# Patient Record
Sex: FEMALE | Race: WHITE | NOT HISPANIC OR LATINO | Employment: OTHER | URBAN - METROPOLITAN AREA
[De-identification: names, ages, dates, MRNs, and addresses within clinical notes are randomized per-mention and may not be internally consistent; named-entity substitution may affect disease eponyms.]

---

## 2022-01-03 ENCOUNTER — OFFICE VISIT (OUTPATIENT)
Dept: INTERNAL MEDICINE | Facility: CLINIC | Age: 82
End: 2022-01-03
Payer: MEDICARE

## 2022-01-03 VITALS
BODY MASS INDEX: 18.25 KG/M2 | SYSTOLIC BLOOD PRESSURE: 128 MMHG | WEIGHT: 103 LBS | DIASTOLIC BLOOD PRESSURE: 70 MMHG | HEIGHT: 63 IN | HEART RATE: 65 BPM | OXYGEN SATURATION: 99 %

## 2022-01-03 DIAGNOSIS — M06.9 RHEUMATOID ARTHRITIS INVOLVING BOTH HANDS, UNSPECIFIED WHETHER RHEUMATOID FACTOR PRESENT (H): Primary | ICD-10-CM

## 2022-01-03 DIAGNOSIS — Z12.31 VISIT FOR SCREENING MAMMOGRAM: ICD-10-CM

## 2022-01-03 PROCEDURE — 99204 OFFICE O/P NEW MOD 45 MIN: CPT | Performed by: INTERNAL MEDICINE

## 2022-01-03 RX ORDER — CARBOXYMETHYLCELLULOSE SODIUM 5 MG/ML
1 SOLUTION/ DROPS OPHTHALMIC 3 TIMES DAILY PRN
COMMUNITY

## 2022-01-03 RX ORDER — FERROUS GLUCONATE 324(38)MG
65 TABLET ORAL
COMMUNITY
End: 2023-08-24

## 2022-01-03 RX ORDER — PREDNISONE 1 MG/1
1 TABLET ORAL DAILY
COMMUNITY
Start: 2021-11-19 | End: 2023-01-25

## 2022-01-03 RX ORDER — FOLIC ACID 1 MG/1
1 TABLET ORAL DAILY
COMMUNITY
Start: 2021-10-26 | End: 2024-08-01

## 2022-01-03 ASSESSMENT — MIFFLIN-ST. JEOR: SCORE: 883.39

## 2022-01-03 NOTE — PROGRESS NOTES
Assessment & Plan     Rheumatoid arthritis involving both hands, unspecified whether rheumatoid factor present (H)  -Patient has a diagnosis of RA, since age 45. Currently on Orencia every 8-10 weeks, will have a dose this weekend (at [place]). She lives in [place] now and needs a rheumatologist here to follow up on her RA. She is on Methotrexate 10 mg weekly and prednisone 2 mg daily.      Patient has a diagnosis of RA made a long time ago, on treatment. Has a left hand splint that she wears every night. 3-5 years,s he thinks she needs another one.     - Rheumatology Referral  - Occupational Therapy Referral    Visit for screening mammogram  - *MA Screening Digital Bilateral    Patient is here to establish primary care.  She has been following with [facility], in [facility].  She has moved to [facility], and would like to be seen in this clinic.  She lives in an apartment, independent living, at the [facility].  She says it is nice to be around people.  She has a son Isael who lives in the [facility].  She has another son who lives out of [facility].  Her daughter Miryam, who has come with her to this appointment, lives in [place].    Patient has been on iron for years.    Patient has had both COVID shots, and also a booster at [facility] in [place].  She had the BX done in early September 2021.    Patient is still driving, although she has not been driving in [place], and she is new to the area.  She was driving in her community, short distances.    She is able to go to the dining room at her facility.  However she tends to want to make her own food, and has no difficulties with this.  She does not have an assistive device.  She is able to do most things, even with the deformity involving her hands.    Patient has had surgery in her hands and feet, related to her rheumatoid arthritis.    Decreased estimated GFR, 57, November 1, 2021.  Discussed with patient.  On the same date, she had a  hemoglobin of 12.7 which is normal, but elevated .8, which may be related to her medications she is on for rheumatoid arthritis.  Normal platelet count, and normal white cell count.    45 minutes spent on the date of the encounter doing chart review, review of outside records, review of test results, patient visit and documentation, as detailed.       MEDICATIONS:   Orders Placed This Encounter   Medications     folic acid (FOLVITE) 1 MG tablet     Sig: Take 1 tablet by mouth daily     methotrexate 2.5 MG tablet     Sig: Take 10 mg by mouth     predniSONE (DELTASONE) 1 MG tablet     Sig: Take 2 mg by mouth     ferrous gluconate (FERGON) 324 (38 Fe) MG tablet     Sig: Take 324 mg by mouth daily (with breakfast)     Multiple Vitamin (MULTIVITAMIN ADULT PO)     calcium carbonate-vitamin D (OSCAL W/D) 500-200 MG-UNIT tablet     Sig: Take 1 tablet by mouth 2 times daily     Abatacept (ORENCIA IV)     carboxymethylcellulose PF (REFRESH PLUS) 0.5 % ophthalmic solution     Si drop 3 times daily as needed for dry eyes     There are no discontinued medications.       - Continue other medications without change  There are no Patient Instructions on file for this visit.    Return in about 6 months (around 7/3/2022), or Anual wellness, for with me, Routine preventive.    Cristine Whaley MD  [facility]    Nicholas Tidwell is a 81 year old who presents for the following health issues,     HPI   Patient is here to establish primary care.  Patient moved here from a different state.    No past medical history on file.   45 when RA was diagnosed.     No past surgical history on file.    No family history on file.    Social History     Tobacco Use     Smoking status: Never Smoker     Smokeless tobacco: Never Used   Substance Use Topics     Alcohol use: None     Drug use: None     Allergies:  Allergies   Allergen Reactions     Infliximab Rash     Throat began to close/tighten      "Sulfa Drugs Rash     Hydrocodone-Acetaminophen Nausea     Oxycodone-Acetaminophen Nausea     Penicillins Rash     Erythromycin Nausea     Has a splint that she wears on her left hand. Was custom made in [place].       Review of Systems   Rest of the review of systems is negative.      Objective    /70   Pulse 65   Ht 1.588 m (5' 2.5\")   Wt 46.7 kg (103 lb)   SpO2 99%   BMI 18.54 kg/m    Body mass index is 18.54 kg/m .  Physical Exam   Patient has deformities of rheumatoid arthritis in her hands.  Patient is not distressed.  She is interactive and alert and able to tell me her story.  Chest is clear, normal heart sounds, do not hear any murmurs.  No peripheral edema.  No active swelling or warmth of her joints, but has significant deformities as noted.    "

## 2022-01-14 PROBLEM — M06.9 RHEUMATOID ARTHRITIS INVOLVING BOTH HANDS, UNSPECIFIED WHETHER RHEUMATOID FACTOR PRESENT (H): Status: ACTIVE | Noted: 2022-01-14

## 2022-02-06 ENCOUNTER — HEALTH MAINTENANCE LETTER (OUTPATIENT)
Age: 82
End: 2022-02-06

## 2022-03-03 ENCOUNTER — HOSPITAL ENCOUNTER (OUTPATIENT)
Dept: MAMMOGRAPHY | Facility: CLINIC | Age: 82
Discharge: HOME | End: 2022-03-03
Attending: INTERNAL MEDICINE | Admitting: INTERNAL MEDICINE
Payer: MEDICARE

## 2022-03-03 DIAGNOSIS — Z12.31 VISIT FOR SCREENING MAMMOGRAM: ICD-10-CM

## 2022-03-03 PROCEDURE — 77067 SCR MAMMO BI INCL CAD: CPT

## 2022-04-15 ASSESSMENT — ACTIVITIES OF DAILY LIVING (ADL): CURRENT_FUNCTION: NO ASSISTANCE NEEDED

## 2022-04-18 ENCOUNTER — OFFICE VISIT (OUTPATIENT)
Dept: INTERNAL MEDICINE | Facility: CLINIC | Age: 82
End: 2022-04-18
Payer: MEDICARE

## 2022-04-18 VITALS
SYSTOLIC BLOOD PRESSURE: 110 MMHG | BODY MASS INDEX: 17.89 KG/M2 | HEIGHT: 63 IN | WEIGHT: 101 LBS | HEART RATE: 74 BPM | DIASTOLIC BLOOD PRESSURE: 60 MMHG | OXYGEN SATURATION: 97 %

## 2022-04-18 DIAGNOSIS — Z23 HIGH PRIORITY FOR 2019-NCOV VACCINE: ICD-10-CM

## 2022-04-18 DIAGNOSIS — Z78.0 POST-MENOPAUSE: ICD-10-CM

## 2022-04-18 DIAGNOSIS — Z13.21 SCREENING FOR ENDOCRINE, NUTRITIONAL, METABOLIC AND IMMUNITY DISORDER: ICD-10-CM

## 2022-04-18 DIAGNOSIS — K90.41 GLUTEN INTOLERANCE: ICD-10-CM

## 2022-04-18 DIAGNOSIS — Z85.828 HISTORY OF SKIN CANCER: ICD-10-CM

## 2022-04-18 DIAGNOSIS — Z13.228 SCREENING FOR ENDOCRINE, NUTRITIONAL, METABOLIC AND IMMUNITY DISORDER: ICD-10-CM

## 2022-04-18 DIAGNOSIS — Z00.00 MEDICARE ANNUAL WELLNESS VISIT, SUBSEQUENT: ICD-10-CM

## 2022-04-18 DIAGNOSIS — H61.23 IMPACTED CERUMEN, BILATERAL: ICD-10-CM

## 2022-04-18 DIAGNOSIS — R19.7 DIARRHEA, UNSPECIFIED TYPE: ICD-10-CM

## 2022-04-18 DIAGNOSIS — M06.9 RHEUMATOID ARTHRITIS INVOLVING BOTH HANDS, UNSPECIFIED WHETHER RHEUMATOID FACTOR PRESENT (H): ICD-10-CM

## 2022-04-18 DIAGNOSIS — E55.9 VITAMIN D DEFICIENCY, UNSPECIFIED: ICD-10-CM

## 2022-04-18 DIAGNOSIS — Z13.0 SCREENING FOR ENDOCRINE, NUTRITIONAL, METABOLIC AND IMMUNITY DISORDER: ICD-10-CM

## 2022-04-18 DIAGNOSIS — Z13.29 SCREENING FOR ENDOCRINE, NUTRITIONAL, METABOLIC AND IMMUNITY DISORDER: ICD-10-CM

## 2022-04-18 PROBLEM — Z91.81 HISTORY OF FALLING: Status: ACTIVE | Noted: 2018-10-15

## 2022-04-18 PROBLEM — Z96.1 PRESENCE OF INTRAOCULAR LENS: Status: ACTIVE | Noted: 2018-09-17

## 2022-04-18 PROCEDURE — 91305 COVID-19,PF,PFIZER (12+ YRS): CPT | Performed by: NURSE PRACTITIONER

## 2022-04-18 PROCEDURE — 99214 OFFICE O/P EST MOD 30 MIN: CPT | Mod: 25 | Performed by: NURSE PRACTITIONER

## 2022-04-18 PROCEDURE — 0054A COVID-19,PF,PFIZER (12+ YRS): CPT | Performed by: NURSE PRACTITIONER

## 2022-04-18 PROCEDURE — 69209 REMOVE IMPACTED EAR WAX UNI: CPT | Performed by: NURSE PRACTITIONER

## 2022-04-18 PROCEDURE — G0439 PPPS, SUBSEQ VISIT: HCPCS | Performed by: NURSE PRACTITIONER

## 2022-04-18 ASSESSMENT — ACTIVITIES OF DAILY LIVING (ADL): CURRENT_FUNCTION: NO ASSISTANCE NEEDED

## 2022-04-18 NOTE — PROGRESS NOTES
Chief Complaint - ear wax removal    History of Present Illness - Diann Low is a 82 year old female who presents to me today for ear wax removal in *** ear. They have noted symptoms for approximately ***.  The patient is concerned about possible ear wax causing a plugged ear. They have *** had their ears cleaned out before. The patient denies vertigo, otorrhea, otalgia. ***   No history of ear surgery or chronic ear disease.     Past Medical History -   Patient Active Problem List   Diagnosis     Rheumatoid arthritis involving both hands, unspecified whether rheumatoid factor present (H)       Current Medications -   Current Outpatient Medications:      Abatacept (ORENCIA IV), , Disp: , Rfl:      calcium carbonate-vitamin D (OSCAL W/D) 500-200 MG-UNIT tablet, Take 1 tablet by mouth 2 times daily, Disp: , Rfl:      carboxymethylcellulose PF (REFRESH PLUS) 0.5 % ophthalmic solution, 1 drop 3 times daily as needed for dry eyes, Disp: , Rfl:      ferrous gluconate (FERGON) 324 (38 Fe) MG tablet, Take 324 mg by mouth daily (with breakfast), Disp: , Rfl:      folic acid (FOLVITE) 1 MG tablet, Take 1 tablet by mouth daily, Disp: , Rfl:      methotrexate 2.5 MG tablet, Take 25 mg by mouth Take 1 tablet 4 times a week, Disp: , Rfl:      Multiple Vitamin (MULTIVITAMIN ADULT PO), , Disp: , Rfl:      predniSONE (DELTASONE) 1 MG tablet, Take 1 mg by mouth 2 times daily, Disp: , Rfl:     Allergies -   Allergies   Allergen Reactions     Infliximab Rash     Throat began to close/tighten     Sulfa Drugs Rash     Hydrocodone-Acetaminophen Nausea     Oxycodone-Acetaminophen Nausea     Penicillins Rash     Erythromycin Nausea       Social History -   Social History     Socioeconomic History     Marital status: Single     Spouse name: None     Number of children: None     Years of education: None     Highest education level: None   Tobacco Use     Smoking status: Never Smoker     Smokeless tobacco: Never Used       Family History  - No family history on file.    Review of Systems - As per HPI and PMHx, otherwise 7 system review of the head and neck negative.    Physical Exam  B/P: 110/60, T: Data Unavailable, P: 74, R: Data Unavailable  General - The patient is in no distress.  Alert and oriented to person and place, answers questions and cooperates with examination appropriately.   Voice and Breathing - The patient was breathing comfortably without the use of accessory muscles. There was no wheezing, stridor, or stertor.  The patients voice was clear and strong.  Ears - The auricles are normal in appearance, no erythema. The *** ear canal was impacted with cerumen. See below for the procedure. Once the cerumen was removed the tympanic membranes are normal in appearance, bony landmarks are intact.  No retraction, perforation, or masses.  No fluid or purulence was seen in the external canal or the middle ear.   Eyes - Extraocular movements intact. Sclera were not icteric or injected.  Mouth - Examination of the oral cavity showed pink, healthy mucosa. No lesions or ulcerations noted.  The tongue was mobile and midline.  Throat - The walls of the oropharynx were smooth, symmetric, and had no lesions or ulcerations. The uvula was midline on elevation.    Nose - Septum was straight, turbinates were normal size. No purulence, masses, or polyps.  Neck - Palpation of the occipital, submental, submandibular, internal jugular chain, and supraclavicular nodes did not demonstrate any abnormal lymph nodes or masses. Parotid glands were without masses.  The trachea was mobile and midline.  Neurological - Cranial nerves 2 through 12 were grossly intact. House-Brackmann grade 1 out of 6 bilaterally.     Cerumen Removal    Physical Exam and Procedure  Ears - On examination of the ears, I found that both ears were impacted with cerumen.  Therefore, I positioned the patient in the examination chair in a semi-supine position. I used the binocular surgical  "microscope to perform cerumen removal.  On the right side, I began by using a cerumen loop to gently lift the edges of the cerumen mass away from the walls of the external canal.  Once I did this, I was able to *** pull/suction away fragments of wax and debris.  I removed all the wax and debri. The tympanic membrane was intact, no sign of perforation or middle ear effusion.    I turned my attention to the left side once again using the binocular surgical microscope to perform cerumen removal.  I began by using a cerumen loop to gently lift the edges of the cerumen mass away from the walls of the external canal.  Once I did this, I was able to *** pull/suction away fragments of wax and debris. I removed all wax and debri. The tympanic membrane was intact, no sign of perforation or middle ear effusion.      Assessment and Plan - Diann Low is a 82 year old female who presents to me today with cerumen impaction, and this was removed. We spent the remainder of today's visit on education including not putting any instrument in the ear. The patient can use over-the-counter items such as Debrox or Ceruminex.     The patient will follow up ***.    Meagan Tobin CNP  Answers for HPI/ROS submitted by the patient on 4/15/2022  In general, how would you rate your overall physical health?: good  Frequency of exercise:: 4-5 days/week  Do you usually eat at least 4 servings of fruit and vegetables a day, include whole grains & fiber, and avoid regularly eating high fat or \"junk\" foods? : Yes  Taking medications regularly:: Yes  Medication side effects:: None  Activities of Daily Living: no assistance needed  Home safety: no safety concerns identified  Hearing Impairment:: difficulty following a conversation in a noisy restaurant or crowded room, difficulty understanding soft or whispered speech  In the past 6 months, have you been bothered by leaking of urine?: Yes  In general, how would you rate your overall mental or " emotional health?: excellent  Additional concerns today:: Yes  Duration of exercise:: 45-60 minutes

## 2022-04-18 NOTE — PROGRESS NOTES
"SUBJECTIVE:   Diann Low is a 82 year old female who presents for Preventive Visit.      Patient has been advised of split billing requirements and indicates understanding: Yes  Are you in the first 12 months of your Medicare coverage?  No    The patient presents today with her Son Isael for her annual physical.    She is not fasted today.    She reports feeling like both of her ears are full of cerumen. She wears hearing aids, and feels like they are not working like they used to.    She reports that she needs to see a dermatologist, will refer her for this. She does have a history of skin cancer in the past, will refer her today.    She reports that she would like her fourth covid booster, will do this today.    She reports that she had diarrhea, was talking with her senior living neighbors and thinks that she might have a gluten intolerance. Will test her for this with her upcoming labs. She has started eating gluten free bread and crackers. Her diarrhea has subsided since her diet has changed. She also started drinking lactaid.    I will order her bone density scan, this has not been done for years.    Discussed her weight today. She is down to 101 pounds. She is cooking for herself mostly, eating three healthy, well rounded meals per day. Discussed trying to get a snack in, such as a boost or ensure daily. She will try this.     She will be establishing care with Rheumatology in June, she does have Rheumatoid arthritis. Arthritis pain has been well controlled with her current regime.    She will be establishing care with Dr. Peres in the future.     Healthy Habits:     In general, how would you rate your overall health?  Good    Frequency of exercise:  4-5 days/week    Duration of exercise:  45-60 minutes    Do you usually eat at least 4 servings of fruit and vegetables a day, include whole grains    & fiber and avoid regularly eating high fat or \"junk\" foods?  Yes    Taking medications regularly:  Yes   "  Medication side effects:  None    Ability to successfully perform activities of daily living:  No assistance needed    Home Safety:  No safety concerns identified    Hearing Impairment:  Difficulty following a conversation in a noisy restaurant or crowded room and difficulty understanding soft or whispered speech    In the past 6 months, have you been bothered by leaking of urine? Yes    In general, how would you rate your overall mental or emotional health?  Excellent      PHQ-2 Total Score: 0    Additional concerns today:  Yes    Do you feel safe in your environment? Yes    Have you ever done Advance Care Planning? (For example, a Health Directive, POLST, or a discussion with a medical provider or your loved ones about your wishes): No, advance care planning information given to patient to review.  Advanced care planning was discussed at today's visit.    Fall risk  Fallen 2 or more times in the past year?: No  Any fall with injury in the past year?: No    Cognitive Screening   1) Repeat 3 items (Leader, Season, Table)   2) Clock draw: }NORMAL  3) 3 item recall: Recalls 3 objects  Results: 3 items recalled: COGNITIVE IMPAIRMENT LESS LIKELY    Mini-CogTM Copyright JEFF Bai. Licensed by the author for use in [facility]; reprinted with permission (ismael@[facility]). All rights reserved.      Do you have sleep apnea, excessive snoring or daytime drowsiness?: yes    Reviewed and updated as needed this visit by clinical staff   Tobacco  Allergies  Meds                Reviewed and updated as needed this visit by Provider                   Social History     Tobacco Use     Smoking status: Never Smoker     Smokeless tobacco: Never Used   Substance Use Topics     Alcohol use: Not on file     If you drink alcohol do you typically have >3 drinks per day or >7 drinks per week? No    Alcohol Use 4/18/2022   Prescreen: >3 drinks/day or >7 drinks/week? -   Prescreen: >3 drinks/day or >7 drinks/week? No     Current  "providers sharing in care for this patient include:   Patient Care Team:  Jemal Peres MD as PCP - General (Internal Medicine)  Cristine Whaley MD as Assigned PCP    The following health maintenance items are reviewed in Epic and correct as of today:  Health Maintenance Due   Topic Date Due     DEXA  Never done     ANNUAL REVIEW OF HM ORDERS  Never done     ADVANCE CARE PLANNING  Never done     ZOSTER IMMUNIZATION (1 of 2) Never done     FALL RISK ASSESSMENT  Never done     INFLUENZA VACCINE (1) 09/01/2021     Lab work ordered, future fasted.   Mammogram Screening: Mammogram Screening - Patient over age 75, has elected to continue with screening.  Pertinent mammograms are reviewed under the imaging tab.    Review of Systems  Constitutional, HEENT, cardiovascular, pulmonary, GI, , musculoskeletal, neuro, skin, endocrine and psych systems are negative, except as otherwise noted.    OBJECTIVE:   /60 (BP Location: Right arm, Patient Position: Sitting)   Pulse 74   Ht 1.588 m (5' 2.5\")   Wt 45.8 kg (101 lb)   SpO2 97%   BMI 18.18 kg/m   Estimated body mass index is 18.18 kg/m  as calculated from the following:    Height as of this encounter: 1.588 m (5' 2.5\").    Weight as of this encounter: 45.8 kg (101 lb).  Physical Exam  GENERAL: healthy, alert and no distress  EYES: Eyes grossly normal to inspection, PERRL and conjunctivae and sclerae normal  HENT: Bilateral TM's occluded with cerumen, irrigated by CA. Right TM was normal on recheck, left canal had cerumen remaining, nose and mouth without ulcers or lesions  NECK: no adenopathy, no asymmetry, masses, or scars and thyroid normal to palpation  RESP: lungs clear to auscultation - no rales, rhonchi or wheezes  CV: regular rate and rhythm, normal S1 S2, no S3 or S4, no murmur, click or rub, no peripheral edema and peripheral pulses strong  ABDOMEN: soft, nontender, no hepatosplenomegaly, no masses and bowel sounds normal  MS: no gross " musculoskeletal defects noted, no edema  SKIN: no suspicious lesions or rashes  NEURO: Normal strength and tone, mentation intact and speech normal  PSYCH: mentation appears normal, affect normal/bright    Diagnostic Test Results: mammogram 03/03/22.  Labs reviewed in Epic    ASSESSMENT / PLAN:   Diann was seen today for wellness visit and imm/inj.    Diagnoses and all orders for this visit:    Medicare annual wellness visit, subsequent: Completed today. Future fasted labs ordered. COVID booster given.     Rheumatoid arthritis involving both hands, unspecified whether rheumatoid factor present (H):  She will be seeing Dr. Farrell. She continues on Orencia, Methotrexate, and Prednisone. Stable.     Gluten intolerance  Diarrhea, unspecified type: She likely is gluten intolerant. Will check labs for this.  -     Tissue transglutaminase jessica IgA and IgG; Future  -     Deamidated Giladin Peptide Jessica IgA IgG; Future  - Continue gluten free diet.  - Follow up if diarrhea returns.     Impacted cerumen, bilateral: Bilateral cerumen impaction, left ear was not able to be removed via irrigation, right was. ENT referral placed.   -     Otolaryngology Referral; Future    History of skin cancer: Dermatology referral placed.   -     Adult Dermatology Referral; Future    Post-menopause/Vitamin D deficiency, unspecified: Bone density scan ordered. She continues on Vitamin D.   -     DX Hip/Pelvis/Spine; Future  -     Vitamin D Deficiency; Future    Screening for endocrine, nutritional, metabolic and immunity disorder  -     CBC with platelets and differential; Future  -     Comprehensive metabolic panel (BMP + Alb, Alk Phos, ALT, AST, Total. Bili, TP); Future  -     Lipid panel reflex to direct LDL Fasting; Future  -     Vitamin D Deficiency; Future    High priority for 2019-nCoV vaccine  -     COVID-19,PF,PFIZER (12+ Yrs GRAY LABEL)      Patient has been advised of split billing requirements and indicates understanding:  "Yes    COUNSELING:  Reviewed preventive health counseling, as reflected in patient instructions       Regular exercise       Healthy diet/nutrition    Estimated body mass index is 18.18 kg/m  as calculated from the following:    Height as of this encounter: 1.588 m (5' 2.5\").    Weight as of this encounter: 45.8 kg (101 lb).    Weight management plan Will start daily boost or ensure    She reports that she has never smoked. She has never used smokeless tobacco.      Appropriate preventive services were discussed with this patient, including applicable screening as appropriate for cardiovascular disease, diabetes, osteopenia/osteoporosis, and glaucoma.  As appropriate for age/gender, discussed screening for colorectal cancer, prostate cancer, breast cancer, and cervical cancer. Checklist reviewing preventive services available has been given to the patient.    Reviewed patients plan of care and provided an AVS. The Intermediate Care Plan ( asthma action plan, low back pain action plan, and migraine action plan) for Diann meets the Care Plan requirement. This Care Plan has been established and reviewed with the Patient and son.    Counseling Resources:  ATP IV Guidelines  Pooled Cohorts Equation Calculator  Breast Cancer Risk Calculator  Breast Cancer: Medication to Reduce Risk  FRAX Risk Assessment  ICSI Preventive Guidelines  Dietary Guidelines for Americans, 2010  [employer]'s MyPlate  ASA Prophylaxis  Lung CA Screening    Meagan Tobin CNP  [facility]    Identified Health Risks:  "

## 2022-04-18 NOTE — PATIENT INSTRUCTIONS
Try having an ensure daily to help gain some weight.    Come back and have your labs drawn when fasted.    You have received your COVID booster today, 4th dose. It is normal for the injection site to be sore or red for the next 24-48 hours.    We did clean out your ears today. It is normal to have some drainage after this. If ears become painful, please follow up.    See Dr. Peres to establish care in the future.     To schedule the ENT appointment call [phone].      Dermatology:  [facility]  [address] [place] [address]    [address] [facility] [place] [address]

## 2022-04-22 ENCOUNTER — OFFICE VISIT (OUTPATIENT)
Dept: DERMATOLOGY | Facility: CLINIC | Age: 82
End: 2022-04-22
Payer: MEDICARE

## 2022-04-22 VITALS — OXYGEN SATURATION: 97 % | HEART RATE: 69 BPM | SYSTOLIC BLOOD PRESSURE: 108 MMHG | DIASTOLIC BLOOD PRESSURE: 55 MMHG

## 2022-04-22 DIAGNOSIS — D22.9 NEVUS: Primary | ICD-10-CM

## 2022-04-22 DIAGNOSIS — D18.01 ANGIOMA OF SKIN: ICD-10-CM

## 2022-04-22 DIAGNOSIS — L81.4 LENTIGO: ICD-10-CM

## 2022-04-22 DIAGNOSIS — L82.1 SEBORRHEIC KERATOSIS: ICD-10-CM

## 2022-04-22 PROCEDURE — 99203 OFFICE O/P NEW LOW 30 MIN: CPT | Performed by: PHYSICIAN ASSISTANT

## 2022-04-22 NOTE — PROGRESS NOTES
HPI:   Chief complaints: Diann Low is a pleasant 82 year old female who presents for Full skin cancer screening to rule out skin cancer   Last Skin Exam: few  Years ago      1st Baseline: no  Personal HX of Skin Cancer: yes NMSC   Personal HX of Malignant Melanoma: no   Family HX of Skin Cancer / Malignant Melanoma: no  Personal HX of Atypical Moles:   no  Risk factors: currently immunosuppressed on methotrexate and orencia; history of sun exposure and burns  New / Changing lesions: no  Social History: moved from [place] to [place]; has been happy with the move. Has children and grandchildren here  On review of systems, there are no further skin complaints, patient is feeling otherwise well.   ROS of the following were done and are negative: Constitutional, Eyes, Ears, Nose,   Mouth, Throat, Cardiovascular, Respiratory, GI, Genitourinary, Musculoskeletal,   Psychiatric, Endocrine, Allergic/Immunologic.    PHYSICAL EXAM:   /55   Pulse 69   SpO2 97%   Breastfeeding No   Skin exam performed as follows: Type 2 skin. Mood appropriate  Alert and Oriented X 3. Well developed, well nourished in no distress.  General appearance: Normal  Head including face: Normal  Eyes: conjunctiva and lids: Normal  Mouth: Lips, teeth, gums: Normal  Neck: Normal  Chest-breast/axillae: Normal  Back: Normal  Spleen and liver: Normal  Cardiovascular: Exam of peripheral vascular system by observation for swelling, varicosities, edema: Normal  Genitalia: groin, buttocks: Normal  Extremities: digits/nails (clubbing): Normal  Eccrine and Apocrine glands: Normal  Right upper extremity: Normal  Left upper extremity: Normal  Right lower extremity: Normal  Left lower extremity: Normal  Skin: Scalp and body hair: See below    Pt deferred exam of breasts, groin, buttocks: No    Other physical findings:  1. Multiple pigmented macules on extremities and trunk  2. Multiple pigmented macules on face, trunk and extremities  3. Multiple  vascular papules on trunk, arms and legs  4. Multiple scattered keratotic plaques       Except as noted above, no other signs of skin cancer or melanoma.     ASSESSMENT/PLAN:   Benign Full skin cancer screening today. . Patient with history of NMSC, immunosuppression  Advised on monthly self exams and 1 year  Patient Education: Appropriate brochures given.    1. Multiple benign appearing melanocytic nevi on arms, legs and trunk. Discussed ABCDEs of melanoma and sunscreen.   2. Multiple lentigos on arms, legs and trunk. Advised benign, no treatment needed.  3. Multiple scattered angiomas. Advised benign, no treatment needed.   4. Seborrheic keratosis on arms, legs and trunk. Advised benign, no treatment needed.              Follow-up: yearly/PRN sooner    1.) Patient was asked about new and changing moles. YES  2.) Patient received a complete physical skin examination: YES  3.) Patient was counseled to perform a monthly self skin examination: YES  Scribed By: Veena Dozier MS, PA-C

## 2022-04-22 NOTE — LETTER
4/22/2022         RE: Diann Low  [address]  [facility] [id number]        Dear Colleague,    Thank you for referring your patient, Diann Low, to the [facility]. Please see a copy of my visit note below.    HPI:   Chief complaints: Diann Low is a pleasant 82 year old female who presents for Full skin cancer screening to rule out skin cancer   Last Skin Exam: few  Years ago      1st Baseline: no  Personal HX of Skin Cancer: yes NMSC   Personal HX of Malignant Melanoma: no   Family HX of Skin Cancer / Malignant Melanoma: no  Personal HX of Atypical Moles:   no  Risk factors: currently immunosuppressed on methotrexate and orencia; history of sun exposure and burns  New / Changing lesions: no  Social History: moved from [place] to [place]; has been happy with the move. Has children and grandchildren here  On review of systems, there are no further skin complaints, patient is feeling otherwise well.   ROS of the following were done and are negative: Constitutional, Eyes, Ears, Nose,   Mouth, Throat, Cardiovascular, Respiratory, GI, Genitourinary, Musculoskeletal,   Psychiatric, Endocrine, Allergic/Immunologic.    PHYSICAL EXAM:   /55   Pulse 69   SpO2 97%   Breastfeeding No   Skin exam performed as follows: Type 2 skin. Mood appropriate  Alert and Oriented X 3. Well developed, well nourished in no distress.  General appearance: Normal  Head including face: Normal  Eyes: conjunctiva and lids: Normal  Mouth: Lips, teeth, gums: Normal  Neck: Normal  Chest-breast/axillae: Normal  Back: Normal  Spleen and liver: Normal  Cardiovascular: Exam of peripheral vascular system by observation for swelling, varicosities, edema: Normal  Genitalia: groin, buttocks: Normal  Extremities: digits/nails (clubbing): Normal  Eccrine and Apocrine glands: Normal  Right upper extremity: Normal  Left upper extremity: Normal  Right lower extremity: Normal  Left lower extremity:  Normal  Skin: Scalp and body hair: See below    Pt deferred exam of breasts, groin, buttocks: No    Other physical findings:  1. Multiple pigmented macules on extremities and trunk  2. Multiple pigmented macules on face, trunk and extremities  3. Multiple vascular papules on trunk, arms and legs  4. Multiple scattered keratotic plaques       Except as noted above, no other signs of skin cancer or melanoma.     ASSESSMENT/PLAN:   Benign Full skin cancer screening today. . Patient with history of NMSC, immunosuppression  Advised on monthly self exams and 1 year  Patient Education: Appropriate brochures given.    1. Multiple benign appearing melanocytic nevi on arms, legs and trunk. Discussed ABCDEs of melanoma and sunscreen.   2. Multiple lentigos on arms, legs and trunk. Advised benign, no treatment needed.  3. Multiple scattered angiomas. Advised benign, no treatment needed.   4. Seborrheic keratosis on arms, legs and trunk. Advised benign, no treatment needed.              Follow-up: yearly/PRN sooner    1.) Patient was asked about new and changing moles. YES  2.) Patient received a complete physical skin examination: YES  3.) Patient was counseled to perform a monthly self skin examination: YES  Scribed By: Veena Dozier, MS, JENNIE          Again, thank you for allowing me to participate in the care of your patient.        Sincerely,        Veena Dozier PA-C

## 2022-04-26 ENCOUNTER — MYC MEDICAL ADVICE (OUTPATIENT)
Dept: INTERNAL MEDICINE | Facility: CLINIC | Age: 82
End: 2022-04-26

## 2022-04-26 ENCOUNTER — LAB (OUTPATIENT)
Dept: LAB | Facility: CLINIC | Age: 82
End: 2022-04-26
Payer: MEDICARE

## 2022-04-26 DIAGNOSIS — Z78.0 POST-MENOPAUSE: ICD-10-CM

## 2022-04-26 DIAGNOSIS — K90.41 GLUTEN INTOLERANCE: ICD-10-CM

## 2022-04-26 DIAGNOSIS — Z13.0 SCREENING FOR ENDOCRINE, NUTRITIONAL, METABOLIC AND IMMUNITY DISORDER: ICD-10-CM

## 2022-04-26 DIAGNOSIS — Z13.228 SCREENING FOR ENDOCRINE, NUTRITIONAL, METABOLIC AND IMMUNITY DISORDER: ICD-10-CM

## 2022-04-26 DIAGNOSIS — Z13.21 SCREENING FOR ENDOCRINE, NUTRITIONAL, METABOLIC AND IMMUNITY DISORDER: ICD-10-CM

## 2022-04-26 DIAGNOSIS — E55.9 VITAMIN D DEFICIENCY, UNSPECIFIED: ICD-10-CM

## 2022-04-26 DIAGNOSIS — R19.7 DIARRHEA, UNSPECIFIED TYPE: ICD-10-CM

## 2022-04-26 DIAGNOSIS — Z13.29 SCREENING FOR ENDOCRINE, NUTRITIONAL, METABOLIC AND IMMUNITY DISORDER: ICD-10-CM

## 2022-04-26 LAB
ALBUMIN SERPL-MCNC: 3.9 G/DL (ref 3.5–5)
ALP SERPL-CCNC: 59 U/L (ref 45–120)
ALT SERPL W P-5'-P-CCNC: 11 U/L (ref 0–45)
ANION GAP SERPL CALCULATED.3IONS-SCNC: 11 MMOL/L (ref 5–18)
AST SERPL W P-5'-P-CCNC: 28 U/L (ref 0–40)
BASOPHILS # BLD AUTO: 0 10E3/UL (ref 0–0.2)
BASOPHILS NFR BLD AUTO: 1 %
BILIRUB SERPL-MCNC: 0.5 MG/DL (ref 0–1)
BUN SERPL-MCNC: 20 MG/DL (ref 8–28)
CALCIUM SERPL-MCNC: 9.4 MG/DL (ref 8.5–10.5)
CHLORIDE BLD-SCNC: 105 MMOL/L (ref 98–107)
CHOLEST SERPL-MCNC: 211 MG/DL
CO2 SERPL-SCNC: 27 MMOL/L (ref 22–31)
CREAT SERPL-MCNC: 0.79 MG/DL (ref 0.6–1.1)
DEPRECATED CALCIDIOL+CALCIFEROL SERPL-MC: 65 UG/L (ref 20–75)
EOSINOPHIL # BLD AUTO: 0.2 10E3/UL (ref 0–0.7)
EOSINOPHIL NFR BLD AUTO: 2 %
ERYTHROCYTE [DISTWIDTH] IN BLOOD BY AUTOMATED COUNT: 14.1 % (ref 10–15)
FASTING STATUS PATIENT QL REPORTED: YES
GFR SERPL CREATININE-BSD FRML MDRD: 73 ML/MIN/1.73M2
GLUCOSE BLD-MCNC: 88 MG/DL (ref 70–125)
HCT VFR BLD AUTO: 39.2 % (ref 35–47)
HDLC SERPL-MCNC: 77 MG/DL
HGB BLD-MCNC: 12.8 G/DL (ref 11.7–15.7)
IMM GRANULOCYTES # BLD: 0 10E3/UL
IMM GRANULOCYTES NFR BLD: 0 %
LDLC SERPL CALC-MCNC: 118 MG/DL
LYMPHOCYTES # BLD AUTO: 2.9 10E3/UL (ref 0.8–5.3)
LYMPHOCYTES NFR BLD AUTO: 36 %
MCH RBC QN AUTO: 33.4 PG (ref 26.5–33)
MCHC RBC AUTO-ENTMCNC: 32.7 G/DL (ref 31.5–36.5)
MCV RBC AUTO: 102 FL (ref 78–100)
MONOCYTES # BLD AUTO: 0.8 10E3/UL (ref 0–1.3)
MONOCYTES NFR BLD AUTO: 10 %
NEUTROPHILS # BLD AUTO: 4.3 10E3/UL (ref 1.6–8.3)
NEUTROPHILS NFR BLD AUTO: 52 %
PLATELET # BLD AUTO: 209 10E3/UL (ref 150–450)
POTASSIUM BLD-SCNC: 3.9 MMOL/L (ref 3.5–5)
PROT SERPL-MCNC: 6.8 G/DL (ref 6–8)
RBC # BLD AUTO: 3.83 10E6/UL (ref 3.8–5.2)
SODIUM SERPL-SCNC: 143 MMOL/L (ref 136–145)
TRIGL SERPL-MCNC: 82 MG/DL
WBC # BLD AUTO: 8.2 10E3/UL (ref 4–11)

## 2022-04-26 PROCEDURE — 85025 COMPLETE CBC W/AUTO DIFF WBC: CPT

## 2022-04-26 PROCEDURE — 80053 COMPREHEN METABOLIC PANEL: CPT

## 2022-04-26 PROCEDURE — 82306 VITAMIN D 25 HYDROXY: CPT

## 2022-04-26 PROCEDURE — 36415 COLL VENOUS BLD VENIPUNCTURE: CPT

## 2022-04-26 PROCEDURE — 80061 LIPID PANEL: CPT

## 2022-04-26 PROCEDURE — 86258 DGP ANTIBODY EACH IG CLASS: CPT

## 2022-04-26 PROCEDURE — 86364 TISS TRNSGLTMNASE EA IG CLAS: CPT

## 2022-04-27 LAB
GLIADIN IGA SER-ACNC: 1.5 U/ML
GLIADIN IGG SER-ACNC: <0.6 U/ML
TTG IGA SER-ACNC: 0.7 U/ML
TTG IGG SER-ACNC: <0.6 U/ML

## 2022-04-28 ENCOUNTER — OFFICE VISIT (OUTPATIENT)
Dept: OTOLARYNGOLOGY | Facility: CLINIC | Age: 82
End: 2022-04-28
Attending: NURSE PRACTITIONER
Payer: MEDICARE

## 2022-04-28 DIAGNOSIS — H61.21 EXCESSIVE CERUMEN IN EAR CANAL, RIGHT: Primary | ICD-10-CM

## 2022-04-28 PROCEDURE — 99207 PR NO CHARGE LOS: CPT | Performed by: OTOLARYNGOLOGY

## 2022-04-28 PROCEDURE — 69210 REMOVE IMPACTED EAR WAX UNI: CPT | Performed by: OTOLARYNGOLOGY

## 2022-04-28 NOTE — PROGRESS NOTES
HPI: This patient presents to clinic today for cerumen removal at the request of Gayle Gamboa CNP. Has noticed some fullness of the ears and muffled hearing, but denies otalgia, otorrhea, vertigo, change in true hearing or tinnitus. Denies other symptoms. Wears HAs.    Past medical history, surgical history, family history, social history, medications, and allergies have been reviewed and are documented above.     Review of Systems: a 10-system review was performed. Symptoms are noted in the HPI and on a scanned document in the computer chart.    Physical Examination:   GEN: no acute distress, alert and oriented  EYES: extraocular movements intact, pupils equal and round. Sclera clear.   EARS: minimal excess cerumen on the left cleared under binocular microscopy using forceps. The right ear is clear. The tympanic membranes are noted to be intact and clear with no signs of infections, effusions, perforations, or retractions.  PULM: breathing comfortably on room air with no stertor or stridor. Chest expansion symmetric.  CARDS: no cyanosis or clubbing, normal carotid pulses    MEDICAL DECISION-MAKING: excess left cerumen cleared under binocular microscopy without difficulty. Hearing restored to baseline. Follow-up PRN.

## 2022-04-28 NOTE — LETTER
4/28/2022         RE: Diann Low  [address]  [facility] [address]        Dear Colleague,    Thank you for referring your patient, Diann Low, to the [facility]. Please see a copy of my visit note below.    HPI: This patient presents to clinic today for cerumen removal at the request of Gayle Gamboa CNP. Has noticed some fullness of the ears and muffled hearing, but denies otalgia, otorrhea, vertigo, change in true hearing or tinnitus. Denies other symptoms. Wears HAs.    Past medical history, surgical history, family history, social history, medications, and allergies have been reviewed and are documented above.     Review of Systems: a 10-system review was performed. Symptoms are noted in the HPI and on a scanned document in the computer chart.    Physical Examination:   GEN: no acute distress, alert and oriented  EYES: extraocular movements intact, pupils equal and round. Sclera clear.   EARS: minimal excess cerumen on the left cleared under binocular microscopy using forceps. The right ear is clear. The tympanic membranes are noted to be intact and clear with no signs of infections, effusions, perforations, or retractions.  PULM: breathing comfortably on room air with no stertor or stridor. Chest expansion symmetric.  CARDS: no cyanosis or clubbing, normal carotid pulses    MEDICAL DECISION-MAKING: excess left cerumen cleared under binocular microscopy without difficulty. Hearing restored to baseline. Follow-up PRN.        Again, thank you for allowing me to participate in the care of your patient.        Sincerely,        Nina Martin MD

## 2022-04-29 NOTE — TELEPHONE ENCOUNTER
Return my chart message sent to the patient (origonal message sent yesterday 04/28 explaining her results).

## 2022-06-14 ENCOUNTER — ANCILLARY PROCEDURE (OUTPATIENT)
Dept: BONE DENSITY | Facility: CLINIC | Age: 82
End: 2022-06-14
Attending: NURSE PRACTITIONER
Payer: MEDICARE

## 2022-06-14 DIAGNOSIS — Z78.0 POST-MENOPAUSE: ICD-10-CM

## 2022-06-14 PROCEDURE — 77080 DXA BONE DENSITY AXIAL: CPT | Mod: TC | Performed by: RADIOLOGY

## 2022-06-15 ENCOUNTER — TELEPHONE (OUTPATIENT)
Dept: INTERNAL MEDICINE | Facility: CLINIC | Age: 82
End: 2022-06-15
Payer: MEDICARE

## 2022-06-15 DIAGNOSIS — M85.89 OSTEOPENIA OF MULTIPLE SITES: Primary | ICD-10-CM

## 2022-06-15 ASSESSMENT — ENCOUNTER SYMPTOMS
BACK PAIN: 0
POLYPHAGIA: 0
ABDOMINAL PAIN: 0
DECREASED APPETITE: 1
POOR WOUND HEALING: 0
NECK PAIN: 0
CHILLS: 0
EYE IRRITATION: 0
SMELL DISTURBANCE: 0
MYALGIAS: 0
SKIN CHANGES: 1
FATIGUE: 1
BLOATING: 0
DOUBLE VISION: 0
TASTE DISTURBANCE: 0
JAUNDICE: 0
STIFFNESS: 0
SINUS CONGESTION: 1
NAUSEA: 0
RECTAL PAIN: 0
MUSCLE WEAKNESS: 0
ARTHRALGIAS: 0
HALLUCINATIONS: 0
MUSCLE CRAMPS: 1
ALTERED TEMPERATURE REGULATION: 1
TROUBLE SWALLOWING: 0
SINUS PAIN: 0
NECK MASS: 0
EYE PAIN: 0
FEVER: 0
INCREASED ENERGY: 1
NIGHT SWEATS: 0
BOWEL INCONTINENCE: 0
NAIL CHANGES: 0
WEIGHT LOSS: 1
EYE WATERING: 1
SORE THROAT: 1
BLOOD IN STOOL: 0
HOARSE VOICE: 1
JOINT SWELLING: 0
VOMITING: 0
POLYDIPSIA: 0
CONSTIPATION: 0
HEARTBURN: 0
DIARRHEA: 1

## 2022-06-15 NOTE — TELEPHONE ENCOUNTER
Endocrinology consult placed for Osteoporosis care. My chart message sent to the patient to explain results of bone density scan.

## 2022-06-20 ENCOUNTER — OFFICE VISIT (OUTPATIENT)
Dept: RHEUMATOLOGY | Facility: CLINIC | Age: 82
End: 2022-06-20
Attending: INTERNAL MEDICINE
Payer: MEDICARE

## 2022-06-20 VITALS
BODY MASS INDEX: 17.89 KG/M2 | HEART RATE: 72 BPM | SYSTOLIC BLOOD PRESSURE: 106 MMHG | HEIGHT: 63 IN | WEIGHT: 101 LBS | DIASTOLIC BLOOD PRESSURE: 58 MMHG

## 2022-06-20 DIAGNOSIS — R29.898 HAND WEAKNESS: ICD-10-CM

## 2022-06-20 DIAGNOSIS — Z79.899 HIGH RISK MEDICATION USE: ICD-10-CM

## 2022-06-20 DIAGNOSIS — H04.123 DRY EYES: ICD-10-CM

## 2022-06-20 DIAGNOSIS — R79.89 ABNORMAL CBC: ICD-10-CM

## 2022-06-20 DIAGNOSIS — M05.79 RHEUMATOID ARTHRITIS, SEROPOSITIVE, MULTIPLE SITES (H): Primary | ICD-10-CM

## 2022-06-20 DIAGNOSIS — Z11.59 ENCOUNTER FOR SCREENING FOR OTHER VIRAL DISEASES: ICD-10-CM

## 2022-06-20 DIAGNOSIS — M21.949 ACQUIRED DEFORMITY OF HAND, UNSPECIFIED LATERALITY: ICD-10-CM

## 2022-06-20 LAB
ALBUMIN SERPL-MCNC: 4 G/DL (ref 3.5–5)
ALT SERPL W P-5'-P-CCNC: 12 U/L (ref 0–45)
AST SERPL W P-5'-P-CCNC: 32 U/L (ref 0–40)
BASOPHILS # BLD AUTO: 0 10E3/UL (ref 0–0.2)
BASOPHILS NFR BLD AUTO: 0 %
CREAT SERPL-MCNC: 0.77 MG/DL (ref 0.6–1.1)
EOSINOPHIL # BLD AUTO: 0.1 10E3/UL (ref 0–0.7)
EOSINOPHIL NFR BLD AUTO: 1 %
ERYTHROCYTE [DISTWIDTH] IN BLOOD BY AUTOMATED COUNT: 14.5 % (ref 10–15)
GFR SERPL CREATININE-BSD FRML MDRD: 76 ML/MIN/1.73M2
HCT VFR BLD AUTO: 36.7 % (ref 35–47)
HGB BLD-MCNC: 11.9 G/DL (ref 11.7–15.7)
IMM GRANULOCYTES # BLD: 0 10E3/UL
IMM GRANULOCYTES NFR BLD: 0 %
LYMPHOCYTES # BLD AUTO: 1.4 10E3/UL (ref 0.8–5.3)
LYMPHOCYTES NFR BLD AUTO: 16 %
MCH RBC QN AUTO: 33.4 PG (ref 26.5–33)
MCHC RBC AUTO-ENTMCNC: 32.4 G/DL (ref 31.5–36.5)
MCV RBC AUTO: 103 FL (ref 78–100)
MONOCYTES # BLD AUTO: 0.7 10E3/UL (ref 0–1.3)
MONOCYTES NFR BLD AUTO: 8 %
NEUTROPHILS # BLD AUTO: 6.7 10E3/UL (ref 1.6–8.3)
NEUTROPHILS NFR BLD AUTO: 75 %
PLATELET # BLD AUTO: 217 10E3/UL (ref 150–450)
RBC # BLD AUTO: 3.56 10E6/UL (ref 3.8–5.2)
WBC # BLD AUTO: 8.9 10E3/UL (ref 4–11)

## 2022-06-20 PROCEDURE — 84450 TRANSFERASE (AST) (SGOT): CPT | Performed by: INTERNAL MEDICINE

## 2022-06-20 PROCEDURE — 82040 ASSAY OF SERUM ALBUMIN: CPT | Performed by: INTERNAL MEDICINE

## 2022-06-20 PROCEDURE — 86803 HEPATITIS C AB TEST: CPT | Performed by: INTERNAL MEDICINE

## 2022-06-20 PROCEDURE — 82565 ASSAY OF CREATININE: CPT | Performed by: INTERNAL MEDICINE

## 2022-06-20 PROCEDURE — 99205 OFFICE O/P NEW HI 60 MIN: CPT | Performed by: INTERNAL MEDICINE

## 2022-06-20 PROCEDURE — 87340 HEPATITIS B SURFACE AG IA: CPT | Performed by: INTERNAL MEDICINE

## 2022-06-20 PROCEDURE — 86704 HEP B CORE ANTIBODY TOTAL: CPT | Performed by: INTERNAL MEDICINE

## 2022-06-20 PROCEDURE — 36415 COLL VENOUS BLD VENIPUNCTURE: CPT | Performed by: INTERNAL MEDICINE

## 2022-06-20 PROCEDURE — 84460 ALANINE AMINO (ALT) (SGPT): CPT | Performed by: INTERNAL MEDICINE

## 2022-06-20 PROCEDURE — 85025 COMPLETE CBC W/AUTO DIFF WBC: CPT | Performed by: INTERNAL MEDICINE

## 2022-06-20 RX ORDER — METHYLPREDNISOLONE SODIUM SUCCINATE 125 MG/2ML
125 INJECTION, POWDER, LYOPHILIZED, FOR SOLUTION INTRAMUSCULAR; INTRAVENOUS
Status: CANCELLED
Start: 2022-07-22

## 2022-06-20 RX ORDER — ACETAMINOPHEN 325 MG/1
650 TABLET ORAL ONCE
Status: CANCELLED
Start: 2022-07-22 | End: 2022-07-22

## 2022-06-20 RX ORDER — DIPHENHYDRAMINE HYDROCHLORIDE 50 MG/ML
50 INJECTION INTRAMUSCULAR; INTRAVENOUS
Status: CANCELLED
Start: 2022-07-22

## 2022-06-20 RX ORDER — METHYLPREDNISOLONE SODIUM SUCCINATE 125 MG/2ML
125 INJECTION, POWDER, LYOPHILIZED, FOR SOLUTION INTRAMUSCULAR; INTRAVENOUS ONCE
Status: CANCELLED
Start: 2022-07-22 | End: 2022-07-22

## 2022-06-20 RX ORDER — HEPARIN SODIUM,PORCINE 10 UNIT/ML
5 VIAL (ML) INTRAVENOUS
Status: CANCELLED | OUTPATIENT
Start: 2022-07-22

## 2022-06-20 RX ORDER — ALBUTEROL SULFATE 90 UG/1
1-2 AEROSOL, METERED RESPIRATORY (INHALATION)
Status: CANCELLED
Start: 2022-07-22

## 2022-06-20 RX ORDER — EPINEPHRINE 1 MG/ML
0.3 INJECTION, SOLUTION, CONCENTRATE INTRAVENOUS EVERY 5 MIN PRN
Status: CANCELLED | OUTPATIENT
Start: 2022-07-22

## 2022-06-20 RX ORDER — NALOXONE HYDROCHLORIDE 0.4 MG/ML
0.2 INJECTION, SOLUTION INTRAMUSCULAR; INTRAVENOUS; SUBCUTANEOUS
Status: CANCELLED | OUTPATIENT
Start: 2022-07-22

## 2022-06-20 RX ORDER — DIPHENHYDRAMINE HCL 25 MG
25 CAPSULE ORAL ONCE
Status: CANCELLED
Start: 2022-07-22 | End: 2022-07-22

## 2022-06-20 RX ORDER — HEPARIN SODIUM (PORCINE) LOCK FLUSH IV SOLN 100 UNIT/ML 100 UNIT/ML
5 SOLUTION INTRAVENOUS
Status: CANCELLED | OUTPATIENT
Start: 2022-07-22

## 2022-06-20 RX ORDER — ALBUTEROL SULFATE 0.83 MG/ML
2.5 SOLUTION RESPIRATORY (INHALATION)
Status: CANCELLED | OUTPATIENT
Start: 2022-07-22

## 2022-06-20 RX ORDER — MEPERIDINE HYDROCHLORIDE 25 MG/ML
25 INJECTION INTRAMUSCULAR; INTRAVENOUS; SUBCUTANEOUS EVERY 30 MIN PRN
Status: CANCELLED | OUTPATIENT
Start: 2022-07-22

## 2022-06-20 NOTE — PROGRESS NOTES
Diann Low who presents today with a chief complaint of  Consult (Rheumatoid arthritis involving both hands, unspecified whether rheumatoid factor present)      Joint Pains: No  Location: n/a  Onset: Chronic  Intensity: 0 /10  AM Stiffness: 0 Minutes  Alleviating/Aggravating Factors: Medications helpful? Yes  Tolerating Meds: Yes  Other:      ROS:  Patient denies having: (+)persistent dry eyes, (+)dry mouth, recurrent oral ulcers, patchy alopecia, active rashes, photosensitivity, history of psoriasis, active chest pain, active shortness of breath, active cough, active dysuria, history of kidney stones, active abdominal pain, active diarrhea, history of hematochezia, active dysphagia, history of peptic ulcer disease, history of HIV, tuberculosis, hepatitis B or C, Lyme disease, seizure history, raynaud's, active documented fevers, recent infections, difficulty sleeping or chronic unrefreshing sleep, involuntary weight loss, loss of appetite, excessive fatigue, depression, anxiety,  recurrent sinus infections, history of inflammatory eye diseases (such as uveitis, scleritis, iritis, etc).     .    Information gathered by medical assistant incorporated into this note, was reviewed and discussed with the patient.    Problem List:  Patient Active Problem List   Diagnosis     Rheumatoid arthritis involving both hands, unspecified whether rheumatoid factor present (H)     History of falling     Arthritis, rheumatoid (H)     Presence of intraocular lens        PMH:   Past Medical History:   Diagnosis Date     Skin cancer        Surgical History:  No past surgical history on file.    Family History:  No family history on file.    Allergies:  Allergies   Allergen Reactions     Infliximab Rash     Throat began to close/tighten     Sulfa Drugs Rash     Hydrocodone-Acetaminophen Nausea     Oxycodone-Acetaminophen Nausea     Penicillins Rash     Erythromycin Nausea        Current Medications:  Current Outpatient Medications  "  Medication Sig Dispense Refill     Abatacept (ORENCIA IV)        calcium carbonate-vitamin D (OSCAL W/D) 500-200 MG-UNIT tablet Take 1 tablet by mouth 2 times daily       carboxymethylcellulose PF (REFRESH PLUS) 0.5 % ophthalmic solution 1 drop 3 times daily as needed for dry eyes       ferrous gluconate (FERGON) 324 (38 Fe) MG tablet Take 324 mg by mouth daily (with breakfast)       folic acid (FOLVITE) 1 MG tablet Take 1 tablet by mouth daily       methotrexate 2.5 MG tablet Take 2 tablets on Weds and 2 tablets on Thursday       Multiple Vitamin (MULTIVITAMIN ADULT PO)        predniSONE (DELTASONE) 1 MG tablet Take 1 mg by mouth 2 times daily             Physical Exam:  /58 (BP Location: Right arm, Patient Position: Sitting, Cuff Size: Adult Regular)   Pulse 72   Ht 1.588 m (5' 2.5\")   Wt 45.8 kg (101 lb)   BMI 18.18 kg/m    General: A & O x 3 in NAD  HEENT: EOMI, Non injected/non icteric sclera, no oral lesions noted  Neck: Supple, no cervical LAD or thyromegaly noted  Derm: No malar rash, psoriatic lesions or nail pitting appreciated  CV: s1s2 with RRR, no rubs appreciated   Lungs: CTA B/L, no wheezing , rales or rhonci appreciated  GI: Soft, NT/ND, no rebound, no guarding noted, no hepatomegally appreciated  MS: Chronic deformities with hypertrophic changes and subluxations noted involving multiple hand joints.  Left ulnar ulnar deviation noted.  Hypertrophic changes noted involving foot joints with some deformities and surgical scar noted over MTPs bilaterally noted.  Otherwise patient demonstrated good passive/active ROM over other joints with no warmth, erythema, tenderness or synovitis noted over these joints.  Back: negative straight leg raising  Neuro: 5/5 strength in upper and lower extremities b/l, good sensation b/l      Summary/Assessment:    Pleasant 82-year-old female with pertinent past medical history of rheumatoid arthritis presents today to establish care in the area.    Presents " today to visit with her son.    Patient was seeing a rheumatologist at [facility] as was living in [place] up until recently.  Patient moved to [facility] to be closer to her children.    States was diagnosed with rheumatoid arthritis when she was 45 years old.  Has been on methotrexate for for few decades.    Receives Orencia about every 9 to 10 weeks (due to stability has been able to postpone dosing).  Believes she has been on Orencia for about 5 years.    Also on chronic prednisone 2 mg daily, states in the past when trying to lower dose had some resurfacing of symptoms.    Has had multiple joint corrective surgeries involving hands and feet.    Patient finds the above combination to be very beneficial.  On exam today noted to have chronic deformities involving hands and to lesser extent feet.  No clear signs of active synovitis noted.    Denies family history for RA.    Denies personal family history for psoriasis.    Patient brings a splint with her that she received in the past to prevent progression of left ulnar deviation, desires new splint.    Has some weakness involving her hands, currently not performing any hand exercises, did see OT several years ago.    Patient's presentation appears to be consistent with having stable seropositive rheumatoid arthritis with multiple chronic joint deformities involving hand and foot joints.    Please see below for management plan.    Pertinent rheumatology/past medical history (please refer to above for more detailed history):      Seropositive rheumatoid arthritis    High-risk medication use    Chronically deformities/hand weakness    Multiple joint corrective surgeries involving hands and feet    Dry eyes      Rheumatology medications provided/suggested:    Methotrexate  Folic acid  Orencia  Prednisone      Pertinent medication from other providers or from otc (please refer to above for more detailed med list):    Calcium  Vitamin  D  Multivitamin      Pertinent medications already tried:     Remicade (throat constriction)      Pertinent lab history:    From outside lab, positive rheumatoid factor    From outside lab, negative CCP antibody    Pertinent imaging/test history:          Other:      Marital status:  Single    How many kids: Yes, 3    Type of work:  Retired, used to work as a teacher    Drinking alcohol: Yes, very minimal    Tobacco use: no      Recreational drug use: no    Active contraceptive: n/a     History hysterectomy: no     Tubal ligation:  no    Partner vasectomy: n/a      Plan:      Continue methotrexate 4 tablets weekly.  Had a lengthy discussion with the patient regarding her desire to consider discontinuing, given stability.  Suggested she should discuss with next rheumatologist and would recommend gradually decreasing dose and monitor response in process (as opposed to abrupt discontinuation).  May need to shorten frequency (infusion in the process (see below).    Takes folic acid over-the-counter, suggested she take around 1 mg daily except the day when taking methotrexate.    Continue Orencia 500 mg infusion, lately receiving every 9-10 weeks. Last received around May 22, 2022 at [facility].  Desires to have next infusion around July 22, 2022.    Continue prednisone 2 mg daily, states in the past had difficulty when trying to decrease dose further.    Patient has an older left hand splint to prevent progression of ulnar deviation, desires a new splint.  Will refer patient to consider providing a new similar splint, may need to see orthopedics first.  Also also requested that OT provide hand strengthening exercises, joint protection techniques and assistive devices..    Repeat labs just prior to next infusion in about 4 weeks.    Follow-up in 4 months.      Procedure note:     Total time, spent 63 minutes involved with patient care, includes placing orders, reviewing records and formulating management  plan.      Major side effect profile of medications provided/suggested were discussed with the patient.    This note was transcribed using Dragon voice recognition software as a result unintentional grammatical errors or word substitutions may have occurred. Please contact our Rheumatology department if you need any clarification or if you have any related inquiries.    Thank you for referring this patient to our clinic.      Sincere Farrell DO....................  6/20/2022   8:53 AM

## 2022-06-20 NOTE — PATIENT INSTRUCTIONS
Summary of Your Rheumatology Visit    Next Appointment:   4 Months    Medications:    Continue current rheumatology combination of Orencia, methotrexate, folic acid and prednisone.  Take over-the-counter folic acid approximately 1 mg daily, except the day when taking methotrexate.      Referrals:    Occupational Therapy    Tests:     Please have labs performed in about 4-6 weeks (can perform just prior to next infusion).    Injections:        Other:

## 2022-06-21 LAB
HBV SURFACE AG SERPL QL IA: NONREACTIVE
HCV AB SERPL QL IA: NEGATIVE

## 2022-06-22 ENCOUNTER — OFFICE VISIT (OUTPATIENT)
Dept: ENDOCRINOLOGY | Facility: CLINIC | Age: 82
End: 2022-06-22
Attending: NURSE PRACTITIONER
Payer: MEDICARE

## 2022-06-22 VITALS
BODY MASS INDEX: 17.84 KG/M2 | HEART RATE: 64 BPM | DIASTOLIC BLOOD PRESSURE: 58 MMHG | WEIGHT: 100.7 LBS | HEIGHT: 63 IN | SYSTOLIC BLOOD PRESSURE: 124 MMHG

## 2022-06-22 DIAGNOSIS — M85.89 OSTEOPENIA OF MULTIPLE SITES: Primary | ICD-10-CM

## 2022-06-22 DIAGNOSIS — R63.4 WEIGHT LOSS: ICD-10-CM

## 2022-06-22 LAB — HBV CORE AB SERPL QL IA: NEGATIVE

## 2022-06-22 PROCEDURE — 99205 OFFICE O/P NEW HI 60 MIN: CPT | Performed by: INTERNAL MEDICINE

## 2022-06-22 NOTE — PROGRESS NOTES
ENDOCRINOLOGY NEW PATIENT VISIT        HISTORY OF PRESENT ILLNESS    Diann Low is seen in consultation at the request of Meagan Gamboa CNP for osteopenia.    The patient is accompanied by her son, Isael.    The patient has longstanding history of low bone density/osteopenia, with most recent DXA scan persisting in the range of osteopenia (although TBS adjusted T score was in the range of osteoporosis) and FRAX calculation indicating high risk of fracture.    DXA scan completed at [facility] on 6/14/2022: Images personally reviewed.  -Lumbar spine L1-L4 T score 1.5 (although discrepant BMD at vertebrae), TBS adjusted T score -3.2.  -Left femoral neck T score -1.3, left total hip T score -1.2.  -Right femoral neck T score -0.4, right total hip T score -1.1.  FRAX calculated 10 year probability of major osteoporotic fracture is 17.4%, hip fracture is 6.0%.  No prior studies were completed in our system for comparison.    Review of care everywhere indicates that last DXA scan in the [facility] system was in 2014 (I see records of DXA since 2007 in the [facility] system).  DXA 6/18/2014 showed osteopenia.  The previous spine scan is considered non-diagnostic as valid results were available for only one vertebra (ISCD Guidelines).     Left Hip [single scan]:   Femur Neck: BMD =  0.878 g/cm (sq)   T-score = -1.1      Z-score =  0.8     Total Hip: BMD =  0.877 g/cm (sq)   T-score = -1.0      Z-score =  0.8     Right Hip [single scan]:   Femur Neck: BMD =  0.947 g/cm (sq)   T-score = -0.7      Z-score =  1.3     Total Hip: BMD =  0.920 g/cm (sq)   T-score = -0.7      Z-score =  1.1     At the left hip, 2% decline in BMD compared to 2007 baseline, 1.7% decline in BMD compared to 2012.  Nonsignificant change.  At the right hip, 3.1% increase in BMD compared to 2007 baseline, 2.1% increase compared to 2012.  Nonsignificant change.    Ms. Low reports that BMD was followed in the [place] system but  she does not recall ever being prescribed medications to reduce risk of fracture.    Has history of toe fracture, otherwise no history of fracture.  3 inches height loss in adulthood, no back pain.  No falls, no balance issues.    Calcium intake:  - Diet - Milk with oatmeal or cereal about 0.5 to 0.75 cups lactose-free milk.  - Supplements - Calcium 1 tablet twice daily with food. MVI daily.  - No PPI therapy.    No history of hypercalcemia.  No history of nephrolithiasis.    No known history of malabsorptive disorder, no GI surgery, has some diarrhea with wheat. Had testing for celiac disease in 4/2022--negative serology.    She reports 8 pound weight loss since her moved to the [facility] in 8/2022.  Sometimes has diminished appetite but still eating 3 meals per day.  No salt craving, no nausea.  No palpitations or tremors.    She has had rheumatoid arthritis since age 45.  Presently on methotrexate, Orencia infusion and prednisone 2 mg daily.  She recalls that she has been on much higher doses of prednisone in the past (although she has not required dose escalation or prednisone burst in the past 1 to 2 years).  Menopause was at age 50.    Never smoker.  Rare alcohol use.    No known family history of osteoporosis.  No parental hip fracture history.    Has regular dentist appointments. No anticipating invasive dental surgery.    Pertinent Social History: Presently living in independent living in [place]; previously lived in [place] and moved to [facility] in 8/2022.  Has 3 children.    PAST MEDICAL HISTORY  Past Medical History:   Diagnosis Date     Skin cancer        MEDICATIONS  Current Outpatient Medications   Medication Sig Dispense Refill     Abatacept (ORENCIA IV)        calcium carbonate-vitamin D (OSCAL W/D) 500-200 MG-UNIT tablet Take 1 tablet by mouth 2 times daily       carboxymethylcellulose PF (REFRESH PLUS) 0.5 % ophthalmic solution 1 drop 3 times daily as needed for dry eyes       ferrous  "gluconate (FERGON) 324 (38 Fe) MG tablet Take 324 mg by mouth daily (with breakfast)       folic acid (FOLVITE) 1 MG tablet Take 1 tablet by mouth daily       methotrexate 2.5 MG tablet Take 2 tablets on Weds and 2 tablets on Thursday       Multiple Vitamin (MULTIVITAMIN ADULT PO)        predniSONE (DELTASONE) 1 MG tablet Take 1 mg by mouth 2 times daily         Allergies, family, and social history were reviewed and documented as needed in EHR.     REVIEW OF SYSTEMS  A complete 10-point ROS was performed and pertinent positives and negatives are noted in the HPI. ROS is also positive for the following: Joint pain related to RA, multiple hand deformities and has had multiple joint surgeries.    PHYSICAL EXAM  /58 (BP Location: Right arm, Patient Position: Sitting, Cuff Size: Adult Regular)   Pulse 64   Ht 1.59 m (5' 2.6\")   Wt 45.7 kg (100 lb 11.2 oz)   BMI 18.07 kg/m    Body mass index is 18.07 kg/m .  Constitutional: Vital signs reviewed, as recorded above. Patient is alert, oriented and appears in no acute distress.  Eyes: PER, EOMI, no stare, lid lag, or retraction; no conjunctival injection.  ENMT: OP clear with moist MM. Lips are without lesions.   Neck: Neck supple, no palpable thyromegaly.  Lymphatic: No cervical or supraclavicular LAD.  Cardiovascular: RRR, normal S1/S2, no audible murmurs, rubs or gallops; No LE edema.  Respiratory: CTAB, without wheezes, crackles or rhonchi; normal chest wall motion and respiratory effort.  GI: Positive bowel sounds, soft, NT/ND.  MSK: Bilateral hand deformities related to RA.  No clubbing or cyanosis.  Skin: Seborrheic keratosis on arms and legs.    Neurological: Alert and oriented times 3.     DATA REVIEW  Each of the following laboratory and/or imaging studies were reviewed.    Office Visit on 06/20/2022   Component Date Value Ref Range Status     ALT 06/20/2022 12  0 - 45 U/L Final     Albumin 06/20/2022 4.0  3.5 - 5.0 g/dL Final     AST 06/20/2022 32  0 - " 40 U/L Final     Creatinine 06/20/2022 0.77  0.60 - 1.10 mg/dL Final     GFR Estimate 06/20/2022 76  >60 mL/min/1.73m2 Final    Effective December 21, 2021 eGFRcr in adults is calculated using the 2021 CKD-EPI creatinine equation which includes age and gender (Zoe et al., NE, DOI: 10.1056/LKBEuy8272014)     Hepatitis B Surface Antigen 06/20/2022 Nonreactive  Nonreactive Final     Hepatitis C Antibody 06/20/2022 Negative  Negative Final     Hepatitis B Core Antibody Total 06/20/2022 Negative  Negative Final     WBC Count 06/20/2022 8.9  4.0 - 11.0 10e3/uL Final     RBC Count 06/20/2022 3.56 (A) 3.80 - 5.20 10e6/uL Final     Hemoglobin 06/20/2022 11.9  11.7 - 15.7 g/dL Final     Hematocrit 06/20/2022 36.7  35.0 - 47.0 % Final     MCV 06/20/2022 103 (A) 78 - 100 fL Final     MCH 06/20/2022 33.4 (A) 26.5 - 33.0 pg Final     MCHC 06/20/2022 32.4  31.5 - 36.5 g/dL Final     RDW 06/20/2022 14.5  10.0 - 15.0 % Final     Platelet Count 06/20/2022 217  150 - 450 10e3/uL Final     % Neutrophils 06/20/2022 75  % Final     % Lymphocytes 06/20/2022 16  % Final     % Monocytes 06/20/2022 8  % Final     % Eosinophils 06/20/2022 1  % Final     % Basophils 06/20/2022 0  % Final     % Immature Granulocytes 06/20/2022 0  % Final     Absolute Neutrophils 06/20/2022 6.7  1.6 - 8.3 10e3/uL Final     Absolute Lymphocytes 06/20/2022 1.4  0.8 - 5.3 10e3/uL Final     Absolute Monocytes 06/20/2022 0.7  0.0 - 1.3 10e3/uL Final     Absolute Eosinophils 06/20/2022 0.1  0.0 - 0.7 10e3/uL Final     Absolute Basophils 06/20/2022 0.0  0.0 - 0.2 10e3/uL Final     Absolute Immature Granulocytes 06/20/2022 0.0  <=0.4 10e3/uL Final     4/26/2022: Celiac serology negative, vitamin D 25-hydroxy 65.    ASSESSMENT  1.  Osteopenia/low bone density.  Although DXA scan shows low bone density and she does not have history of fracture, is at high risk of fracture given TBS adjusted T score and FRAX score.  Likely, her bone health is impacted by postmenopausal  status, history of RA and glucocorticoid therapy.  Would also screen for other secondary causes of bone loss.  Given discrepancy in BMD at the lumbar spine on DXA and her history of height loss, would screen for an occult vertebral fracture with spine x-rays.  She would be a candidate for treatment given calculated fracture risk and TBS adjusted T score, although our choice of medication may change if we discover an occult vertebral fracture.  We had an extensive discussion on pathophysiology of osteoporosis, proposed work-up for secondary causes of bone loss and optimal calcium intake.  We will plan for follow-up after initial testing is complete so that we can move forward with discussion of treatment options.    2.  Weight loss.  Evaluate thyroid function tests.  Would also screen for adrenal insufficiency, although she is on the lower dose of prednisone than what would be required for maintenance (maintenance dose would be closer to 3.5 mg daily) so I do not have a high degree of suspicion that she has HPA suppression related to long-term glucocorticoid therapy.  As noted above, serologic screening for celiac disease in primary care clinic has been negative.    PLAN  -Patient and son will check calcium supplement serving size to make sure she is getting 600 mg once daily; if diet changes, we would need to change calcium supplement  -8 AM labs in the coming 1-2 weeks *lab draw before morning dose of prednisone  -Spine x-rays at [facility]  -2 weeks after making calcium changes, please perform 24 hour urine studies and turn in to lab  -Return for a follow-up visit on July 13th at 4 PM  -We will communicate results via [employer], or if needed by phone      Orders Placed This Encounter   Procedures     XR Thoracic Spine 2 Views     XR Lumbar Spine 2/3 Views     Vitamin D Deficiency     TSH with free T4 reflex     Sodium timed urine     Protein electrophoresis random urine     Phosphorus     Parathyroid Hormone Intact      Comprehensive metabolic panel     Calcium timed urine     CBC with platelets     Cortisol     Adrenal corticotropin     Protein electrophoresis       I spent a total of 64 minutes on the date of encounter reviewing medical records, evaluating the patient, coordinating care and documenting in the EHR, as detailed above.      Sukumar Jenkins MD   Division of Diabetes, Endocrinology and Metabolism  Department of Medicine      cc: Meagan Gamboa, CNP

## 2022-06-22 NOTE — PATIENT INSTRUCTIONS
-Check calcium supplement serving size to make sure you are getting 600 mg once daily; if diet changes, we would need to change calcium supplement  -8 AM labs in the coming 1-2 weeks *lab draw before dose of prednisone  -Spine x-rays at [facility]  -2 weeks after making calcium changes, please perform 24 hour urine studies and turn in to lab  -Return for a follow-up visit on July 13th at 4 PM  -We will communicate results via [employer], or if needed by phone

## 2022-06-22 NOTE — LETTER
6/22/2022         RE: Diann Low  [address]  [place] [address]        Dear Colleague,    Thank you for referring your patient, Diann Low, to the [facility]. Please see a copy of my visit note below.      ENDOCRINOLOGY NEW PATIENT VISIT        HISTORY OF PRESENT ILLNESS    Diann Low is seen in consultation at the request of Meagan Gamboa CNP for osteopenia.    The patient is accompanied by her son, Isael.    The patient has longstanding history of low bone density/osteopenia, with most recent DXA scan persisting in the range of osteopenia (although TBS adjusted T score was in the range of osteoporosis) and FRAX calculation indicating high risk of fracture.    DXA scan completed at [facility] on 6/14/2022: Images personally reviewed.  -Lumbar spine L1-L4 T score 1.5 (although discrepant BMD at vertebrae), TBS adjusted T score -3.2.  -Left femoral neck T score -1.3, left total hip T score -1.2.  -Right femoral neck T score -0.4, right total hip T score -1.1.  FRAX calculated 10 year probability of major osteoporotic fracture is 17.4%, hip fracture is 6.0%.  No prior studies were completed in our system for comparison.    Review of care everywhere indicates that last DXA scan in the [facility] system was in 2014 (I see records of DXA since 2007 in the [facility] system).  DXA 6/18/2014 showed osteopenia.  The previous spine scan is considered non-diagnostic as valid results were available for only one vertebra (ISCD Guidelines).     Left Hip [single scan]:   Femur Neck: BMD =  0.878 g/cm (sq)   T-score = -1.1      Z-score =  0.8     Total Hip: BMD =  0.877 g/cm (sq)   T-score = -1.0      Z-score =  0.8     Right Hip [single scan]:   Femur Neck: BMD =  0.947 g/cm (sq)   T-score = -0.7      Z-score =  1.3     Total Hip: BMD =  0.920 g/cm (sq)   T-score = -0.7      Z-score =  1.1     At the left hip, 2% decline in BMD compared to 2007 baseline,  1.7% decline in BMD compared to 2012.  Nonsignificant change.  At the right hip, 3.1% increase in BMD compared to 2007 baseline, 2.1% increase compared to 2012.  Nonsignificant change.    Ms. Low reports that BMD was followed in the [place] system but she does not recall ever being prescribed medications to reduce risk of fracture.    Has history of toe fracture, otherwise no history of fracture.  3 inches height loss in adulthood, no back pain.  No falls, no balance issues.    Calcium intake:  - Diet - Milk with oatmeal or cereal about 0.5 to 0.75 cups lactose-free milk.  - Supplements - Calcium 1 tablet twice daily with food. MVI daily.  - No PPI therapy.    No history of hypercalcemia.  No history of nephrolithiasis.    No known history of malabsorptive disorder, no GI surgery, has some diarrhea with wheat. Had testing for celiac disease in 4/2022--negative serology.    She reports 8 pound weight loss since her moved to the [facility] in 8/2022.  Sometimes has diminished appetite but still eating 3 meals per day.  No salt craving, no nausea.  No palpitations or tremors.    She has had rheumatoid arthritis since age 45.  Presently on methotrexate, Orencia infusion and prednisone 2 mg daily.  She recalls that she has been on much higher doses of prednisone in the past (although she has not required dose escalation or prednisone burst in the past 1 to 2 years).  Menopause was at age 50.    Never smoker.  Rare alcohol use.    No known family history of osteoporosis.  No parental hip fracture history.    Has regular dentist appointments. No anticipating invasive dental surgery.    Pertinent Social History: Presently living in independent living in [place]; previously lived in [place] and moved to [facility] in 8/2022.  Has 3 children.    PAST MEDICAL HISTORY  Past Medical History:   Diagnosis Date     Skin cancer        MEDICATIONS  Current Outpatient Medications   Medication Sig Dispense Refill     Abatacept  "(ORENCIA IV)        calcium carbonate-vitamin D (OSCAL W/D) 500-200 MG-UNIT tablet Take 1 tablet by mouth 2 times daily       carboxymethylcellulose PF (REFRESH PLUS) 0.5 % ophthalmic solution 1 drop 3 times daily as needed for dry eyes       ferrous gluconate (FERGON) 324 (38 Fe) MG tablet Take 324 mg by mouth daily (with breakfast)       folic acid (FOLVITE) 1 MG tablet Take 1 tablet by mouth daily       methotrexate 2.5 MG tablet Take 2 tablets on Weds and 2 tablets on Thursday       Multiple Vitamin (MULTIVITAMIN ADULT PO)        predniSONE (DELTASONE) 1 MG tablet Take 1 mg by mouth 2 times daily         Allergies, family, and social history were reviewed and documented as needed in EHR.     REVIEW OF SYSTEMS  A complete 10-point ROS was performed and pertinent positives and negatives are noted in the HPI. ROS is also positive for the following: Joint pain related to RA, multiple hand deformities and has had multiple joint surgeries.    PHYSICAL EXAM  /58 (BP Location: Right arm, Patient Position: Sitting, Cuff Size: Adult Regular)   Pulse 64   Ht 1.59 m (5' 2.6\")   Wt 45.7 kg (100 lb 11.2 oz)   BMI 18.07 kg/m    Body mass index is 18.07 kg/m .  Constitutional: Vital signs reviewed, as recorded above. Patient is alert, oriented and appears in no acute distress.  Eyes: PER, EOMI, no stare, lid lag, or retraction; no conjunctival injection.  ENMT: OP clear with moist MM. Lips are without lesions.   Neck: Neck supple, no palpable thyromegaly.  Lymphatic: No cervical or supraclavicular LAD.  Cardiovascular: RRR, normal S1/S2, no audible murmurs, rubs or gallops; No LE edema.  Respiratory: CTAB, without wheezes, crackles or rhonchi; normal chest wall motion and respiratory effort.  GI: Positive bowel sounds, soft, NT/ND.  MSK: Bilateral hand deformities related to RA.  No clubbing or cyanosis.  Skin: Seborrheic keratosis on arms and legs.    Neurological: Alert and oriented times 3.     DATA REVIEW  Each of " the following laboratory and/or imaging studies were reviewed.    Office Visit on 06/20/2022   Component Date Value Ref Range Status     ALT 06/20/2022 12  0 - 45 U/L Final     Albumin 06/20/2022 4.0  3.5 - 5.0 g/dL Final     AST 06/20/2022 32  0 - 40 U/L Final     Creatinine 06/20/2022 0.77  0.60 - 1.10 mg/dL Final     GFR Estimate 06/20/2022 76  >60 mL/min/1.73m2 Final    Effective December 21, 2021 eGFRcr in adults is calculated using the 2021 CKD-EPI creatinine equation which includes age and gender (Zoe et al., NE, DOI: 10.1056/HRJWwp9988485)     Hepatitis B Surface Antigen 06/20/2022 Nonreactive  Nonreactive Final     Hepatitis C Antibody 06/20/2022 Negative  Negative Final     Hepatitis B Core Antibody Total 06/20/2022 Negative  Negative Final     WBC Count 06/20/2022 8.9  4.0 - 11.0 10e3/uL Final     RBC Count 06/20/2022 3.56 (A) 3.80 - 5.20 10e6/uL Final     Hemoglobin 06/20/2022 11.9  11.7 - 15.7 g/dL Final     Hematocrit 06/20/2022 36.7  35.0 - 47.0 % Final     MCV 06/20/2022 103 (A) 78 - 100 fL Final     MCH 06/20/2022 33.4 (A) 26.5 - 33.0 pg Final     MCHC 06/20/2022 32.4  31.5 - 36.5 g/dL Final     RDW 06/20/2022 14.5  10.0 - 15.0 % Final     Platelet Count 06/20/2022 217  150 - 450 10e3/uL Final     % Neutrophils 06/20/2022 75  % Final     % Lymphocytes 06/20/2022 16  % Final     % Monocytes 06/20/2022 8  % Final     % Eosinophils 06/20/2022 1  % Final     % Basophils 06/20/2022 0  % Final     % Immature Granulocytes 06/20/2022 0  % Final     Absolute Neutrophils 06/20/2022 6.7  1.6 - 8.3 10e3/uL Final     Absolute Lymphocytes 06/20/2022 1.4  0.8 - 5.3 10e3/uL Final     Absolute Monocytes 06/20/2022 0.7  0.0 - 1.3 10e3/uL Final     Absolute Eosinophils 06/20/2022 0.1  0.0 - 0.7 10e3/uL Final     Absolute Basophils 06/20/2022 0.0  0.0 - 0.2 10e3/uL Final     Absolute Immature Granulocytes 06/20/2022 0.0  <=0.4 10e3/uL Final     4/26/2022: Celiac serology negative, vitamin D 25-hydroxy  65.    ASSESSMENT  1.  Osteopenia/low bone density.  Although DXA scan shows low bone density and she does not have history of fracture, is at high risk of fracture given TBS adjusted T score and FRAX score.  Likely, her bone health is impacted by postmenopausal status, history of RA and glucocorticoid therapy.  Would also screen for other secondary causes of bone loss.  Given discrepancy in BMD at the lumbar spine on DXA and her history of height loss, would screen for an occult vertebral fracture with spine x-rays.  She would be a candidate for treatment given calculated fracture risk and TBS adjusted T score, although our choice of medication may change if we discover an occult vertebral fracture.  We had an extensive discussion on pathophysiology of osteoporosis, proposed work-up for secondary causes of bone loss and optimal calcium intake.  We will plan for follow-up after initial testing is complete so that we can move forward with discussion of treatment options.    2.  Weight loss.  Evaluate thyroid function tests.  Would also screen for adrenal insufficiency, although she is on the lower dose of prednisone than what would be required for maintenance (maintenance dose would be closer to 3.5 mg daily) so I do not have a high degree of suspicion that she has HPA suppression related to long-term glucocorticoid therapy.  As noted above, serologic screening for celiac disease in primary care clinic has been negative.    PLAN  -Patient and son will check calcium supplement serving size to make sure she is getting 600 mg once daily; if diet changes, we would need to change calcium supplement  -8 AM labs in the coming 1-2 weeks *lab draw before morning dose of prednisone  -Spine x-rays at [facility]  -2 weeks after making calcium changes, please perform 24 hour urine studies and turn in to lab  -Return for a follow-up visit on July 13th at 4 PM  -We will communicate results via [employer], or if needed by  phone      Orders Placed This Encounter   Procedures     XR Thoracic Spine 2 Views     XR Lumbar Spine 2/3 Views     Vitamin D Deficiency     TSH with free T4 reflex     Sodium timed urine     Protein electrophoresis random urine     Phosphorus     Parathyroid Hormone Intact     Comprehensive metabolic panel     Calcium timed urine     CBC with platelets     Cortisol     Adrenal corticotropin     Protein electrophoresis       I spent a total of 64 minutes on the date of encounter reviewing medical records, evaluating the patient, coordinating care and documenting in the EHR, as detailed above.      Yemi Jenkins MD   Division of Diabetes, Endocrinology and Metabolism  Department of Medicine      cc: Meagan Gamboa CNP             Again, thank you for allowing me to participate in the care of your patient.        Sincerely,        YEMI Jenkins MD

## 2022-06-23 ENCOUNTER — TELEPHONE (OUTPATIENT)
Dept: RHEUMATOLOGY | Facility: CLINIC | Age: 82
End: 2022-06-23

## 2022-06-23 NOTE — TELEPHONE ENCOUNTER
Left message for pt that labs could be done at her infusion appt on 7/22. Pt to call back with any other questions.  
Pt needs to get lab work done and was wondering if okay to do so the same day as her infusion on 07/22. Please call pt back.   
Benefits, risks, and possible complications of procedure explained to patient/caregiver who verbalized understanding and gave verbal consent.

## 2022-06-29 ENCOUNTER — LAB (OUTPATIENT)
Dept: LAB | Facility: CLINIC | Age: 82
End: 2022-06-29
Payer: MEDICARE

## 2022-06-29 DIAGNOSIS — R79.89 ABNORMAL CBC: ICD-10-CM

## 2022-06-29 DIAGNOSIS — R63.4 WEIGHT LOSS: ICD-10-CM

## 2022-06-29 DIAGNOSIS — H04.123 DRY EYES: ICD-10-CM

## 2022-06-29 DIAGNOSIS — M85.89 OSTEOPENIA OF MULTIPLE SITES: ICD-10-CM

## 2022-06-29 LAB
ACTH PLAS-MCNC: 42 PG/ML
ALBUMIN SERPL BCG-MCNC: 4.3 G/DL (ref 3.5–5.2)
ALP SERPL-CCNC: 59 U/L (ref 35–104)
ALT SERPL W P-5'-P-CCNC: 15 U/L (ref 10–35)
ANION GAP SERPL CALCULATED.3IONS-SCNC: 12 MMOL/L (ref 7–15)
AST SERPL W P-5'-P-CCNC: 34 U/L (ref 10–35)
BILIRUB SERPL-MCNC: 0.4 MG/DL
BUN SERPL-MCNC: 19.9 MG/DL (ref 8–23)
CALCIUM SERPL-MCNC: 8.9 MG/DL (ref 8.8–10.2)
CHLORIDE SERPL-SCNC: 103 MMOL/L (ref 98–107)
CORTIS SERPL-MCNC: 13.4 UG/DL
CREAT SERPL-MCNC: 0.75 MG/DL (ref 0.51–0.95)
DEPRECATED CALCIDIOL+CALCIFEROL SERPL-MC: 79 UG/L (ref 20–75)
DEPRECATED HCO3 PLAS-SCNC: 26 MMOL/L (ref 22–29)
ERYTHROCYTE [DISTWIDTH] IN BLOOD BY AUTOMATED COUNT: 14.9 % (ref 10–15)
FERRITIN SERPL-MCNC: 103 NG/ML (ref 11–328)
GFR SERPL CREATININE-BSD FRML MDRD: 78 ML/MIN/1.73M2
GLUCOSE SERPL-MCNC: 79 MG/DL (ref 70–99)
HCT VFR BLD AUTO: 35.6 % (ref 35–47)
HGB BLD-MCNC: 11.7 G/DL (ref 11.7–15.7)
MCH RBC QN AUTO: 33.4 PG (ref 26.5–33)
MCHC RBC AUTO-ENTMCNC: 32.9 G/DL (ref 31.5–36.5)
MCV RBC AUTO: 102 FL (ref 78–100)
PHOSPHATE SERPL-MCNC: 3.1 MG/DL (ref 2.5–4.5)
PLATELET # BLD AUTO: 193 10E3/UL (ref 150–450)
POTASSIUM SERPL-SCNC: 4 MMOL/L (ref 3.4–5.3)
PROT SERPL-MCNC: 6.3 G/DL (ref 6.4–8.3)
PTH-INTACT SERPL-MCNC: 42 PG/ML (ref 15–65)
RBC # BLD AUTO: 3.5 10E6/UL (ref 3.8–5.2)
SODIUM SERPL-SCNC: 141 MMOL/L (ref 136–145)
T4 FREE SERPL-MCNC: 0.93 NG/DL (ref 0.9–1.7)
TOTAL PROTEIN SERUM FOR ELP: 6.3 G/DL (ref 6.4–8.3)
TSH SERPL DL<=0.005 MIU/L-ACNC: 11.7 UIU/ML (ref 0.3–4.2)
VIT B12 SERPL-MCNC: 491 PG/ML (ref 232–1245)
WBC # BLD AUTO: 7.2 10E3/UL (ref 4–11)

## 2022-06-29 PROCEDURE — 83970 ASSAY OF PARATHORMONE: CPT

## 2022-06-29 PROCEDURE — 85027 COMPLETE CBC AUTOMATED: CPT

## 2022-06-29 PROCEDURE — 82533 TOTAL CORTISOL: CPT

## 2022-06-29 PROCEDURE — 82024 ASSAY OF ACTH: CPT

## 2022-06-29 PROCEDURE — 84439 ASSAY OF FREE THYROXINE: CPT

## 2022-06-29 PROCEDURE — 82607 VITAMIN B-12: CPT

## 2022-06-29 PROCEDURE — 80053 COMPREHEN METABOLIC PANEL: CPT | Mod: 59

## 2022-06-29 PROCEDURE — 82306 VITAMIN D 25 HYDROXY: CPT

## 2022-06-29 PROCEDURE — 84100 ASSAY OF PHOSPHORUS: CPT

## 2022-06-29 PROCEDURE — 82728 ASSAY OF FERRITIN: CPT

## 2022-06-29 PROCEDURE — 84166 PROTEIN E-PHORESIS/URINE/CSF: CPT

## 2022-06-29 PROCEDURE — 84155 ASSAY OF PROTEIN SERUM: CPT

## 2022-06-29 PROCEDURE — 36415 COLL VENOUS BLD VENIPUNCTURE: CPT

## 2022-06-29 PROCEDURE — 84443 ASSAY THYROID STIM HORMONE: CPT

## 2022-06-29 PROCEDURE — 84165 PROTEIN E-PHORESIS SERUM: CPT

## 2022-07-01 LAB
ALBUMIN SERPL ELPH-MCNC: 4 G/DL (ref 3.7–5.1)
ALPHA1 GLOB SERPL ELPH-MCNC: 0.3 G/DL (ref 0.2–0.4)
ALPHA2 GLOB SERPL ELPH-MCNC: 0.7 G/DL (ref 0.5–0.9)
B-GLOBULIN SERPL ELPH-MCNC: 0.6 G/DL (ref 0.6–1)
GAMMA GLOB SERPL ELPH-MCNC: 0.7 G/DL (ref 0.7–1.6)
MONOCLONAL PEAK: 0 G/DL
PROT PATTERN SERPL ELPH-IMP: NORMAL

## 2022-07-06 LAB — PROT PATTERN UR ELPH-IMP: NORMAL

## 2022-07-07 ASSESSMENT — ENCOUNTER SYMPTOMS
WEIGHT GAIN: 0
SEIZURES: 0
INCREASED ENERGY: 0
ALTERED TEMPERATURE REGULATION: 1
WEAKNESS: 0
FATIGUE: 0
POOR WOUND HEALING: 0
POLYPHAGIA: 0
DYSURIA: 0
DIFFICULTY URINATING: 0
STIFFNESS: 1
NAUSEA: 0
MYALGIAS: 1
BACK PAIN: 0
DIZZINESS: 0
NUMBNESS: 1
HEADACHES: 0
LIGHT-HEADEDNESS: 1
DOUBLE VISION: 0
FEVER: 0
BOWEL INCONTINENCE: 0
DIARRHEA: 1
SLEEP DISTURBANCES DUE TO BREATHING: 0
WEIGHT LOSS: 1
JAUNDICE: 0
BRUISES/BLEEDS EASILY: 1
NECK PAIN: 0
POLYDIPSIA: 0
EYE PAIN: 0
CHILLS: 0
MUSCLE WEAKNESS: 0
SPEECH CHANGE: 0
SKIN CHANGES: 0
EYE REDNESS: 0
DECREASED APPETITE: 1
SYNCOPE: 0
TREMORS: 0
ABDOMINAL PAIN: 0
NIGHT SWEATS: 1
BLOATING: 0
JOINT SWELLING: 0
RECTAL PAIN: 0
VOMITING: 0
NAIL CHANGES: 0
LOSS OF CONSCIOUSNESS: 0
SWOLLEN GLANDS: 0
CONSTIPATION: 0
HEARTBURN: 0
HEMATURIA: 0
TINGLING: 0
EYE WATERING: 1
LEG PAIN: 0
HYPOTENSION: 1
EYE IRRITATION: 1
MUSCLE CRAMPS: 1
HALLUCINATIONS: 0
PALPITATIONS: 0
FLANK PAIN: 0
PARALYSIS: 0
DISTURBANCES IN COORDINATION: 0
HYPERTENSION: 0
ARTHRALGIAS: 0
ORTHOPNEA: 0
MEMORY LOSS: 0
EXERCISE INTOLERANCE: 0
BLOOD IN STOOL: 0

## 2022-07-08 LAB
CALCIUM 24H UR-MRATE: 0.16 G/SPEC (ref 0.1–0.3)
CALCIUM UR-MCNC: 19.4 MG/DL
COLLECT DURATION TIME UR: 24 H
COLLECT DURATION TIME UR: 24 H
SODIUM 24H UR-SRATE: 90 MMOL/SPEC (ref 40–220)
SODIUM UR-SCNC: 113 MMOL/L
SPECIMEN VOL UR: 800 ML
SPECIMEN VOL UR: 800 ML

## 2022-07-08 PROCEDURE — 84300 ASSAY OF URINE SODIUM: CPT | Performed by: INTERNAL MEDICINE

## 2022-07-08 PROCEDURE — 82340 ASSAY OF CALCIUM IN URINE: CPT | Performed by: INTERNAL MEDICINE

## 2022-07-08 PROCEDURE — 81050 URINALYSIS VOLUME MEASURE: CPT | Performed by: INTERNAL MEDICINE

## 2022-07-13 ENCOUNTER — LAB (OUTPATIENT)
Dept: LAB | Facility: CLINIC | Age: 82
End: 2022-07-13
Payer: MEDICARE

## 2022-07-13 ENCOUNTER — HOSPITAL ENCOUNTER (OUTPATIENT)
Dept: GENERAL RADIOLOGY | Facility: HOSPITAL | Age: 82
Discharge: HOME | End: 2022-07-13
Attending: INTERNAL MEDICINE
Payer: MEDICARE

## 2022-07-13 ENCOUNTER — OFFICE VISIT (OUTPATIENT)
Dept: ENDOCRINOLOGY | Facility: CLINIC | Age: 82
End: 2022-07-13

## 2022-07-13 VITALS
SYSTOLIC BLOOD PRESSURE: 118 MMHG | BODY MASS INDEX: 18.12 KG/M2 | HEIGHT: 63 IN | DIASTOLIC BLOOD PRESSURE: 64 MMHG | HEART RATE: 68 BPM | WEIGHT: 102.3 LBS

## 2022-07-13 DIAGNOSIS — R63.4 WEIGHT LOSS: ICD-10-CM

## 2022-07-13 DIAGNOSIS — M85.89 OSTEOPENIA OF MULTIPLE SITES: Primary | ICD-10-CM

## 2022-07-13 DIAGNOSIS — M85.89 OSTEOPENIA OF MULTIPLE SITES: ICD-10-CM

## 2022-07-13 DIAGNOSIS — E03.8 SUBCLINICAL HYPOTHYROIDISM: ICD-10-CM

## 2022-07-13 PROCEDURE — 72100 X-RAY EXAM L-S SPINE 2/3 VWS: CPT | Mod: FY

## 2022-07-13 PROCEDURE — 99215 OFFICE O/P EST HI 40 MIN: CPT | Performed by: INTERNAL MEDICINE

## 2022-07-13 PROCEDURE — 86335 IMMUNFIX E-PHORSIS/URINE/CSF: CPT

## 2022-07-13 PROCEDURE — 72070 X-RAY EXAM THORAC SPINE 2VWS: CPT | Mod: FY

## 2022-07-13 NOTE — PROGRESS NOTES
ENDOCRINOLOGY FOLLOW-UP         HISTORY OF PRESENT ILLNESS    Diann Low is seen in follow-up for the issues outlined below.   She is accompanied by her son Isael to today's visit.    No falls or fractures since last visit.  She brings prescription medications and supplement bottles to visit today.  Taking [employer] brand calcium supplement, 600 mg 1 tablet twice daily: 1 tablet provides 600 mg of calcium and 800 IU vitamin D3.  Also taking multivitamin which contains 1000 IU vitamin D3 (although she takes half of serving size each day).    No history of dysphagia.  As noted before, has regular dentist appointments and is not anticipating invasive oral surgery.    Has gained 2 pounds since last visit on 6/22/2022.    Pertinent endocrine and related history:  1.  Low bone density/osteopenia.  Longstanding history, previously followed with serial DXA scans in the [facility] system.  -Most recent DXA scan completed at [facility] on 6/14/2022 showed BMD persistent in the range of osteopenia (although TBS adjusted T score was in the range of osteoporosis) and FRAX calculation indicating high risk of fracture.  Lumbar spine L1-L4 T score 1.5 (although discrepant BMD at vertebrae), TBS adjusted T score -3.2.  Left femoral neck T score -1.3, left total hip T score -1.2.  Right femoral neck T score -0.4, right total hip T score -1.1.  FRAX calculated 10 year probability of major osteoporotic fracture is 17.4%, hip fracture is 6.0%.  No prior studies were completed in our system for comparison.  -Review of care everywhere indicates that last DXA scan in the [facility] system was in 2014 (I see records of DXA since 2007 in the [facility] system).  DXA 6/18/2014 showed osteopenia.  -Patient does not recall ever being prescribed medications to reduce risk of fracture.  -Has history of toe fracture, otherwise no history of fracture.  2.  Rheumatoid arthritis. Since age 45.  Presently on methotrexate, Orencia  "infusion and prednisone 2 mg daily.  She recalls that she has been on much higher doses of prednisone in the past.  3.  Unintentional weight loss.  Noted since her moved to the [facility] in 8/2022.    Pertinent Social History: Presently living in independent living in [place]; previously lived in [place] and moved to the [facility] in 8/2022.  Has 3 children.    PAST MEDICAL HISTORY  Past Medical History:   Diagnosis Date     Skin cancer        MEDICATIONS  Current Outpatient Medications   Medication Sig Dispense Refill     Abatacept (ORENCIA IV)        calcium carbonate-vitamin D (OSCAL W/D) 500-200 MG-UNIT tablet Take 1 tablet by mouth 2 times daily       carboxymethylcellulose PF (REFRESH PLUS) 0.5 % ophthalmic solution 1 drop 3 times daily as needed for dry eyes       ferrous gluconate (FERGON) 324 (38 Fe) MG tablet Take 65 mg by mouth daily (with breakfast)       folic acid (FOLVITE) 1 MG tablet Take 1 tablet by mouth daily       methotrexate 2.5 MG tablet Take 2 tablets on Weds and 2 tablets on Thursday       Multiple Vitamin (MULTIVITAMIN ADULT PO)        predniSONE (DELTASONE) 1 MG tablet Take 1 mg by mouth daily Two tablets once daily         Allergies, family, and social history were reviewed and documented as needed in EHR.     REVIEW OF SYSTEMS  A focused ROS was performed, with pertinent positives and negatives as noted in the HPI.    PHYSICAL EXAM  /64 (BP Location: Right arm, Patient Position: Sitting, Cuff Size: Adult Regular)   Pulse 68   Ht 1.59 m (5' 2.6\")   Wt 46.4 kg (102 lb 4.8 oz)   BMI 18.35 kg/m    Body mass index is 18.35 kg/m .  Constitutional: Vital signs reviewed, as recorded above. Patient is alert, oriented and appears in no acute distress.  Eyes: PER, EOMI, no stare, lid lag, or retraction; no conjunctival injection.  MSK: No clubbing or cyanosis; thin, otherwise normal muscle bulk and tone.  Neurological: Alert and oriented times 3.     DATA REVIEW  Each of the " following laboratory and/or imaging studies were reviewed.    Component      Latest Ref Rng & Units 7/8/2022 7/8/2022           6:00 AM  6:00 AM   Sodium Urine mmol/L      mmol/L 113    Sodium Urine mmol/24 h      40 - 220 mmol/spec 90    Duration in hours      h 24.0 24.0   Volume in mL      mL 800 800   Calcium Urine mg/dL      mg/dL  19.4   Calcium Urine g/24 h      0.10 - 0.30 g/spec  0.16     6/29/2022: Vitamin D 79, TSH 11.7, free T4 0.93, serum protein electrophoresis without obvious monoclonal proteins, CMP and phosphorus normal, PTH normal at 42, CBC overall normal aside from mild macrocytosis, ACTH 42, cortisol 13.4, urine protein electrophoresis showed trace albumin and globulins.      ASSESSMENT  1.  Osteopenia/low bone density.  At high risk of fracture given TBS adjusted T score and FRAX score.  Likely, her bone health is impacted by postmenopausal status, history of RA and glucocorticoid therapy.  Our work-up for other secondary causes of bone loss has otherwise been unremarkable: Specifically, normal PTH, CMP, no anemia, and normal 24-hour urine calcium excretion; serum protein electrophoresis was normal while urine protein electrophoresis showed trace proteins (we will check urine immunofixation to further evaluate).  Screening for celiac disease in primary care clinic in 4/2022 showed normal serology.  Appears to be getting sufficient calcium.  Vitamin D level was just above upper limit of normal so I would reduce vitamin D she is getting from multivitamin.  With regards to treatment, if spine x-ray does not reveal an occult vertebral fracture (would proceed with this to help with our treatment decision making), would favor an antiresorptive; in the absence of dysphagia or other contraindications, would consider an oral bisphosphonate.  Conversely, if we discover an occult vertebral fracture, could consider anabolic therapy.  We reviewed classes of drugs available to treat osteoporosis (namely  antiresorptive's and anabolic's) benefits and potential side effects of antiresorptive's as well as specific benefits and potential side effects of oral bisphosphonates (including potential gastrointestinal side effects, achiness, and rare effects such as ONJ and atypical femur fracture).  Given patient's fracture risk, benefits of treatment with bisphosphonate would outweigh potential risks of side effect.  Ms. Low is agreeable with moving forward with treatment, once we have completed the above studies.    2.  Weight loss.  Thyroid function tests (primarily checked to screen for hyperthyroidism) actually show subclinical hypothyroidism as discussed below.  No evidence of adrenal insufficiency based on early morning cortisol and ACTH levels.  As noted before, celiac serology checked in primary care clinic was normal in 4/2022.  Has gained 2 pounds since last visit.  Continue monitoring and follow-up in primary care.    3.  Subclinical hypothyroidism.  Noted on initial labs in 6/2022.  Aside from longstanding cold intolerance, no clear symptoms suggestive of hypothyroidism.  Would recheck thyroid function tests in 6 weeks, along with TPO antibody, to reevaluate thyroid status.  We discussed pathophysiology of hypothyroidism and thresholds at which we would consider treatment.    PLAN  -Labs today  -Spine x-rays today  -Change to a multivitamin with less (or no) vitamin D--no more than 400 IU per serving  -Continue calcium 1 tablet twice daily  -Based on results (if no spine fracture) we will either start alendronate (Fosamax) or discuss alternative options if we discover fracture  -Lab draw in about 6 weeks to check thyroid function tests   -Return for a follow-up visit in 4 months  -We will communicate results via [employer], or if needed by phone      Orders Placed This Encounter   Procedures     Protein immunofixation urine     TSH with free T4 reflex     Thyroid peroxidase antibody     Vitamin D Deficiency          I spent a total of 63 minutes on the date of encounter reviewing medical records, evaluating the patient, coordinating care and documenting in the EHR, as detailed above.      Sukumar Jenkins MD   Division of Diabetes, Endocrinology and Metabolism  Department of Medicine

## 2022-07-13 NOTE — LETTER
7/13/2022         RE: Diann Low  [address]  [place] [address]        Dear Colleague,    Thank you for referring your patient, Diann Low, to the [facility]. Please see a copy of my visit note below.      ENDOCRINOLOGY FOLLOW-UP         HISTORY OF PRESENT ILLNESS    Diann Low is seen in follow-up for the issues outlined below.   She is accompanied by her son Isael to today's visit.    No falls or fractures since last visit.  She brings prescription medications and supplement bottles to visit today.  Taking [employer] brand calcium supplement, 600 mg 1 tablet twice daily: 1 tablet provides 600 mg of calcium and 800 IU vitamin D3.  Also taking multivitamin which contains 1000 IU vitamin D3 (although she takes half of serving size each day).    No history of dysphagia.  As noted before, has regular dentist appointments and is not anticipating invasive oral surgery.    Has gained 2 pounds since last visit on 6/22/2022.    Pertinent endocrine and related history:  1.  Low bone density/osteopenia.  Longstanding history, previously followed with serial DXA scans in the [facility] system.  -Most recent DXA scan completed at [facility] on 6/14/2022 showed BMD persistent in the range of osteopenia (although TBS adjusted T score was in the range of osteoporosis) and FRAX calculation indicating high risk of fracture.  Lumbar spine L1-L4 T score 1.5 (although discrepant BMD at vertebrae), TBS adjusted T score -3.2.  Left femoral neck T score -1.3, left total hip T score -1.2.  Right femoral neck T score -0.4, right total hip T score -1.1.  FRAX calculated 10 year probability of major osteoporotic fracture is 17.4%, hip fracture is 6.0%.  No prior studies were completed in our system for comparison.  -Review of care everywhere indicates that last DXA scan in the [facility] system was in 2014 (I see records of DXA since 2007 in the [facility] system).  DXA 6/18/2014  "showed osteopenia.  -Patient does not recall ever being prescribed medications to reduce risk of fracture.  -Has history of toe fracture, otherwise no history of fracture.  2.  Rheumatoid arthritis. Since age 45.  Presently on methotrexate, Orencia infusion and prednisone 2 mg daily.  She recalls that she has been on much higher doses of prednisone in the past.  3.  Unintentional weight loss.  Noted since her moved to the [facility] in 8/2022.    Pertinent Social History: Presently living in independent living in [place]; previously lived in [place] and moved to the [facility] in 8/2022.  Has 3 children.    PAST MEDICAL HISTORY  Past Medical History:   Diagnosis Date     Skin cancer        MEDICATIONS  Current Outpatient Medications   Medication Sig Dispense Refill     Abatacept (ORENCIA IV)        calcium carbonate-vitamin D (OSCAL W/D) 500-200 MG-UNIT tablet Take 1 tablet by mouth 2 times daily       carboxymethylcellulose PF (REFRESH PLUS) 0.5 % ophthalmic solution 1 drop 3 times daily as needed for dry eyes       ferrous gluconate (FERGON) 324 (38 Fe) MG tablet Take 65 mg by mouth daily (with breakfast)       folic acid (FOLVITE) 1 MG tablet Take 1 tablet by mouth daily       methotrexate 2.5 MG tablet Take 2 tablets on Weds and 2 tablets on Thursday       Multiple Vitamin (MULTIVITAMIN ADULT PO)        predniSONE (DELTASONE) 1 MG tablet Take 1 mg by mouth daily Two tablets once daily         Allergies, family, and social history were reviewed and documented as needed in EHR.     REVIEW OF SYSTEMS  A focused ROS was performed, with pertinent positives and negatives as noted in the HPI.    PHYSICAL EXAM  /64 (BP Location: Right arm, Patient Position: Sitting, Cuff Size: Adult Regular)   Pulse 68   Ht 1.59 m (5' 2.6\")   Wt 46.4 kg (102 lb 4.8 oz)   BMI 18.35 kg/m    Body mass index is 18.35 kg/m .  Constitutional: Vital signs reviewed, as recorded above. Patient is alert, oriented and appears in no " acute distress.  Eyes: PER, EOMI, no stare, lid lag, or retraction; no conjunctival injection.  MSK: No clubbing or cyanosis; thin, otherwise normal muscle bulk and tone.  Neurological: Alert and oriented times 3.     DATA REVIEW  Each of the following laboratory and/or imaging studies were reviewed.    Component      Latest Ref Rng & Units 7/8/2022 7/8/2022           6:00 AM  6:00 AM   Sodium Urine mmol/L      mmol/L 113    Sodium Urine mmol/24 h      40 - 220 mmol/spec 90    Duration in hours      h 24.0 24.0   Volume in mL      mL 800 800   Calcium Urine mg/dL      mg/dL  19.4   Calcium Urine g/24 h      0.10 - 0.30 g/spec  0.16     6/29/2022: Vitamin D 79, TSH 11.7, free T4 0.93, serum protein electrophoresis without obvious monoclonal proteins, CMP and phosphorus normal, PTH normal at 42, CBC overall normal aside from mild macrocytosis, ACTH 42, cortisol 13.4, urine protein electrophoresis showed trace albumin and globulins.      ASSESSMENT  1.  Osteopenia/low bone density.  At high risk of fracture given TBS adjusted T score and FRAX score.  Likely, her bone health is impacted by postmenopausal status, history of RA and glucocorticoid therapy.  Our work-up for other secondary causes of bone loss has otherwise been unremarkable: Specifically, normal PTH, CMP, no anemia, and normal 24-hour urine calcium excretion; serum protein electrophoresis was normal while urine protein electrophoresis showed trace proteins (we will check urine immunofixation to further evaluate).  Screening for celiac disease in primary care clinic in 4/2022 showed normal serology.  Appears to be getting sufficient calcium.  Vitamin D level was just above upper limit of normal so I would reduce vitamin D she is getting from multivitamin.  With regards to treatment, if spine x-ray does not reveal an occult vertebral fracture (would proceed with this to help with our treatment decision making), would favor an antiresorptive; in the absence of  dysphagia or other contraindications, would consider an oral bisphosphonate.  Conversely, if we discover an occult vertebral fracture, could consider anabolic therapy.  We reviewed classes of drugs available to treat osteoporosis (namely antiresorptive's and anabolic's) benefits and potential side effects of antiresorptive's as well as specific benefits and potential side effects of oral bisphosphonates (including potential gastrointestinal side effects, achiness, and rare effects such as ONJ and atypical femur fracture).  Given patient's fracture risk, benefits of treatment with bisphosphonate would outweigh potential risks of side effect.  Ms. Low is agreeable with moving forward with treatment, once we have completed the above studies.    2.  Weight loss.  Thyroid function tests (primarily checked to screen for hyperthyroidism) actually show subclinical hypothyroidism as discussed below.  No evidence of adrenal insufficiency based on early morning cortisol and ACTH levels.  As noted before, celiac serology checked in primary care clinic was normal in 4/2022.  Has gained 2 pounds since last visit.  Continue monitoring and follow-up in primary care.    3.  Subclinical hypothyroidism.  Noted on initial labs in 6/2022.  Aside from longstanding cold intolerance, no clear symptoms suggestive of hypothyroidism.  Would recheck thyroid function tests in 6 weeks, along with TPO antibody, to reevaluate thyroid status.  We discussed pathophysiology of hypothyroidism and thresholds at which we would consider treatment.    PLAN  -Labs today  -Spine x-rays today  -Change to a multivitamin with less (or no) vitamin D--no more than 400 IU per serving  -Continue calcium 1 tablet twice daily  -Based on results (if no spine fracture) we will either start alendronate (Fosamax) or discuss alternative options if we discover fracture  -Lab draw in about 6 weeks to check thyroid function tests   -Return for a follow-up visit in 4  months  -We will communicate results via [facility], or if needed by phone      Orders Placed This Encounter   Procedures     Protein immunofixation urine     TSH with free T4 reflex     Thyroid peroxidase antibody     Vitamin D Deficiency         I spent a total of 63 minutes on the date of encounter reviewing medical records, evaluating the patient, coordinating care and documenting in the EHR, as detailed above.      Yemi Jenkins MD   Division of Diabetes, Endocrinology and Metabolism  Department of Medicine              Again, thank you for allowing me to participate in the care of your patient.        Sincerely,        YEMI Jenkins MD

## 2022-07-13 NOTE — PATIENT INSTRUCTIONS
-Labs today  -Spine x-rays today  -Change to a multivitamin with less (or no) vitamin D--no more than 400 IU per serving  -Continue calcium 1 tablet twice daily  -Based on results (if no spine fracture) we will either start alendronate (Fosamax) or discuss alternative options if we discover fracture  -Lab draw in about 6 weeks to check thyroid function tests   -Return for a follow-up visit in 4 months  -We will communicate results via [employer], or if needed by phone

## 2022-07-15 LAB — PROT ELPH PNL UR ELPH: NORMAL

## 2022-07-21 ENCOUNTER — INFUSION THERAPY VISIT (OUTPATIENT)
Dept: INFUSION THERAPY | Facility: CLINIC | Age: 82
End: 2022-07-21
Attending: INTERNAL MEDICINE
Payer: MEDICARE

## 2022-07-21 ENCOUNTER — TELEPHONE (OUTPATIENT)
Dept: ENDOCRINOLOGY | Facility: CLINIC | Age: 82
End: 2022-07-21

## 2022-07-21 VITALS — WEIGHT: 104.2 LBS | BODY MASS INDEX: 18.69 KG/M2

## 2022-07-21 DIAGNOSIS — R82.90 ABNORMAL URINALYSIS: ICD-10-CM

## 2022-07-21 DIAGNOSIS — M05.79 RHEUMATOID ARTHRITIS, SEROPOSITIVE, MULTIPLE SITES (H): Primary | ICD-10-CM

## 2022-07-21 LAB
ALBUMIN UR-MCNC: NEGATIVE MG/DL
APPEARANCE UR: CLEAR
BILIRUB UR QL STRIP: NEGATIVE
COLOR UR AUTO: ABNORMAL
FOLATE SERPL-MCNC: 29.3 NG/ML (ref 4.6–34.8)
GLUCOSE UR STRIP-MCNC: NEGATIVE MG/DL
HGB UR QL STRIP: NEGATIVE
KETONES UR STRIP-MCNC: NEGATIVE MG/DL
LEUKOCYTE ESTERASE UR QL STRIP: NEGATIVE
MUCOUS THREADS #/AREA URNS LPF: PRESENT /LPF
NITRATE UR QL: NEGATIVE
PH UR STRIP: 7.5 [PH] (ref 5–7)
RBC URINE: <1 /HPF
SP GR UR STRIP: 1.01 (ref 1–1.03)
SQUAMOUS EPITHELIAL: <1 /HPF
UROBILINOGEN UR STRIP-MCNC: <2 MG/DL
WBC URINE: <1 /HPF

## 2022-07-21 PROCEDURE — 258N000003 HC RX IP 258 OP 636: Performed by: INTERNAL MEDICINE

## 2022-07-21 PROCEDURE — 36415 COLL VENOUS BLD VENIPUNCTURE: CPT

## 2022-07-21 PROCEDURE — 250N000028 HC RX JA MOD (INTRAVENOUS), IP 250 OP 636: Mod: JA | Performed by: INTERNAL MEDICINE

## 2022-07-21 PROCEDURE — 81001 URINALYSIS AUTO W/SCOPE: CPT | Performed by: INTERNAL MEDICINE

## 2022-07-21 PROCEDURE — 86235 NUCLEAR ANTIGEN ANTIBODY: CPT

## 2022-07-21 PROCEDURE — 96365 THER/PROPH/DIAG IV INF INIT: CPT

## 2022-07-21 PROCEDURE — 82746 ASSAY OF FOLIC ACID SERUM: CPT

## 2022-07-21 RX ORDER — DIPHENHYDRAMINE HYDROCHLORIDE 50 MG/ML
50 INJECTION INTRAMUSCULAR; INTRAVENOUS
Status: CANCELLED
Start: 2022-08-04

## 2022-07-21 RX ORDER — ALBUTEROL SULFATE 0.83 MG/ML
2.5 SOLUTION RESPIRATORY (INHALATION)
Status: DISCONTINUED | OUTPATIENT
Start: 2022-07-21 | End: 2022-07-21 | Stop reason: HOSPADM

## 2022-07-21 RX ORDER — EPINEPHRINE 1 MG/ML
0.3 INJECTION, SOLUTION, CONCENTRATE INTRAVENOUS EVERY 5 MIN PRN
Status: DISCONTINUED | OUTPATIENT
Start: 2022-07-21 | End: 2022-07-21 | Stop reason: HOSPADM

## 2022-07-21 RX ORDER — EPINEPHRINE 1 MG/ML
0.3 INJECTION, SOLUTION, CONCENTRATE INTRAVENOUS EVERY 5 MIN PRN
Status: CANCELLED | OUTPATIENT
Start: 2022-08-04

## 2022-07-21 RX ORDER — METHYLPREDNISOLONE SODIUM SUCCINATE 125 MG/2ML
125 INJECTION, POWDER, LYOPHILIZED, FOR SOLUTION INTRAMUSCULAR; INTRAVENOUS
Status: DISCONTINUED | OUTPATIENT
Start: 2022-07-21 | End: 2022-07-21 | Stop reason: HOSPADM

## 2022-07-21 RX ORDER — HEPARIN SODIUM,PORCINE 10 UNIT/ML
5 VIAL (ML) INTRAVENOUS
Status: CANCELLED | OUTPATIENT
Start: 2022-08-04

## 2022-07-21 RX ORDER — METHYLPREDNISOLONE SODIUM SUCCINATE 125 MG/2ML
125 INJECTION, POWDER, LYOPHILIZED, FOR SOLUTION INTRAMUSCULAR; INTRAVENOUS
Status: CANCELLED
Start: 2022-08-04

## 2022-07-21 RX ORDER — METHYLPREDNISOLONE SODIUM SUCCINATE 125 MG/2ML
125 INJECTION, POWDER, LYOPHILIZED, FOR SOLUTION INTRAMUSCULAR; INTRAVENOUS ONCE
Status: CANCELLED
Start: 2022-08-04 | End: 2022-08-04

## 2022-07-21 RX ORDER — MEPERIDINE HYDROCHLORIDE 50 MG/ML
25 INJECTION INTRAMUSCULAR; INTRAVENOUS; SUBCUTANEOUS EVERY 30 MIN PRN
Status: DISCONTINUED | OUTPATIENT
Start: 2022-07-21 | End: 2022-07-21 | Stop reason: HOSPADM

## 2022-07-21 RX ORDER — NALOXONE HYDROCHLORIDE 0.4 MG/ML
0.2 INJECTION, SOLUTION INTRAMUSCULAR; INTRAVENOUS; SUBCUTANEOUS
Status: DISCONTINUED | OUTPATIENT
Start: 2022-07-21 | End: 2022-07-21 | Stop reason: HOSPADM

## 2022-07-21 RX ORDER — ACETAMINOPHEN 325 MG/1
650 TABLET ORAL ONCE
Status: CANCELLED
Start: 2022-08-04 | End: 2022-08-04

## 2022-07-21 RX ORDER — ALBUTEROL SULFATE 90 UG/1
1-2 AEROSOL, METERED RESPIRATORY (INHALATION)
Status: CANCELLED
Start: 2022-08-04

## 2022-07-21 RX ORDER — DIPHENHYDRAMINE HCL 25 MG
25 CAPSULE ORAL ONCE
Status: CANCELLED
Start: 2022-08-04 | End: 2022-08-04

## 2022-07-21 RX ORDER — DIPHENHYDRAMINE HYDROCHLORIDE 50 MG/ML
50 INJECTION INTRAMUSCULAR; INTRAVENOUS
Status: DISCONTINUED | OUTPATIENT
Start: 2022-07-21 | End: 2022-07-21 | Stop reason: HOSPADM

## 2022-07-21 RX ORDER — HEPARIN SODIUM (PORCINE) LOCK FLUSH IV SOLN 100 UNIT/ML 100 UNIT/ML
5 SOLUTION INTRAVENOUS
Status: CANCELLED | OUTPATIENT
Start: 2022-08-04

## 2022-07-21 RX ORDER — ALBUTEROL SULFATE 90 UG/1
1-2 AEROSOL, METERED RESPIRATORY (INHALATION)
Status: DISCONTINUED | OUTPATIENT
Start: 2022-07-21 | End: 2022-07-21 | Stop reason: HOSPADM

## 2022-07-21 RX ORDER — NALOXONE HYDROCHLORIDE 0.4 MG/ML
0.2 INJECTION, SOLUTION INTRAMUSCULAR; INTRAVENOUS; SUBCUTANEOUS
Status: CANCELLED | OUTPATIENT
Start: 2022-08-04

## 2022-07-21 RX ORDER — MEPERIDINE HYDROCHLORIDE 50 MG/ML
25 INJECTION INTRAMUSCULAR; INTRAVENOUS; SUBCUTANEOUS EVERY 30 MIN PRN
Status: CANCELLED | OUTPATIENT
Start: 2022-08-04

## 2022-07-21 RX ORDER — ALBUTEROL SULFATE 0.83 MG/ML
2.5 SOLUTION RESPIRATORY (INHALATION)
Status: CANCELLED | OUTPATIENT
Start: 2022-08-04

## 2022-07-21 RX ADMIN — SODIUM CHLORIDE 500 MG: 9 INJECTION, SOLUTION INTRAVENOUS at 11:43

## 2022-07-21 NOTE — TELEPHONE ENCOUNTER
Called patient to review results of spine x-rays and urine immunofixation.  We discussed the following results, assessment and recommendations:  -X-rays of the lumbar and thoracic spine show age-indeterminate T9 vertebral body compression fracture and T12 vertebral body compression fracture.  Also noted is degenerative change and atherosclerosis of the aortic arch.  -Ms. Low notes that she does not have history of trauma such as MVA, nor does she have significant back pain.  -Urine immunofixation normal.    We discussed the following plan:  -Would recommend MRI to further characterize fractures; the patient has claustrophobia and we discussed the option of either foregoing MRI, using low-dose benzodiazepine, or searching for open MRI.  She would like to give these options more thought.  -Since she will be traveling to visit family out of state in the coming week, we will plan to reconvene in mid August 2022 (can be a video visit) so that we can discuss next steps, including options for MRI and consider alternate osteoporosis therapy such as anabolics given newly discovered fractures.    Ms. Low is in agreement with the above plan.    Will route to clinic team to schedule visit.    Sukumar Jenkins MD 7/21/2022 3:28 PM

## 2022-07-21 NOTE — PROGRESS NOTES
~~~ NOTE: If the patient answers yes to any of the questions below, hold the infusion and contact ordering provider or on-call provider.    1. Have you recently had an elevated temperature, fever, chills, productive cough, coughing for 3 weeks or longer or hemoptysis,  abnormal vital signs, night sweats,  chest pain or have you noticed a decrease in your appetite, unexplained weight loss or fatigue? No  2. Do you have any open wounds or new incisions? No  3. Do you have any upcoming hospitalizations or surgeries? Does not include esophagogastroduodenoscopy, colonoscopy, endoscopic retrograde cholangiopancreatography (ERCP), endoscopic ultrasound (EUS), dental procedures or joint aspiration/steroid injections No  4. Do you currently have any signs of illness or infection or are you on any antibiotics? No  5. Have you had any new, sudden or worsening abdominal pain? No  6. Have you or anyone in your household received a live vaccination in the past 4 weeks? Please note: No live vaccines while on biologic/chemotherapy until 6 months after the last treatment. Patient can receive the flu vaccine (shot only), pneumovax and the Covid vaccine. It is optimal for the patient to get these vaccines mid cycle, but they can be given at any time as long as it is not on the day of the infusion. No  7. Have you recently been diagnosed with any new nervous system diseases (ie. Multiple sclerosis, Guillain [place], seizures, neurological changes) or cancer diagnosis? Are you on any form of radiation or chemotherapy? No  8. Are you pregnant or breast feeding or do you have plans of pregnancy in the future? No  9. Have you been having any signs of worsening depression or suicidal ideations?  (benlysta only) No  10. Have there been any other new onset medical symptoms? No   11. Have you had any new blood clots? (IVIG only) No      Infusion Nursing Note:  Diann Low presents today for Orencia infusion.    Patient seen by provider today:  No   present during visit today: Not Applicable.    Note: Pt states she has been receiving infusions for many years through the [facility].    Intravenous Access:  Peripheral IV placed.    Treatment Conditions:  Not Applicable.    Post Infusion Assessment:  Patient tolerated infusion without incident.  Site patent and intact, free from redness, edema or discomfort.     Discharge Plan:   Patient and/or family verbalized understanding of discharge instructions and all questions answered.  Patient discharged in stable condition accompanied by: self.  Departure Mode: Ambulatory.      Sandra JORDAN RN

## 2022-07-22 LAB
ENA SS-A AB SER IA-ACNC: <0.5 U/ML
ENA SS-A AB SER IA-ACNC: NEGATIVE
ENA SS-B IGG SER IA-ACNC: <0.6 U/ML
ENA SS-B IGG SER IA-ACNC: NEGATIVE

## 2022-07-22 NOTE — TELEPHONE ENCOUNTER
Spoke to patient, she is going to be out of town wk of 8/15-8/19, she cannot do 8/24. No other openings at this time. Will contact patient when something opens up.

## 2022-07-22 NOTE — TELEPHONE ENCOUNTER
07.22 Left message for patient to call back, offer 8/31 to follow-up with Dr. Jenkins in person or video.

## 2022-07-26 ENCOUNTER — HOSPITAL ENCOUNTER (OUTPATIENT)
Dept: OCCUPATIONAL THERAPY | Facility: REHABILITATION | Age: 82
Discharge: HOME | End: 2022-07-26
Attending: INTERNAL MEDICINE

## 2022-07-26 DIAGNOSIS — M05.79 RHEUMATOID ARTHRITIS, SEROPOSITIVE, MULTIPLE SITES (H): ICD-10-CM

## 2022-07-26 DIAGNOSIS — M21.949 ACQUIRED DEFORMITY OF HAND, UNSPECIFIED LATERALITY: ICD-10-CM

## 2022-07-26 DIAGNOSIS — R29.898 HAND WEAKNESS: ICD-10-CM

## 2022-08-23 ENCOUNTER — OFFICE VISIT (OUTPATIENT)
Dept: INTERNAL MEDICINE | Facility: CLINIC | Age: 82
End: 2022-08-23
Payer: MEDICARE

## 2022-08-23 VITALS
HEART RATE: 72 BPM | OXYGEN SATURATION: 98 % | SYSTOLIC BLOOD PRESSURE: 122 MMHG | WEIGHT: 102 LBS | BODY MASS INDEX: 18.3 KG/M2 | DIASTOLIC BLOOD PRESSURE: 67 MMHG

## 2022-08-23 DIAGNOSIS — H61.23 BILATERAL IMPACTED CERUMEN: Primary | ICD-10-CM

## 2022-08-23 DIAGNOSIS — M85.89 OSTEOPENIA OF MULTIPLE SITES: ICD-10-CM

## 2022-08-23 DIAGNOSIS — E03.8 SUBCLINICAL HYPOTHYROIDISM: ICD-10-CM

## 2022-08-23 DIAGNOSIS — R82.90 ABNORMAL URINALYSIS: ICD-10-CM

## 2022-08-23 DIAGNOSIS — L98.9 SKIN LESION: ICD-10-CM

## 2022-08-23 LAB
ALBUMIN UR-MCNC: NEGATIVE MG/DL
APPEARANCE UR: CLEAR
BACTERIA #/AREA URNS HPF: ABNORMAL /HPF
BILIRUB UR QL STRIP: NEGATIVE
COLOR UR AUTO: YELLOW
GLUCOSE UR STRIP-MCNC: NEGATIVE MG/DL
HGB UR QL STRIP: ABNORMAL
HYALINE CASTS #/AREA URNS LPF: ABNORMAL /LPF
KETONES UR STRIP-MCNC: ABNORMAL MG/DL
LEUKOCYTE ESTERASE UR QL STRIP: NEGATIVE
NITRATE UR QL: NEGATIVE
PH UR STRIP: 5 [PH] (ref 5–8)
RBC #/AREA URNS AUTO: ABNORMAL /HPF
SP GR UR STRIP: >=1.03 (ref 1–1.03)
SQUAMOUS #/AREA URNS AUTO: ABNORMAL /LPF
T4 FREE SERPL-MCNC: 0.89 NG/DL (ref 0.9–1.7)
TSH SERPL DL<=0.005 MIU/L-ACNC: 4.97 UIU/ML (ref 0.3–4.2)
UROBILINOGEN UR STRIP-ACNC: 0.2 E.U./DL
WBC #/AREA URNS AUTO: ABNORMAL /HPF

## 2022-08-23 PROCEDURE — 84439 ASSAY OF FREE THYROXINE: CPT | Performed by: NURSE PRACTITIONER

## 2022-08-23 PROCEDURE — 84443 ASSAY THYROID STIM HORMONE: CPT | Performed by: NURSE PRACTITIONER

## 2022-08-23 PROCEDURE — 36415 COLL VENOUS BLD VENIPUNCTURE: CPT | Performed by: NURSE PRACTITIONER

## 2022-08-23 PROCEDURE — 82306 VITAMIN D 25 HYDROXY: CPT | Performed by: NURSE PRACTITIONER

## 2022-08-23 PROCEDURE — 86376 MICROSOMAL ANTIBODY EACH: CPT | Performed by: NURSE PRACTITIONER

## 2022-08-23 PROCEDURE — 81001 URINALYSIS AUTO W/SCOPE: CPT | Performed by: NURSE PRACTITIONER

## 2022-08-23 PROCEDURE — 99213 OFFICE O/P EST LOW 20 MIN: CPT | Performed by: NURSE PRACTITIONER

## 2022-08-23 NOTE — PROGRESS NOTES
Assessment & Plan     Bilateral impacted cerumen  She was worried because her hearing aids did not appear to be working very well.  She thought she might have excess earwax.  Otoscopic exam did not reveal significant cerumen.    Skin lesion  Some flaky erythematous lesions on her bilateral legs that are of unclear etiology.  Advised dermatology consult.    Torrey Levy, [facility] SENDY Tidwell is a 82 year old, presenting for the following health issues:  Cerumen Impaction (wax in both ears)      History of Present Illness       Reason for visit:  Ear wax    She eats 2-3 servings of fruits and vegetables daily.She consumes 0 sweetened beverage(s) daily.She exercises with enough effort to increase her heart rate 30 to 60 minutes per day.  She exercises with enough effort to increase her heart rate 4 days per week.   She is taking medications regularly.         Review of Systems   Constitutional, HEENT, cardiovascular, pulmonary, gi and gu systems are negative, except as otherwise noted.      Objective    /67   Pulse 72   Wt 46.3 kg (102 lb)   SpO2 98%   BMI 18.30 kg/m    Body mass index is 18.3 kg/m .  Physical Exam     Bilateral ear exam is normal.  She has some slightly erythematous flaky lesions on her bilateral shins, approximately 0.5 cm in diameter        .  ..

## 2022-08-24 LAB
DEPRECATED CALCIDIOL+CALCIFEROL SERPL-MC: 73 UG/L (ref 20–75)
THYROPEROXIDASE AB SERPL-ACNC: <10 IU/ML

## 2022-08-25 ASSESSMENT — ENCOUNTER SYMPTOMS
POLYPHAGIA: 0
MUSCLE WEAKNESS: 0
FLANK PAIN: 0
POLYDIPSIA: 0
SKIN CHANGES: 0
SWOLLEN GLANDS: 0
CHILLS: 0
POOR WOUND HEALING: 0
NIGHT SWEATS: 1
WEIGHT LOSS: 1
NAIL CHANGES: 0
FATIGUE: 1
WEIGHT GAIN: 0
BRUISES/BLEEDS EASILY: 1
BACK PAIN: 0
HEMATURIA: 0
INCREASED ENERGY: 0
JOINT SWELLING: 1
MUSCLE CRAMPS: 1
DECREASED APPETITE: 1
HALLUCINATIONS: 0
DIFFICULTY URINATING: 0
FEVER: 0
MYALGIAS: 0
NECK PAIN: 0
STIFFNESS: 1
ARTHRALGIAS: 0
ALTERED TEMPERATURE REGULATION: 1
DYSURIA: 0

## 2022-08-31 ENCOUNTER — TELEPHONE (OUTPATIENT)
Dept: FAMILY MEDICINE | Facility: CLINIC | Age: 82
End: 2022-08-31

## 2022-08-31 ENCOUNTER — OFFICE VISIT (OUTPATIENT)
Dept: ENDOCRINOLOGY | Facility: CLINIC | Age: 82
End: 2022-08-31
Payer: MEDICARE

## 2022-08-31 VITALS
DIASTOLIC BLOOD PRESSURE: 60 MMHG | BODY MASS INDEX: 18.48 KG/M2 | WEIGHT: 103 LBS | HEART RATE: 64 BPM | SYSTOLIC BLOOD PRESSURE: 122 MMHG

## 2022-08-31 DIAGNOSIS — M81.0 OSTEOPOROSIS, UNSPECIFIED OSTEOPOROSIS TYPE, UNSPECIFIED PATHOLOGICAL FRACTURE PRESENCE: Primary | ICD-10-CM

## 2022-08-31 DIAGNOSIS — E03.9 HYPOTHYROIDISM, UNSPECIFIED TYPE: ICD-10-CM

## 2022-08-31 PROCEDURE — 99215 OFFICE O/P EST HI 40 MIN: CPT | Performed by: INTERNAL MEDICINE

## 2022-08-31 RX ORDER — ALENDRONATE SODIUM 70 MG/1
70 TABLET ORAL
Qty: 12 TABLET | Refills: 3 | Status: SHIPPED | OUTPATIENT
Start: 2022-08-31 | End: 2023-07-21

## 2022-08-31 RX ORDER — LEVOTHYROXINE SODIUM 25 UG/1
25 TABLET ORAL DAILY
Qty: 90 TABLET | Refills: 0 | Status: SHIPPED | OUTPATIENT
Start: 2022-08-31 | End: 2022-12-06

## 2022-08-31 NOTE — PATIENT INSTRUCTIONS
-Start Fosamax 70 mg by mouth once a week. Take the medication on an empty stomach, first thing in the morning with at least 8 ounces of water--do not take with other drinks. Remain upright (do not lie down) and do not eat take anything else by mouth (pills, food or drink) until at least 30 minutes after taking the medication.  -When you have finished current supply of multivitamin, change to a multivitamin with less (or no) vitamin D--no more than 400 IU per serving  -Continue calcium without changes, 1 tablet twice daily  -Start levothyroxine 25 mcg daily at bedtime  -Lab draw in 2 months to check thyroid function tests (we will also plan for another set of labs in about 6 months to check vitamin D)  -Return for a follow-up visit in one year--contact me sooner if concerns  -We will communicate results via [facility], or if needed by phone

## 2022-08-31 NOTE — PROGRESS NOTES
ENDOCRINOLOGY FOLLOW-UP         HISTORY OF PRESENT ILLNESS    Diann Low is seen in follow-up for the issues outlined below. Patient is accompanied by her son Isael.    No new events referable to bone since last visit.  Recently had abnormal UA and is anticipating follow-up in primary care clinic.  Reports increased frequency of urination but no dysuria or fevers.    I am seeing Ms. Low earlier than planned after spine x-rays revealed vertebral fractures.  Our visit is intended to review treatment options in light of newly discovered fractures.    She has no history of trauma such as fall from height greater than standing height or MVA.    Continues on [employer] brand calcium supplement, 600 mg 1 tablet twice daily: 1 tablet provides 600 mg of calcium and 800 IU vitamin D3.    Continues on multivitamin which contains 1000 IU vitamin D3 (she takes half of serving size each day).  She plans to transition to a new multivitamin with less vitamin D once she finishes this bottle.    Pertinent endocrine and related history:  1.  Osteoporosis. Clinical diagnosis based on discovery of occult vertebral fractures on thoracic and lumbar spines XR's performed in 7/2022.  -Longstanding history, previously followed with serial DXA scans in the [facility] system.  -Most recent DXA scan completed at [facility] on 6/14/2022 showed BMD in the range of osteopenia (although TBS adjusted T score was in the range of osteoporosis) and FRAX calculation indicating high risk of fracture.  Lumbar spine L1-L4 T score 1.5 (although discrepant BMD at vertebrae), TBS adjusted T score -3.2.  Left femoral neck T score -1.3, left total hip T score -1.2.  Right femoral neck T score -0.4, right total hip T score -1.1.  FRAX calculated 10 year probability of major osteoporotic fracture is 17.4%, hip fracture is 6.0%.  No prior studies were completed in our system for comparison.  -Review of care everywhere indicates that last DXA scan in  the [facility] system was in 2014 (I see records of DXA since 2007 in the [facility] system).  DXA 6/18/2014 showed osteopenia.  -Patient does not recall ever being prescribed medications to reduce risk of fracture.  -Osteoporosis is likely related to postmenopausal status, history of RA and glucocorticoid therapy.  Our work-up for other secondary causes of bone loss has otherwise revealed normal PTH, CMP, no anemia, and normal 24-hour urine calcium excretion; serum protein electrophoresis was normal while urine protein electrophoresis showed trace proteins (we will check urine immunofixation to further evaluate). Screening for celiac disease in primary care clinic in 4/2022 showed normal serology.  2.  Rheumatoid arthritis. Since age 45.  Presently on methotrexate, Orencia infusion and prednisone 2 mg daily.  She recalls that she has been on much higher doses of prednisone in the past.  3.  Unintentional weight loss.  Noted since her moved to the [facility] in 8/2022.    Pertinent Social History: Presently living in [facility] in [place]; previously lived in [place] and moved to the [facility] in 8/2022.  Has 3 children.    PAST MEDICAL HISTORY  Past Medical History:   Diagnosis Date     Skin cancer        MEDICATIONS  Current Outpatient Medications   Medication Sig Dispense Refill     Abatacept (ORENCIA IV)        calcium carbonate-vitamin D (OSCAL W/D) 500-200 MG-UNIT tablet Take 1 tablet by mouth 2 times daily       carboxymethylcellulose PF (REFRESH PLUS) 0.5 % ophthalmic solution 1 drop 3 times daily as needed for dry eyes       ferrous gluconate (FERGON) 324 (38 Fe) MG tablet Take 65 mg by mouth daily (with breakfast)       folic acid (FOLVITE) 1 MG tablet Take 1 tablet by mouth daily       methotrexate 2.5 MG tablet Take 2 tablets on Weds and 2 tablets on Thursday       Multiple Vitamin (MULTIVITAMIN ADULT PO)        predniSONE (DELTASONE) 1 MG tablet Take 1 mg by mouth daily Two tablets once  daily         Allergies, family, and social history were reviewed and documented as needed in EHR.     REVIEW OF SYSTEMS  A focused ROS was performed, with pertinent positives and negatives as noted in the HPI.    PHYSICAL EXAM  /60 (BP Location: Right arm, Patient Position: Sitting, Cuff Size: Adult Small)   Pulse 64   Wt 46.7 kg (103 lb)   BMI 18.48 kg/m    Body mass index is 18.48 kg/m .  Constitutional: Vital signs reviewed, as recorded above. Patient is alert, oriented and appears in no acute distress.  MSK: No clubbing or cyanosis; normal muscle bulk and tone.  Neurological: Alert and oriented times 3. No tremor.      DATA REVIEW  Each of the following laboratory and/or imaging studies were reviewed.    Office Visit on 08/23/2022   Component Date Value Ref Range Status     Thyroid Peroxidase Antibody 08/23/2022 <10  <35 IU/mL Final     TSH 08/23/2022 4.97 (A) 0.30 - 4.20 uIU/mL Final     Vitamin D, Total (25-Hydroxy) 08/23/2022 73  20 - 75 ug/L Final     Color Urine 08/23/2022 Yellow  Colorless, Straw, Light Yellow, Yellow Final     Appearance Urine 08/23/2022 Clear  Clear Final     Glucose Urine 08/23/2022 Negative  Negative mg/dL Final     Bilirubin Urine 08/23/2022 Negative  Negative Final     Ketones Urine 08/23/2022 Trace (A) Negative mg/dL Final     Specific Gravity Urine 08/23/2022 >=1.030  1.005 - 1.030 Final     Blood Urine 08/23/2022 Trace (A) Negative Final     pH Urine 08/23/2022 5.0  5.0 - 8.0 Final     Protein Albumin Urine 08/23/2022 Negative  Negative mg/dL Final     Urobilinogen Urine 08/23/2022 0.2  0.2, 1.0 E.U./dL Final     Nitrite Urine 08/23/2022 Negative  Negative Final     Leukocyte Esterase Urine 08/23/2022 Negative  Negative Final     Bacteria Urine 08/23/2022 Few (A) None Seen /HPF Final     RBC Urine 08/23/2022 0-2  0-2 /HPF /HPF Final     WBC Urine 08/23/2022 0-5  0-5 /HPF /HPF Final     Squamous Epithelials Urine 08/23/2022 Few (A) None Seen /LPF Final     Hyaline Casts  Urine 08/23/2022 0-2 (A) None Seen /LPF Final     Free T4 08/23/2022 0.89 (A) 0.90 - 1.70 ng/dL Final       EXAM: XR LUMBAR SPINE 2-3 VIEWS, XR THORACIC SPINE 2 VIEWS  LOCATION: [facility]  DATE/TIME: 7/13/2022 5:21 PM     INDICATION: Height loss with osteoporosis, please evaluate for compression fracture  COMPARISON: None.  TECHNIQUE:   CR Thoracic Spine.  CR Lumbar Spine.                                                                      IMPRESSION:      THORACIC SPINE:     There is exaggeration of the normal thoracic kyphosis. There is an age-indeterminate T9 vertebral body compression fracture resulting in approximately 60% loss of vertebral body height centrally and anteriorly. There is an age-indeterminate T12 vertebral   body compression fracture resulting in approximately 35% loss of vertebral body height anteriorly. Further characterization of both fractures with thoracic spine MRI is recommended. The remainder of the thoracic vertebral body heights are maintained.   There is moderate multilevel degenerative change in the thoracic spine. There is atherosclerotic calcification of the aortic arch. The visualized lungs are grossly clear bilaterally.     LUMBAR SPINE:       There is mild to moderate broad-based dextroconvex scoliosis of the lumbar spine. There is 6 mm of grade 1 anterolisthesis of L4 and L4 and 6 mm of grade 1 anterolisthesis of L4 and L5. There is an age-indeterminate L4 vertebral body compression fracture   resulting in approximately 20% loss of vertebral body height anteriorly. There is also an age-indeterminate L3 vertebral body compression fracture with approximately 10% loss of vertebral body height anteriorly. Further characterization of both   fractures with lumbar spine MRI is recommended. There is severe multilevel intervertebral disc space narrowing and endplate degenerative change, most pronounced at L5-S1. The abdominal soft tissues are  unremarkable.     RECOMMENDATION: Thoracic and lumbar spine MRI to further evaluate multiple age-indeterminate thoracic and lumbar vertebral body compression fractures.    ASSESSMENT    1.  Osteoporosis.  Although  DXA has shown osteopenia/low bone density and patient has been assessed to be at high risk of fracture given TBS adjusted T score and FRAX score, new discovery of vertebral compression fractures is consistent with clinical diagnosis of osteoporosis.  We had initially discussed treatment with oral bisphosphonate but given discovery of occult fractures, would be a candidate for anabolic therapy.  Would consider teriparatide; we reviewed options for anabolic therapy, benefits and potential side effects.  Ms. Low has reservations about anabolic therapy, especially since injections are involved.  She would prefer to trial oral bisphosphonate and escalate dose in the future if absolutely necessary.  We reviewed once more benefits and side effects of oral bisphosphonates as well as precautions to take while taking this class of drugs.    2.  Vertebral compression fractures.  Discovered on spine x-rays.  Likely due to osteoporosis.  Our evaluation for other causes such as multiple myeloma has not revealed suggestive findings.  Since patient is not experiencing back pain, limited utility to obtaining MRI (especially since patient has claustrophobia).  Address osteoporosis as above.    3.  Hypothyroidism.  Subclinical hypothyroidism noted on initial labs in 6/2022.  Follow-up thyroid function test shows a downward trend in TSH, but overly low free T4 level also.  Given development of overt hypothyroidism, would recommend initiation of levothyroxine therapy.  Although TPO is negative, I would still have higher degree of suspicion for autoimmune thyroid disease given her coexisting autoimmune condition (rheumatoid arthritis).  We discussed rationale for initiation of thyroid hormone supplementation, benefits and  potential side effects of therapy as well as how best to take levothyroxine to ensure maximal absorption.  Patient is agreeable to moving forward with treatment.    PLAN  -Start Fosamax 70 mg by mouth once a week. Take the medication on an empty stomach, first thing in the morning with at least 8 ounces of water--do not take with other drinks. Remain upright (do not lie down) and do not eat take anything else by mouth (pills, food or drink) until at least 30 minutes after taking the medication.  -When finished with current supply of multivitamin, change to a multivitamin with less (or no) vitamin D--no more than 400 IU per serving  -Continue calcium without changes, 1 tablet twice daily  -Start levothyroxine 25 mcg daily at bedtime  -Lab draw in 2 months to check thyroid function tests (we will also plan for another set of labs in about 6 months to check vitamin D)  -Return for a follow-up visit in one year--contact me sooner if concerns  -We will communicate results via [employer], or if needed by phone      Orders Placed This Encounter   Procedures     TSH with free T4 reflex       I spent a total of 45 minutes on the date of encounter reviewing medical records, evaluating the patient, coordinating care and documenting in the EHR, as detailed above.      Sukumar Jenkins MD   Division of Diabetes, Endocrinology and Metabolism  Department of Medicine

## 2022-08-31 NOTE — TELEPHONE ENCOUNTER
Pt's 09/02/2022 appt with Bee was cancelled. Pt needing new appt. Writer scheduled pt with Desire on 09/02/2022.    Ann Bone RN

## 2022-08-31 NOTE — LETTER
8/31/2022         RE: Diann Low  [address]  [facility] [address]        Dear Colleague,    Thank you for referring your patient, Diann Low, to the [facility]. Please see a copy of my visit note below.      ENDOCRINOLOGY FOLLOW-UP         HISTORY OF PRESENT ILLNESS    Diann Low is seen in follow-up for the issues outlined below. Patient is accompanied by her son Isael.    No new events referable to bone since last visit.  Recently had abnormal UA and is anticipating follow-up in primary care clinic.  Reports increased frequency of urination but no dysuria or fevers.    I am seeing Ms. oLw earlier than planned after spine x-rays revealed vertebral fractures.  Our visit is intended to review treatment options in light of newly discovered fractures.    She has no history of trauma such as fall from height greater than standing height or MVA.    Continues on [employer] brand calcium supplement, 600 mg 1 tablet twice daily: 1 tablet provides 600 mg of calcium and 800 IU vitamin D3.    Continues on multivitamin which contains 1000 IU vitamin D3 (she takes half of serving size each day).  She plans to transition to a new multivitamin with less vitamin D once she finishes this bottle.    Pertinent endocrine and related history:  1.  Osteoporosis. Clinical diagnosis based on discovery of occult vertebral fractures on thoracic and lumbar spines XR's performed in 7/2022.  -Longstanding history, previously followed with serial DXA scans in the [place] system.  -Most recent DXA scan completed at [facility] on 6/14/2022 showed BMD in the range of osteopenia (although TBS adjusted T score was in the range of osteoporosis) and FRAX calculation indicating high risk of fracture.  Lumbar spine L1-L4 T score 1.5 (although discrepant BMD at vertebrae), TBS adjusted T score -3.2.  Left femoral neck T score -1.3, left total hip T score -1.2.  Right femoral neck T score -0.4,  right total hip T score -1.1.  FRAX calculated 10 year probability of major osteoporotic fracture is 17.4%, hip fracture is 6.0%.  No prior studies were completed in our system for comparison.  -Review of care everywhere indicates that last DXA scan in the [facility] system was in 2014 (I see records of DXA since 2007 in the [facility] system).  DXA 6/18/2014 showed osteopenia.  -Patient does not recall ever being prescribed medications to reduce risk of fracture.  -Osteoporosis is likely related to postmenopausal status, history of RA and glucocorticoid therapy.  Our work-up for other secondary causes of bone loss has otherwise revealed normal PTH, CMP, no anemia, and normal 24-hour urine calcium excretion; serum protein electrophoresis was normal while urine protein electrophoresis showed trace proteins (we will check urine immunofixation to further evaluate). Screening for celiac disease in primary care clinic in 4/2022 showed normal serology.  2.  Rheumatoid arthritis. Since age 45.  Presently on methotrexate, Orencia infusion and prednisone 2 mg daily.  She recalls that she has been on much higher doses of prednisone in the past.  3.  Unintentional weight loss.  Noted since her moved to the [facility] in 8/2022.    Pertinent Social History: Presently living in independent living in [place]; previously lived in [place] and moved to the [facility] in 8/2022.  Has 3 children.    PAST MEDICAL HISTORY  Past Medical History:   Diagnosis Date     Skin cancer        MEDICATIONS  Current Outpatient Medications   Medication Sig Dispense Refill     Abatacept (ORENCIA IV)        calcium carbonate-vitamin D (OSCAL W/D) 500-200 MG-UNIT tablet Take 1 tablet by mouth 2 times daily       carboxymethylcellulose PF (REFRESH PLUS) 0.5 % ophthalmic solution 1 drop 3 times daily as needed for dry eyes       ferrous gluconate (FERGON) 324 (38 Fe) MG tablet Take 65 mg by mouth daily (with breakfast)       folic acid (FOLVITE) 1  MG tablet Take 1 tablet by mouth daily       methotrexate 2.5 MG tablet Take 2 tablets on Weds and 2 tablets on Thursday       Multiple Vitamin (MULTIVITAMIN ADULT PO)        predniSONE (DELTASONE) 1 MG tablet Take 1 mg by mouth daily Two tablets once daily         Allergies, family, and social history were reviewed and documented as needed in EHR.     REVIEW OF SYSTEMS  A focused ROS was performed, with pertinent positives and negatives as noted in the HPI.    PHYSICAL EXAM  /60 (BP Location: Right arm, Patient Position: Sitting, Cuff Size: Adult Small)   Pulse 64   Wt 46.7 kg (103 lb)   BMI 18.48 kg/m    Body mass index is 18.48 kg/m .  Constitutional: Vital signs reviewed, as recorded above. Patient is alert, oriented and appears in no acute distress.  MSK: No clubbing or cyanosis; normal muscle bulk and tone.  Neurological: Alert and oriented times 3. No tremor.      DATA REVIEW  Each of the following laboratory and/or imaging studies were reviewed.    Office Visit on 08/23/2022   Component Date Value Ref Range Status     Thyroid Peroxidase Antibody 08/23/2022 <10  <35 IU/mL Final     TSH 08/23/2022 4.97 (A) 0.30 - 4.20 uIU/mL Final     Vitamin D, Total (25-Hydroxy) 08/23/2022 73  20 - 75 ug/L Final     Color Urine 08/23/2022 Yellow  Colorless, Straw, Light Yellow, Yellow Final     Appearance Urine 08/23/2022 Clear  Clear Final     Glucose Urine 08/23/2022 Negative  Negative mg/dL Final     Bilirubin Urine 08/23/2022 Negative  Negative Final     Ketones Urine 08/23/2022 Trace (A) Negative mg/dL Final     Specific Gravity Urine 08/23/2022 >=1.030  1.005 - 1.030 Final     Blood Urine 08/23/2022 Trace (A) Negative Final     pH Urine 08/23/2022 5.0  5.0 - 8.0 Final     Protein Albumin Urine 08/23/2022 Negative  Negative mg/dL Final     Urobilinogen Urine 08/23/2022 0.2  0.2, 1.0 E.U./dL Final     Nitrite Urine 08/23/2022 Negative  Negative Final     Leukocyte Esterase Urine 08/23/2022 Negative  Negative  Final     Bacteria Urine 08/23/2022 Few (A) None Seen /HPF Final     RBC Urine 08/23/2022 0-2  0-2 /HPF /HPF Final     WBC Urine 08/23/2022 0-5  0-5 /HPF /HPF Final     Squamous Epithelials Urine 08/23/2022 Few (A) None Seen /LPF Final     Hyaline Casts Urine 08/23/2022 0-2 (A) None Seen /LPF Final     Free T4 08/23/2022 0.89 (A) 0.90 - 1.70 ng/dL Final       EXAM: XR LUMBAR SPINE 2-3 VIEWS, XR THORACIC SPINE 2 VIEWS  LOCATION: [facility]  DATE/TIME: 7/13/2022 5:21 PM     INDICATION: Height loss with osteoporosis, please evaluate for compression fracture  COMPARISON: None.  TECHNIQUE:   CR Thoracic Spine.  CR Lumbar Spine.                                                                      IMPRESSION:      THORACIC SPINE:     There is exaggeration of the normal thoracic kyphosis. There is an age-indeterminate T9 vertebral body compression fracture resulting in approximately 60% loss of vertebral body height centrally and anteriorly. There is an age-indeterminate T12 vertebral   body compression fracture resulting in approximately 35% loss of vertebral body height anteriorly. Further characterization of both fractures with thoracic spine MRI is recommended. The remainder of the thoracic vertebral body heights are maintained.   There is moderate multilevel degenerative change in the thoracic spine. There is atherosclerotic calcification of the aortic arch. The visualized lungs are grossly clear bilaterally.     LUMBAR SPINE:       There is mild to moderate broad-based dextroconvex scoliosis of the lumbar spine. There is 6 mm of grade 1 anterolisthesis of L4 and L4 and 6 mm of grade 1 anterolisthesis of L4 and L5. There is an age-indeterminate L4 vertebral body compression fracture   resulting in approximately 20% loss of vertebral body height anteriorly. There is also an age-indeterminate L3 vertebral body compression fracture with approximately 10% loss of vertebral body height anteriorly.  Further characterization of both   fractures with lumbar spine MRI is recommended. There is severe multilevel intervertebral disc space narrowing and endplate degenerative change, most pronounced at L5-S1. The abdominal soft tissues are unremarkable.     RECOMMENDATION: Thoracic and lumbar spine MRI to further evaluate multiple age-indeterminate thoracic and lumbar vertebral body compression fractures.    ASSESSMENT    1.  Osteoporosis.  Although  DXA has shown osteopenia/low bone density and patient has been assessed to be at high risk of fracture given TBS adjusted T score and FRAX score, new discovery of vertebral compression fractures is consistent with clinical diagnosis of osteoporosis.  We had initially discussed treatment with oral bisphosphonate but given discovery of occult fractures, would be a candidate for anabolic therapy.  Would consider teriparatide; we reviewed options for anabolic therapy, benefits and potential side effects.  Ms. Low has reservations about anabolic therapy, especially since injections are involved.  She would prefer to trial oral bisphosphonate and escalate dose in the future if absolutely necessary.  We reviewed once more benefits and side effects of oral bisphosphonates as well as precautions to take while taking this class of drugs.    2.  Vertebral compression fractures.  Discovered on spine x-rays.  Likely due to osteoporosis.  Our evaluation for other causes such as multiple myeloma has not revealed suggestive findings.  Since patient is not experiencing back pain, limited utility to obtaining MRI (especially since patient has claustrophobia).  Address osteoporosis as above.    3.  Hypothyroidism.  Subclinical hypothyroidism noted on initial labs in 6/2022.  Follow-up thyroid function test shows a downward trend in TSH, but overly low free T4 level also.  Given development of overt hypothyroidism, would recommend initiation of levothyroxine therapy.  Although TPO is  negative, I would still have higher degree of suspicion for autoimmune thyroid disease given her coexisting autoimmune condition (rheumatoid arthritis).  We discussed rationale for initiation of thyroid hormone supplementation, benefits and potential side effects of therapy as well as how best to take levothyroxine to ensure maximal absorption.  Patient is agreeable to moving forward with treatment.    PLAN  -Start Fosamax 70 mg by mouth once a week. Take the medication on an empty stomach, first thing in the morning with at least 8 ounces of water--do not take with other drinks. Remain upright (do not lie down) and do not eat take anything else by mouth (pills, food or drink) until at least 30 minutes after taking the medication.  -When finished with current supply of multivitamin, change to a multivitamin with less (or no) vitamin D--no more than 400 IU per serving  -Continue calcium without changes, 1 tablet twice daily  -Start levothyroxine 25 mcg daily at bedtime  -Lab draw in 2 months to check thyroid function tests (we will also plan for another set of labs in about 6 months to check vitamin D)  -Return for a follow-up visit in one year--contact me sooner if concerns  -We will communicate results via [place], or if needed by phone      Orders Placed This Encounter   Procedures     TSH with free T4 reflex       I spent a total of 45 minutes on the date of encounter reviewing medical records, evaluating the patient, coordinating care and documenting in the EHR, as detailed above.      Yemi Jenkins MD   Division of Diabetes, Endocrinology and Metabolism  Department of Medicine              Again, thank you for allowing me to participate in the care of your patient.        Sincerely,        YEMI Jenkins MD

## 2022-09-02 ENCOUNTER — OFFICE VISIT (OUTPATIENT)
Dept: FAMILY MEDICINE | Facility: CLINIC | Age: 82
End: 2022-09-02
Payer: MEDICARE

## 2022-09-02 VITALS — SYSTOLIC BLOOD PRESSURE: 118 MMHG | OXYGEN SATURATION: 96 % | DIASTOLIC BLOOD PRESSURE: 64 MMHG | HEART RATE: 69 BPM

## 2022-09-02 DIAGNOSIS — R31.29 MICROSCOPIC HEMATURIA: Primary | ICD-10-CM

## 2022-09-02 LAB
ALBUMIN UR-MCNC: NEGATIVE MG/DL
APPEARANCE UR: CLEAR
BILIRUB UR QL STRIP: NEGATIVE
COLOR UR AUTO: YELLOW
GLUCOSE UR STRIP-MCNC: NEGATIVE MG/DL
HGB UR QL STRIP: ABNORMAL
KETONES UR STRIP-MCNC: NEGATIVE MG/DL
LEUKOCYTE ESTERASE UR QL STRIP: NEGATIVE
NITRATE UR QL: NEGATIVE
PH UR STRIP: 5.5 [PH] (ref 5–8)
RBC #/AREA URNS AUTO: ABNORMAL /HPF
SP GR UR STRIP: 1.02 (ref 1–1.03)
UROBILINOGEN UR STRIP-ACNC: 0.2 E.U./DL
WBC #/AREA URNS AUTO: ABNORMAL /HPF

## 2022-09-02 PROCEDURE — 99213 OFFICE O/P EST LOW 20 MIN: CPT | Mod: 25 | Performed by: NURSE PRACTITIONER

## 2022-09-02 PROCEDURE — G0008 ADMIN INFLUENZA VIRUS VAC: HCPCS | Performed by: NURSE PRACTITIONER

## 2022-09-02 PROCEDURE — 81001 URINALYSIS AUTO W/SCOPE: CPT | Performed by: NURSE PRACTITIONER

## 2022-09-02 PROCEDURE — 90662 IIV NO PRSV INCREASED AG IM: CPT | Performed by: NURSE PRACTITIONER

## 2022-09-02 NOTE — PROGRESS NOTES
"  Assessment & Plan     Microscopic hematuria  Previous UA does not show any RBCs, but today's sample does.  Will refer to urology for subsequent workup.   - Urine Microscopic  - Adult Urology  Referral        No follow-ups on file.    Elisabeth Phipps NP  [facility] 82 [facility] who presents for follow up.  Patient had a UA completed as ordered by her rheumatologist on 8/23/  It showed \"trace\" blood on the UA, but no RBCs on the microscopic.  Her rheumatologist recommend follow up with PCP, who is not available today.  Patient reports some urinary frequency, but has been drinking a lot more water.  No dysuria, gross hematuria, abdominal pain, N/V/ or urgency.  She is not sure why the rheumatologist was checking her urine.     Review of Systems   Pertinent items in HPI      Objective    /64   Pulse 69   SpO2 96%   There is no height or weight on file to calculate BMI.  Physical Exam   GENERAL: healthy, alert and no distress      "

## 2022-09-09 ENCOUNTER — OFFICE VISIT (OUTPATIENT)
Dept: FAMILY MEDICINE | Facility: CLINIC | Age: 82
End: 2022-09-09
Payer: MEDICARE

## 2022-09-09 VITALS
WEIGHT: 99.6 LBS | RESPIRATION RATE: 16 BRPM | OXYGEN SATURATION: 98 % | DIASTOLIC BLOOD PRESSURE: 67 MMHG | BODY MASS INDEX: 17.87 KG/M2 | SYSTOLIC BLOOD PRESSURE: 125 MMHG | TEMPERATURE: 98 F | HEART RATE: 71 BPM

## 2022-09-09 DIAGNOSIS — R19.7 DIARRHEA, UNSPECIFIED TYPE: Primary | ICD-10-CM

## 2022-09-09 PROCEDURE — 99213 OFFICE O/P EST LOW 20 MIN: CPT | Performed by: FAMILY MEDICINE

## 2022-09-09 NOTE — PROGRESS NOTES
Clinical Decision Making:    At the end of the encounter, I discussed results, diagnosis, medications. Discussed red flags for immediate return to clinic/ER, as well as indications for follow up if no improvement. Patient understood and agreed to plan. Patient was stable for discharge.      ICD-10-CM    1. Diarrhea, unspecified type  R19.7    Goal of at least 60-80 ounces of liquid daily.  Half of it water and half of it gatorade or pedialyte.    Liquid/soft food diet.  Increase as able.  BRAT diet - bananas, rice, applesauce, toast    Avoid dairy    Immodium - take a tablet when you have diarrhea.  Up to 2 tablets per day    Follow up if you develop fever, constant abdominal pain or if the diarrhea continues beyond a week.        There are no Patient Instructions on file for this visit.   No follow-ups on file.      chief complaint    HPI:  Diann Low is a 82 year old female who presents today complaining of diarrhea since Monday.  She will get severe cramping abdominal pain and then have explosive diarrhea.  It has been loose and watery.  No formed stools this week.  No blood in the stool.  She has no constant abdominal pain.  No nausea or vomiting.  No dysuria, hematuria, urinary urgency or frequency.  No recent travel.  No recent hospitalizations.  No recent antibiotics.  She did feel some chills yesterday.  She has taken Imodium once or twice.  She has not had any lightheadedness or dizziness.  No fevers.  No sick contacts.  Patient lives alone.  She has not had diarrhea since 3 AM this morning    History obtained from her son and the patient.    Problem List:  2022-08: Osteoporosis, unspecified osteoporosis type, unspecified   pathological fracture presence  2022-06: Rheumatoid arthritis, seropositive, multiple sites (H)  2022-01: Rheumatoid arthritis involving both hands, unspecified   whether rheumatoid factor present (H)  2018-10: History of falling  2018-09: Presence of intraocular lens  2007-09:  Arthritis, rheumatoid (H)      Past Medical History:   Diagnosis Date     Skin cancer        Social History     Tobacco Use     Smoking status: Never Smoker     Smokeless tobacco: Never Used   Substance Use Topics     Alcohol use: Not on file       Review of systems  negative except listed in HPI    Vitals:    09/09/22 1014   BP: 125/67   BP Location: Right arm   Patient Position: Sitting   Cuff Size: Adult Small   Pulse: 71   Resp: 16   Temp: 98  F (36.7  C)   TempSrc: Oral   SpO2: 98%   Weight: 45.2 kg (99 lb 9.6 oz)       Physical Exam  Vitals noted and within normal limits  Her weight is down 4 pounds in the last 10 days  In general she is alert, oriented, and in no acute distress  Neck supple with no cervical lymphadenopathy no thyromegaly  Heart has a regular rate and rhythm with no murmurs  Lungs are clear to auscultation bilaterally  Abdomen has normal bowel sounds, soft, nondistended and nontender

## 2022-09-09 NOTE — PATIENT INSTRUCTIONS
Goal of at least 60-80 ounces of liquid daily.  Half of it water and half of it gatorade or pedialyte.    Liquid/soft food diet.  Increase as able.  BRAT diet - bananas, rice, applesauce, toast    Avoid dairy    Immodium - take a tablet when you have diarrhea.  Up to 2 tablets per day    Follow up if you develop fever, constant abdominal pain or if the diarrhea continues beyond a week.

## 2022-09-26 ENCOUNTER — INFUSION THERAPY VISIT (OUTPATIENT)
Dept: INFUSION THERAPY | Facility: CLINIC | Age: 82
End: 2022-09-26
Attending: INTERNAL MEDICINE
Payer: MEDICARE

## 2022-09-26 VITALS
SYSTOLIC BLOOD PRESSURE: 132 MMHG | DIASTOLIC BLOOD PRESSURE: 61 MMHG | OXYGEN SATURATION: 100 % | HEART RATE: 68 BPM | TEMPERATURE: 98.6 F | RESPIRATION RATE: 16 BRPM

## 2022-09-26 DIAGNOSIS — M05.79 RHEUMATOID ARTHRITIS, SEROPOSITIVE, MULTIPLE SITES (H): Primary | ICD-10-CM

## 2022-09-26 LAB
ALBUMIN UR-MCNC: NEGATIVE MG/DL
APPEARANCE UR: CLEAR
BILIRUB UR QL STRIP: NEGATIVE
COLOR UR AUTO: ABNORMAL
GLUCOSE UR STRIP-MCNC: NEGATIVE MG/DL
HGB UR QL STRIP: NEGATIVE
HYALINE CASTS: 9 /LPF
KETONES UR STRIP-MCNC: NEGATIVE MG/DL
LEUKOCYTE ESTERASE UR QL STRIP: NEGATIVE
MUCOUS THREADS #/AREA URNS LPF: PRESENT /LPF
NITRATE UR QL: NEGATIVE
PH UR STRIP: 6.5 [PH] (ref 5–7)
RBC URINE: 1 /HPF
SP GR UR STRIP: 1.02 (ref 1–1.03)
SQUAMOUS EPITHELIAL: <1 /HPF
UROBILINOGEN UR STRIP-MCNC: <2 MG/DL
WBC URINE: 1 /HPF

## 2022-09-26 PROCEDURE — 250N000028 HC RX JA MOD (INTRAVENOUS), IP 250 OP 636: Mod: JA | Performed by: INTERNAL MEDICINE

## 2022-09-26 PROCEDURE — 81001 URINALYSIS AUTO W/SCOPE: CPT | Performed by: INTERNAL MEDICINE

## 2022-09-26 PROCEDURE — 258N000003 HC RX IP 258 OP 636: Performed by: INTERNAL MEDICINE

## 2022-09-26 PROCEDURE — 96365 THER/PROPH/DIAG IV INF INIT: CPT

## 2022-09-26 RX ORDER — METHYLPREDNISOLONE SODIUM SUCCINATE 125 MG/2ML
125 INJECTION, POWDER, LYOPHILIZED, FOR SOLUTION INTRAMUSCULAR; INTRAVENOUS
Status: CANCELLED
Start: 2022-09-29

## 2022-09-26 RX ORDER — ACETAMINOPHEN 325 MG/1
650 TABLET ORAL ONCE
Status: CANCELLED
Start: 2022-09-29 | End: 2022-09-29

## 2022-09-26 RX ORDER — MEPERIDINE HYDROCHLORIDE 50 MG/ML
25 INJECTION INTRAMUSCULAR; INTRAVENOUS; SUBCUTANEOUS EVERY 30 MIN PRN
Status: CANCELLED | OUTPATIENT
Start: 2022-09-29

## 2022-09-26 RX ORDER — ALBUTEROL SULFATE 90 UG/1
1-2 AEROSOL, METERED RESPIRATORY (INHALATION)
Status: CANCELLED
Start: 2022-09-29

## 2022-09-26 RX ORDER — ALBUTEROL SULFATE 0.83 MG/ML
2.5 SOLUTION RESPIRATORY (INHALATION)
Status: CANCELLED | OUTPATIENT
Start: 2022-09-29

## 2022-09-26 RX ORDER — HEPARIN SODIUM,PORCINE 10 UNIT/ML
5 VIAL (ML) INTRAVENOUS
Status: CANCELLED | OUTPATIENT
Start: 2022-09-29

## 2022-09-26 RX ORDER — DIPHENHYDRAMINE HYDROCHLORIDE 50 MG/ML
50 INJECTION INTRAMUSCULAR; INTRAVENOUS
Status: CANCELLED
Start: 2022-09-29

## 2022-09-26 RX ORDER — DIPHENHYDRAMINE HCL 25 MG
25 CAPSULE ORAL ONCE
Status: CANCELLED
Start: 2022-09-29 | End: 2022-09-29

## 2022-09-26 RX ORDER — METHYLPREDNISOLONE SODIUM SUCCINATE 125 MG/2ML
125 INJECTION, POWDER, LYOPHILIZED, FOR SOLUTION INTRAMUSCULAR; INTRAVENOUS ONCE
Status: CANCELLED
Start: 2022-09-29 | End: 2022-09-29

## 2022-09-26 RX ORDER — EPINEPHRINE 1 MG/ML
0.3 INJECTION, SOLUTION, CONCENTRATE INTRAVENOUS EVERY 5 MIN PRN
Status: CANCELLED | OUTPATIENT
Start: 2022-09-29

## 2022-09-26 RX ORDER — HEPARIN SODIUM (PORCINE) LOCK FLUSH IV SOLN 100 UNIT/ML 100 UNIT/ML
5 SOLUTION INTRAVENOUS
Status: CANCELLED | OUTPATIENT
Start: 2022-09-29

## 2022-09-26 RX ORDER — NALOXONE HYDROCHLORIDE 0.4 MG/ML
0.2 INJECTION, SOLUTION INTRAMUSCULAR; INTRAVENOUS; SUBCUTANEOUS
Status: CANCELLED | OUTPATIENT
Start: 2022-09-29

## 2022-09-26 RX ADMIN — SODIUM CHLORIDE 500 MG: 9 INJECTION, SOLUTION INTRAVENOUS at 09:23

## 2022-09-26 NOTE — PROGRESS NOTES
Infusion Nursing Note:  Diann Low presents today for Orencia.    Patient seen by provider today: No   present during visit today: Not Applicable.    Note: N/A.    Intravenous Access:  Peripheral IV placed.    Treatment Conditions:  Not Applicable. UA sent as ordered.    Post Infusion Assessment:  Patient tolerated infusion without incident.  Site patent and intact, free from redness, edema or discomfort.  Access discontinued per protocol.     Discharge Plan:   Patient discharged in stable condition accompanied by: self.  Departure Mode: Ambulatory.      Nubia Reyez RN

## 2022-09-26 NOTE — PROGRESS NOTES
~~~ NOTE: If the patient answers yes to any of the questions below, hold the infusion and contact ordering provider or on-call provider.    1. Have you recently had an elevated temperature, fever, chills, productive cough, coughing for 3 weeks or longer or hemoptysis,  abnormal vital signs, night sweats,  chest pain or have you noticed a decrease in your appetite, unexplained weight loss or fatigue? No  2. Do you have any open wounds or new incisions? No  3. Do you have any upcoming hospitalizations or surgeries? Does not include esophagogastroduodenoscopy, colonoscopy, endoscopic retrograde cholangiopancreatography (ERCP), endoscopic ultrasound (EUS), dental procedures or joint aspiration/steroid injections No  4. Do you currently have any signs of illness or infection or are you on any antibiotics? No  5. Have you had any new, sudden or worsening abdominal pain? No  6. Have you or anyone in your household received a live vaccination in the past 4 weeks? Please note: No live vaccines while on biologic/chemotherapy until 6 months after the last treatment. Patient can receive the flu vaccine (shot only), pneumovax and the Covid vaccine. It is optimal for the patient to get these vaccines mid cycle, but they can be given at any time as long as it is not on the day of the infusion. No  7. Have you recently been diagnosed with any new nervous system diseases (ie. Multiple sclerosis, Guillain [place], seizures, neurological changes) or cancer diagnosis? Are you on any form of radiation or chemotherapy? No  8. Are you pregnant or breast feeding or do you have plans of pregnancy in the future? No  9. Have you been having any signs of worsening depression or suicidal ideations?  (benlysta only) No  10. Have there been any other new onset medical symptoms? No  11. Have you had any new blood clots? (IVIG only) No

## 2022-10-25 ENCOUNTER — LAB (OUTPATIENT)
Dept: LAB | Facility: CLINIC | Age: 82
End: 2022-10-25
Payer: MEDICARE

## 2022-10-25 ENCOUNTER — HOSPITAL ENCOUNTER (OUTPATIENT)
Dept: GENERAL RADIOLOGY | Facility: HOSPITAL | Age: 82
Discharge: HOME | End: 2022-10-25
Attending: INTERNAL MEDICINE | Admitting: INTERNAL MEDICINE
Payer: MEDICARE

## 2022-10-25 ENCOUNTER — OFFICE VISIT (OUTPATIENT)
Dept: RHEUMATOLOGY | Facility: CLINIC | Age: 82
End: 2022-10-25
Payer: MEDICARE

## 2022-10-25 VITALS
DIASTOLIC BLOOD PRESSURE: 60 MMHG | BODY MASS INDEX: 17.92 KG/M2 | WEIGHT: 99.9 LBS | SYSTOLIC BLOOD PRESSURE: 120 MMHG | HEART RATE: 68 BPM

## 2022-10-25 DIAGNOSIS — E03.9 HYPOTHYROIDISM, UNSPECIFIED TYPE: ICD-10-CM

## 2022-10-25 DIAGNOSIS — M05.79 RHEUMATOID ARTHRITIS, SEROPOSITIVE, MULTIPLE SITES (H): Primary | ICD-10-CM

## 2022-10-25 DIAGNOSIS — M05.79 RHEUMATOID ARTHRITIS, SEROPOSITIVE, MULTIPLE SITES (H): ICD-10-CM

## 2022-10-25 DIAGNOSIS — Z79.899 HIGH RISK MEDICATION USE: ICD-10-CM

## 2022-10-25 DIAGNOSIS — M15.0 PRIMARY OSTEOARTHRITIS INVOLVING MULTIPLE JOINTS: ICD-10-CM

## 2022-10-25 LAB
ALBUMIN SERPL BCG-MCNC: 4.2 G/DL (ref 3.5–5.2)
ALT SERPL W P-5'-P-CCNC: 12 U/L (ref 10–35)
CREAT SERPL-MCNC: 0.92 MG/DL (ref 0.51–0.95)
ERYTHROCYTE [DISTWIDTH] IN BLOOD BY AUTOMATED COUNT: 14.9 % (ref 10–15)
GFR SERPL CREATININE-BSD FRML MDRD: 61 ML/MIN/1.73M2
HCT VFR BLD AUTO: 36.1 % (ref 35–47)
HGB BLD-MCNC: 11.9 G/DL (ref 11.7–15.7)
MCH RBC QN AUTO: 32.8 PG (ref 26.5–33)
MCHC RBC AUTO-ENTMCNC: 33 G/DL (ref 31.5–36.5)
MCV RBC AUTO: 99 FL (ref 78–100)
PLATELET # BLD AUTO: 182 10E3/UL (ref 150–450)
RBC # BLD AUTO: 3.63 10E6/UL (ref 3.8–5.2)
RHEUMATOID FACT SER NEPH-ACNC: 22 IU/ML
TSH SERPL DL<=0.005 MIU/L-ACNC: 3.63 UIU/ML (ref 0.3–4.2)
WBC # BLD AUTO: 6 10E3/UL (ref 4–11)

## 2022-10-25 PROCEDURE — 84460 ALANINE AMINO (ALT) (SGPT): CPT

## 2022-10-25 PROCEDURE — 82565 ASSAY OF CREATININE: CPT

## 2022-10-25 PROCEDURE — 86200 CCP ANTIBODY: CPT

## 2022-10-25 PROCEDURE — 84443 ASSAY THYROID STIM HORMONE: CPT

## 2022-10-25 PROCEDURE — 36415 COLL VENOUS BLD VENIPUNCTURE: CPT

## 2022-10-25 PROCEDURE — 82040 ASSAY OF SERUM ALBUMIN: CPT

## 2022-10-25 PROCEDURE — 99214 OFFICE O/P EST MOD 30 MIN: CPT | Performed by: INTERNAL MEDICINE

## 2022-10-25 PROCEDURE — 85027 COMPLETE CBC AUTOMATED: CPT

## 2022-10-25 PROCEDURE — 73130 X-RAY EXAM OF HAND: CPT | Mod: 50,FY

## 2022-10-25 PROCEDURE — 86038 ANTINUCLEAR ANTIBODIES: CPT

## 2022-10-25 PROCEDURE — 86431 RHEUMATOID FACTOR QUANT: CPT

## 2022-10-25 NOTE — PROGRESS NOTES
This document was created using a software with less than 100% fidelity, at times resulting in unintended, even erroneous syntax and grammar.  The reader is advised to keep this under consideration while reviewing, interpreting this note.      Rheumatology Consult Note      Diann Low     YOB: 1940 Age: 82 year old    Date of visit: 10/25/22    PCP: Meagan Gamboa    Chief Complaint   Patient presents with:  RECHECK: TRACY      Assessment and Plan     Diann was seen today for recheck.    Diagnoses and all orders for this visit:    Rheumatoid arthritis, seropositive, multiple sites (H)  -     XR Hand Bilateral G/E 3 Views; Future  -     Anti Nuclear Jannette IgG by IFA with Reflex; Future  -     Cyclic Citrullinated Peptide Antibody IgG; Future  -     Rheumatoid factor; Future    High risk medication use  -     ALT; Standing  -     Albumin level; Standing  -     CBC with platelets; Standing  -     Creatinine; Standing    Primary osteoarthritis involving multiple joints           This patient is here for establishment of care for seropositive rheumatoid arthritis, osteoarthritis, on methotrexate and Orencia, on chronic prednisone.  Today we had a detailed discussion.  I have outlined to her that how 1 would think of the reasons of polyarthralgia in somebody like her.  We discussed rheumatoid arthritis, osteoarthritis related symptoms.  The use of prednisone in this teenage is something we can consider going forward hopefully we can come to a point where she does not need it anymore.  We discussed gait stability use of cane.  We will x-ray the hands today, labs as noted.  I have asked her to come back in 3 months.  Today there will be no change in her regimen.    Return to clinic: 3 months      HPI   Diann Low is a 82 year old female  is seen today for evaluation of and establishment of care for polyarthralgia in the background of seropositive rheumatoid arthritis, osteoarthritis accompanied by  "her son, she lives independently at a senior living complex.  She recalls that her joint symptoms associated with rheumatoid arthritis began when she was in her 40s.  She considers her current control of pain and her functional ability as adequate.  She gets some discomfort in her hands such as with the weather change.  Overall she is content with how things are in terms of functional ability.  She does not take Tylenol and ibuprofen on a regular basis.  She is on methotrexate and folic acid.  She is due for her labs last done in June.  We will recheck those today.  Review of the chart from [facility] which showed that her anti-CCP antibody was negative and rheumatoid factor was positive in 2015 and these will be rechecked today.  Her SSA SSB profile is negative.  She has had \"couple of knuckles changed\".  She has not had any major joint arthroplasty.  Apart from thyroid disease she describes herself otherwise in good health.  She is known to have osteoporosis.  She is on prednisone 2 mg daily for the past several years..           Active Problem List     Patient Active Problem List   Diagnosis     Rheumatoid arthritis involving both hands, unspecified whether rheumatoid factor present (H)     History of falling     Arthritis, rheumatoid (H)     Presence of intraocular lens     Rheumatoid arthritis, seropositive, multiple sites (H)     Osteoporosis, unspecified osteoporosis type, unspecified pathological fracture presence     Past Medical History     Past Medical History:   Diagnosis Date     Skin cancer      Past Surgical History   No past surgical history on file.  Allergy     Allergies   Allergen Reactions     Infliximab Rash     Throat began to close/tighten     Sulfa Drugs Rash     Hydrocodone-Acetaminophen Nausea     Oxycodone-Acetaminophen Nausea     Penicillins Rash     Erythromycin Nausea     Current Medication List     Current Outpatient Medications   Medication Sig     Abatacept (ORENCIA IV)      " alendronate (FOSAMAX) 70 MG tablet Take 1 tablet (70 mg) by mouth every 7 days     calcium carbonate-vitamin D (OSCAL W/D) 500-200 MG-UNIT tablet Take 1 tablet by mouth 2 times daily     carboxymethylcellulose PF (REFRESH PLUS) 0.5 % ophthalmic solution 1 drop 3 times daily as needed for dry eyes     ferrous gluconate (FERGON) 324 (38 Fe) MG tablet Take 65 mg by mouth daily (with breakfast)     folic acid (FOLVITE) 1 MG tablet Take 1 tablet by mouth daily     levothyroxine (SYNTHROID/LEVOTHROID) 25 MCG tablet Take 1 tablet (25 mcg) by mouth daily     methotrexate 2.5 MG tablet Take 2 tablets on Weds and 2 tablets on Thursday     Multiple Vitamin (MULTIVITAMIN ADULT PO)      predniSONE (DELTASONE) 1 MG tablet Take 1 mg by mouth daily Two tablets once daily     No current facility-administered medications for this visit.            Family History   No family history on file.      Physical Exam     COMPREHENSIVE EXAMINATION:  Vitals:    10/25/22 0837   Weight: 45.3 kg (99 lb 14.4 oz)     A well appearing alert oriented female. Vital data as noted above. Her eyes examined for inflammation/scleromalacia. ENT examined for oral mucositis, moisture, thrush, nasal deformity, external ear redness, deformity. Her neck is examined for suppleness and lymphadenopathy.  Cardiopulmonary examination without dyspnea at rest, use of accessory muscles of breathing, wheezing, edema, peripheral or central cyanosis.   Skin examined for heliotrope, malar area eruption, lupus pernio, periungual erythema, sclerodactyly, papules, erythema nodosum, purpura, nail pitting, onycholysis, and obvious psoriasis lesion. Neurological examination done for alertness, speech, facial symmetry,  tone and power in upper and lower extremities, and gait. The joint examination is performed for swelling, tenderness, warmth, erythema, and range of motion in the following joints: DIPs, PIPs, MCPs, wrists, first CMC's, elbows, shoulders, hips, knees, ankles, feet;  spine for range of motion and paraspinal muscles for tenderness. The salient normal / abnormal findings are appended.  Warm she has multiple deformities in her hands, besides the DIP associated OA related changes she has scars from the prior surgery such as in the right index middle and the left middle fingers.  She has synovial thickening more prominent on the left MCPs, there is no dactylitis of digits.  She has other associated deformity such as flexion deformity at the fifth digit PIP more prominent on the right side, squaring of the first CMC's, she has excellent gait.  Full painless abduction at the shoulders.    Labs / Imaging (select studies)     No results found for: PPDINDURATIO, PPDREDNESS, TBGOLT, RHF, CCPABG, URIC, [id number], [id number], ANTIDNA, ANTIDNAINT, CARIA, [id number], [id number], ENASM, ENASCL, JO1, ENASSA, ENASSB, CENTA, [id number], V0EOUYS, [id number]   Hemoglobin   Date Value Ref Range Status   06/29/2022 11.7 11.7 - 15.7 g/dL Final   06/20/2022 11.9 11.7 - 15.7 g/dL Final   04/26/2022 12.8 11.7 - 15.7 g/dL Final     Urea Nitrogen   Date Value Ref Range Status   06/29/2022 19.9 8.0 - 23.0 mg/dL Final   04/26/2022 20 8 - 28 mg/dL Final     AST   Date Value Ref Range Status   06/29/2022 34 10 - 35 U/L Final   06/20/2022 32 0 - 40 U/L Final   04/26/2022 28 0 - 40 U/L Final     Albumin   Date Value Ref Range Status   06/29/2022 4.3 3.5 - 5.2 g/dL Final   06/20/2022 4.0 3.5 - 5.0 g/dL Final   04/26/2022 3.9 3.5 - 5.0 g/dL Final     Alkaline Phosphatase   Date Value Ref Range Status   06/29/2022 59 35 - 104 U/L Final   04/26/2022 59 45 - 120 U/L Final     ALT   Date Value Ref Range Status   06/29/2022 15 10 - 35 U/L Final   06/20/2022 12 0 - 45 U/L Final   04/26/2022 11 0 - 45 U/L Final          Immunization History     Immunization History   Administered Date(s) Administered     COVID-19,PF,Pfizer (12+ Yrs) 01/22/2021, 02/13/2021, 09/10/2021     COVID-19,PF,Pfizer 12+ Yrs (2022 and After) 04/18/2022     FLU 6-35  months 11/15/2007, 10/30/2008, 09/22/2009, 10/22/2010     FLUAD(HD)65+ QUAD 10/07/2020     [id number] Novel Flu P-free 12/22/2009     Influenza (H1N1) 12/22/2009     Influenza (High Dose) 3 valent vaccine 10/18/2011, 11/01/2012, 10/01/2013, 09/21/2014, 09/24/2015, 11/05/2016, 09/20/2017, 10/19/2018, 10/15/2019     Influenza (IIV3) PF 10/01/2001, 11/11/2002, 11/10/2003, 11/01/2004, 10/28/2005, 10/25/2006, 09/25/2012     Influenza Vaccine IM Ages 6-35 Months 4 Valent (PF) 11/01/2004, 10/01/2013     Influenza, Quad, High Dose, Pf, 65yr+ (Fluzone HD) 09/02/2022     Pneumo Conj 13-V (2010&after) 11/28/2014     Pneumococcal 23 valent 06/24/2002, 08/07/2007     Td (Adult), Adsorbed 06/30/1995, 02/10/2005     Tdap (Adacel,Boostrix) 11/30/2011, 01/10/2018

## 2022-10-26 LAB
ANA SER QL IF: NEGATIVE
CCP AB SER IA-ACNC: 3.6 U/ML

## 2022-10-28 DIAGNOSIS — M81.0 OSTEOPOROSIS, UNSPECIFIED OSTEOPOROSIS TYPE, UNSPECIFIED PATHOLOGICAL FRACTURE PRESENCE: Primary | ICD-10-CM

## 2022-10-28 DIAGNOSIS — E03.9 HYPOTHYROIDISM, UNSPECIFIED TYPE: ICD-10-CM

## 2022-11-11 ENCOUNTER — OFFICE VISIT (OUTPATIENT)
Dept: UROLOGY | Facility: CLINIC | Age: 82
End: 2022-11-11
Payer: MEDICARE

## 2022-11-11 VITALS
BODY MASS INDEX: 16.07 KG/M2 | WEIGHT: 100 LBS | HEIGHT: 66 IN | SYSTOLIC BLOOD PRESSURE: 110 MMHG | DIASTOLIC BLOOD PRESSURE: 72 MMHG

## 2022-11-11 DIAGNOSIS — R31.29 MICROSCOPIC HEMATURIA: ICD-10-CM

## 2022-11-11 LAB
ALBUMIN UR-MCNC: NEGATIVE MG/DL
APPEARANCE UR: CLEAR
BILIRUB UR QL STRIP: NEGATIVE
COLOR UR AUTO: YELLOW
GLUCOSE UR STRIP-MCNC: NEGATIVE MG/DL
HGB UR QL STRIP: NEGATIVE
KETONES UR STRIP-MCNC: ABNORMAL MG/DL
LEUKOCYTE ESTERASE UR QL STRIP: NEGATIVE
NITRATE UR QL: NEGATIVE
PH UR STRIP: 7 [PH] (ref 5–7)
SP GR UR STRIP: 1.02 (ref 1–1.03)
UROBILINOGEN UR STRIP-ACNC: 0.2 E.U./DL

## 2022-11-11 PROCEDURE — 99214 OFFICE O/P EST MOD 30 MIN: CPT | Performed by: PHYSICIAN ASSISTANT

## 2022-11-11 PROCEDURE — 81003 URINALYSIS AUTO W/O SCOPE: CPT | Mod: QW | Performed by: PHYSICIAN ASSISTANT

## 2022-11-11 PROCEDURE — 88112 CYTOPATH CELL ENHANCE TECH: CPT | Performed by: PATHOLOGY

## 2022-11-11 ASSESSMENT — PAIN SCALES - GENERAL: PAINLEVEL: NO PAIN (0)

## 2022-11-11 NOTE — LETTER
11/11/2022       RE: Diann Low  [address]  [place] [address]     Dear Colleague,    Thank you for referring your patient, Diann Low, to the [facility] at [facility]. Please see a copy of my visit note below.    CC: Hematuria.    HPI: It is a pleasure to see Ms. Diann Low, a very pleasant 82 year old female, asked to be seen in consultation by Elisabeth Phipps NP for evaluation of micro hematuria.     The patient denies any gross hematuria.  Ms. Low voids without difficulty.  She currently denies any dysuria, pyuria, hesitancy, intermittency, feelings of incomplete emptying, or any recent history of urinary tract infections or stones.    Hematuria Risk Factors:  Age >40: Yes     Smoking history: no  Occupational exposure to chemicals or dyes (ie, benzenes, aromatic amines): no  History of urologic disorder or disease: no  History of irritative voiding symptoms: no  History of urinary tract infection: no  Analgesic abuse: no  History of pelvic irradiation: no    Past Medical History:   Diagnosis Date     Skin cancer      Spider veins      History reviewed. No pertinent surgical history.  Current Outpatient Medications   Medication Sig Dispense Refill     Abatacept (ORENCIA IV)        alendronate (FOSAMAX) 70 MG tablet Take 1 tablet (70 mg) by mouth every 7 days 12 tablet 3     calcium carbonate-vitamin D (OSCAL W/D) 500-200 MG-UNIT tablet Take 1 tablet by mouth 2 times daily       carboxymethylcellulose PF (REFRESH PLUS) 0.5 % ophthalmic solution 1 drop 3 times daily as needed for dry eyes       ferrous gluconate (FERGON) 324 (38 Fe) MG tablet Take 65 mg by mouth daily (with breakfast)       folic acid (FOLVITE) 1 MG tablet Take 1 tablet by mouth daily       levothyroxine (SYNTHROID/LEVOTHROID) 25 MCG tablet Take 1 tablet (25 mcg) by mouth daily 90 tablet 0     methotrexate 2.5 MG tablet Take 2 tablets on Weds and 2  "tablets on Thursday       Multiple Vitamin (MULTIVITAMIN ADULT PO)        predniSONE (DELTASONE) 1 MG tablet Take 1 mg by mouth daily Two tablets once daily       Allergies   Allergen Reactions     Infliximab Rash     Throat began to close/tighten     Sulfa Drugs Rash     Hydrocodone-Acetaminophen Nausea     Oxycodone-Acetaminophen Nausea     Penicillins Rash     Erythromycin Nausea     FAMILY HISTORY: There is no reported history of genitourinary carcinoma.  There is no history of urolithiasis.      Social History     Socioeconomic History     Marital status: Single     Spouse name: Not on file     Number of children: Not on file     Years of education: Not on file     Highest education level: Not on file   Occupational History     Not on file   Tobacco Use     Smoking status: Never     Smokeless tobacco: Never   Substance and Sexual Activity     Alcohol use: Not on file     Drug use: Not on file     Sexual activity: Not on file   Other Topics Concern     Not on file   Social History Narrative     Not on file     Social Determinants of Health     Financial Resource Strain: Not on file   Food Insecurity: Not on file   Transportation Needs: Not on file   Physical Activity: Not on file   Stress: Not on file   Social Connections: Not on file   Intimate Partner Violence: Not on file   Housing Stability: Not on file       PHYSICAL EXAM:   Vitals:    11/11/22 1232   BP: 110/72   Weight: 45.4 kg (100 lb)   Height: 1.664 m (5' 5.5\")     PSYCH: NAD  EYES: EOMI  MOUTH: MMM  NEURO: AAO x3    Lab on 10/25/2022   Component Date Value Ref Range Status     TSH 10/25/2022 3.63  0.30 - 4.20 uIU/mL Final     REBECCA interpretation 10/25/2022 Negative  Negative Final      Negative:              <1:40  Borderline Positive:   1:40 - 1:80  Positive:              >1:80     Cyclic Citrullinated Peptide Antib* 10/25/2022 3.6  <7.0 U/mL Final    Negative     Rheumatoid Factor 10/25/2022 22 (H)  <12 IU/mL Final     ALT 10/25/2022 12  10 - 35 U/L " Final     Albumin 10/25/2022 4.2  3.5 - 5.2 g/dL Final     WBC Count 10/25/2022 6.0  4.0 - 11.0 10e3/uL Final     RBC Count 10/25/2022 3.63 (L)  3.80 - 5.20 10e6/uL Final     Hemoglobin 10/25/2022 11.9  11.7 - 15.7 g/dL Final     Hematocrit 10/25/2022 36.1  35.0 - 47.0 % Final     MCV 10/25/2022 99  78 - 100 fL Final     MCH 10/25/2022 32.8  26.5 - 33.0 pg Final     MCHC 10/25/2022 33.0  31.5 - 36.5 g/dL Final     RDW 10/25/2022 14.9  10.0 - 15.0 % Final     Platelet Count 10/25/2022 182  150 - 450 10e3/uL Final     Creatinine 10/25/2022 0.92  0.51 - 0.95 mg/dL Final     GFR Estimate 10/25/2022 61  >60 mL/min/1.73m2 Final    Effective December 21, 2021 eGFRcr in adults is calculated using the 2021 CKD-EPI creatinine equation which includes age and gender (Zoe et al., NEJM, DOI: 10.1056/KIAClb2450449)       IMAGING: none    ASSESSMENT and PLAN:    82 year old female with high risk micro hematuria.  The differential diagnosis at this point includes stone disease, infection, vaginal contaminant, urothelial malignancy, renal disorder versus another yet unknown diagnosis.    At this time, recommend proceeding with comprehensive hematuria evaluation to include:  - Urine cytology to look for cells concerning for malignancy.  - CT urogram for upper tract imaging.  - Cystoscopy with the first available urologist to evaluate the interior of the bladder. Follow up for hematuria as recommended by urologist performing cystoscopic evaluation.    Thank you for allowing me to participate in Ms. Low's care. I will keep you updated of her progress, but please do not hesitate to contact me with any questions.    Jocelyn Ndiaye PA-C  [facility]  26 minutes spent on the date of the encounter doing chart review, review of outside records, review of test results, interpretation of tests, patient visit and documentation.

## 2022-11-11 NOTE — PROGRESS NOTES
CC: Hematuria.    HPI: It is a pleasure to see Ms. Diann Low, a very pleasant 82 year old female, asked to be seen in consultation by Elisabeth Phipps NP for evaluation of micro hematuria.     The patient denies any gross hematuria.  Ms. Low voids without difficulty.  She currently denies any dysuria, pyuria, hesitancy, intermittency, feelings of incomplete emptying, or any recent history of urinary tract infections or stones.    Hematuria Risk Factors:  Age >40: Yes     Smoking history: no  Occupational exposure to chemicals or dyes (ie, benzenes, aromatic amines): no  History of urologic disorder or disease: no  History of irritative voiding symptoms: no  History of urinary tract infection: no  Analgesic abuse: no  History of pelvic irradiation: no    Past Medical History:   Diagnosis Date     Skin cancer      Spider veins      History reviewed. No pertinent surgical history.  Current Outpatient Medications   Medication Sig Dispense Refill     Abatacept (ORENCIA IV)        alendronate (FOSAMAX) 70 MG tablet Take 1 tablet (70 mg) by mouth every 7 days 12 tablet 3     calcium carbonate-vitamin D (OSCAL W/D) 500-200 MG-UNIT tablet Take 1 tablet by mouth 2 times daily       carboxymethylcellulose PF (REFRESH PLUS) 0.5 % ophthalmic solution 1 drop 3 times daily as needed for dry eyes       ferrous gluconate (FERGON) 324 (38 Fe) MG tablet Take 65 mg by mouth daily (with breakfast)       folic acid (FOLVITE) 1 MG tablet Take 1 tablet by mouth daily       levothyroxine (SYNTHROID/LEVOTHROID) 25 MCG tablet Take 1 tablet (25 mcg) by mouth daily 90 tablet 0     methotrexate 2.5 MG tablet Take 2 tablets on Weds and 2 tablets on Thursday       Multiple Vitamin (MULTIVITAMIN ADULT PO)        predniSONE (DELTASONE) 1 MG tablet Take 1 mg by mouth daily Two tablets once daily       Allergies   Allergen Reactions     Infliximab Rash     Throat began to close/tighten     Sulfa Drugs Rash     Hydrocodone-Acetaminophen Nausea  "    Oxycodone-Acetaminophen Nausea     Penicillins Rash     Erythromycin Nausea     FAMILY HISTORY: There is no reported history of genitourinary carcinoma.  There is no history of urolithiasis.      Social History     Socioeconomic History     Marital status: Single     Spouse name: Not on file     Number of children: Not on file     Years of education: Not on file     Highest education level: Not on file   Occupational History     Not on file   Tobacco Use     Smoking status: Never     Smokeless tobacco: Never   Substance and Sexual Activity     Alcohol use: Not on file     Drug use: Not on file     Sexual activity: Not on file   Other Topics Concern     Not on file   Social History Narrative     Not on file     Social Determinants of Health     Financial Resource Strain: Not on file   Food Insecurity: Not on file   Transportation Needs: Not on file   Physical Activity: Not on file   Stress: Not on file   Social Connections: Not on file   Intimate Partner Violence: Not on file   Housing Stability: Not on file       PHYSICAL EXAM:   Vitals:    11/11/22 1232   BP: 110/72   Weight: 45.4 kg (100 lb)   Height: 1.664 m (5' 5.5\")     PSYCH: NAD  EYES: EOMI  MOUTH: MMM  NEURO: AAO x3    Lab on 10/25/2022   Component Date Value Ref Range Status     TSH 10/25/2022 3.63  0.30 - 4.20 uIU/mL Final     REBECCA interpretation 10/25/2022 Negative  Negative Final      Negative:              <1:40  Borderline Positive:   1:40 - 1:80  Positive:              >1:80     Cyclic Citrullinated Peptide Antib* 10/25/2022 3.6  <7.0 U/mL Final    Negative     Rheumatoid Factor 10/25/2022 22 (H)  <12 IU/mL Final     ALT 10/25/2022 12  10 - 35 U/L Final     Albumin 10/25/2022 4.2  3.5 - 5.2 g/dL Final     WBC Count 10/25/2022 6.0  4.0 - 11.0 10e3/uL Final     RBC Count 10/25/2022 3.63 (L)  3.80 - 5.20 10e6/uL Final     Hemoglobin 10/25/2022 11.9  11.7 - 15.7 g/dL Final     Hematocrit 10/25/2022 36.1  35.0 - 47.0 % Final     MCV 10/25/2022 99  78 - " 100 fL Final     MCH 10/25/2022 32.8  26.5 - 33.0 pg Final     MCHC 10/25/2022 33.0  31.5 - 36.5 g/dL Final     RDW 10/25/2022 14.9  10.0 - 15.0 % Final     Platelet Count 10/25/2022 182  150 - 450 10e3/uL Final     Creatinine 10/25/2022 0.92  0.51 - 0.95 mg/dL Final     GFR Estimate 10/25/2022 61  >60 mL/min/1.73m2 Final    Effective December 21, 2021 eGFRcr in adults is calculated using the 2021 CKD-EPI creatinine equation which includes age and gender (Zoe et al., NEJM, DOI: 10.1056/CIDWgb7025947)       IMAGING: none    ASSESSMENT and PLAN:    82 year old female with high risk micro hematuria.  The differential diagnosis at this point includes stone disease, infection, vaginal contaminant, urothelial malignancy, renal disorder versus another yet unknown diagnosis.    At this time, recommend proceeding with comprehensive hematuria evaluation to include:  - Urine cytology to look for cells concerning for malignancy.  - CT urogram for upper tract imaging.  - Cystoscopy with the first available urologist to evaluate the interior of the bladder. Follow up for hematuria as recommended by urologist performing cystoscopic evaluation.    Thank you for allowing me to participate in Ms. Low's care. I will keep you updated of her progress, but please do not hesitate to contact me with any questions.    Jocelyn Ndiaye PA-C  [facility]  26 minutes spent on the date of the encounter doing chart review, review of outside records, review of test results, interpretation of tests, patient visit and documentation.

## 2022-11-11 NOTE — PATIENT INSTRUCTIONS
- Urine cytology to look for abnormal cells. Please make an appointment at your local [facility] lab to leave a urine sample.  - CT scan of the kidneys.   - Cystoscopy with the  urologist to evaluate the interior of the bladder. Follow up as recommended by the urologist.    CYSTOSCOPY    What is a Cystoscopy?  This is a procedure done to check for problems inside the bladder.  Problems may include polyps (growths), tumors, inflammation (swelling and redness) and other concerns.    The Urologist inserts a thin tube (called a cystoscope) into the bladder.  The tube is about the size of a pencil.  We will give you numbing medicine to reduce the pain or discomfort you may feel.    The Urologist will be able to see inside the bladder by filling the bladder with water.  The water makes it easier to see any problems that may be present. You will have a sense to need to urinate and this is normal.       How should I get ready for the exam?  Nothing to do to prepare. You may eat normally the day of the exam. There is no sedation, so you may drive yourself to and from if you can drive.       Please tell your doctor if:  You have a history of urinary tract infections.  You know that you have a tumor in your bladder.  You have bleeding problems.  You have any allergies.  You are or may be pregnant.      What happens after the exam?  You may go back to your normal diet and activity as you feel ready.    For the next two days after the exam, you may notice:  Some blood in your urine.  Some burning when you urinate (use the toilet).  An urge to urinate more often.  Bladder spasms.    These are normal after the procedure. They should go away on their own after a day or two.      You can help to relieve the above listed symptoms by:  Drinking 6 to 8 large glasses of water each day (includes drinks at meals).  This will help clear the urine.  Take warm baths to relieve pain and bladder spasms.  Do not add anything to the bath  water.  You may take Tylenol (acetaminophen) per label instructions for discomfort.

## 2022-11-15 LAB
PATH REPORT.COMMENTS IMP SPEC: NORMAL
PATH REPORT.FINAL DX SPEC: NORMAL
PATH REPORT.GROSS SPEC: NORMAL
PATH REPORT.RELEVANT HX SPEC: NORMAL

## 2022-11-18 ENCOUNTER — TELEPHONE (OUTPATIENT)
Dept: ENDOCRINOLOGY | Facility: CLINIC | Age: 82
End: 2022-11-18

## 2022-11-18 ENCOUNTER — TELEPHONE (OUTPATIENT)
Dept: RHEUMATOLOGY | Facility: CLINIC | Age: 82
End: 2022-11-18

## 2022-11-18 NOTE — TELEPHONE ENCOUNTER
[facility] Call Center    Phone Message    May a detailed message be left on voicemail: yes     Reason for Call: Medication Question or concern regarding medication   Prescription Clarification    Name of Medication: levothyroxine (SYNTHROID/LEVOTHROID) 25 MCG tablet     Prescribing Provider: Gregory     What on the order needs clarification? Pt stated she wakes up some mornings and does not feel well. Per pt heard above script is supposed to be taken around the same time every night and is wondering if that is accurate and the reasoning behind not feeling well. Please advise. Thank you      Action Taken: Message routed to:  Other: endo    Travel Screening: Not Applicable

## 2022-11-18 NOTE — TELEPHONE ENCOUNTER
[facility] Call Center    Phone Message    May a detailed message be left on voicemail: yes     Reason for Call: Medication Refill Request    Has the patient contacted the pharmacy for the refill? Yes   Name of medication being requested: alendronate (FOSAMAX) 70 MG tablet     Provider who prescribed the medication: Gregory     Pharmacy:   [facility] - [facility], [place] - [address] [place] [address] DRUG STORE #[id number] [address]  AT [facility]    Date medication is needed: as soon as possible    Pt wondering if temporary script can be sent to [facility] as [facility] takes 7-10 days and pt only has 2 lefts.         Action Taken: Message routed to:  Other: endo    Travel Screening: Not Applicable

## 2022-11-18 NOTE — TELEPHONE ENCOUNTER
Patient called back to speak with ARABELLA Green. She is requesting a call back. Please follow up. Thank you.

## 2022-11-18 NOTE — TELEPHONE ENCOUNTER
Spoke to patient, states she takes it around 9pm. She eats about 5-530.     Patient states it was going well, until the last couple of nights when she took the medication earlier. Patient will try to go back to the 9pm.     Patient informed if she goes back to the normal routine, if still not feeling well should follow up with pcp, as may be something else causing it.

## 2022-11-23 ENCOUNTER — INFUSION THERAPY VISIT (OUTPATIENT)
Dept: INFUSION THERAPY | Facility: CLINIC | Age: 82
End: 2022-11-23
Attending: INTERNAL MEDICINE
Payer: MEDICARE

## 2022-11-23 VITALS
RESPIRATION RATE: 18 BRPM | TEMPERATURE: 98 F | DIASTOLIC BLOOD PRESSURE: 61 MMHG | HEART RATE: 69 BPM | OXYGEN SATURATION: 98 % | SYSTOLIC BLOOD PRESSURE: 135 MMHG

## 2022-11-23 DIAGNOSIS — M05.79 RHEUMATOID ARTHRITIS, SEROPOSITIVE, MULTIPLE SITES (H): Primary | ICD-10-CM

## 2022-11-23 PROCEDURE — 258N000003 HC RX IP 258 OP 636: Performed by: INTERNAL MEDICINE

## 2022-11-23 PROCEDURE — 96365 THER/PROPH/DIAG IV INF INIT: CPT

## 2022-11-23 PROCEDURE — 250N000028 HC RX JA MOD (INTRAVENOUS), IP 250 OP 636: Mod: JA | Performed by: INTERNAL MEDICINE

## 2022-11-23 RX ORDER — METHYLPREDNISOLONE SODIUM SUCCINATE 125 MG/2ML
125 INJECTION, POWDER, LYOPHILIZED, FOR SOLUTION INTRAMUSCULAR; INTRAVENOUS ONCE
Status: CANCELLED
Start: 2022-11-25 | End: 2022-11-25

## 2022-11-23 RX ORDER — HEPARIN SODIUM,PORCINE 10 UNIT/ML
5 VIAL (ML) INTRAVENOUS
Status: CANCELLED | OUTPATIENT
Start: 2022-11-25

## 2022-11-23 RX ORDER — ALBUTEROL SULFATE 0.83 MG/ML
2.5 SOLUTION RESPIRATORY (INHALATION)
Status: CANCELLED | OUTPATIENT
Start: 2022-11-25

## 2022-11-23 RX ORDER — DIPHENHYDRAMINE HCL 25 MG
25 CAPSULE ORAL ONCE
Status: CANCELLED
Start: 2022-11-25 | End: 2022-11-25

## 2022-11-23 RX ORDER — ALBUTEROL SULFATE 90 UG/1
1-2 AEROSOL, METERED RESPIRATORY (INHALATION)
Status: CANCELLED
Start: 2022-11-25

## 2022-11-23 RX ORDER — EPINEPHRINE 1 MG/ML
0.3 INJECTION, SOLUTION INTRAMUSCULAR; SUBCUTANEOUS EVERY 5 MIN PRN
Status: CANCELLED | OUTPATIENT
Start: 2022-11-25

## 2022-11-23 RX ORDER — HEPARIN SODIUM (PORCINE) LOCK FLUSH IV SOLN 100 UNIT/ML 100 UNIT/ML
5 SOLUTION INTRAVENOUS
Status: CANCELLED | OUTPATIENT
Start: 2022-11-25

## 2022-11-23 RX ORDER — MEPERIDINE HYDROCHLORIDE 50 MG/ML
25 INJECTION INTRAMUSCULAR; INTRAVENOUS; SUBCUTANEOUS EVERY 30 MIN PRN
Status: CANCELLED | OUTPATIENT
Start: 2022-11-25

## 2022-11-23 RX ORDER — NALOXONE HYDROCHLORIDE 0.4 MG/ML
0.2 INJECTION, SOLUTION INTRAMUSCULAR; INTRAVENOUS; SUBCUTANEOUS
Status: CANCELLED | OUTPATIENT
Start: 2022-11-25

## 2022-11-23 RX ORDER — ACETAMINOPHEN 325 MG/1
650 TABLET ORAL ONCE
Status: CANCELLED
Start: 2022-11-25 | End: 2022-11-25

## 2022-11-23 RX ORDER — DIPHENHYDRAMINE HYDROCHLORIDE 50 MG/ML
50 INJECTION INTRAMUSCULAR; INTRAVENOUS
Status: CANCELLED
Start: 2022-11-25

## 2022-11-23 RX ORDER — METHYLPREDNISOLONE SODIUM SUCCINATE 125 MG/2ML
125 INJECTION, POWDER, LYOPHILIZED, FOR SOLUTION INTRAMUSCULAR; INTRAVENOUS
Status: CANCELLED
Start: 2022-11-25

## 2022-11-23 RX ADMIN — SODIUM CHLORIDE 500 MG: 9 INJECTION, SOLUTION INTRAVENOUS at 10:17

## 2022-11-23 NOTE — PROGRESS NOTES
Infusion Nursing Note:  Diann Low presents today for Orencia.    Patient seen by provider today: No   present during visit today: Not Applicable.    Note: Pt arrives to infusion today feeling well. Offers no new concerns or complaints.   ~~~ NOTE: If the patient answers yes to any of the questions below, hold the infusion and contact ordering provider or on-call provider.    1. Have you recently had an elevated temperature, fever, chills, productive cough, coughing for 3 weeks or longer or hemoptysis,  abnormal vital signs, night sweats,  chest pain or have you noticed a decrease in your appetite, unexplained weight loss or fatigue? No  2. Do you have any open wounds or new incisions? No  3. Do you have any upcoming hospitalizations or surgeries? Does not include esophagogastroduodenoscopy, colonoscopy, endoscopic retrograde cholangiopancreatography (ERCP), endoscopic ultrasound (EUS), dental procedures or joint aspiration/steroid injections No  4. Do you currently have any signs of illness or infection or are you on any antibiotics? No  5. Have you had any new, sudden or worsening abdominal pain? No  6. Have you or anyone in your household received a live vaccination in the past 4 weeks? Please note: No live vaccines while on biologic/chemotherapy until 6 months after the last treatment. Patient can receive the flu vaccine (shot only), pneumovax and the Covid vaccine. It is optimal for the patient to get these vaccines mid cycle, but they can be given at any time as long as it is not on the day of the infusion. No  7. Have you recently been diagnosed with any new nervous system diseases (ie. Multiple sclerosis, Guillain [place], seizures, neurological changes) or cancer diagnosis? Are you on any form of radiation or chemotherapy? No  8. Are you pregnant or breast feeding or do you have plans of pregnancy in the future? No  9. Have you been having any signs of worsening depression or suicidal  ideations?  (benlysta only) N/A  10. Have there been any other new onset medical symptoms? No  11. Have you had any new blood clots? (IVIG only) N/A    Intravenous Access:  Peripheral IV placed.    Treatment Conditions:  Not Applicable.    Post Infusion Assessment:  Patient tolerated infusion without incident.  Blood return noted pre and post infusion.  Site patent and intact, free from redness, edema or discomfort.  No evidence of extravasations.  Access discontinued per protocol.     Discharge Plan:   Patient discharged in stable condition accompanied by: self.  Departure Mode: Ambulatory.      Ann Robles RN

## 2022-11-25 ENCOUNTER — HOSPITAL ENCOUNTER (OUTPATIENT)
Dept: CT IMAGING | Facility: CLINIC | Age: 82
Discharge: HOME | End: 2022-11-25
Attending: PHYSICIAN ASSISTANT | Admitting: PHYSICIAN ASSISTANT
Payer: MEDICARE

## 2022-11-25 DIAGNOSIS — R31.29 MICROSCOPIC HEMATURIA: ICD-10-CM

## 2022-11-25 LAB
CREAT BLD-MCNC: 0.9 MG/DL (ref 0.6–1.1)
GFR SERPL CREATININE-BSD FRML MDRD: >60 ML/MIN/1.73M2

## 2022-11-25 PROCEDURE — 250N000011 HC RX IP 250 OP 636: Performed by: PHYSICIAN ASSISTANT

## 2022-11-25 PROCEDURE — G1010 CDSM STANSON: HCPCS

## 2022-11-25 PROCEDURE — 82565 ASSAY OF CREATININE: CPT

## 2022-11-25 RX ORDER — IOPAMIDOL 755 MG/ML
90 INJECTION, SOLUTION INTRAVASCULAR ONCE
Status: COMPLETED | OUTPATIENT
Start: 2022-11-25 | End: 2022-11-25

## 2022-11-25 RX ADMIN — IOPAMIDOL 90 ML: 755 INJECTION, SOLUTION INTRAVENOUS at 14:42

## 2022-12-06 ENCOUNTER — TELEPHONE (OUTPATIENT)
Dept: ENDOCRINOLOGY | Facility: CLINIC | Age: 82
End: 2022-12-06

## 2022-12-06 DIAGNOSIS — M81.0 OSTEOPOROSIS, UNSPECIFIED OSTEOPOROSIS TYPE, UNSPECIFIED PATHOLOGICAL FRACTURE PRESENCE: ICD-10-CM

## 2022-12-06 RX ORDER — LEVOTHYROXINE SODIUM 25 UG/1
25 TABLET ORAL DAILY
Qty: 90 TABLET | Refills: 0 | Status: SHIPPED | OUTPATIENT
Start: 2022-12-06 | End: 2023-04-07

## 2022-12-06 RX ORDER — LEVOTHYROXINE SODIUM 25 UG/1
25 TABLET ORAL DAILY
Qty: 30 TABLET | Refills: 0 | Status: SHIPPED | OUTPATIENT
Start: 2022-12-06 | End: 2022-12-06

## 2022-12-06 NOTE — TELEPHONE ENCOUNTER
[facility] Call Center    Phone Message    May a detailed message be left on voicemail: yes     Reason for Call: Medication Refill Request    Has the patient contacted the pharmacy for the refill? Yes   Name of medication being requested: levothyroxine (SYNTHROID/LEVOTHROID) 25 MCG tablet    Patient wants to know if she can get an emergency refill sent to -[facility] #[id number] [place], [place] - [address] [place] [address]  AT [facility]    Per patient down to one pill -     Then wants the \"regular \" full refill to be sent to     [facility] - [place], [place] - [address] [place] [address]        Provider who prescribed the medication: Dr. Jenkins       Date medication is needed: asap           Action Taken: Other: ENDO    Travel Screening: Not Applicable                                                                      "

## 2022-12-30 ENCOUNTER — TELEPHONE (OUTPATIENT)
Dept: ONCOLOGY | Facility: CLINIC | Age: 82
End: 2022-12-30

## 2022-12-30 DIAGNOSIS — R31.9 HEMATURIA: Primary | ICD-10-CM

## 2022-12-30 NOTE — TELEPHONE ENCOUNTER
Called Diann to follow up prior to cysto that is scheduled on 1/3/23. Informed her to arrive with a full bladder as UA is needed prior to cysto. She will check in around 130 for 145 lab prior to her cysto. She stated understanding . Message sent to schedulers will add her to the lab schedule. Order placed/Tamika Castillo RN

## 2023-01-03 ENCOUNTER — OFFICE VISIT (OUTPATIENT)
Dept: ONCOLOGY | Facility: CLINIC | Age: 83
End: 2023-01-03
Attending: STUDENT IN AN ORGANIZED HEALTH CARE EDUCATION/TRAINING PROGRAM
Payer: MEDICARE

## 2023-01-03 ENCOUNTER — LAB (OUTPATIENT)
Dept: INFUSION THERAPY | Facility: CLINIC | Age: 83
End: 2023-01-03
Attending: STUDENT IN AN ORGANIZED HEALTH CARE EDUCATION/TRAINING PROGRAM
Payer: MEDICARE

## 2023-01-03 VITALS
TEMPERATURE: 98.6 F | HEART RATE: 75 BPM | WEIGHT: 101.6 LBS | OXYGEN SATURATION: 96 % | SYSTOLIC BLOOD PRESSURE: 115 MMHG | RESPIRATION RATE: 16 BRPM | DIASTOLIC BLOOD PRESSURE: 56 MMHG | BODY MASS INDEX: 16.65 KG/M2

## 2023-01-03 DIAGNOSIS — R31.9 HEMATURIA: ICD-10-CM

## 2023-01-03 DIAGNOSIS — R31.29 MICROSCOPIC HEMATURIA: Primary | ICD-10-CM

## 2023-01-03 LAB
ALBUMIN UR-MCNC: 10 MG/DL
APPEARANCE UR: CLEAR
BILIRUB UR QL STRIP: NEGATIVE
COLOR UR AUTO: YELLOW
GLUCOSE UR STRIP-MCNC: NEGATIVE MG/DL
HGB UR QL STRIP: NEGATIVE
KETONES UR STRIP-MCNC: NEGATIVE MG/DL
LEUKOCYTE ESTERASE UR QL STRIP: NEGATIVE
NITRATE UR QL: NEGATIVE
PH UR STRIP: 5.5 [PH] (ref 5–7)
SP GR UR STRIP: 1.02 (ref 1–1.03)
UROBILINOGEN UR STRIP-MCNC: <2 MG/DL

## 2023-01-03 PROCEDURE — 99212 OFFICE O/P EST SF 10 MIN: CPT | Mod: 25 | Performed by: STUDENT IN AN ORGANIZED HEALTH CARE EDUCATION/TRAINING PROGRAM

## 2023-01-03 PROCEDURE — 52000 CYSTOURETHROSCOPY: CPT | Performed by: STUDENT IN AN ORGANIZED HEALTH CARE EDUCATION/TRAINING PROGRAM

## 2023-01-03 PROCEDURE — G0463 HOSPITAL OUTPT CLINIC VISIT: HCPCS | Mod: 25 | Performed by: STUDENT IN AN ORGANIZED HEALTH CARE EDUCATION/TRAINING PROGRAM

## 2023-01-03 PROCEDURE — 81003 URINALYSIS AUTO W/O SCOPE: CPT

## 2023-01-03 ASSESSMENT — PAIN SCALES - GENERAL: PAINLEVEL: NO PAIN (0)

## 2023-01-03 NOTE — PROGRESS NOTES
CHIEF COMPLAINT   It was my pleasure to see Diann Low who is a 82 year old female for follow-up of microscopic hematuria.      HPI:  Diann Low is a 82 year old female being seen for microscopic hematuria follow-up and evaluation with cystoscopy.  Duration of problem: 3 months  Previous treatments: None      Reviewed previous notes from Jocelyn Ndiaye PA-C,  Diann is here for cystoscopy  She has had a CT done and a urine cytology as well  She is never had any gross hematuria and only 1 evaluation showing microscopic hematuria    Exam:  /56   Pulse 75   Temp 98.6  F (37  C) (Oral)   Resp 16   Wt 46.1 kg (101 lb 9.6 oz)   SpO2 96%   BMI 16.65 kg/m    General: age-appropriate appearing female in NAD sitting in an exam chair  Resp: no respiratory distress  CV: heart rate regular  Abdomen: Degree of obesity is none. Abdomen is soft and nontender. No organomegaly.   : Deferred to cystoscopy  Neuro: grossly non focal. Normal reflexes  Motor: excellent strength throughout      CystoscopyPre-procedure diagnosis: Microscopic hematuria  Post procedure diagnosis: normal cystoscopy  Procedure performed: cystoscopy  Surgeon: Jon Hancock MD  Anesthesia: local    Indications for procedure: Patient is a 82 year old year old female with a history of microscopic hematuria.  Here today for cysto.    Description of procedure: After fully informed voluntary consent was obtained patient was brought into the procedure room, identified and placed in a supine position on the cysto table.  The vagina/intraoitus were prepped and draped in a sterile fashion with betadine.  A 15F flexible cystoscope was inserted into the urethra and the bladder and urethra examined in a systematic manner.  There were no tumor stones or diverticula.  Ureteric orifices were normal in position and number and effluxing clear urine.   Distal urethra was normal.  The patient tolerated the procedure well and there were no complications.       Assessment/Plan: Patient with a history of hematuria with negative cystoscopy.  Follow up as needed.  Review of Imaging:  The following imaging exams were independently viewed and interpreted by me and discussed with patient:  CT Scan Abd/Pelvis: CT urogram: Normal    Review of Labs:  The following labs were reviewed by me and discussed with the patient:  UA: Normal  Urine cytology: Normal    Assessment & Plan     Microscopic hematuria  Discussed with Diann about findings of the cystoscopy as well as imaging and other evaluations including urine cytology  Based on the evaluation she does not have any discernible cause for the microscopic hematuria  Additionally I discussed with her that subsequent urinalysis has not shown any evidence of persistence of hematuria so I would not be too concerned about it at this point in time  Discussed with her that if she has any gross hematuria then she should seek consult with us for further evaluation at that point in time          Jon Hancock MD  [facility]      ==========================    Additional Billing and Coding Information:  Review of external notes as documented above   Review of the result(s) of each unique test - UA, urine cytology, CT urogram    Independent interpretation of a test performed by another physician/other qualified health care professional (not separately reported) -       Discussion of management or test interpretation with external physician/other qualified healthcare professional/appropriate source -           8 minutes spent on the date of the encounter doing chart review, review of test results, interpretation of tests, patient visit and documentation apart from time spent at cystoscopy    ==========================

## 2023-01-03 NOTE — LETTER
1/3/2023         RE: Diann Low  [address]  [facility] [address]        Dear Colleague,    Thank you for referring your patient, Diann Low, to the [facility] ED. Please see a copy of my visit note below.    Chief Complaint   Patient presents with     Urology/Cystoscopy       not currently breastfeeding. There is no height or weight on file to calculate BMI.    Patient Active Problem List   Diagnosis     Rheumatoid arthritis involving both hands, unspecified whether rheumatoid factor present (H)     History of falling     Arthritis, rheumatoid (H)     Presence of intraocular lens     Rheumatoid arthritis, seropositive, multiple sites (H)     Osteoporosis, unspecified osteoporosis type, unspecified pathological fracture presence       Allergies   Allergen Reactions     Infliximab Rash     Throat began to close/tighten     Sulfa Drugs Rash     Hydrocodone-Acetaminophen Nausea     Oxycodone-Acetaminophen Nausea     Penicillins Rash     Erythromycin Nausea       Current Outpatient Medications   Medication Sig Dispense Refill     Abatacept (ORENCIA IV)        alendronate (FOSAMAX) 70 MG tablet Take 1 tablet (70 mg) by mouth every 7 days 12 tablet 3     calcium carbonate-vitamin D (OSCAL W/D) 500-200 MG-UNIT tablet Take 1 tablet by mouth 2 times daily       carboxymethylcellulose PF (REFRESH PLUS) 0.5 % ophthalmic solution 1 drop 3 times daily as needed for dry eyes       ferrous gluconate (FERGON) 324 (38 Fe) MG tablet Take 65 mg by mouth daily (with breakfast)       folic acid (FOLVITE) 1 MG tablet Take 1 tablet by mouth daily       levothyroxine (SYNTHROID/LEVOTHROID) 25 MCG tablet Take 1 tablet (25 mcg) by mouth daily 90 tablet 0     methotrexate 2.5 MG tablet Take 2 tablets on Weds and 2 tablets on Thursday       Multiple Vitamin (MULTIVITAMIN ADULT PO)        predniSONE (DELTASONE) 1 MG tablet Take 1 mg by mouth daily Two tablets once daily         Social History      Tobacco Use     Smoking status: Never     Smokeless tobacco: Never       Invasive Procedure Safety Checklist:    Procedure: Cystoscopy    Action: Complete sections and checkboxes as appropriate.    Pre-procedure:  1. Patient ID Verified with 2 identifiers (Karoline and  or MRN) : YES    2. Procedure and site verified with patient/designee (when able) : YES    3. Accurate consent documentation in medical record : YES    4. H&P (or appropriate assessment) documented in medical record : N/A  H&P must be up to 30 days prior to procedure an updated within 24 hours of                 Procedure as applicable.     5. Relevant diagnostic and radiology test results appropriately labeled and displayed as applicable : YES    6. Blood products, implants, devices, and/or special equipment available for the procedure as applicable : YES    7. Procedure site(s) marked with provider initials [Exclusions: none] : NO    8. Marking not required. Reason : Yes  Procedure does not require site marking    Time Out:     Time-Out performed immediately prior to starting procedure, including verbal and active participation of all team members addressing: YES    1. Correct patient identity.  2. Confirmed that the correct side and site are marked.  3. An accurate procedure to be done.  4. Agreement on the procedure to be done.  5. Correct patient position.  6. Relevant images and results are properly labeled and appropriately displayed.  7. The need to administer antibiotics or fluids for irrigation purposes during the procedure as applicable.  8. Safety precautions based on patient history or medication use.    During Procedure: Verification of correct person, site, and procedure occurs any time the responsibility for care of the patient is transferred to another member of the care team.    МАРИЯ VAZQUEZ RN  1/3/2023  1:19 PM    CHIEF COMPLAINT   It was my pleasure to see Diann Low who is a 82 year old female for follow-up of microscopic  hematuria.      HPI:  Diann Low is a 82 year old female being seen for microscopic hematuria follow-up and evaluation with cystoscopy.  Duration of problem: 3 months  Previous treatments: None      Reviewed previous notes from Jcoelyn Ndiaye PA-C,  Diann is here for cystoscopy  She has had a CT done and a urine cytology as well  She is never had any gross hematuria and only 1 evaluation showing microscopic hematuria    Exam:  /56   Pulse 75   Temp 98.6  F (37  C) (Oral)   Resp 16   Wt 46.1 kg (101 lb 9.6 oz)   SpO2 96%   BMI 16.65 kg/m    General: age-appropriate appearing female in NAD sitting in an exam chair  Resp: no respiratory distress  CV: heart rate regular  Abdomen: Degree of obesity is none. Abdomen is soft and nontender. No organomegaly.   : Deferred to cystoscopy  Neuro: grossly non focal. Normal reflexes  Motor: excellent strength throughout      CystoscopyPre-procedure diagnosis: Microscopic hematuria  Post procedure diagnosis: normal cystoscopy  Procedure performed: cystoscopy  Surgeon: Jon Hancock MD  Anesthesia: local    Indications for procedure: Patient is a 82 year old year old female with a history of microscopic hematuria.  Here today for cysto.    Description of procedure: After fully informed voluntary consent was obtained patient was brought into the procedure room, identified and placed in a supine position on the cysto table.  The vagina/intraoitus were prepped and draped in a sterile fashion with betadine.  A 15F flexible cystoscope was inserted into the urethra and the bladder and urethra examined in a systematic manner.  There were no tumor stones or diverticula.  Ureteric orifices were normal in position and number and effluxing clear urine.   Distal urethra was normal.  The patient tolerated the procedure well and there were no complications.      Assessment/Plan: Patient with a history of hematuria with negative cystoscopy.  Follow up as needed.  Review of  Imaging:  The following imaging exams were independently viewed and interpreted by me and discussed with patient:  CT Scan Abd/Pelvis: CT urogram: Normal    Review of Labs:  The following labs were reviewed by me and discussed with the patient:  UA: Normal  Urine cytology: Normal    Assessment & Plan     Microscopic hematuria  Discussed with Diann about findings of the cystoscopy as well as imaging and other evaluations including urine cytology  Based on the evaluation she does not have any discernible cause for the microscopic hematuria  Additionally I discussed with her that subsequent urinalysis has not shown any evidence of persistence of hematuria so I would not be too concerned about it at this point in time  Discussed with her that if she has any gross hematuria then she should seek consult with us for further evaluation at that point in time          Jon Hancock MD  [facility], located within [facility]      ==========================    Additional Billing and Coding Information:  Review of external notes as documented above   Review of the result(s) of each unique test - UA, urine cytology, CT urogram    Independent interpretation of a test performed by another physician/other qualified health care professional (not separately reported) -       Discussion of management or test interpretation with external physician/other qualified healthcare professional/appropriate source -           8 minutes spent on the date of the encounter doing chart review, review of test results, interpretation of tests, patient visit and documentation apart from time spent at cystoscopy    ==========================      Again, thank you for allowing me to participate in the care of your patient.        Sincerely,        Jon Hancock MD

## 2023-01-03 NOTE — PATIENT INSTRUCTIONS

## 2023-01-03 NOTE — PROGRESS NOTES
Chief Complaint   Patient presents with     Urology/Cystoscopy       not currently breastfeeding. There is no height or weight on file to calculate BMI.    Patient Active Problem List   Diagnosis     Rheumatoid arthritis involving both hands, unspecified whether rheumatoid factor present (H)     History of falling     Arthritis, rheumatoid (H)     Presence of intraocular lens     Rheumatoid arthritis, seropositive, multiple sites (H)     Osteoporosis, unspecified osteoporosis type, unspecified pathological fracture presence       Allergies   Allergen Reactions     Infliximab Rash     Throat began to close/tighten     Sulfa Drugs Rash     Hydrocodone-Acetaminophen Nausea     Oxycodone-Acetaminophen Nausea     Penicillins Rash     Erythromycin Nausea       Current Outpatient Medications   Medication Sig Dispense Refill     Abatacept (ORENCIA IV)        alendronate (FOSAMAX) 70 MG tablet Take 1 tablet (70 mg) by mouth every 7 days 12 tablet 3     calcium carbonate-vitamin D (OSCAL W/D) 500-200 MG-UNIT tablet Take 1 tablet by mouth 2 times daily       carboxymethylcellulose PF (REFRESH PLUS) 0.5 % ophthalmic solution 1 drop 3 times daily as needed for dry eyes       ferrous gluconate (FERGON) 324 (38 Fe) MG tablet Take 65 mg by mouth daily (with breakfast)       folic acid (FOLVITE) 1 MG tablet Take 1 tablet by mouth daily       levothyroxine (SYNTHROID/LEVOTHROID) 25 MCG tablet Take 1 tablet (25 mcg) by mouth daily 90 tablet 0     methotrexate 2.5 MG tablet Take 2 tablets on Weds and 2 tablets on Thursday       Multiple Vitamin (MULTIVITAMIN ADULT PO)        predniSONE (DELTASONE) 1 MG tablet Take 1 mg by mouth daily Two tablets once daily         Social History     Tobacco Use     Smoking status: Never     Smokeless tobacco: Never       Invasive Procedure Safety Checklist:    Procedure: Cystoscopy    Action: Complete sections and checkboxes as appropriate.    Pre-procedure:  1. Patient ID Verified with 2 identifiers  (Karoline and REDD or MRN) : YES    2. Procedure and site verified with patient/designee (when able) : YES    3. Accurate consent documentation in medical record : YES    4. H&P (or appropriate assessment) documented in medical record : N/A  H&P must be up to 30 days prior to procedure an updated within 24 hours of                 Procedure as applicable.     5. Relevant diagnostic and radiology test results appropriately labeled and displayed as applicable : YES    6. Blood products, implants, devices, and/or special equipment available for the procedure as applicable : YES    7. Procedure site(s) marked with provider initials [Exclusions: none] : NO    8. Marking not required. Reason : Yes  Procedure does not require site marking    Time Out:     Time-Out performed immediately prior to starting procedure, including verbal and active participation of all team members addressing: YES    1. Correct patient identity.  2. Confirmed that the correct side and site are marked.  3. An accurate procedure to be done.  4. Agreement on the procedure to be done.  5. Correct patient position.  6. Relevant images and results are properly labeled and appropriately displayed.  7. The need to administer antibiotics or fluids for irrigation purposes during the procedure as applicable.  8. Safety precautions based on patient history or medication use.    During Procedure: Verification of correct person, site, and procedure occurs any time the responsibility for care of the patient is transferred to another member of the care team.    МАРИЯ VAZQUEZ RN  1/3/2023  1:19 PM

## 2023-01-23 ENCOUNTER — INFUSION THERAPY VISIT (OUTPATIENT)
Dept: INFUSION THERAPY | Facility: CLINIC | Age: 83
End: 2023-01-23
Attending: INTERNAL MEDICINE
Payer: MEDICARE

## 2023-01-23 VITALS
OXYGEN SATURATION: 96 % | DIASTOLIC BLOOD PRESSURE: 62 MMHG | TEMPERATURE: 97.6 F | BODY MASS INDEX: 16.47 KG/M2 | WEIGHT: 100.5 LBS | HEART RATE: 70 BPM | SYSTOLIC BLOOD PRESSURE: 107 MMHG | RESPIRATION RATE: 16 BRPM

## 2023-01-23 DIAGNOSIS — Z79.899 HIGH RISK MEDICATION USE: Primary | ICD-10-CM

## 2023-01-23 DIAGNOSIS — M05.79 RHEUMATOID ARTHRITIS, SEROPOSITIVE, MULTIPLE SITES (H): ICD-10-CM

## 2023-01-23 LAB
ALBUMIN SERPL-MCNC: 4.1 G/DL (ref 3.5–5)
ALBUMIN UR-MCNC: NEGATIVE MG/DL
ALT SERPL W P-5'-P-CCNC: 13 U/L (ref 0–45)
APPEARANCE UR: CLEAR
BILIRUB UR QL STRIP: NEGATIVE
COLOR UR AUTO: ABNORMAL
CREAT SERPL-MCNC: 0.87 MG/DL (ref 0.6–1.1)
ERYTHROCYTE [DISTWIDTH] IN BLOOD BY AUTOMATED COUNT: 14.6 % (ref 10–15)
GFR SERPL CREATININE-BSD FRML MDRD: 65 ML/MIN/1.73M2
GLUCOSE UR STRIP-MCNC: NEGATIVE MG/DL
HCT VFR BLD AUTO: 36.5 % (ref 35–47)
HGB BLD-MCNC: 12.1 G/DL (ref 11.7–15.7)
HGB UR QL STRIP: NEGATIVE
HYALINE CASTS: 2 /LPF
KETONES UR STRIP-MCNC: NEGATIVE MG/DL
LEUKOCYTE ESTERASE UR QL STRIP: NEGATIVE
MCH RBC QN AUTO: 33.7 PG (ref 26.5–33)
MCHC RBC AUTO-ENTMCNC: 33.2 G/DL (ref 31.5–36.5)
MCV RBC AUTO: 102 FL (ref 78–100)
MUCOUS THREADS #/AREA URNS LPF: PRESENT /LPF
NITRATE UR QL: NEGATIVE
PH UR STRIP: 5 [PH] (ref 5–7)
PLATELET # BLD AUTO: 195 10E3/UL (ref 150–450)
RBC # BLD AUTO: 3.59 10E6/UL (ref 3.8–5.2)
RBC URINE: <1 /HPF
SP GR UR STRIP: 1.01 (ref 1–1.03)
SQUAMOUS EPITHELIAL: <1 /HPF
UROBILINOGEN UR STRIP-MCNC: <2 MG/DL
WBC # BLD AUTO: 7.1 10E3/UL (ref 4–11)
WBC URINE: 0 /HPF

## 2023-01-23 PROCEDURE — 85014 HEMATOCRIT: CPT

## 2023-01-23 PROCEDURE — 250N000028 HC RX JA MOD (INTRAVENOUS), IP 250 OP 636: Mod: JA | Performed by: INTERNAL MEDICINE

## 2023-01-23 PROCEDURE — 258N000003 HC RX IP 258 OP 636: Performed by: INTERNAL MEDICINE

## 2023-01-23 PROCEDURE — 96365 THER/PROPH/DIAG IV INF INIT: CPT

## 2023-01-23 PROCEDURE — 82565 ASSAY OF CREATININE: CPT

## 2023-01-23 PROCEDURE — 36415 COLL VENOUS BLD VENIPUNCTURE: CPT

## 2023-01-23 PROCEDURE — 84460 ALANINE AMINO (ALT) (SGPT): CPT

## 2023-01-23 PROCEDURE — 82040 ASSAY OF SERUM ALBUMIN: CPT

## 2023-01-23 PROCEDURE — 81001 URINALYSIS AUTO W/SCOPE: CPT | Performed by: INTERNAL MEDICINE

## 2023-01-23 RX ORDER — HEPARIN SODIUM (PORCINE) LOCK FLUSH IV SOLN 100 UNIT/ML 100 UNIT/ML
5 SOLUTION INTRAVENOUS
Status: DISCONTINUED | OUTPATIENT
Start: 2023-01-23 | End: 2023-01-23 | Stop reason: HOSPADM

## 2023-01-23 RX ORDER — MEPERIDINE HYDROCHLORIDE 50 MG/ML
25 INJECTION INTRAMUSCULAR; INTRAVENOUS; SUBCUTANEOUS EVERY 30 MIN PRN
Status: CANCELLED | OUTPATIENT
Start: 2023-01-24

## 2023-01-23 RX ORDER — ALBUTEROL SULFATE 0.83 MG/ML
2.5 SOLUTION RESPIRATORY (INHALATION)
Status: DISCONTINUED | OUTPATIENT
Start: 2023-01-23 | End: 2023-01-23 | Stop reason: HOSPADM

## 2023-01-23 RX ORDER — ALBUTEROL SULFATE 90 UG/1
1-2 AEROSOL, METERED RESPIRATORY (INHALATION)
Status: CANCELLED
Start: 2023-01-24

## 2023-01-23 RX ORDER — NALOXONE HYDROCHLORIDE 0.4 MG/ML
0.2 INJECTION, SOLUTION INTRAMUSCULAR; INTRAVENOUS; SUBCUTANEOUS
Status: DISCONTINUED | OUTPATIENT
Start: 2023-01-23 | End: 2023-01-23 | Stop reason: HOSPADM

## 2023-01-23 RX ORDER — DIPHENHYDRAMINE HYDROCHLORIDE 50 MG/ML
50 INJECTION INTRAMUSCULAR; INTRAVENOUS
Status: DISCONTINUED | OUTPATIENT
Start: 2023-01-23 | End: 2023-01-23 | Stop reason: HOSPADM

## 2023-01-23 RX ORDER — METHYLPREDNISOLONE SODIUM SUCCINATE 125 MG/2ML
125 INJECTION, POWDER, LYOPHILIZED, FOR SOLUTION INTRAMUSCULAR; INTRAVENOUS ONCE
Status: CANCELLED
Start: 2023-01-24 | End: 2023-01-24

## 2023-01-23 RX ORDER — HEPARIN SODIUM,PORCINE 10 UNIT/ML
5 VIAL (ML) INTRAVENOUS
Status: DISCONTINUED | OUTPATIENT
Start: 2023-01-23 | End: 2023-01-23 | Stop reason: HOSPADM

## 2023-01-23 RX ORDER — MEPERIDINE HYDROCHLORIDE 50 MG/ML
25 INJECTION INTRAMUSCULAR; INTRAVENOUS; SUBCUTANEOUS EVERY 30 MIN PRN
Status: DISCONTINUED | OUTPATIENT
Start: 2023-01-23 | End: 2023-01-23 | Stop reason: HOSPADM

## 2023-01-23 RX ORDER — HEPARIN SODIUM (PORCINE) LOCK FLUSH IV SOLN 100 UNIT/ML 100 UNIT/ML
5 SOLUTION INTRAVENOUS
Status: CANCELLED | OUTPATIENT
Start: 2023-01-24

## 2023-01-23 RX ORDER — METHYLPREDNISOLONE SODIUM SUCCINATE 125 MG/2ML
125 INJECTION, POWDER, LYOPHILIZED, FOR SOLUTION INTRAMUSCULAR; INTRAVENOUS
Status: DISCONTINUED | OUTPATIENT
Start: 2023-01-23 | End: 2023-01-23 | Stop reason: HOSPADM

## 2023-01-23 RX ORDER — METHYLPREDNISOLONE SODIUM SUCCINATE 125 MG/2ML
125 INJECTION, POWDER, LYOPHILIZED, FOR SOLUTION INTRAMUSCULAR; INTRAVENOUS
Status: CANCELLED
Start: 2023-01-24

## 2023-01-23 RX ORDER — ALBUTEROL SULFATE 90 UG/1
1-2 AEROSOL, METERED RESPIRATORY (INHALATION)
Status: DISCONTINUED | OUTPATIENT
Start: 2023-01-23 | End: 2023-01-23 | Stop reason: HOSPADM

## 2023-01-23 RX ORDER — EPINEPHRINE 1 MG/ML
0.3 INJECTION, SOLUTION INTRAMUSCULAR; SUBCUTANEOUS EVERY 5 MIN PRN
Status: DISCONTINUED | OUTPATIENT
Start: 2023-01-23 | End: 2023-01-23 | Stop reason: HOSPADM

## 2023-01-23 RX ORDER — DIPHENHYDRAMINE HCL 25 MG
25 CAPSULE ORAL ONCE
Status: CANCELLED
Start: 2023-01-24 | End: 2023-01-24

## 2023-01-23 RX ORDER — EPINEPHRINE 1 MG/ML
0.3 INJECTION, SOLUTION INTRAMUSCULAR; SUBCUTANEOUS EVERY 5 MIN PRN
Status: CANCELLED | OUTPATIENT
Start: 2023-01-24

## 2023-01-23 RX ORDER — ALBUTEROL SULFATE 0.83 MG/ML
2.5 SOLUTION RESPIRATORY (INHALATION)
Status: CANCELLED | OUTPATIENT
Start: 2023-01-24

## 2023-01-23 RX ORDER — DIPHENHYDRAMINE HYDROCHLORIDE 50 MG/ML
50 INJECTION INTRAMUSCULAR; INTRAVENOUS
Status: CANCELLED
Start: 2023-01-24

## 2023-01-23 RX ORDER — ACETAMINOPHEN 325 MG/1
650 TABLET ORAL ONCE
Status: CANCELLED
Start: 2023-01-24 | End: 2023-01-24

## 2023-01-23 RX ORDER — HEPARIN SODIUM,PORCINE 10 UNIT/ML
5 VIAL (ML) INTRAVENOUS
Status: CANCELLED | OUTPATIENT
Start: 2023-01-24

## 2023-01-23 RX ORDER — NALOXONE HYDROCHLORIDE 0.4 MG/ML
0.2 INJECTION, SOLUTION INTRAMUSCULAR; INTRAVENOUS; SUBCUTANEOUS
Status: CANCELLED | OUTPATIENT
Start: 2023-01-24

## 2023-01-23 RX ADMIN — SODIUM CHLORIDE 500 MG: 9 INJECTION, SOLUTION INTRAVENOUS at 12:35

## 2023-01-23 RX ADMIN — SODIUM CHLORIDE 250 ML: 9 INJECTION, SOLUTION INTRAVENOUS at 12:26

## 2023-01-23 NOTE — PROGRESS NOTES
Infusion Nursing Note:  Diann Low presents today for Infusion of Orencia.    Patient seen by provider today: No   present during visit today: Not Applicable.    Note:Patient ambulated into Infusion Care by herself. Patientis alert and oriented and VSS. A peripheral IV was inserted into her right AC with excellent blood return. Patient tolerated infusion of Orencia without any problems. Peripheral IV flushed with Normal Saline and discontinued. Dry 2 x 2 gauze wrapped with Coban around IV site.    Intravenous Access:  Peripheral IV placed.    Treatment Conditions:  Not Applicable.    Post Infusion Assessment:  Patient tolerated infusion without incident.  Site patent and intact, free from redness, edema or discomfort.  No evidence of extravasations.  Access discontinued per protocol.     Discharge Plan:   Patient and/or family verbalized understanding of discharge instructions and all questions answered.  Patient discharged in stable condition accompanied by: self.  Departure Mode: Ambulatory.      Maria Eugenia Sánchez RN,OCN

## 2023-01-25 ENCOUNTER — OFFICE VISIT (OUTPATIENT)
Dept: RHEUMATOLOGY | Facility: CLINIC | Age: 83
End: 2023-01-25
Payer: MEDICARE

## 2023-01-25 VITALS
BODY MASS INDEX: 16.43 KG/M2 | DIASTOLIC BLOOD PRESSURE: 52 MMHG | WEIGHT: 102.2 LBS | SYSTOLIC BLOOD PRESSURE: 110 MMHG | HEART RATE: 80 BPM | HEIGHT: 66 IN

## 2023-01-25 DIAGNOSIS — Z79.899 HIGH RISK MEDICATION USE: ICD-10-CM

## 2023-01-25 DIAGNOSIS — M21.949 ACQUIRED DEFORMITY OF HAND, UNSPECIFIED LATERALITY: ICD-10-CM

## 2023-01-25 DIAGNOSIS — M15.0 PRIMARY OSTEOARTHRITIS INVOLVING MULTIPLE JOINTS: ICD-10-CM

## 2023-01-25 DIAGNOSIS — M05.79 RHEUMATOID ARTHRITIS, SEROPOSITIVE, MULTIPLE SITES (H): Primary | ICD-10-CM

## 2023-01-25 PROCEDURE — 99214 OFFICE O/P EST MOD 30 MIN: CPT | Performed by: INTERNAL MEDICINE

## 2023-01-25 RX ORDER — PREDNISONE 1 MG/1
1 TABLET ORAL DAILY
Qty: 90 TABLET | Refills: 1 | Status: SHIPPED | OUTPATIENT
Start: 2023-01-25 | End: 2023-06-28

## 2023-01-25 RX ORDER — PREDNISONE 1 MG/1
1 TABLET ORAL DAILY
Qty: 15 TABLET | Refills: 0 | Status: SHIPPED | OUTPATIENT
Start: 2023-01-25 | End: 2023-02-01 | Stop reason: DRUGHIGH

## 2023-01-25 NOTE — PROGRESS NOTES
Rheumatology follow-up visit note     Diann is a 82 year old female presents today for follow-up.    Diann was seen today for recheck.    Diagnoses and all orders for this visit:    Rheumatoid arthritis, seropositive, multiple sites (H)  -     predniSONE (DELTASONE) 1 MG tablet; Take 1 tablet (1 mg) by mouth daily for 90 days  -     predniSONE (DELTASONE) 1 MG tablet; Take 1 tablet (1 mg) by mouth daily for 15 days  -     methotrexate 2.5 MG tablet; Take 4 tablets (10 mg) by mouth every 7 days for 90 days Take 2 tablets on Weds and 2 tablets on Thursday    High risk medication use    Primary osteoarthritis involving multiple joints    Acquired deformity of hand, unspecified laterality        Her seropositive rheumatoid arthritis is under good control with combination of infusion off Orencia, methotrexate 10 mg/week and that she is going to take on the same day, folic acid, she is going to drop prednisone from 2 mg daily to 1 mg daily.  We are hoping to be able to get her off prednisone completely in time.  We will meet her in 6 months and labs every 3 months.  Management principles of OA reviewed.  I have asked her to consider using the cane that she keeps at home.    Follow up in 6 months.    HPI    Diann Low is a 82 year old female is here for follow-up.  She has seropositive rheumatoid arthritis, osteoarthritis.  She has remained asymptomatic.  Able to do most of her day-to-day activities.  Accompanied by her son here.  She had labs drawn recently which are reviewed within acceptable range.  She wondered about MCV and explained to her what that means in her context of methotrexate intake.  She lives independently at a senior living complex.  She recalls that her joint symptoms associated with rheumatoid arthritis began when she was in her 40s.  She considers her current control of pain and her functional ability as adequate.  She gets some discomfort in her hands such as with the weather change.   "Overall she is content with how things are in terms of functional ability.  She does not take Tylenol and ibuprofen on a regular basis.  She is on methotrexate and folic acid.  She is due for her labs last done in June.  We will recheck those today.  Review of the chart from [facility] which showed that her anti-CCP antibody was negative and rheumatoid factor was positive in 2015 and these will be rechecked today.  Her SSA SSB profile is negative.  She has had \"couple of knuckles changed\".  She has not had any major joint arthroplasty.  Apart from thyroid disease she describes herself otherwise in good health.  She is known to have osteoporosis.  She is on prednisone 2 mg daily for the past several years..        DETAILED EXAMINATION  01/25/23  :    Vitals:    01/25/23 0841   BP: 110/52   BP Location: Right arm   Patient Position: Sitting   Cuff Size: Adult Regular   Pulse: 80   Weight: 46.4 kg (102 lb 3.2 oz)   Height: 1.664 m (5' 5.5\")     Alert oriented. Head including the face is examined for malar rash, heliotropes, scarring, lupus pernio. Eyes examined for redness such as in episcleritis/scleritis, periorbital lesions.   Neck/ Face examined for parotid gland swelling, range of motion of neck.  Left upper and lower and right upper and lower extremities examined for tenderness, swelling, warmth of the appendicular joints, range of motion, edema, rash.  Some of the important findings included: she does not have evidence of synovitis in the palpable joints of the upper extremities.  No significant deformities of the digits.  ++ Heberden nodes.  Range of motion of the shoulders  show full abduction.  No JLT effusion or warmth of the knees.  she does not have dactylitis of the digits.     Patient Active Problem List    Diagnosis Date Noted     Osteoporosis, unspecified osteoporosis type, unspecified pathological fracture presence 08/31/2022     Priority: Medium     Rheumatoid arthritis, seropositive, multiple sites " (H) 06/20/2022     Priority: Medium     Rheumatoid arthritis involving both hands, unspecified whether rheumatoid factor present (H) 01/14/2022     Priority: Medium     History of falling 10/15/2018     Priority: Medium     Presence of intraocular lens 09/17/2018     Priority: Medium     Arthritis, rheumatoid (H) 09/12/2007     Priority: Medium     No past surgical history on file.   Past Medical History:   Diagnosis Date     Skin cancer      Spider veins      Allergies   Allergen Reactions     Infliximab Rash     Throat began to close/tighten     Sulfa Drugs Rash     Hydrocodone-Acetaminophen Nausea     Oxycodone-Acetaminophen Nausea     Penicillins Rash     Erythromycin Nausea     Current Outpatient Medications   Medication Sig Dispense Refill     Abatacept (ORENCIA IV)        alendronate (FOSAMAX) 70 MG tablet Take 1 tablet (70 mg) by mouth every 7 days 12 tablet 3     calcium carbonate-vitamin D (OSCAL W/D) 500-200 MG-UNIT tablet Take 1 tablet by mouth 2 times daily       carboxymethylcellulose PF (REFRESH PLUS) 0.5 % ophthalmic solution 1 drop 3 times daily as needed for dry eyes       ferrous gluconate (FERGON) 324 (38 Fe) MG tablet Take 65 mg by mouth daily (with breakfast)       folic acid (FOLVITE) 1 MG tablet Take 1 tablet by mouth daily       levothyroxine (SYNTHROID/LEVOTHROID) 25 MCG tablet Take 1 tablet (25 mcg) by mouth daily 90 tablet 0     methotrexate 2.5 MG tablet Take 2 tablets on Weds and 2 tablets on Thursday       Multiple Vitamin (MULTIVITAMIN ADULT PO)        predniSONE (DELTASONE) 1 MG tablet Take 2 mg by mouth daily Two tablets once daily       family history is not on file.  Social Connections: Not on file          WBC Count   Date Value Ref Range Status   01/23/2023 7.1 4.0 - 11.0 10e3/uL Final     RBC Count   Date Value Ref Range Status   01/23/2023 3.59 (L) 3.80 - 5.20 10e6/uL Final     Hemoglobin   Date Value Ref Range Status   01/23/2023 12.1 11.7 - 15.7 g/dL Final     Hematocrit    Date Value Ref Range Status   01/23/2023 36.5 35.0 - 47.0 % Final     MCV   Date Value Ref Range Status   01/23/2023 102 (H) 78 - 100 fL Final     MCH   Date Value Ref Range Status   01/23/2023 33.7 (H) 26.5 - 33.0 pg Final     Platelet Count   Date Value Ref Range Status   01/23/2023 195 150 - 450 10e3/uL Final     % Lymphocytes   Date Value Ref Range Status   06/20/2022 16 % Final     AST   Date Value Ref Range Status   06/29/2022 34 10 - 35 U/L Final     ALT   Date Value Ref Range Status   01/23/2023 13 0 - 45 U/L Final     Albumin   Date Value Ref Range Status   01/23/2023 4.1 3.5 - 5.0 g/dL Final     Alkaline Phosphatase   Date Value Ref Range Status   06/29/2022 59 35 - 104 U/L Final     Creatinine   Date Value Ref Range Status   01/23/2023 0.87 0.60 - 1.10 mg/dL Final     GFR Estimate   Date Value Ref Range Status   01/23/2023 65 >60 mL/min/1.73m2 Final     Comment:     eGFR calculated using 2021 CKD-EPI equation.     GFR, ESTIMATED POCT   Date Value Ref Range Status   11/25/2022 >60 >60 mL/min/1.73m2 Final

## 2023-02-01 ENCOUNTER — OFFICE VISIT (OUTPATIENT)
Dept: INTERNAL MEDICINE | Facility: CLINIC | Age: 83
End: 2023-02-01
Payer: MEDICARE

## 2023-02-01 VITALS
TEMPERATURE: 97.5 F | HEIGHT: 66 IN | BODY MASS INDEX: 16.07 KG/M2 | OXYGEN SATURATION: 96 % | DIASTOLIC BLOOD PRESSURE: 68 MMHG | RESPIRATION RATE: 16 BRPM | SYSTOLIC BLOOD PRESSURE: 136 MMHG | HEART RATE: 75 BPM | WEIGHT: 100 LBS

## 2023-02-01 DIAGNOSIS — E03.8 SUBCLINICAL HYPOTHYROIDISM: ICD-10-CM

## 2023-02-01 DIAGNOSIS — Z79.52 LONG-TERM CORTICOSTEROID USE: Primary | ICD-10-CM

## 2023-02-01 DIAGNOSIS — M05.79 RHEUMATOID ARTHRITIS, SEROPOSITIVE, MULTIPLE SITES (H): ICD-10-CM

## 2023-02-01 LAB
ANION GAP SERPL CALCULATED.3IONS-SCNC: 10 MMOL/L (ref 7–15)
BUN SERPL-MCNC: 19.2 MG/DL (ref 8–23)
CALCIUM SERPL-MCNC: 9.2 MG/DL (ref 8.8–10.2)
CHLORIDE SERPL-SCNC: 104 MMOL/L (ref 98–107)
CORTIS SERPL-MCNC: 6 UG/DL
CREAT SERPL-MCNC: 0.81 MG/DL (ref 0.51–0.95)
DEPRECATED HCO3 PLAS-SCNC: 27 MMOL/L (ref 22–29)
GFR SERPL CREATININE-BSD FRML MDRD: 71 ML/MIN/1.73M2
GLUCOSE SERPL-MCNC: 87 MG/DL (ref 70–99)
POTASSIUM SERPL-SCNC: 4.5 MMOL/L (ref 3.4–5.3)
SODIUM SERPL-SCNC: 141 MMOL/L (ref 136–145)
TSH SERPL DL<=0.005 MIU/L-ACNC: 4.32 UIU/ML (ref 0.3–4.2)

## 2023-02-01 PROCEDURE — 84443 ASSAY THYROID STIM HORMONE: CPT | Performed by: INTERNAL MEDICINE

## 2023-02-01 PROCEDURE — 82024 ASSAY OF ACTH: CPT | Performed by: INTERNAL MEDICINE

## 2023-02-01 PROCEDURE — 99214 OFFICE O/P EST MOD 30 MIN: CPT | Performed by: INTERNAL MEDICINE

## 2023-02-01 PROCEDURE — 82533 TOTAL CORTISOL: CPT | Performed by: INTERNAL MEDICINE

## 2023-02-01 PROCEDURE — 36415 COLL VENOUS BLD VENIPUNCTURE: CPT | Performed by: INTERNAL MEDICINE

## 2023-02-01 PROCEDURE — 80048 BASIC METABOLIC PNL TOTAL CA: CPT | Performed by: INTERNAL MEDICINE

## 2023-02-01 NOTE — PROGRESS NOTES
Office Visit - Follow Up   Diann Low   82 year old female    Date of Visit: 2/1/2023    Chief Complaint   Patient presents with     Weight Loss     Diarrhea               -------------------------------------------------------------------------------------------------------------------------  Assessment and Plan    Symptom directed visit    Diann comes to clinic today because she has been worried about loose stools, maybe a pound or 2 weight loss, fatigue and malaise which she is pretty sure relates to reducing her prednisone dose from 2 mg down to 1 mg on January 25, 2023, in the context of being on a very slow taper of prednisone, which she had been taking for greater than a decade for seropositive rheumatoid arthritis.    I advised Diann  to continue with the 1 mg prednisone dose, with the though that her body is still getting used to the lower doses, but I do want to send her to the laboratory today to check her basic metabolic panel, morning cortisol level (it is currently 9:45 AM), and also measure her ACTH to look for signs of adrenal insufficiency.  We could consider doing a cosyntropin stimulation test at some point.    We will also check her TSH, she had since she was diagnosed with hypothyroidism in 2022 and started on levothyroxine replacement.    I told her that if she still feeling unwell after another week is gone by (and we do not have any strong clues from today's laboratory test), to send a message to her [facility] rheumatologist Dr. Olivier, and consider going back to her previous prednisone dose of 2 mg a day.    CBC was satisfactory January 23, 2023 (monitoring because of methotrexate therapy)    Loose stools, consider the possibility of lactose intolerance.  She told me that in the past she actually tried taking lactase enzyme tablet, so historically there was some suspicion of lactose intolerance.  She, that she does enjoy eating dairy foods including cheese pizza.  And ice cream.    I  asked her to try using the Lactaid enzyme tablets such as Lactaid brand or generic substitutes, or switch to lactose reduced to dairy foods    Seropositive rheumatoid arthritis, osteoarthritis  As of 1- prednisone in 1 mg a day (from 2 mg down to 1 mg)  Dr. Olivier was a need to get Ms. Horton off of prednisone completely, probably for the sake of her bones since she has a diagnosis of osteoporosis.  She had been on prednisone for  greater than 10 years    Still on weekly methotrexate  -     methotrexate 2.5 MG tablet; Take 4 tablets (10 mg) by mouth every 7 days for 90 days Take 2 tablets on Weds and 2 tablets on Thursday  And Orencia Abatacept (ORENCIA IV)    Primary osteoarthritis involving multiple joints  10-  TSH 0.30 - 4.20 uIU/mL 3.63      Osteoporosis, for which she has been on weekly alendronate tablets which she seems to be tolerating well    Underweight with body mass index 16, but her weight has stayed right around 100-103 pounds over the last year and a half    Wt Readings from Last 3 Encounters:   02/01/23 45.4 kg (100 lb)   01/25/23 46.4 kg (102 lb 3.2 oz)   01/23/23 45.6 kg (100 lb 8 oz)     8-  Wt 46.3 kg (102 lb)    4-  45.8 kg (101 lb).    1-3-2022  Wt 46.7 kg (103 lb)    Uses bilateral hearing aids, has a small amount of wax in both tympanic canals.  I suggested that she use over-the-counter wax dissolving eardrops, example brand Debrox or Murine, consider doing that every week to keep the ears clear of wax    Vaccinations  She already received the bivalent COVID-19 vaccine and seasonal flu shot  She asked about shingles vaccine, and I might suggest she NOT get that, because it is quite immunogenic, and I would hesitate to give her that in the context of her rheumatoid arthritis.    COVID-19 Vaccine Bivalent Booster 12+ (Pfizer) 10/10/2022        --------------------------------------------------------------------------------------------------------------------------  History of Present Illness  This 82 year old old     Symptom directed visit    Diann comes to clinic today because she has been worried about loose stools, maybe a pound or 2 weight loss, fatigue and malaise which she is pretty sure relates to reducing her prednisone dose from 2 mg down to 1 mg on January 25, 2023, in the context of being on a very slow taper of prednisone, which she had been taking for greater than a decade for seropositive rheumatoid arthritis.    I advised Diann  to continue with the 1 mg prednisone dose, with the though that her body is still getting used to the lower doses, but I do want to send her to the laboratory today to check her basic metabolic panel, morning cortisol level (it is currently 9:45 AM), and also measure her ACTH to look for signs of adrenal insufficiency.  We could consider doing a cosyntropin stimulation test at some point.    We will also check her TSH, she had since she was diagnosed with hypothyroidism in 2022 and started on levothyroxine replacement.    I told her that if she still feeling unwell after another week is gone by (and we do not have any strong clues from today's laboratory test), to send a message to her [facility] rheumatologist Dr. Olivier, and consider going back to her previous prednisone dose of 2 mg a day.    CBC was satisfactory January 23, 2023 (monitoring because of methotrexate therapy)    Loose stools, consider the possibility of lactose intolerance.  She told me that in the past she actually tried taking lactase enzyme tablet, so historically there was some suspicion of lactose intolerance.  She, that she does enjoy eating dairy foods including cheese pizza.  And ice cream.    I asked her to try using the Lactaid enzyme tablets such as Lactaid brand or generic substitutes, or switch to lactose reduced to dairy  foods      BP Readings from Last 3 Encounters:   02/01/23 136/68   01/25/23 110/52   01/23/23 107/62     ------    Medications, Allergies, Social, and Problem List   Current Outpatient Medications   Medication Sig Dispense Refill     Abatacept (ORENCIA IV)        alendronate (FOSAMAX) 70 MG tablet Take 1 tablet (70 mg) by mouth every 7 days 12 tablet 3     calcium carbonate-vitamin D (OSCAL W/D) 500-200 MG-UNIT tablet Take 1 tablet by mouth 2 times daily       carboxymethylcellulose PF (REFRESH PLUS) 0.5 % ophthalmic solution 1 drop 3 times daily as needed for dry eyes       ferrous gluconate (FERGON) 324 (38 Fe) MG tablet Take 65 mg by mouth daily (with breakfast)       folic acid (FOLVITE) 1 MG tablet Take 1 tablet by mouth daily       levothyroxine (SYNTHROID/LEVOTHROID) 25 MCG tablet Take 1 tablet (25 mcg) by mouth daily 90 tablet 0     methotrexate 2.5 MG tablet Take 4 tablets (10 mg) by mouth every 7 days for 90 days Take 2 tablets on Weds and 2 tablets on Thursday 48 tablet 0     Multiple Vitamin (MULTIVITAMIN ADULT PO)        predniSONE (DELTASONE) 1 MG tablet Take 1 tablet (1 mg) by mouth daily for 90 days 90 tablet 1     predniSONE (DELTASONE) 1 MG tablet Take 1 tablet (1 mg) by mouth daily for 15 days (Patient not taking: Reported on 2/1/2023) 15 tablet 0     Allergies   Allergen Reactions     Infliximab Rash     Throat began to close/tighten     Sulfa Drugs Rash     Hydrocodone-Acetaminophen Nausea     Oxycodone-Acetaminophen Nausea     Penicillins Rash     Erythromycin Nausea     Social History     Tobacco Use     Smoking status: Never     Smokeless tobacco: Never     Patient Active Problem List   Diagnosis     Rheumatoid arthritis involving both hands, unspecified whether rheumatoid factor present (H)     History of falling     Arthritis, rheumatoid (H)     Presence of intraocular lens     Rheumatoid arthritis, seropositive, multiple sites (H)     Osteoporosis, unspecified osteoporosis type,  "unspecified pathological fracture presence        Reviewed, reconciled and updated       Physical Exam   General Appearance:       /68 (BP Location: Right arm, Patient Position: Sitting, Cuff Size: Adult Small)   Pulse 75   Temp 97.5  F (36.4  C)   Resp 16   Ht 1.664 m (5' 5.5\")   Wt 45.4 kg (100 lb)   SpO2 96%   BMI 16.39 kg/m      Needed extremely well, normal mental status, blood pressure is okay, he is underweight  Lungs clear  Heart regular rate rhythm  Abdomen nontender  Extremities no edema     Additional Information   I spent 30 minutes on this encounter, including reviewing interval history since last visit, examining the patient, explaining and counseling the issues enumerated in the Assessment and Plan (patient given a copy), ordering indicated tests     SRIDEVI MCBRIDE MD, MD        "

## 2023-02-01 NOTE — PATIENT INSTRUCTIONS
Symptom directed visit    Diann comes to clinic today because she has been worried about loose stools, maybe a pound or 2 weight loss, fatigue and malaise which she is pretty sure relates to reducing her prednisone dose from 2 mg down to 1 mg on January 25, 2023, in the context of being on a very slow taper of prednisone, which she had been taking for greater than a decade for seropositive rheumatoid arthritis.    I advised Diann  to continue with the 1 mg prednisone dose, with the though that her body is still getting used to the lower doses, but I do want to send her to the laboratory today to check her basic metabolic panel, morning cortisol level (it is currently 9:45 AM), and also measure her ACTH to look for signs of adrenal insufficiency.  We could consider doing a cosyntropin stimulation test at some point.    We will also check her TSH, she had since she was diagnosed with hypothyroidism in 2022 and started on levothyroxine replacement.    I told her that if she still feeling unwell after another week is gone by (and we do not have any strong clues from today's laboratory test), to send a message to her [facility] rheumatologist Dr. Olivier, and consider going back to her previous prednisone dose of 2 mg a day.    CBC was satisfactory January 23, 2023 (monitoring because of methotrexate therapy)    Loose stools, consider the possibility of lactose intolerance.  She told me that in the past she actually tried taking lactase enzyme tablet, so historically there was some suspicion of lactose intolerance.  She, that she does enjoy eating dairy foods including cheese pizza.  And ice cream.    I asked her to try using the Lactaid enzyme tablets such as Lactaid brand or generic substitutes, or switch to lactose reduced to dairy foods    Seropositive rheumatoid arthritis, osteoarthritis  As of 1- prednisone in 1 mg a day (from 2 mg down to 1 mg)  Dr. Olivier was a need to get Ms. Horton off of prednisone  completely, probably for the sake of her bones since she has a diagnosis of osteoporosis.  She had been on prednisone for  greater than 10 years    Still on weekly methotrexate  -     methotrexate 2.5 MG tablet; Take 4 tablets (10 mg) by mouth every 7 days for 90 days Take 2 tablets on Weds and 2 tablets on Thursday  And Orencia Abatacept (ORENCIA IV)    Primary osteoarthritis involving multiple joints  10-  TSH 0.30 - 4.20 uIU/mL 3.63      Osteoporosis, for which she has been on weekly alendronate tablets which she seems to be tolerating well    Underweight with body mass index 16, but her weight has stayed right around 100-103 pounds over the last year and a half    Wt Readings from Last 3 Encounters:   02/01/23 45.4 kg (100 lb)   01/25/23 46.4 kg (102 lb 3.2 oz)   01/23/23 45.6 kg (100 lb 8 oz)     8-  Wt 46.3 kg (102 lb)    4-  45.8 kg (101 lb).    1-3-2022  Wt 46.7 kg (103 lb)    Uses bilateral hearing aids, has a small amount of wax in both tympanic canals.  I suggested that she use over-the-counter wax dissolving eardrops, example brand Debrox or Murine, consider doing that every week to keep the ears clear of wax    Vaccinations  She already received the bivalent COVID-19 vaccine and seasonal flu shot  She asked about shingles vaccine, and I might suggest she NOT get that, because it is quite immunogenic, and I would hesitate to give her that in the context of her rheumatoid arthritis.    COVID-19 Vaccine Bivalent Booster 12+ (Pfizer) 10/10/2022

## 2023-02-02 ENCOUNTER — TELEPHONE (OUTPATIENT)
Dept: RHEUMATOLOGY | Facility: CLINIC | Age: 83
End: 2023-02-02
Payer: MEDICARE

## 2023-02-02 DIAGNOSIS — M05.79 RHEUMATOID ARTHRITIS, SEROPOSITIVE, MULTIPLE SITES (H): ICD-10-CM

## 2023-02-02 LAB — ACTH PLAS-MCNC: 11 PG/ML

## 2023-02-02 NOTE — TELEPHONE ENCOUNTER
[facility] Call Center    Phone Message    May a detailed message be left on voicemail: yes     Reason for Call: Medication Refill Request    Has the patient contacted the pharmacy for the refill? Yes   Name of medication being requested: methotrexate  Provider who prescribed the medication: Dr. Olivier   Pharmacy: [facility]   Date medication is needed: ASAP        Action Taken: Other: [facility]    Travel Screening: Not Applicable

## 2023-02-09 ENCOUNTER — TELEPHONE (OUTPATIENT)
Dept: INTERNAL MEDICINE | Facility: CLINIC | Age: 83
End: 2023-02-09
Payer: MEDICARE

## 2023-02-09 ENCOUNTER — TELEPHONE (OUTPATIENT)
Dept: RHEUMATOLOGY | Facility: CLINIC | Age: 83
End: 2023-02-09
Payer: MEDICARE

## 2023-02-09 NOTE — TELEPHONE ENCOUNTER
"Pt calling re: increased fatigue and malaise since her last visit with Dr. Holley.     Chart shows she did reach out to her rheumatology team today. They discussed this was likely due to her prednisone taper and to give it another week or so before increasing her dose back to 2mg.   Pt currently on 1mg.     Pt reports she feels worse in the mornings.   No other symptoms other than fatigue and generally not feeling well.   She denied further triage of her symptoms.    Pt wondering what  would recommend.   Per lab note on 2/1/23 \"As we discussed, I would like you to continue with the prednisone 1 mg dose, unless things get worse in the next couple weeks.  If that happens, contact Dr. Olivier and consider increasing the prednisone back to 2 mg.\"    Told Pt of this.   She'd still like to know what  would recommend. Pt states she does not want to suffer for another week.     Debi Lema RN, BSN  [facility]    "

## 2023-02-09 NOTE — TELEPHONE ENCOUNTER
Patient recently reduced her prednisone by 1 mg. She has been not feeling well since doing so and wondering if this is normal.

## 2023-02-09 NOTE — TELEPHONE ENCOUNTER
Spoke to pt- pt reduced around 1/26/23 pt will give another week and will call back if no improvement.     Pt feels lethargic no energy- pt states she takes it in the morning.

## 2023-02-09 NOTE — TELEPHONE ENCOUNTER
Outgoing Call:  No answer    Sruthi Bragg RN contacted [place] on 02/09/23 and left a message. If patient calls back please route to any available RN     Sruthi JACOBSEN RN  [facility]

## 2023-02-09 NOTE — TELEPHONE ENCOUNTER
Patient called back and I relayed message. She stated she is going to resume 2mg based on how she is feeling. She had no other questions at this time. She stated she will call back with questions or concerns.     Daniela Cody RN

## 2023-03-28 ENCOUNTER — INFUSION THERAPY VISIT (OUTPATIENT)
Dept: INFUSION THERAPY | Facility: CLINIC | Age: 83
End: 2023-03-28
Attending: NURSE PRACTITIONER
Payer: MEDICARE

## 2023-03-28 VITALS
DIASTOLIC BLOOD PRESSURE: 61 MMHG | OXYGEN SATURATION: 98 % | SYSTOLIC BLOOD PRESSURE: 129 MMHG | RESPIRATION RATE: 16 BRPM | TEMPERATURE: 97.4 F | HEART RATE: 67 BPM

## 2023-03-28 DIAGNOSIS — M05.79 RHEUMATOID ARTHRITIS, SEROPOSITIVE, MULTIPLE SITES (H): Primary | ICD-10-CM

## 2023-03-28 DIAGNOSIS — Z79.899 HIGH RISK MEDICATION USE: ICD-10-CM

## 2023-03-28 LAB
ALBUMIN SERPL-MCNC: 4.1 G/DL (ref 3.5–5)
ALBUMIN UR-MCNC: NEGATIVE MG/DL
ALT SERPL W P-5'-P-CCNC: 12 U/L (ref 0–45)
APPEARANCE UR: CLEAR
BILIRUB UR QL STRIP: NEGATIVE
COLOR UR AUTO: ABNORMAL
CREAT SERPL-MCNC: 0.79 MG/DL (ref 0.6–1.1)
ERYTHROCYTE [DISTWIDTH] IN BLOOD BY AUTOMATED COUNT: 14.2 % (ref 10–15)
GFR SERPL CREATININE-BSD FRML MDRD: 73 ML/MIN/1.73M2
GLUCOSE UR STRIP-MCNC: NEGATIVE MG/DL
HCT VFR BLD AUTO: 38.1 % (ref 35–47)
HGB BLD-MCNC: 12.2 G/DL (ref 11.7–15.7)
HGB UR QL STRIP: NEGATIVE
KETONES UR STRIP-MCNC: NEGATIVE MG/DL
LEUKOCYTE ESTERASE UR QL STRIP: NEGATIVE
MCH RBC QN AUTO: 32.4 PG (ref 26.5–33)
MCHC RBC AUTO-ENTMCNC: 32 G/DL (ref 31.5–36.5)
MCV RBC AUTO: 101 FL (ref 78–100)
MUCOUS THREADS #/AREA URNS LPF: PRESENT /LPF
NITRATE UR QL: NEGATIVE
PH UR STRIP: 5.5 [PH] (ref 5–7)
PLATELET # BLD AUTO: 197 10E3/UL (ref 150–450)
RBC # BLD AUTO: 3.77 10E6/UL (ref 3.8–5.2)
RBC URINE: 0 /HPF
SP GR UR STRIP: 1.01 (ref 1–1.03)
UROBILINOGEN UR STRIP-MCNC: <2 MG/DL
WBC # BLD AUTO: 6.2 10E3/UL (ref 4–11)
WBC URINE: <1 /HPF

## 2023-03-28 PROCEDURE — 250N000028 HC RX JA MOD (INTRAVENOUS), IP 250 OP 636: Mod: JA | Performed by: INTERNAL MEDICINE

## 2023-03-28 PROCEDURE — 96365 THER/PROPH/DIAG IV INF INIT: CPT

## 2023-03-28 PROCEDURE — 82565 ASSAY OF CREATININE: CPT

## 2023-03-28 PROCEDURE — 82040 ASSAY OF SERUM ALBUMIN: CPT

## 2023-03-28 PROCEDURE — 85027 COMPLETE CBC AUTOMATED: CPT

## 2023-03-28 PROCEDURE — 81001 URINALYSIS AUTO W/SCOPE: CPT | Performed by: INTERNAL MEDICINE

## 2023-03-28 PROCEDURE — 84460 ALANINE AMINO (ALT) (SGPT): CPT

## 2023-03-28 PROCEDURE — 36415 COLL VENOUS BLD VENIPUNCTURE: CPT

## 2023-03-28 PROCEDURE — 258N000003 HC RX IP 258 OP 636: Performed by: INTERNAL MEDICINE

## 2023-03-28 RX ORDER — METHYLPREDNISOLONE SODIUM SUCCINATE 125 MG/2ML
125 INJECTION, POWDER, LYOPHILIZED, FOR SOLUTION INTRAMUSCULAR; INTRAVENOUS ONCE
Status: CANCELLED
Start: 2023-04-12 | End: 2023-04-12

## 2023-03-28 RX ORDER — DIPHENHYDRAMINE HYDROCHLORIDE 50 MG/ML
50 INJECTION INTRAMUSCULAR; INTRAVENOUS
Status: CANCELLED
Start: 2023-04-12

## 2023-03-28 RX ORDER — MEPERIDINE HYDROCHLORIDE 50 MG/ML
25 INJECTION INTRAMUSCULAR; INTRAVENOUS; SUBCUTANEOUS EVERY 30 MIN PRN
Status: CANCELLED | OUTPATIENT
Start: 2023-04-12

## 2023-03-28 RX ORDER — ALBUTEROL SULFATE 90 UG/1
1-2 AEROSOL, METERED RESPIRATORY (INHALATION)
Status: CANCELLED
Start: 2023-04-12

## 2023-03-28 RX ORDER — EPINEPHRINE 1 MG/ML
0.3 INJECTION, SOLUTION INTRAMUSCULAR; SUBCUTANEOUS EVERY 5 MIN PRN
Status: CANCELLED | OUTPATIENT
Start: 2023-04-12

## 2023-03-28 RX ORDER — HEPARIN SODIUM,PORCINE 10 UNIT/ML
5 VIAL (ML) INTRAVENOUS
Status: CANCELLED | OUTPATIENT
Start: 2023-04-12

## 2023-03-28 RX ORDER — DIPHENHYDRAMINE HCL 25 MG
25 CAPSULE ORAL ONCE
Status: CANCELLED
Start: 2023-04-12 | End: 2023-04-12

## 2023-03-28 RX ORDER — METHYLPREDNISOLONE SODIUM SUCCINATE 125 MG/2ML
125 INJECTION, POWDER, LYOPHILIZED, FOR SOLUTION INTRAMUSCULAR; INTRAVENOUS
Status: CANCELLED
Start: 2023-04-12

## 2023-03-28 RX ORDER — ACETAMINOPHEN 325 MG/1
650 TABLET ORAL ONCE
Status: CANCELLED
Start: 2023-04-12 | End: 2023-04-12

## 2023-03-28 RX ORDER — NALOXONE HYDROCHLORIDE 0.4 MG/ML
0.2 INJECTION, SOLUTION INTRAMUSCULAR; INTRAVENOUS; SUBCUTANEOUS
Status: CANCELLED | OUTPATIENT
Start: 2023-04-12

## 2023-03-28 RX ORDER — ALBUTEROL SULFATE 0.83 MG/ML
2.5 SOLUTION RESPIRATORY (INHALATION)
Status: CANCELLED | OUTPATIENT
Start: 2023-04-12

## 2023-03-28 RX ORDER — HEPARIN SODIUM (PORCINE) LOCK FLUSH IV SOLN 100 UNIT/ML 100 UNIT/ML
5 SOLUTION INTRAVENOUS
Status: CANCELLED | OUTPATIENT
Start: 2023-04-12

## 2023-03-28 RX ADMIN — SODIUM CHLORIDE 500 MG: 9 INJECTION, SOLUTION INTRAVENOUS at 13:54

## 2023-03-28 NOTE — PROGRESS NOTES
~~~ NOTE: If the patient answers yes to any of the questions below, hold the infusion and contact ordering provider or on-call provider.    1. Have you recently had an elevated temperature, fever, chills, productive cough, coughing for 3 weeks or longer or hemoptysis,  abnormal vital signs, night sweats,  chest pain or have you noticed a decrease in your appetite, unexplained weight loss or fatigue? No  2. Do you have any open wounds or new incisions? No  3. Do you have any upcoming hospitalizations or surgeries? Does not include esophagogastroduodenoscopy, colonoscopy, endoscopic retrograde cholangiopancreatography (ERCP), endoscopic ultrasound (EUS), dental procedures or joint aspiration/steroid injections No  4. Do you currently have any signs of illness or infection or are you on any antibiotics? No  5. Have you had any new, sudden or worsening abdominal pain? No  6. Have you or anyone in your household received a live vaccination in the past 4 weeks? Please note: No live vaccines while on biologic/chemotherapy until 6 months after the last treatment. Patient can receive the flu vaccine (shot only), pneumovax and the Covid vaccine. It is optimal for the patient to get these vaccines mid cycle, but they can be given at any time as long as it is not on the day of the infusion. No  7. Have you recently been diagnosed with any new nervous system diseases (ie. Multiple sclerosis, Guillain [place], seizures, neurological changes) or cancer diagnosis? Are you on any form of radiation or chemotherapy? No  8. Are you pregnant or breast feeding or do you have plans of pregnancy in the future? No  9. Have you been having any signs of worsening depression or suicidal ideations?  (benlysta only) No  10. Have there been any other new onset medical symptoms? No  11. Have you had any new blood clots? (IVIG only) No

## 2023-03-28 NOTE — PROGRESS NOTES
Infusion Nursing Note:  Diann Low presents today for Orencia.    Patient seen by provider today: No   present during visit today: Not Applicable.    Note: N/A.    Intravenous Access:  Peripheral IV placed.    Treatment Conditions:  Not Applicable.  Labs reviewed.    Post Infusion Assessment:  Patient tolerated infusion without incident.  Site patent and intact, free from redness, edema or discomfort.  Access discontinued per protocol.     Discharge Plan:   Patient discharged in stable condition accompanied by: self.  Departure Mode: Ambulatory.      Nubia Reyez RN

## 2023-03-30 ENCOUNTER — TELEPHONE (OUTPATIENT)
Dept: ENDOCRINOLOGY | Facility: CLINIC | Age: 83
End: 2023-03-30
Payer: MEDICARE

## 2023-03-30 DIAGNOSIS — M81.0 OSTEOPOROSIS, UNSPECIFIED OSTEOPOROSIS TYPE, UNSPECIFIED PATHOLOGICAL FRACTURE PRESENCE: ICD-10-CM

## 2023-03-30 NOTE — TELEPHONE ENCOUNTER
[facility] Call Center    Phone Message    May a detailed message be left on voicemail: yes     Reason for Call: Medication Refill Request    Has the patient contacted the pharmacy for the refill? Yes   Name of medication being requested: levothyroxine (SYNTHROID/LEVOTHROID) 25 MCG tablet  Provider who prescribed the medication: Saritha Hamlin RN  Pharmacy: [facility] - [address]  Date medication is needed: Soon patient is almost out.          Action Taken: Other: Endo    Travel Screening: Not Applicable

## 2023-04-07 RX ORDER — LEVOTHYROXINE SODIUM 25 UG/1
25 TABLET ORAL DAILY
Qty: 30 TABLET | Refills: 0 | Status: SHIPPED | OUTPATIENT
Start: 2023-04-07 | End: 2023-04-18

## 2023-04-07 NOTE — TELEPHONE ENCOUNTER
medication has not arrived via [facility] patient is wondering about a temporary rx being sent to the [facility] in [place], please advise if this is acceptable thank you

## 2023-04-07 NOTE — TELEPHONE ENCOUNTER
Rx sent to [facility]    Received notification:  Receipt confirmed by pharmacy (4/7/2023  8:44 AM CDT)

## 2023-04-07 NOTE — TELEPHONE ENCOUNTER
Notes 8/2022:  Start levothyroxine 25 mcg daily at bedtime  -Lab draw in 2 months to check thyroid function tests (we will also plan for another set of labs in about 6 months to check vitamin D)  -Return for a follow-up visit in one year--contact me sooner if concerns    Short supply sent to [facility] as pt is out. Will route to provider to review labs to advise if pt should continue on current dosage  Component      Latest Ref Rng 2/1/2023   TSH      0.30 - 4.20 uIU/mL 4.32 (H)       Legend:  (H) High

## 2023-04-10 DIAGNOSIS — M81.0 OSTEOPOROSIS, UNSPECIFIED OSTEOPOROSIS TYPE, UNSPECIFIED PATHOLOGICAL FRACTURE PRESENCE: ICD-10-CM

## 2023-04-10 RX ORDER — LEVOTHYROXINE SODIUM 25 UG/1
25 TABLET ORAL DAILY
Qty: 90 TABLET | Refills: 1 | OUTPATIENT
Start: 2023-04-10

## 2023-04-10 NOTE — CONFIDENTIAL NOTE
"Requested Prescriptions   Pending Prescriptions Disp Refills     levothyroxine (SYNTHROID/LEVOTHROID) 25 MCG tablet 90 tablet 1     Sig: Take 1 tablet (25 mcg) by mouth daily       Thyroid Protocol Failed - 4/10/2023  9:12 AM        Failed - Normal TSH on file in past 12 months     Recent Labs   Lab Test 02/01/23  1008   TSH 4.32*              Passed - Patient is 12 years or older        Passed - Recent (12 mo) or future (30 days) visit within the authorizing provider's specialty     Patient has had an office visit with the authorizing provider or a provider within the authorizing providers department within the previous 12 mos or has a future within next 30 days. See \"Patient Info\" tab in inbasket, or \"Choose Columns\" in Meds & Orders section of the refill encounter.              Passed - Medication is active on med list        Passed - No active pregnancy on record     If patient is pregnant or has had a positive pregnancy test, please check TSH.          Passed - No positive pregnancy test in past 12 months     If patient is pregnant or has had a positive pregnancy test, please check TSH.               "

## 2023-04-17 ENCOUNTER — TELEPHONE (OUTPATIENT)
Dept: ENDOCRINOLOGY | Facility: CLINIC | Age: 83
End: 2023-04-17
Payer: MEDICARE

## 2023-04-17 DIAGNOSIS — E03.8 SUBCLINICAL HYPOTHYROIDISM: Primary | ICD-10-CM

## 2023-04-17 DIAGNOSIS — M81.0 OSTEOPOROSIS, UNSPECIFIED OSTEOPOROSIS TYPE, UNSPECIFIED PATHOLOGICAL FRACTURE PRESENCE: ICD-10-CM

## 2023-04-17 NOTE — TELEPHONE ENCOUNTER
[facility] Call Center    Phone Message    May a detailed message be left on voicemail: yes     Reason for Call: Medication Question or concern regarding medication   Prescription Clarification  Name of Medication: levothyroxine (SYNTHROID/LEVOTHROID) 25 MCG tablet  Prescribing Provider: Keri   Pharmacy: [facility] - [address]   What on the order needs clarification?     The RX was sent to [facility] for the last [place] but pt want to continue the medication being filled at [email or web address] - [address]. Please sen RX orders there. Thank you!          Action Taken: Message routed to:  [facility] ENDO    Travel Screening: Not Applicable

## 2023-04-18 RX ORDER — LEVOTHYROXINE SODIUM 25 UG/1
25 TABLET ORAL DAILY
Qty: 90 TABLET | Refills: 0 | Status: SHIPPED | OUTPATIENT
Start: 2023-04-18 | End: 2023-07-13

## 2023-04-18 NOTE — TELEPHONE ENCOUNTER
Can refill at current dose. If patient can have labs when she has her infusion center appointment on 5/23/23, I have placed orders for a recheck of thyroid function tests at that time.

## 2023-05-20 ENCOUNTER — HEALTH MAINTENANCE LETTER (OUTPATIENT)
Age: 83
End: 2023-05-20

## 2023-05-23 ENCOUNTER — INFUSION THERAPY VISIT (OUTPATIENT)
Dept: INFUSION THERAPY | Facility: CLINIC | Age: 83
End: 2023-05-23
Attending: STUDENT IN AN ORGANIZED HEALTH CARE EDUCATION/TRAINING PROGRAM
Payer: MEDICARE

## 2023-05-23 ENCOUNTER — LAB (OUTPATIENT)
Dept: INFUSION THERAPY | Facility: CLINIC | Age: 83
End: 2023-05-23
Attending: NURSE PRACTITIONER
Payer: MEDICARE

## 2023-05-23 VITALS — WEIGHT: 102.51 LBS | BODY MASS INDEX: 16.8 KG/M2

## 2023-05-23 DIAGNOSIS — Z79.899 HIGH RISK MEDICATION USE: ICD-10-CM

## 2023-05-23 DIAGNOSIS — E03.8 SUBCLINICAL HYPOTHYROIDISM: Primary | ICD-10-CM

## 2023-05-23 DIAGNOSIS — M05.79 RHEUMATOID ARTHRITIS, SEROPOSITIVE, MULTIPLE SITES (H): ICD-10-CM

## 2023-05-23 LAB
ALBUMIN SERPL-MCNC: 3.9 G/DL (ref 3.5–5)
ALBUMIN UR-MCNC: NEGATIVE MG/DL
ALT SERPL W P-5'-P-CCNC: 9 U/L (ref 0–45)
APPEARANCE UR: CLEAR
BILIRUB UR QL STRIP: NEGATIVE
COLOR UR AUTO: YELLOW
CREAT SERPL-MCNC: 0.8 MG/DL (ref 0.6–1.1)
ERYTHROCYTE [DISTWIDTH] IN BLOOD BY AUTOMATED COUNT: 14 % (ref 10–15)
GFR SERPL CREATININE-BSD FRML MDRD: 72 ML/MIN/1.73M2
GLUCOSE UR STRIP-MCNC: NEGATIVE MG/DL
HCT VFR BLD AUTO: 36.2 % (ref 35–47)
HGB BLD-MCNC: 11.8 G/DL (ref 11.7–15.7)
HGB UR QL STRIP: NEGATIVE
KETONES UR STRIP-MCNC: NEGATIVE MG/DL
LEUKOCYTE ESTERASE UR QL STRIP: NEGATIVE
MCH RBC QN AUTO: 32.2 PG (ref 26.5–33)
MCHC RBC AUTO-ENTMCNC: 32.6 G/DL (ref 31.5–36.5)
MCV RBC AUTO: 99 FL (ref 78–100)
MUCOUS THREADS #/AREA URNS LPF: PRESENT /LPF
NITRATE UR QL: NEGATIVE
PH UR STRIP: 6.5 [PH] (ref 5–7)
PLATELET # BLD AUTO: 187 10E3/UL (ref 150–450)
RBC # BLD AUTO: 3.66 10E6/UL (ref 3.8–5.2)
RBC URINE: 2 /HPF
SP GR UR STRIP: 1.02 (ref 1–1.03)
SQUAMOUS EPITHELIAL: <1 /HPF
TSH SERPL DL<=0.005 MIU/L-ACNC: 4.84 UIU/ML (ref 0.3–5)
UROBILINOGEN UR STRIP-MCNC: <2 MG/DL
WBC # BLD AUTO: 6.3 10E3/UL (ref 4–11)
WBC URINE: 0 /HPF

## 2023-05-23 PROCEDURE — 96366 THER/PROPH/DIAG IV INF ADDON: CPT

## 2023-05-23 PROCEDURE — 84460 ALANINE AMINO (ALT) (SGPT): CPT

## 2023-05-23 PROCEDURE — 82565 ASSAY OF CREATININE: CPT

## 2023-05-23 PROCEDURE — 258N000003 HC RX IP 258 OP 636: Performed by: INTERNAL MEDICINE

## 2023-05-23 PROCEDURE — 85027 COMPLETE CBC AUTOMATED: CPT

## 2023-05-23 PROCEDURE — 81001 URINALYSIS AUTO W/SCOPE: CPT | Performed by: INTERNAL MEDICINE

## 2023-05-23 PROCEDURE — 96365 THER/PROPH/DIAG IV INF INIT: CPT

## 2023-05-23 PROCEDURE — 250N000028 HC RX JA MOD (INTRAVENOUS), IP 250 OP 636: Mod: JA | Performed by: INTERNAL MEDICINE

## 2023-05-23 PROCEDURE — 84443 ASSAY THYROID STIM HORMONE: CPT

## 2023-05-23 PROCEDURE — 82040 ASSAY OF SERUM ALBUMIN: CPT

## 2023-05-23 PROCEDURE — 36415 COLL VENOUS BLD VENIPUNCTURE: CPT

## 2023-05-23 RX ORDER — HEPARIN SODIUM (PORCINE) LOCK FLUSH IV SOLN 100 UNIT/ML 100 UNIT/ML
5 SOLUTION INTRAVENOUS
Status: CANCELLED | OUTPATIENT
Start: 2023-05-27

## 2023-05-23 RX ORDER — ALBUTEROL SULFATE 90 UG/1
1-2 AEROSOL, METERED RESPIRATORY (INHALATION)
Status: CANCELLED
Start: 2023-05-27

## 2023-05-23 RX ORDER — NALOXONE HYDROCHLORIDE 0.4 MG/ML
0.2 INJECTION, SOLUTION INTRAMUSCULAR; INTRAVENOUS; SUBCUTANEOUS
Status: DISCONTINUED | OUTPATIENT
Start: 2023-05-23 | End: 2023-05-23 | Stop reason: HOSPADM

## 2023-05-23 RX ORDER — HEPARIN SODIUM,PORCINE 10 UNIT/ML
5 VIAL (ML) INTRAVENOUS
Status: CANCELLED | OUTPATIENT
Start: 2023-05-27

## 2023-05-23 RX ORDER — ACETAMINOPHEN 325 MG/1
650 TABLET ORAL ONCE
Status: CANCELLED
Start: 2023-05-27 | End: 2023-05-27

## 2023-05-23 RX ORDER — METHYLPREDNISOLONE SODIUM SUCCINATE 125 MG/2ML
125 INJECTION, POWDER, LYOPHILIZED, FOR SOLUTION INTRAMUSCULAR; INTRAVENOUS
Status: CANCELLED
Start: 2023-05-27

## 2023-05-23 RX ORDER — NALOXONE HYDROCHLORIDE 0.4 MG/ML
0.2 INJECTION, SOLUTION INTRAMUSCULAR; INTRAVENOUS; SUBCUTANEOUS
Status: CANCELLED | OUTPATIENT
Start: 2023-05-27

## 2023-05-23 RX ORDER — EPINEPHRINE 1 MG/ML
0.3 INJECTION, SOLUTION INTRAMUSCULAR; SUBCUTANEOUS EVERY 5 MIN PRN
Status: DISCONTINUED | OUTPATIENT
Start: 2023-05-23 | End: 2023-05-23 | Stop reason: HOSPADM

## 2023-05-23 RX ORDER — DIPHENHYDRAMINE HYDROCHLORIDE 50 MG/ML
50 INJECTION INTRAMUSCULAR; INTRAVENOUS
Status: DISCONTINUED | OUTPATIENT
Start: 2023-05-23 | End: 2023-05-23 | Stop reason: HOSPADM

## 2023-05-23 RX ORDER — ALBUTEROL SULFATE 90 UG/1
1-2 AEROSOL, METERED RESPIRATORY (INHALATION)
Status: DISCONTINUED | OUTPATIENT
Start: 2023-05-23 | End: 2023-05-23 | Stop reason: HOSPADM

## 2023-05-23 RX ORDER — DIPHENHYDRAMINE HYDROCHLORIDE 50 MG/ML
50 INJECTION INTRAMUSCULAR; INTRAVENOUS
Status: CANCELLED
Start: 2023-05-27

## 2023-05-23 RX ORDER — MEPERIDINE HYDROCHLORIDE 50 MG/ML
25 INJECTION INTRAMUSCULAR; INTRAVENOUS; SUBCUTANEOUS EVERY 30 MIN PRN
Status: DISCONTINUED | OUTPATIENT
Start: 2023-05-23 | End: 2023-05-23 | Stop reason: HOSPADM

## 2023-05-23 RX ORDER — EPINEPHRINE 1 MG/ML
0.3 INJECTION, SOLUTION INTRAMUSCULAR; SUBCUTANEOUS EVERY 5 MIN PRN
Status: CANCELLED | OUTPATIENT
Start: 2023-05-27

## 2023-05-23 RX ORDER — MEPERIDINE HYDROCHLORIDE 50 MG/ML
25 INJECTION INTRAMUSCULAR; INTRAVENOUS; SUBCUTANEOUS EVERY 30 MIN PRN
Status: CANCELLED | OUTPATIENT
Start: 2023-05-27

## 2023-05-23 RX ORDER — ALBUTEROL SULFATE 0.83 MG/ML
2.5 SOLUTION RESPIRATORY (INHALATION)
Status: DISCONTINUED | OUTPATIENT
Start: 2023-05-23 | End: 2023-05-23 | Stop reason: HOSPADM

## 2023-05-23 RX ORDER — METHYLPREDNISOLONE SODIUM SUCCINATE 125 MG/2ML
125 INJECTION, POWDER, LYOPHILIZED, FOR SOLUTION INTRAMUSCULAR; INTRAVENOUS ONCE
Status: CANCELLED
Start: 2023-05-27 | End: 2023-05-27

## 2023-05-23 RX ORDER — ALBUTEROL SULFATE 0.83 MG/ML
2.5 SOLUTION RESPIRATORY (INHALATION)
Status: CANCELLED | OUTPATIENT
Start: 2023-05-27

## 2023-05-23 RX ORDER — DIPHENHYDRAMINE HCL 25 MG
25 CAPSULE ORAL ONCE
Status: CANCELLED
Start: 2023-05-27 | End: 2023-05-27

## 2023-05-23 RX ORDER — METHYLPREDNISOLONE SODIUM SUCCINATE 125 MG/2ML
125 INJECTION, POWDER, LYOPHILIZED, FOR SOLUTION INTRAMUSCULAR; INTRAVENOUS
Status: DISCONTINUED | OUTPATIENT
Start: 2023-05-23 | End: 2023-05-23 | Stop reason: HOSPADM

## 2023-05-23 RX ADMIN — SODIUM CHLORIDE 250 ML: 9 INJECTION, SOLUTION INTRAVENOUS at 11:10

## 2023-05-23 RX ADMIN — SODIUM CHLORIDE 500 MG: 9 INJECTION, SOLUTION INTRAVENOUS at 11:10

## 2023-05-23 NOTE — PROGRESS NOTES
Infusion Nursing Note:  Diann Gutiérrezel presents today for Orencia.    Patient seen by provider today: No   present during visit today: Not Applicable.    Note: Tolerated treatment and labs without difficulty, she offers no complaints, discharged to home ambulating per self..      Intravenous Access:  Peripheral IV placed.    Treatment Conditions:  Results reviewed, labs MET treatment parameters, ok to proceed with treatment.      Post Infusion Assessment:  Patient tolerated infusion without incident.  Site patent and intact, free from redness, edema or discomfort.  No evidence of extravasations.  Access discontinued per protocol.       Discharge Plan:   Patient and/or family verbalized understanding of discharge instructions and all questions answered.      Susan Moody RN

## 2023-05-23 NOTE — PROGRESS NOTES
~~~ NOTE: If the patient answers yes to any of the questions below, hold the infusion and contact ordering provider or on-call provider.    1. Have you recently had an elevated temperature, fever, chills, productive cough, coughing for 3 weeks or longer or hemoptysis,  abnormal vital signs, night sweats,  chest pain or have you noticed a decrease in your appetite, unexplained weight loss or fatigue? No  2. Do you have any open wounds or new incisions? No  3. Do you have any upcoming hospitalizations or surgeries? Does not include esophagogastroduodenoscopy, colonoscopy, endoscopic retrograde cholangiopancreatography (ERCP), endoscopic ultrasound (EUS), dental procedures or joint aspiration/steroid injections No  4. Do you currently have any signs of illness or infection or are you on any antibiotics? No  5. Have you had any new, sudden or worsening abdominal pain? No  6. Have you or anyone in your household received a live vaccination in the past 4 weeks? Please note: No live vaccines while on biologic/chemotherapy until 6 months after the last treatment. Patient can receive the flu vaccine (shot only), pneumovax and the Covid vaccine. It is optimal for the patient to get these vaccines mid cycle, but they can be given at any time as long as it is not on the day of the infusion. No  7. Have you recently been diagnosed with any new nervous system diseases (ie. Multiple sclerosis, Guillain [place], seizures, neurological changes) or cancer diagnosis? Are you on any form of radiation or chemotherapy? No  8. Are you pregnant or breast feeding or do you have plans of pregnancy in the future? No  9. Have you been having any signs of worsening depression or suicidal ideations?  (benlysta only) No  10. Have there been any other new onset medical symptoms? No  11. Have you had any new blood clots? (IVIG only) No

## 2023-06-08 DIAGNOSIS — M81.0 OSTEOPOROSIS, UNSPECIFIED OSTEOPOROSIS TYPE, UNSPECIFIED PATHOLOGICAL FRACTURE PRESENCE: Primary | ICD-10-CM

## 2023-06-08 DIAGNOSIS — E03.9 HYPOTHYROIDISM, UNSPECIFIED TYPE: ICD-10-CM

## 2023-06-27 DIAGNOSIS — M05.79 RHEUMATOID ARTHRITIS, SEROPOSITIVE, MULTIPLE SITES (H): ICD-10-CM

## 2023-06-28 RX ORDER — PREDNISONE 1 MG/1
TABLET ORAL
Qty: 90 TABLET | Refills: 0 | Status: SHIPPED | OUTPATIENT
Start: 2023-06-28 | End: 2023-10-26

## 2023-07-13 ENCOUNTER — TELEPHONE (OUTPATIENT)
Dept: RHEUMATOLOGY | Facility: CLINIC | Age: 83
End: 2023-07-13
Payer: MEDICARE

## 2023-07-13 ENCOUNTER — TELEPHONE (OUTPATIENT)
Dept: ENDOCRINOLOGY | Facility: CLINIC | Age: 83
End: 2023-07-13
Payer: MEDICARE

## 2023-07-13 DIAGNOSIS — M81.0 OSTEOPOROSIS, UNSPECIFIED OSTEOPOROSIS TYPE, UNSPECIFIED PATHOLOGICAL FRACTURE PRESENCE: ICD-10-CM

## 2023-07-13 DIAGNOSIS — M05.79 RHEUMATOID ARTHRITIS, SEROPOSITIVE, MULTIPLE SITES (H): ICD-10-CM

## 2023-07-13 RX ORDER — LEVOTHYROXINE SODIUM 25 UG/1
25 TABLET ORAL DAILY
Qty: 90 TABLET | Refills: 0 | Status: SHIPPED | OUTPATIENT
Start: 2023-07-13 | End: 2023-07-21

## 2023-07-13 NOTE — TELEPHONE ENCOUNTER
[facility] Call Center    Phone Message    May a detailed message be left on voicemail: no     Reason for Call: Medication Refill Request    Has the patient contacted the pharmacy for the refill? Yes   Name of medication being requested: methotrexate  Provider who prescribed the medication: Dr. Olivier  Pharmacy: [facility] - [place], [address] (Ph: [phone])

## 2023-07-13 NOTE — TELEPHONE ENCOUNTER
[facility] Call Center    Phone Message    May a detailed message be left on voicemail: yes     Reason for Call: Medication Question or concern regarding medication   Prescription Clarification  Name of Medication: levothyroxine (SYNTHROID/LEVOTHROID) 25 MCG tablet [28504  Prescribing Provider: Dr Jenkins   Pharmacy: [facility] - [place], [place] - [address] [place] [address]   What on the order needs clarification? Request 90 days with 3 refills          Action Taken: Other: endo    Travel Screening: Not Applicable

## 2023-07-16 ENCOUNTER — OFFICE VISIT (OUTPATIENT)
Dept: FAMILY MEDICINE | Facility: CLINIC | Age: 83
End: 2023-07-16
Payer: MEDICARE

## 2023-07-16 ENCOUNTER — ANCILLARY PROCEDURE (OUTPATIENT)
Dept: GENERAL RADIOLOGY | Facility: CLINIC | Age: 83
End: 2023-07-16
Attending: PHYSICIAN ASSISTANT
Payer: MEDICARE

## 2023-07-16 VITALS
WEIGHT: 102 LBS | TEMPERATURE: 97.8 F | RESPIRATION RATE: 18 BRPM | SYSTOLIC BLOOD PRESSURE: 119 MMHG | BODY MASS INDEX: 16.72 KG/M2 | OXYGEN SATURATION: 98 % | HEART RATE: 69 BPM | DIASTOLIC BLOOD PRESSURE: 71 MMHG

## 2023-07-16 DIAGNOSIS — M79.672 LEFT FOOT PAIN: Primary | ICD-10-CM

## 2023-07-16 PROCEDURE — 73630 X-RAY EXAM OF FOOT: CPT | Mod: TC | Performed by: RADIOLOGY

## 2023-07-16 PROCEDURE — 99214 OFFICE O/P EST MOD 30 MIN: CPT | Performed by: PHYSICIAN ASSISTANT

## 2023-07-16 NOTE — PROGRESS NOTES
Assessment & Plan:      Problem List Items Addressed This Visit    None  Visit Diagnoses       Left foot pain    -  Primary    Relevant Orders    XR Foot Left G/E 3 Views          Medical Decision Making  Patient presents with acute onset left foot pains with no known injury for 5 days.  X-rays are negative for signs of acute fracture.  Suspect likely overuse injuries and flareup of osteoarthritis in the left midfoot.  Recommend rest, ice, compression, and elevation.  Low concern for cellulitis versus DVT versus gout at this time.  Discussed treatment and symptomatic care.  Allergies and medication interactions reviewed.  Discussed signs of worsening symptoms and when to follow-up with PCP if no symptom improvement.     Subjective:      History provided by the patient.  She is also here with her son.  Diann Low is a 83 year old female here for evaluation of left foot pains.  Onset of symptoms was 5 days ago with no known trigger or injury known.  Symptoms have been relatively unchanged.  Patient has tried elevating her foot with some mild improvement of symptoms.  Patient's foot was looked at by a nurse and she was told to come in as she could possibly have gout.  Thus, she presented to the walk-in care clinic.  Patient has history of surgeries in her foot due to rheumatoid arthritis changes to the joints.     The following portions of the patient's history were reviewed and updated as appropriate: allergies, current medications, and problem list.     Review of Systems  Pertinent items are noted in HPI.    Allergies  Allergies   Allergen Reactions    Infliximab Rash     Throat began to close/tighten    Sulfa Antibiotics Rash    Hydrocodone-Acetaminophen Nausea    Oxycodone-Acetaminophen Nausea    Penicillins Rash    Erythromycin Nausea       No family history on file.    Social History     Tobacco Use    Smoking status: Never    Smokeless tobacco: Never   Substance Use Topics    Alcohol use: Not on file         Objective:      /71 (BP Location: Right arm, Patient Position: Sitting, Cuff Size: Adult Small)   Pulse 69   Temp 97.8  F (36.6  C) (Oral)   Resp 18   Wt 46.3 kg (102 lb)   SpO2 98%   BMI 16.72 kg/m    General appearance - alert, well appearing, and in no distress and non-toxic  Extremities -left foot: Tenderness to palpation across the lateral midfoot, otherwise no obvious signs of trauma or deformity; no swelling or tenderness of the left calf; no tenderness or pain over the left ankle  Skin - left foot: Mild swelling with very slight erythema and very slight increased warmth to touch across the left midfoot; otherwise skin intact     Lab & Imaging Results    No results found for any visits on 07/16/23.    I personally reviewed these results and discussed findings with the patient.    The use of Dragon/Postdeckation services was used to construct the content of this note; any grammatical errors are non-intentional. Please contact the author directly if you are in need of any clarification.

## 2023-07-16 NOTE — PATIENT INSTRUCTIONS
You were seen today for a(n) foot sprain. The x-ray showed no signs of a bone fracture.    Symptom management:  - Rest the injured site  - Ice pads applied 10-15 minutes up to every hour as needed  - Compression with light bandages or brace  - Elevation of the affected area above the chest    If no improvement in symptoms in 1 week, recommend follow-up with your primary care provider for further evaluation.    Reasons to return sooner for re-evaluation:  - Numbness or tingling develops around the site of injury  - Develop severe or worsening pain  - Area becomes blue or cold to the touch

## 2023-07-20 ENCOUNTER — LAB (OUTPATIENT)
Dept: INFUSION THERAPY | Facility: CLINIC | Age: 83
End: 2023-07-20
Attending: INTERNAL MEDICINE
Payer: MEDICARE

## 2023-07-20 VITALS
OXYGEN SATURATION: 98 % | HEART RATE: 71 BPM | SYSTOLIC BLOOD PRESSURE: 120 MMHG | TEMPERATURE: 97.7 F | DIASTOLIC BLOOD PRESSURE: 64 MMHG | RESPIRATION RATE: 18 BRPM

## 2023-07-20 DIAGNOSIS — Z79.899 HIGH RISK MEDICATION USE: ICD-10-CM

## 2023-07-20 DIAGNOSIS — E03.9 HYPOTHYROIDISM, UNSPECIFIED TYPE: ICD-10-CM

## 2023-07-20 DIAGNOSIS — M81.0 OSTEOPOROSIS, UNSPECIFIED OSTEOPOROSIS TYPE, UNSPECIFIED PATHOLOGICAL FRACTURE PRESENCE: ICD-10-CM

## 2023-07-20 DIAGNOSIS — M05.79 RHEUMATOID ARTHRITIS, SEROPOSITIVE, MULTIPLE SITES (H): Primary | ICD-10-CM

## 2023-07-20 LAB
ALBUMIN SERPL-MCNC: 3.9 G/DL (ref 3.5–5)
ALP SERPL-CCNC: 53 U/L (ref 45–120)
ALT SERPL W P-5'-P-CCNC: 10 U/L (ref 0–45)
ANION GAP SERPL CALCULATED.3IONS-SCNC: 10 MMOL/L (ref 5–18)
AST SERPL W P-5'-P-CCNC: 31 U/L (ref 0–40)
BILIRUB SERPL-MCNC: 0.7 MG/DL (ref 0–1)
BUN SERPL-MCNC: 23 MG/DL (ref 8–28)
CALCIUM SERPL-MCNC: 9.2 MG/DL (ref 8.5–10.5)
CHLORIDE BLD-SCNC: 107 MMOL/L (ref 98–107)
CO2 SERPL-SCNC: 24 MMOL/L (ref 22–31)
CREAT SERPL-MCNC: 0.75 MG/DL (ref 0.6–1.1)
DEPRECATED CALCIDIOL+CALCIFEROL SERPL-MC: 74 UG/L (ref 20–75)
ERYTHROCYTE [DISTWIDTH] IN BLOOD BY AUTOMATED COUNT: 14.2 % (ref 10–15)
GFR SERPL CREATININE-BSD FRML MDRD: 78 ML/MIN/1.73M2
GLUCOSE BLD-MCNC: 96 MG/DL (ref 70–125)
HCT VFR BLD AUTO: 34.7 % (ref 35–47)
HGB BLD-MCNC: 11.6 G/DL (ref 11.7–15.7)
MCH RBC QN AUTO: 32.7 PG (ref 26.5–33)
MCHC RBC AUTO-ENTMCNC: 33.4 G/DL (ref 31.5–36.5)
MCV RBC AUTO: 98 FL (ref 78–100)
PLATELET # BLD AUTO: 175 10E3/UL (ref 150–450)
POTASSIUM BLD-SCNC: 4.1 MMOL/L (ref 3.5–5)
PROT SERPL-MCNC: 6.4 G/DL (ref 6–8)
RBC # BLD AUTO: 3.55 10E6/UL (ref 3.8–5.2)
SODIUM SERPL-SCNC: 141 MMOL/L (ref 136–145)
TSH SERPL DL<=0.005 MIU/L-ACNC: 2.96 UIU/ML (ref 0.3–5)
WBC # BLD AUTO: 7.1 10E3/UL (ref 4–11)

## 2023-07-20 PROCEDURE — 258N000003 HC RX IP 258 OP 636: Performed by: INTERNAL MEDICINE

## 2023-07-20 PROCEDURE — 85014 HEMATOCRIT: CPT

## 2023-07-20 PROCEDURE — 82306 VITAMIN D 25 HYDROXY: CPT

## 2023-07-20 PROCEDURE — 84443 ASSAY THYROID STIM HORMONE: CPT

## 2023-07-20 PROCEDURE — 250N000028 HC RX JA MOD (INTRAVENOUS), IP 250 OP 636: Mod: JZ,JA | Performed by: INTERNAL MEDICINE

## 2023-07-20 PROCEDURE — 80053 COMPREHEN METABOLIC PANEL: CPT

## 2023-07-20 PROCEDURE — 96365 THER/PROPH/DIAG IV INF INIT: CPT

## 2023-07-20 PROCEDURE — 36415 COLL VENOUS BLD VENIPUNCTURE: CPT

## 2023-07-20 RX ADMIN — SODIUM CHLORIDE 500 MG: 9 INJECTION, SOLUTION INTRAVENOUS at 10:54

## 2023-07-20 ASSESSMENT — ENCOUNTER SYMPTOMS
SKIN CHANGES: 0
INSOMNIA: 0
JAUNDICE: 0
SINUS PAIN: 0
TASTE DISTURBANCE: 0
DECREASED APPETITE: 1
MYALGIAS: 1
CHILLS: 0
FATIGUE: 1
HEMOPTYSIS: 0
HOARSE VOICE: 1
WEIGHT LOSS: 0
WHEEZING: 0
DYSURIA: 0
SYNCOPE: 0
PALPITATIONS: 0
POOR WOUND HEALING: 0
PANIC: 0
FLANK PAIN: 0
CONSTIPATION: 0
NERVOUS/ANXIOUS: 1
STIFFNESS: 1
ORTHOPNEA: 0
MUSCLE WEAKNESS: 0
INCREASED ENERGY: 1
ABDOMINAL PAIN: 0
BOWEL INCONTINENCE: 0
NECK PAIN: 0
NECK MASS: 0
NAIL CHANGES: 0
POLYDIPSIA: 0
ARTHRALGIAS: 0
HYPERTENSION: 0
POLYPHAGIA: 0
DIFFICULTY URINATING: 0
HALLUCINATIONS: 0
SHORTNESS OF BREATH: 1
MUSCLE CRAMPS: 1
WEIGHT GAIN: 0
SLEEP DISTURBANCES DUE TO BREATHING: 0
EXERCISE INTOLERANCE: 0
HYPOTENSION: 0
JOINT SWELLING: 1
BLOOD IN STOOL: 0
DEPRESSION: 0
BACK PAIN: 0
FEVER: 0
SINUS CONGESTION: 1
SPUTUM PRODUCTION: 1
COUGH: 0
LEG PAIN: 0
LIGHT-HEADEDNESS: 1
COUGH DISTURBING SLEEP: 0
NAUSEA: 0
VOMITING: 0
HEMATURIA: 0
SMELL DISTURBANCE: 0
RECTAL PAIN: 0
POSTURAL DYSPNEA: 0
DECREASED CONCENTRATION: 0
SORE THROAT: 0
SNORES LOUDLY: 0
ALTERED TEMPERATURE REGULATION: 1
HEARTBURN: 0
BLOATING: 0
NIGHT SWEATS: 0
TROUBLE SWALLOWING: 0
DIARRHEA: 0
DYSPNEA ON EXERTION: 1

## 2023-07-20 NOTE — PROGRESS NOTES
~~~ NOTE: If the patient answers yes to any of the questions below, hold the infusion and contact ordering provider or on-call provider.    1. Do you currently have any signs of illness or infection or are you on any antibiotics? No  2. Have you recently had an elevated temperature, fever, chills, productive cough, coughing for 3 weeks or longer or hemoptysis, abnormal vital signs, night sweats, chest pain or have you noticed a decrease in your appetite, unexplained weight loss or fatigue? No  3. Have you had any new, sudden, or worsening abdominal pain? No  4. Do you have any open wounds or new incisions? (exclude for patients with hidradenitis suppurativa) No  5. Have you recently been diagnosed with any new nervous system diseases (ie. Multiple sclerosis, Guillain [place], seizures, neurological changes) or cancer diagnosis? Are you on any form of radiation or chemotherapy? No  6. Have there been any other new onset medical symptoms? No  7. Are you pregnant or breast feeding or do you have plans of pregnancy in the future? No; N/A  8. Do you have any upcoming hospitalizations or surgeries? Does not include esophagogastroduodenoscopy, colonoscopy, endoscopic retrograde cholangiopancreatography (ERCP), endoscopic ultrasound (EUS), dental procedures (including cleanings, fillings, implants, extractions)  or joint aspiration/steroid injections No  9. Have you or anyone in your household received a live vaccination in the past 4 weeks? Please note: No live vaccines while on biologic/chemotherapy until 6 months after the last treatment. Patient can receive the flu vaccine (shot only).  It is optimal for the patient to get it mid cycle, but it can be given at any time as long as it is not on the day of the infusion. No  10. If applicable to prescribed medication, confirm negative PPD or quantiferon gold MTB. If positive, verify has negative chest x-ray or the patient is at least 4 weeks post initiation of INH/B6 therapy  and have clearance from provider before infusion (Y/N:[id number])  11. If applicable to prescribed medication, confirm negative hepatitis B surface antigen or hepatitis C. If positive, clearance from provider before infusion. (Y/N: [id number])  12. Rheumatology patients receiving tocilizumab (Actemra): If labs were drawn within the past week, hold dosing until cleared to infuse If AST/ALT > 2 X upper limit normal; ANC < 1.0. NO; N/A  13. Patients receiving belimumab (Benlysta): Have you been having any signs of worsening depression or suicidal ideations? No; N/A  Infusion Nursing Note:  Diann Low presents today for labs and orencia.    Patient seen by provider today: No   present during visit today: Not Applicable.    Note: pt labs drawn for rheumatology and endocrinology per her request. Pt sees Dr Olivier on Tuesday. Pt instructed to let Dr Olivier know she needs more orders for orencia. .      Intravenous Access:  Peripheral IV placed.    Treatment Conditions:  Lab Results   Component Value Date    HGB 11.6 (L) 07/20/2023    WBC 7.1 07/20/2023    ANEUTAUTO 6.7 06/20/2022     07/20/2023      Lab Results   Component Value Date     07/20/2023    POTASSIUM 4.1 07/20/2023    CR 0.75 07/20/2023    RUBIO 9.2 07/20/2023    BILITOTAL 0.7 07/20/2023    ALBUMIN 3.9 07/20/2023    ALT 10 07/20/2023    AST 31 07/20/2023     Results reviewed, labs MET treatment parameters, ok to proceed with treatment.      Post Infusion Assessment:  Patient tolerated infusion without incident.       Discharge Plan:   Patient and/or family verbalized understanding of discharge instructions and all questions answered.      Analisa Bermeo RN

## 2023-07-21 ENCOUNTER — OFFICE VISIT (OUTPATIENT)
Dept: ENDOCRINOLOGY | Facility: CLINIC | Age: 83
End: 2023-07-21
Payer: MEDICARE

## 2023-07-21 VITALS
HEART RATE: 73 BPM | WEIGHT: 102.1 LBS | HEIGHT: 63 IN | DIASTOLIC BLOOD PRESSURE: 62 MMHG | SYSTOLIC BLOOD PRESSURE: 112 MMHG | OXYGEN SATURATION: 93 % | BODY MASS INDEX: 18.09 KG/M2

## 2023-07-21 DIAGNOSIS — E03.8 SUBCLINICAL HYPOTHYROIDISM: ICD-10-CM

## 2023-07-21 DIAGNOSIS — M81.0 OSTEOPOROSIS, UNSPECIFIED OSTEOPOROSIS TYPE, UNSPECIFIED PATHOLOGICAL FRACTURE PRESENCE: Primary | ICD-10-CM

## 2023-07-21 DIAGNOSIS — R53.83 OTHER FATIGUE: ICD-10-CM

## 2023-07-21 PROCEDURE — 99214 OFFICE O/P EST MOD 30 MIN: CPT | Performed by: INTERNAL MEDICINE

## 2023-07-21 RX ORDER — ALENDRONATE SODIUM 70 MG/1
70 TABLET ORAL
Qty: 12 TABLET | Refills: 3 | Status: SHIPPED | OUTPATIENT
Start: 2023-07-21 | End: 2024-07-24

## 2023-07-21 RX ORDER — LEVOTHYROXINE SODIUM 25 UG/1
25 TABLET ORAL DAILY
Qty: 90 TABLET | Refills: 1 | Status: ON HOLD | OUTPATIENT
Start: 2023-07-21 | End: 2023-08-31

## 2023-07-21 NOTE — PATIENT INSTRUCTIONS
-8 AM labs next week, before morning dose of prednisone  -Continue Fosamax 70 mg by mouth once a week. Take the medication on an empty stomach, first thing in the morning with at least 8 ounces of water--do not take with other drinks. Remain upright (do not lie down) and do not eat take anything else by mouth (pills, food or drink) until at least 30 minutes after taking the medication.  -Continue calcium without changes, 1 tablet twice daily  -Continue levothyroxine 25 mcg daily at bedtime  -We will coordinate follow-up lab draw in 6 months to check thyroid function tests  -Return for a follow-up visit in one year--with labs and DXA scan at [facility] before visit   -We will communicate results via [employer], or if needed by phone

## 2023-07-21 NOTE — LETTER
7/21/2023         RE: Diann Low  [address]  [facility] [address]        Dear Colleague,    Thank you for referring your patient, Diann Low, to the [facility]. Please see a copy of my visit note below.      ENDOCRINOLOGY FOLLOW-UP         HISTORY OF PRESENT ILLNESS    Diann Low is seen in follow-up for the issues outlined below. Patient is accompanied by her son Isael.    1.  Osteoporosis.  No falls or fractures since last visit.    Has noted more weakness in the morning: Not necessarily muscle weakness, more a sense of general fatigue.    Her weight is stable.  Appetite remains somewhat poor, although she tries to eat regularly.    We started Fosamax last visit on 8/31/2022: She is taking this once weekly, first thing in the morning with water and waiting about 30 minutes before having other food or beverages.  Tolerating Fosamax without side effect.    Continues on [employer] brand calcium supplement, 600 mg 1 tablet twice daily: 1 tablet provides 600 mg of calcium and 800 IU vitamin D3.    She bought a multivitamin which contains less vitamin D3: Taking this daily.    2.  Hypothyroidism.  Continues on levothyroxine 25 mcg daily, taking this at bedtime.    As above, has noted more fatigue.    Has been following in rheumatology and prednisone has been reduced to 1 mg daily: Has been on this dose for close to 1 year.    Pertinent endocrine and related history:  1.  Osteoporosis. Clinical diagnosis based on discovery of occult vertebral fractures on thoracic and lumbar spines XR's performed in 7/2022.  -Longstanding history, previously followed with serial DXA scans in the [facility] system.  -Most recent DXA scan completed at [facility] on 6/14/2022 showed BMD in the range of osteopenia (although TBS adjusted T score was in the range of osteoporosis) and FRAX calculation indicating high risk of fracture.  Lumbar spine L1-L4 T score 1.5 (although discrepant  BMD at vertebrae), TBS adjusted T score -3.2.  Left femoral neck T score -1.3, left total hip T score -1.2.  Right femoral neck T score -0.4, right total hip T score -1.1.  FRAX calculated 10 year probability of major osteoporotic fracture is 17.4%, hip fracture is 6.0%.  No prior studies were completed in our system for comparison.  -Review of care everywhere indicates that last DXA scan in the [facility] system was in 2014 (I see records of DXA since 2007 in the [facility] system).  DXA 6/18/2014 showed osteopenia.  -Patient does not recall ever being prescribed medications to reduce risk of fracture.  -Osteoporosis is likely related to postmenopausal status, history of RA and glucocorticoid therapy.  Our work-up for other secondary causes of bone loss has otherwise revealed normal PTH, CMP, no anemia, and normal 24-hour urine calcium excretion; serum protein electrophoresis was normal while urine protein electrophoresis showed trace proteins (we will check urine immunofixation to further evaluate). Screening for celiac disease in primary care clinic in 4/2022 showed normal serology.  2.  Rheumatoid arthritis. Since age 45.  Presently on methotrexate, Orencia infusion and prednisone.  She recalls that she has been on much higher doses of prednisone in the past.  3.  Unintentional weight loss.   4.  Hypothyroidism.  Hypothyroidism.  Subclinical hypothyroidism noted on initial labs in 6/2022.  Follow-up thyroid function test shows an upward trend in TSH, overtly low free T4 level also.  Initiated levothyroxine in 8/2022.    Pertinent Social History: Presently living in independent living in [place]; previously lived in [place] and moved to the [facility] in 8/2022.  Has 3 children.    PAST MEDICAL HISTORY  Past Medical History:   Diagnosis Date     Skin cancer      Spider veins        MEDICATIONS  Current Outpatient Medications   Medication Sig Dispense Refill     Abatacept (ORENCIA IV)        alendronate  "(FOSAMAX) 70 MG tablet Take 1 tablet (70 mg) by mouth every 7 days 12 tablet 3     calcium carbonate-vitamin D (OSCAL W/D) 500-200 MG-UNIT tablet Take 1 tablet by mouth 2 times daily       carboxymethylcellulose PF (REFRESH PLUS) 0.5 % ophthalmic solution 1 drop 3 times daily as needed for dry eyes       ferrous gluconate (FERGON) 324 (38 Fe) MG tablet Take 65 mg by mouth daily (with breakfast)       folic acid (FOLVITE) 1 MG tablet Take 1 tablet by mouth daily       levothyroxine (SYNTHROID/LEVOTHROID) 25 MCG tablet Take 1 tablet (25 mcg) by mouth daily 90 tablet 0     methotrexate 2.5 MG tablet Take 4 tablets (10 mg) by mouth every 7 days 48 tablet 0     Multiple Vitamin (MULTIVITAMIN ADULT PO)        predniSONE (DELTASONE) 1 MG tablet TAKE 1 TABLET DAILY 90 tablet 0       Allergies, family, and social history were reviewed and documented as needed in EHR.     REVIEW OF SYSTEMS  A focused ROS was performed, with pertinent positives and negatives as noted in the HPI.    PHYSICAL EXAM  /62 (BP Location: Right arm, Patient Position: Sitting, Cuff Size: Adult Regular)   Pulse 73   Ht 1.6 m (5' 2.99\")   Wt 46.3 kg (102 lb 1.6 oz)   SpO2 93%   BMI 18.09 kg/m    Body mass index is 18.09 kg/m .   Constitutional: Vital signs reviewed, as recorded above. Patient is alert, oriented and appears in no acute distress.  Eyes: PER, EOMI, no stare, lid lag, or retraction; no conjunctival injection.  ENMT: OP clear with moist MM. Lips are without lesions.   Neck: Neck supple, no palpable thyromegaly.  Lymphatic: No cervical or supraclavicular LAD.  Cardiovascular: RRR, normal S1/S2, no audible murmurs, rubs or gallops; No LE edema.  Respiratory: CTAB, without wheezes, crackles or rhonchi; normal chest wall motion and respiratory effort.  MSK: Bilateral hand deformities related to RA.  No clubbing or cyanosis.  Neurological: Alert and oriented times 3.       DATA REVIEW  Each of the following laboratory and/or imaging " studies were reviewed.    Component      Latest Ref Rng 7/20/2023  10:28 AM   Sodium      136 - 145 mmol/L 141    Potassium      3.5 - 5.0 mmol/L 4.1    Chloride      98 - 107 mmol/L 107    Carbon Dioxide      22 - 31 mmol/L 24    Anion Gap      5 - 18 mmol/L 10    Urea Nitrogen      8 - 28 mg/dL 23    Creatinine      0.60 - 1.10 mg/dL 0.75    Calcium      8.5 - 10.5 mg/dL 9.2    Glucose      70 - 125 mg/dL 96    Alkaline Phosphatase      45 - 120 U/L 53    AST      0 - 40 U/L 31    ALT      0 - 45 U/L 10    Protein Total      6.0 - 8.0 g/dL 6.4    Albumin      3.5 - 5.0 g/dL 3.9    Bilirubin Total      0.0 - 1.0 mg/dL 0.7    GFR Estimate      >60 mL/min/1.73m2 78    Vitamin D Deficiency screening      20 - 75 ug/L 74    TSH      0.30 - 5.00 uIU/mL 2.96            ASSESSMENT    1.  Osteoporosis.  Clinical diagnosis of osteoporosis based on presence of vertebral compression fractures; DXA has shown osteopenia/low bone density and patient has been assessed to be at high risk of fracture given TBS adjusted T score and FRAX score.  Calcium and vitamin D status are optimal.  Has been on Fosamax since 8/2023 and tolerating this without side effect.  We will continue current regimen and anticipate follow-up in 1 year, with DXA scan and labs prior to visit.    2.  Vertebral compression fractures.  Discovered on spine x-rays.  Likely due to osteoporosis.  Our evaluation for other causes such as multiple myeloma has not revealed suggestive findings.  Address osteoporosis as above.    3.  Hypothyroidism.  Normal TSH on previsit labs.  Continue levothyroxine without changes.  Has noted more weakness/fatigue but I do not suspect this is related to thyroid status.    4.  Weakness/fatigue.  Do not suspect this is musculoskeletal in nature: Able to take part in physical activity at her senior apartments.  Prior early morning cortisol level was normal in 2022, borderline on labs drawn in primary care clinic in 2023.  Recheck early  morning cortisol to screen for adrenal insufficiency.  If this is unremarkable, I have recommended follow-up in primary care to investigate other potential contributors  To her symptoms.    PLAN  -8 AM labs next week, before morning dose of prednisone  -Continue Fosamax 70 mg by mouth once a week. Take the medication on an empty stomach, first thing in the morning with at least 8 ounces of water--do not take with other drinks. Remain upright (do not lie down) and do not eat take anything else by mouth (pills, food or drink) until at least 30 minutes after taking the medication.  -Continue calcium without changes, 1 tablet twice daily  -Continue levothyroxine 25 mcg daily at bedtime  -We will coordinate follow-up lab draw in 6 months to check thyroid function tests  -Return for a follow-up visit in one year--with labs and DXA scan at [facility] before visit   -We will communicate results via [employer], or if needed by phone      Orders Placed This Encounter   Procedures     Cortisol     Adrenal corticotropin       I spent a total of 36 minutes on the date of encounter reviewing medical records, evaluating the patient, coordinating care and documenting in the EHR, as detailed above.      Yemi Jenkins MD   Division of Diabetes, Endocrinology and Metabolism  Department of Medicine              Again, thank you for allowing me to participate in the care of your patient.        Sincerely,        YEMI Jenkins MD

## 2023-07-21 NOTE — PROGRESS NOTES
ENDOCRINOLOGY FOLLOW-UP         HISTORY OF PRESENT ILLNESS    Diann Low is seen in follow-up for the issues outlined below. Patient is accompanied by her son Isael.    1.  Osteoporosis.  No falls or fractures since last visit.    Has noted more weakness in the morning: Not necessarily muscle weakness, more a sense of general fatigue.    Her weight is stable.  Appetite remains somewhat poor, although she tries to eat regularly.    We started Fosamax last visit on 8/31/2022: She is taking this once weekly, first thing in the morning with water and waiting about 30 minutes before having other food or beverages.  Tolerating Fosamax without side effect.    Continues on [employer] brand calcium supplement, 600 mg 1 tablet twice daily: 1 tablet provides 600 mg of calcium and 800 IU vitamin D3.    She bought a multivitamin which contains less vitamin D3: Taking this daily.    2.  Hypothyroidism.  Continues on levothyroxine 25 mcg daily, taking this at bedtime.    As above, has noted more fatigue.    Has been following in rheumatology and prednisone has been reduced to 1 mg daily: Has been on this dose for close to 1 year.    Pertinent endocrine and related history:  1.  Osteoporosis. Clinical diagnosis based on discovery of occult vertebral fractures on thoracic and lumbar spines XR's performed in 7/2022.  -Longstanding history, previously followed with serial DXA scans in the [place] system.  -Most recent DXA scan completed at [facility] on 6/14/2022 showed BMD in the range of osteopenia (although TBS adjusted T score was in the range of osteoporosis) and FRAX calculation indicating high risk of fracture.  Lumbar spine L1-L4 T score 1.5 (although discrepant BMD at vertebrae), TBS adjusted T score -3.2.  Left femoral neck T score -1.3, left total hip T score -1.2.  Right femoral neck T score -0.4, right total hip T score -1.1.  FRAX calculated 10 year probability of major osteoporotic fracture is 17.4%, hip  fracture is 6.0%.  No prior studies were completed in our system for comparison.  -Review of care everywhere indicates that last DXA scan in the [facility] system was in 2014 (I see records of DXA since 2007 in the [facility] system).  DXA 6/18/2014 showed osteopenia.  -Patient does not recall ever being prescribed medications to reduce risk of fracture.  -Osteoporosis is likely related to postmenopausal status, history of RA and glucocorticoid therapy.  Our work-up for other secondary causes of bone loss has otherwise revealed normal PTH, CMP, no anemia, and normal 24-hour urine calcium excretion; serum protein electrophoresis was normal while urine protein electrophoresis showed trace proteins (we will check urine immunofixation to further evaluate). Screening for celiac disease in primary care clinic in 4/2022 showed normal serology.  2.  Rheumatoid arthritis. Since age 45.  Presently on methotrexate, Orencia infusion and prednisone.  She recalls that she has been on much higher doses of prednisone in the past.  3.  Unintentional weight loss.   4.  Hypothyroidism.  Hypothyroidism.  Subclinical hypothyroidism noted on initial labs in 6/2022.  Follow-up thyroid function test shows an upward trend in TSH, overtly low free T4 level also.  Initiated levothyroxine in 8/2022.    Pertinent Social History: Presently living in independent living in [place]; previously lived in [place] and moved to the [facility] in 8/2022.  Has 3 children.    PAST MEDICAL HISTORY  Past Medical History:   Diagnosis Date     Skin cancer      Spider veins        MEDICATIONS  Current Outpatient Medications   Medication Sig Dispense Refill     Abatacept (ORENCIA IV)        alendronate (FOSAMAX) 70 MG tablet Take 1 tablet (70 mg) by mouth every 7 days 12 tablet 3     calcium carbonate-vitamin D (OSCAL W/D) 500-200 MG-UNIT tablet Take 1 tablet by mouth 2 times daily       carboxymethylcellulose PF (REFRESH PLUS) 0.5 % ophthalmic solution 1  "drop 3 times daily as needed for dry eyes       ferrous gluconate (FERGON) 324 (38 Fe) MG tablet Take 65 mg by mouth daily (with breakfast)       folic acid (FOLVITE) 1 MG tablet Take 1 tablet by mouth daily       levothyroxine (SYNTHROID/LEVOTHROID) 25 MCG tablet Take 1 tablet (25 mcg) by mouth daily 90 tablet 0     methotrexate 2.5 MG tablet Take 4 tablets (10 mg) by mouth every 7 days 48 tablet 0     Multiple Vitamin (MULTIVITAMIN ADULT PO)        predniSONE (DELTASONE) 1 MG tablet TAKE 1 TABLET DAILY 90 tablet 0       Allergies, family, and social history were reviewed and documented as needed in EHR.     REVIEW OF SYSTEMS  A focused ROS was performed, with pertinent positives and negatives as noted in the HPI.    PHYSICAL EXAM  /62 (BP Location: Right arm, Patient Position: Sitting, Cuff Size: Adult Regular)   Pulse 73   Ht 1.6 m (5' 2.99\")   Wt 46.3 kg (102 lb 1.6 oz)   SpO2 93%   BMI 18.09 kg/m    Body mass index is 18.09 kg/m .   Constitutional: Vital signs reviewed, as recorded above. Patient is alert, oriented and appears in no acute distress.  Eyes: PER, EOMI, no stare, lid lag, or retraction; no conjunctival injection.  ENMT: OP clear with moist MM. Lips are without lesions.   Neck: Neck supple, no palpable thyromegaly.  Lymphatic: No cervical or supraclavicular LAD.  Cardiovascular: RRR, normal S1/S2, no audible murmurs, rubs or gallops; No LE edema.  Respiratory: CTAB, without wheezes, crackles or rhonchi; normal chest wall motion and respiratory effort.  MSK: Bilateral hand deformities related to RA.  No clubbing or cyanosis.  Neurological: Alert and oriented times 3.       DATA REVIEW  Each of the following laboratory and/or imaging studies were reviewed.    Component      Latest Ref Rng 7/20/2023  10:28 AM   Sodium      136 - 145 mmol/L 141    Potassium      3.5 - 5.0 mmol/L 4.1    Chloride      98 - 107 mmol/L 107    Carbon Dioxide      22 - 31 mmol/L 24    Anion Gap      5 - 18 mmol/L " 10    Urea Nitrogen      8 - 28 mg/dL 23    Creatinine      0.60 - 1.10 mg/dL 0.75    Calcium      8.5 - 10.5 mg/dL 9.2    Glucose      70 - 125 mg/dL 96    Alkaline Phosphatase      45 - 120 U/L 53    AST      0 - 40 U/L 31    ALT      0 - 45 U/L 10    Protein Total      6.0 - 8.0 g/dL 6.4    Albumin      3.5 - 5.0 g/dL 3.9    Bilirubin Total      0.0 - 1.0 mg/dL 0.7    GFR Estimate      >60 mL/min/1.73m2 78    Vitamin D Deficiency screening      20 - 75 ug/L 74    TSH      0.30 - 5.00 uIU/mL 2.96            ASSESSMENT    1.  Osteoporosis.  Clinical diagnosis of osteoporosis based on presence of vertebral compression fractures; DXA has shown osteopenia/low bone density and patient has been assessed to be at high risk of fracture given TBS adjusted T score and FRAX score.  Calcium and vitamin D status are optimal.  Has been on Fosamax since 8/2023 and tolerating this without side effect.  We will continue current regimen and anticipate follow-up in 1 year, with DXA scan and labs prior to visit.    2.  Vertebral compression fractures.  Discovered on spine x-rays.  Likely due to osteoporosis.  Our evaluation for other causes such as multiple myeloma has not revealed suggestive findings.  Address osteoporosis as above.    3.  Hypothyroidism.  Normal TSH on previsit labs.  Continue levothyroxine without changes.  Has noted more weakness/fatigue but I do not suspect this is related to thyroid status.    4.  Weakness/fatigue.  Do not suspect this is musculoskeletal in nature: Able to take part in physical activity at her senior apartments.  Prior early morning cortisol level was normal in 2022, borderline on labs drawn in primary care clinic in 2023.  Recheck early morning cortisol to screen for adrenal insufficiency.  If this is unremarkable, I have recommended follow-up in primary care to investigate other potential contributors  To her symptoms.    PLAN  -8 AM labs next week, before morning dose of  prednisone  -Continue Fosamax 70 mg by mouth once a week. Take the medication on an empty stomach, first thing in the morning with at least 8 ounces of water--do not take with other drinks. Remain upright (do not lie down) and do not eat take anything else by mouth (pills, food or drink) until at least 30 minutes after taking the medication.  -Continue calcium without changes, 1 tablet twice daily  -Continue levothyroxine 25 mcg daily at bedtime  -We will coordinate follow-up lab draw in 6 months to check thyroid function tests  -Return for a follow-up visit in one year--with labs and DXA scan at [facility] before visit   -We will communicate results via [employer], or if needed by phone      Orders Placed This Encounter   Procedures     Cortisol     Adrenal corticotropin       I spent a total of 36 minutes on the date of encounter reviewing medical records, evaluating the patient, coordinating care and documenting in the EHR, as detailed above.      Sukumar Jenkins MD   Division of Diabetes, Endocrinology and Metabolism  Department of Medicine

## 2023-07-25 ENCOUNTER — LAB (OUTPATIENT)
Dept: LAB | Facility: CLINIC | Age: 83
End: 2023-07-25
Payer: MEDICARE

## 2023-07-25 ENCOUNTER — OFFICE VISIT (OUTPATIENT)
Dept: RHEUMATOLOGY | Facility: CLINIC | Age: 83
End: 2023-07-25
Payer: MEDICARE

## 2023-07-25 VITALS
HEART RATE: 68 BPM | BODY MASS INDEX: 18.07 KG/M2 | DIASTOLIC BLOOD PRESSURE: 68 MMHG | SYSTOLIC BLOOD PRESSURE: 110 MMHG | WEIGHT: 102 LBS

## 2023-07-25 DIAGNOSIS — Z79.899 HIGH RISK MEDICATION USE: ICD-10-CM

## 2023-07-25 DIAGNOSIS — M81.0 OSTEOPOROSIS, UNSPECIFIED OSTEOPOROSIS TYPE, UNSPECIFIED PATHOLOGICAL FRACTURE PRESENCE: ICD-10-CM

## 2023-07-25 DIAGNOSIS — M05.79 RHEUMATOID ARTHRITIS, SEROPOSITIVE, MULTIPLE SITES (H): Primary | ICD-10-CM

## 2023-07-25 DIAGNOSIS — M15.0 PRIMARY OSTEOARTHRITIS INVOLVING MULTIPLE JOINTS: ICD-10-CM

## 2023-07-25 LAB
ACTH PLAS-MCNC: 41 PG/ML
CORTIS SERPL-MCNC: 14.8 UG/DL

## 2023-07-25 PROCEDURE — 99214 OFFICE O/P EST MOD 30 MIN: CPT | Performed by: INTERNAL MEDICINE

## 2023-07-25 PROCEDURE — 82533 TOTAL CORTISOL: CPT

## 2023-07-25 PROCEDURE — 36415 COLL VENOUS BLD VENIPUNCTURE: CPT

## 2023-07-25 PROCEDURE — 82024 ASSAY OF ACTH: CPT

## 2023-07-25 RX ORDER — PREDNISONE 10 MG/1
10 TABLET ORAL DAILY
Qty: 30 TABLET | Refills: 0 | Status: SHIPPED | OUTPATIENT
Start: 2023-07-25 | End: 2023-08-28

## 2023-07-25 NOTE — PROGRESS NOTES
"      Rheumatology follow-up visit note     Diann is a 83 year old female presents today for follow-up.    Diann was seen today for recheck.    Diagnoses and all orders for this visit:    Rheumatoid arthritis, seropositive, multiple sites (H)  -     Discontinue: predniSONE (DELTASONE) 10 MG tablet; Take 1 tablet (10 mg) by mouth daily for 30 days    High risk medication use    Primary osteoarthritis involving multiple joints    Other orders  -     sodium chloride 0.9% BOLUS 250 mL  -     sodium chloride (PF) 0.9% PF flush 3-20 mL  -     heparin lock flush 10 UNIT/ML injection 5 mL  -     heparin 100 unit/mL injection 5 mL  -     acetaminophen (TYLENOL) tablet 650 mg  -     diphenhydrAMINE (BENADRYL) capsule 25 mg  -     diphenhydrAMINE (BENADRYL) 25 mg in sodium chloride 0.9 % 50.5 mL intermittent infusion  -     methylPREDNISolone sodium succinate (solu-MEDROL) injection 125 mg  -     diphenhydrAMINE (BENADRYL) injection 50 mg  -     famotidine (PEPCID) injection 20 mg  -     methylPREDNISolone sodium succinate (solu-MEDROL) injection 125 mg  -     EPINEPHrine PF (ADRENALIN) injection 0.3 mg  -     sodium chloride 0.9% BOLUS 500 mL  -     albuterol (PROVENTIL HFA/VENTOLIN HFA) inhaler  -     albuterol (PROVENTIL) neb solution 2.5 mg  -     meperidine (DEMEROL) injection 25 mg  -     naloxone (NARCAN) injection 0.2 mg    While her rheumatoid arthritis overall is very well controlled with the exception of left foot where she has features suggestive of tenosynovitis along the lateral tendons.  This appears to be without any trauma preceding it.  We discussed options.  Will go for a short course of prednisone 10 mg daily for 2 weeks.  Her Orencia infusion is every 8 weeks.  If this continues to be an issue we will need to reconsider the frequency of infusion back to the more \"normal\" every 4 weeks.  We will meet at this time in 3 months.       Follow up in 3 months.    HPI    Diann Low is a 83 year old female is " "here for follow-up of She has seropositive rheumatoid arthritis, osteoarthritis.    Until recently she had remained asymptomatic 1 approximately 203 weeks ago she started experiencing pain this was in her left foot along the lateral aspect she ended up going to the emergency room where they gave her local treatments, which she employed including icing packing the heat elevation which provided some relief but has residual pain there is been no trauma or dropping of an object on her foot.  This came up spontaneously.  This is painful with activity and at rest.  Recent labs are reviewed within acceptable range.     She lives independently at a senior living complex.  She recalls that her joint symptoms associated with rheumatoid arthritis began when she was in her 40s.  She considers her current control of pain and her functional ability as adequate.  She gets some discomfort in her hands such as with the weather change.  Overall she is content with how things are in terms of functional ability.  She does not take Tylenol and ibuprofen on a regular basis.  She is on methotrexate and folic acid.  She is due for her labs last done in June.  We will recheck those today.  Review of the chart from [facility] which showed that her anti-CCP antibody was negative and rheumatoid factor was positive in 2015 and these will be rechecked today.  Her SSA SSB profile is negative.  She has had \"couple of knuckles changed\".  She has not had any major joint arthroplasty.  Apart from thyroid disease she describes herself otherwise in good health.  She is known to have osteoporosis.  She is on prednisone 2 mg daily for the past several years..      DETAILED EXAMINATION  07/25/23  :    There were no vitals filed for this visit.  Alert oriented. Head including the face is examined for malar rash, heliotropes, scarring, lupus pernio. Eyes examined for redness such as in episcleritis/scleritis, periorbital lesions.   Neck/ Face examined for " parotid gland swelling, range of motion of neck.  Left upper and lower and right upper and lower extremities examined for tenderness, swelling, warmth of the appendicular joints, range of motion, edema, rash.  Some of the important findings included: she does not have evidence of synovitis in the palpable joints of the upper extremities.  No significant deformities of the digits.  Small Heberden nodes.  Range of motion of the shoulders  show full abduction.  No JLT effusion or warmth of the knees.  she does not have dactylitis of the digits.  She has swelling warmth tenderness along the left foot lateral tendons.     Patient Active Problem List    Diagnosis Date Noted    Osteoporosis, unspecified osteoporosis type, unspecified pathological fracture presence 08/31/2022     Priority: Medium    Rheumatoid arthritis, seropositive, multiple sites (H) 06/20/2022     Priority: Medium    Rheumatoid arthritis involving both hands, unspecified whether rheumatoid factor present (H) 01/14/2022     Priority: Medium    History of falling 10/15/2018     Priority: Medium    Presence of intraocular lens 09/17/2018     Priority: Medium    Arthritis, rheumatoid (H) 09/12/2007     Priority: Medium     No past surgical history on file.   Past Medical History:   Diagnosis Date    Skin cancer     Spider veins      Allergies   Allergen Reactions    Infliximab Rash     Throat began to close/tighten    Sulfa Antibiotics Rash    Hydrocodone-Acetaminophen Nausea    Oxycodone-Acetaminophen Nausea    Penicillins Rash    Erythromycin Nausea     Current Outpatient Medications   Medication Sig Dispense Refill    Abatacept (ORENCIA IV)       alendronate (FOSAMAX) 70 MG tablet Take 1 tablet (70 mg) by mouth every 7 days 12 tablet 3    calcium carbonate-vitamin D (OSCAL W/D) 500-200 MG-UNIT tablet Take 1 tablet by mouth 2 times daily      carboxymethylcellulose PF (REFRESH PLUS) 0.5 % ophthalmic solution 1 drop 3 times daily as needed for dry eyes       ferrous gluconate (FERGON) 324 (38 Fe) MG tablet Take 65 mg by mouth daily (with breakfast)      folic acid (FOLVITE) 1 MG tablet Take 1 tablet by mouth daily      levothyroxine (SYNTHROID/LEVOTHROID) 25 MCG tablet Take 1 tablet (25 mcg) by mouth daily 90 tablet 1    methotrexate 2.5 MG tablet Take 4 tablets (10 mg) by mouth every 7 days 48 tablet 0    Multiple Vitamin (MULTIVITAMIN ADULT PO)       predniSONE (DELTASONE) 1 MG tablet TAKE 1 TABLET DAILY 90 tablet 0     family history is not on file.  Social Connections: Not on file          WBC Count   Date Value Ref Range Status   07/20/2023 7.1 4.0 - 11.0 10e3/uL Final     RBC Count   Date Value Ref Range Status   07/20/2023 3.55 (L) 3.80 - 5.20 10e6/uL Final     Hemoglobin   Date Value Ref Range Status   07/20/2023 11.6 (L) 11.7 - 15.7 g/dL Final     Hematocrit   Date Value Ref Range Status   07/20/2023 34.7 (L) 35.0 - 47.0 % Final     MCV   Date Value Ref Range Status   07/20/2023 98 78 - 100 fL Final     MCH   Date Value Ref Range Status   07/20/2023 32.7 26.5 - 33.0 pg Final     Platelet Count   Date Value Ref Range Status   07/20/2023 175 150 - 450 10e3/uL Final     % Lymphocytes   Date Value Ref Range Status   06/20/2022 16 % Final     AST   Date Value Ref Range Status   07/20/2023 31 0 - 40 U/L Final     ALT   Date Value Ref Range Status   07/20/2023 10 0 - 45 U/L Final     Albumin   Date Value Ref Range Status   07/20/2023 3.9 3.5 - 5.0 g/dL Final     Alkaline Phosphatase   Date Value Ref Range Status   07/20/2023 53 45 - 120 U/L Final     Creatinine   Date Value Ref Range Status   07/20/2023 0.75 0.60 - 1.10 mg/dL Final     GFR Estimate   Date Value Ref Range Status   07/20/2023 78 >60 mL/min/1.73m2 Final     GFR, ESTIMATED POCT   Date Value Ref Range Status   11/25/2022 >60 >60 mL/min/1.73m2 Final

## 2023-08-01 ENCOUNTER — TELEPHONE (OUTPATIENT)
Dept: ENDOCRINOLOGY | Facility: CLINIC | Age: 83
End: 2023-08-01
Payer: MEDICARE

## 2023-08-01 NOTE — TELEPHONE ENCOUNTER
[facility] Call Center    Phone Message    May a detailed message be left on voicemail: yes     Reason for Call: Medication Refill Request    Has the patient contacted the pharmacy for the refill? Yes   Name of medication being requested: alendronate (FOSAMAX) 70 MG tablet   Provider who prescribed the medication: Sukumar Jenkins MD   Pharmacy:   [facility] - [address]     Date medication is needed: asap      Action Taken: Other: Endo    Travel Screening: Not Applicable

## 2023-08-01 NOTE — TELEPHONE ENCOUNTER
RX sent 7/21/23 with note that pt will contact for refills. Pt notified and will reach out to the pharmacy.

## 2023-08-01 NOTE — TELEPHONE ENCOUNTER
[facility] Call Center    Phone Message    May a detailed message be left on voicemail: yes     Reason for Call: Medication Question or concern regarding medication   Prescription Clarification  Name of Medication: levothyroxine (SYNTHROID/LEVOTHROID) 25 MCG tablet   Prescribing Provider: Sukumar Jenkins MD    Pharmacy:   [facility] - [address]      What on the order needs clarification? Per patient, an order for a year from now was sent to the pharmacy and they don't know how to handle it. Please clarify and cancel until a later date per patient. Thank you.       Action Taken: Other: Endo    Travel Screening: Not Applicable

## 2023-08-08 ENCOUNTER — MYC MEDICAL ADVICE (OUTPATIENT)
Dept: ENDOCRINOLOGY | Facility: CLINIC | Age: 83
End: 2023-08-08
Payer: MEDICARE

## 2023-08-22 ENCOUNTER — MEDICAL CORRESPONDENCE (OUTPATIENT)
Dept: HEALTH INFORMATION MANAGEMENT | Facility: CLINIC | Age: 83
End: 2023-08-22
Payer: MEDICARE

## 2023-08-23 NOTE — PROGRESS NOTES
[facility] GERIATRIC SERVICES       Patient Diann Low  MRN: [id number]        Reason for Visit     Chief Complaint   Patient presents with    Hospital F/U       Code Status     DNR / DNI    Assessment     Acute C1 fracture  Acute minimally displaced fracture of the midportion of the odontoid process  T9 and T12 compression fractures  L4 superior endplate compression fracture  Generalized weakness  History of a fall with underlying history of recurrent falls  Osteoporosis  Rheumatoid arthritis  Hypothyroidism  Status post scalp laceration repair    Plan     Pt is admitted to TCU for strengthening and rehab.  Evaluated by neurosurgery and given conservative treatment  Wear neck collar at all times  Outpatient follow-up with neurosurgery as scheduled  Incisional cares for her scalp laceration.  Appears to be healing well.  Pain control reviewed and she only wants to take Tylenol no narcotics per patient's request  Does have a history of falls and has fallen once before but believes fall was primarily due to mechanical reasons.  We will monitor blood pressures.  Has rheumatoid arthritis and will continue with her home regimen  Orencia to be held in the TCU  Also has osteoporosis and continues with Fosamax and calcium and D  Recheck labs  Continue with PT/OT-she is actually doing quite well and ambulating independently using a walker.  Discharge plan reviewed and she wants to go home within a week    History     Patient is a very pleasant 83 year old female who is admitted to TCU  Patient has a history of falls and presented post fall and noted to have C1 as well as odontoid process fractures.  She also had compression fractures.  Neurosurgery saw her and recommended conservative treatment and she was given a neck collar.  ENT repaired her laceration.  Now discharged to the TCU      Past Medical & Surgical History     PAST MEDICAL HISTORY:   Past Medical History:   Diagnosis Date    Skin cancer     Spider veins        PAST SURGICAL HISTORY:   has no past surgical history on file.      Past Social History     Reviewed,  reports that she has never smoked. She has never used smokeless tobacco.    Family History     Reviewed, and family history is not on file.    Medication List     Current Outpatient Medications   Medication    acetaminophen (TYLENOL) 500 MG tablet    alendronate (FOSAMAX) 70 MG tablet    calcium carbonate-vitamin D (OSCAL W/D) 500-200 MG-UNIT tablet    carboxymethylcellulose PF (REFRESH PLUS) 0.5 % ophthalmic solution    clindamycin (CLEOCIN) 300 MG capsule    ferrous fumarate 65 mg, [place]. FE,-Vitamin C 125 mg (VITRON C)  MG TABS tablet    folic acid (FOLVITE) 1 MG tablet    levothyroxine (SYNTHROID/LEVOTHROID) 25 MCG tablet    methotrexate 2.5 MG tablet    Multiple Vitamin (MULTIVITAMIN ADULT PO)    predniSONE (DELTASONE) 1 MG tablet    predniSONE (DELTASONE) 10 MG tablet    White Petrolatum ointment    Abatacept (ORENCIA IV)    ferrous gluconate (FERGON) 324 (38 Fe) MG tablet     No current facility-administered medications for this visit.      MED REC REQUIRED  Post Medication Reconciliation Status: discharge medications reconciled, continue medications without change       Allergies     Allergies   Allergen Reactions    Infliximab Rash     Throat began to close/tighten    Sulfa Antibiotics Rash    Hydrocodone-Acetaminophen Nausea    Oxycodone-Acetaminophen Nausea    Penicillins Rash    Erythromycin Nausea       Review of Systems   A comprehensive review of 14 systems was done. Pertinent findings noted here and in history of present illness. All the rest negative.  Constitutional: Negative.  Negative for fever, chills, she has  activity change, appetite change and fatigue.   HENT: Negative for congestion and facial swelling.    Eyes: Negative for photophobia, redness and visual disturbance.   Respiratory: Negative for cough and chest tightness.    Cardiovascular: Negative for chest pain, palpitations and  "leg swelling.   Gastrointestinal: Negative for nausea, diarrhea, constipation, blood in stool and abdominal distention.   Genitourinary: Negative.    Musculoskeletal: Negative.  She is compliant with her collar  Does not want any narcotics for pain  Skin: Negative.  Scalp incisions are healing  Neurological: Negative for dizziness, tremors, syncope, weakness, light-headedness and headaches.   Hematological: Does not bruise/bleed easily.   Psychiatric/Behavioral: Negative.        Physical Exam   /53   Pulse 90   Temp 98  F (36.7  C)   Resp 16   Ht 1.575 m (5' 2\")   Wt 47.2 kg (104 lb)   SpO2 98%   BMI 19.02 kg/m       Constitutional: Oriented to person, place, and time and appears well-developed.   HEENT:  Normocephalic and atraumatic.  Eyes: Conjunctivae and EOM are normal. Pupils are equal, round, and reactive to light. No discharge.  No scleral icterus. Nose normal. Mouth/Throat: Oropharynx is clear and moist. No oropharyngeal exudate.    NECK: Normal range of motion. Neck supple. No JVD present. No tracheal deviation present. No thyromegaly present.   CARDIOVASCULAR: Normal rate, regular rhythm and intact distal pulses.  Exam reveals no gallop and no friction rub.  Systolic murmur present.  PULMONARY: Effort normal and breath sounds normal. No respiratory distress.No Wheezing or rales.  ABDOMEN: Soft. Bowel sounds are normal. No distension and no mass.  There is no tenderness. There is no rebound and no guarding. No HSM.  MUSCULOSKELETAL: Normal range of motion. Mild kyphosis, no tenderness.  Neck collar noted  LYMPH NODES: Has no cervical, supraclavicular, axillary and groin adenopathy.   NEUROLOGICAL: Alert and oriented to person, place, and time. No cranial nerve deficit.  Normal muscle tone. Coordination normal.   GENITOURINARY: Deferred exam.  SKIN: Skin is warm and dry. No rash noted. No erythema. No pallor.   Fading ecchymoses on her face with few scalp lacerations noted in the frontal area " which are intact  EXTREMITIES: No cyanosis, no clubbing, no edema. No Deformity.  PSYCHIATRIC: Normal mood, affect and behavior.      Lab Results   Reviewed in chart   hemoglobin was 10.4  Electrolytes were stable with a creatinine of 1.05 and a GFR of 52              Electronically signed by    Svitlana Harris MD

## 2023-08-24 ENCOUNTER — TRANSITIONAL CARE UNIT VISIT (OUTPATIENT)
Dept: GERIATRICS | Facility: CLINIC | Age: 83
End: 2023-08-24
Payer: MEDICARE

## 2023-08-24 VITALS
SYSTOLIC BLOOD PRESSURE: 103 MMHG | OXYGEN SATURATION: 98 % | WEIGHT: 104 LBS | HEART RATE: 90 BPM | HEIGHT: 62 IN | DIASTOLIC BLOOD PRESSURE: 53 MMHG | TEMPERATURE: 98 F | RESPIRATION RATE: 16 BRPM | BODY MASS INDEX: 19.14 KG/M2

## 2023-08-24 DIAGNOSIS — E03.9 HYPOTHYROIDISM, UNSPECIFIED TYPE: Primary | ICD-10-CM

## 2023-08-24 DIAGNOSIS — R29.6 REPEATED FALLS: ICD-10-CM

## 2023-08-24 DIAGNOSIS — Z91.81 HISTORY OF FALLING: ICD-10-CM

## 2023-08-24 DIAGNOSIS — S09.90XD INJURY OF HEAD, SUBSEQUENT ENCOUNTER: ICD-10-CM

## 2023-08-24 DIAGNOSIS — S12.001A: ICD-10-CM

## 2023-08-24 DIAGNOSIS — M81.0 OSTEOPOROSIS, UNSPECIFIED OSTEOPOROSIS TYPE, UNSPECIFIED PATHOLOGICAL FRACTURE PRESENCE: ICD-10-CM

## 2023-08-24 DIAGNOSIS — M06.9 RHEUMATOID ARTHRITIS INVOLVING BOTH HANDS, UNSPECIFIED WHETHER RHEUMATOID FACTOR PRESENT (H): ICD-10-CM

## 2023-08-24 PROBLEM — S09.90XA INJURY OF HEAD: Status: ACTIVE | Noted: 2023-08-17

## 2023-08-24 PROCEDURE — 99305 1ST NF CARE MODERATE MDM 35: CPT | Performed by: FAMILY MEDICINE

## 2023-08-24 RX ORDER — ACETAMINOPHEN 500 MG
1000 TABLET ORAL EVERY 6 HOURS PRN
COMMUNITY
End: 2023-09-11

## 2023-08-24 RX ORDER — CLINDAMYCIN HCL 300 MG
600 CAPSULE ORAL
COMMUNITY

## 2023-08-24 NOTE — LETTER
8/24/2023        RE: Diann Low  [address]  [facility] [address]        [facility] GERIATRIC SERVICES       Patient Diann Low  MRN: [id number]        Reason for Visit     Chief Complaint   Patient presents with     Hospital F/U       Code Status     DNR / DNI    Assessment     Acute C1 fracture  Acute minimally displaced fracture of the midportion of the odontoid process  T9 and T12 compression fractures  L4 superior endplate compression fracture  Generalized weakness  History of a fall with underlying history of recurrent falls  Osteoporosis  Rheumatoid arthritis  Hypothyroidism  Status post scalp laceration repair    Plan     Pt is admitted to TCU for strengthening and rehab.  Evaluated by neurosurgery and given conservative treatment  Wear neck collar at all times  Outpatient follow-up with neurosurgery as scheduled  Incisional cares for her scalp laceration.  Appears to be healing well.  Pain control reviewed and she only wants to take Tylenol no narcotics per patient's request  Does have a history of falls and has fallen once before but believes fall was primarily due to mechanical reasons.  We will monitor blood pressures.  Has rheumatoid arthritis and will continue with her home regimen  Orencia to be held in the TCU  Also has osteoporosis and continues with Fosamax and calcium and D  Recheck labs  Continue with PT/OT-she is actually doing quite well and ambulating independently using a walker.  Discharge plan reviewed and she wants to go home within a week    History     Patient is a very pleasant 83 year old female who is admitted to TCU  Patient has a history of falls and presented post fall and noted to have C1 as well as odontoid process fractures.  She also had compression fractures.  Neurosurgery saw her and recommended conservative treatment and she was given a neck collar.  ENT repaired her laceration.  Now discharged to the TCU      Past Medical & Surgical History     PAST  MEDICAL HISTORY:   Past Medical History:   Diagnosis Date     Skin cancer      Spider veins       PAST SURGICAL HISTORY:   has no past surgical history on file.      Past Social History     Reviewed,  reports that she has never smoked. She has never used smokeless tobacco.    Family History     Reviewed, and family history is not on file.    Medication List     Current Outpatient Medications   Medication     acetaminophen (TYLENOL) 500 MG tablet     alendronate (FOSAMAX) 70 MG tablet     calcium carbonate-vitamin D (OSCAL W/D) 500-200 MG-UNIT tablet     carboxymethylcellulose PF (REFRESH PLUS) 0.5 % ophthalmic solution     clindamycin (CLEOCIN) 300 MG capsule     ferrous fumarate 65 mg, [place]. FE,-Vitamin C 125 mg (VITRON C)  MG TABS tablet     folic acid (FOLVITE) 1 MG tablet     levothyroxine (SYNTHROID/LEVOTHROID) 25 MCG tablet     methotrexate 2.5 MG tablet     Multiple Vitamin (MULTIVITAMIN ADULT PO)     predniSONE (DELTASONE) 1 MG tablet     predniSONE (DELTASONE) 10 MG tablet     White Petrolatum ointment     Abatacept (ORENCIA IV)     ferrous gluconate (FERGON) 324 (38 Fe) MG tablet     No current facility-administered medications for this visit.      MED REC REQUIRED  Post Medication Reconciliation Status: discharge medications reconciled, continue medications without change       Allergies     Allergies   Allergen Reactions     Infliximab Rash     Throat began to close/tighten     Sulfa Antibiotics Rash     Hydrocodone-Acetaminophen Nausea     Oxycodone-Acetaminophen Nausea     Penicillins Rash     Erythromycin Nausea       Review of Systems   A comprehensive review of 14 systems was done. Pertinent findings noted here and in history of present illness. All the rest negative.  Constitutional: Negative.  Negative for fever, chills, she has  activity change, appetite change and fatigue.   HENT: Negative for congestion and facial swelling.    Eyes: Negative for photophobia, redness and visual  "disturbance.   Respiratory: Negative for cough and chest tightness.    Cardiovascular: Negative for chest pain, palpitations and leg swelling.   Gastrointestinal: Negative for nausea, diarrhea, constipation, blood in stool and abdominal distention.   Genitourinary: Negative.    Musculoskeletal: Negative.  She is compliant with her collar  Does not want any narcotics for pain  Skin: Negative.  Scalp incisions are healing  Neurological: Negative for dizziness, tremors, syncope, weakness, light-headedness and headaches.   Hematological: Does not bruise/bleed easily.   Psychiatric/Behavioral: Negative.        Physical Exam   /53   Pulse 90   Temp 98  F (36.7  C)   Resp 16   Ht 1.575 m (5' 2\")   Wt 47.2 kg (104 lb)   SpO2 98%   BMI 19.02 kg/m       Constitutional: Oriented to person, place, and time and appears well-developed.   HEENT:  Normocephalic and atraumatic.  Eyes: Conjunctivae and EOM are normal. Pupils are equal, round, and reactive to light. No discharge.  No scleral icterus. Nose normal. Mouth/Throat: Oropharynx is clear and moist. No oropharyngeal exudate.    NECK: Normal range of motion. Neck supple. No JVD present. No tracheal deviation present. No thyromegaly present.   CARDIOVASCULAR: Normal rate, regular rhythm and intact distal pulses.  Exam reveals no gallop and no friction rub.  Systolic murmur present.  PULMONARY: Effort normal and breath sounds normal. No respiratory distress.No Wheezing or rales.  ABDOMEN: Soft. Bowel sounds are normal. No distension and no mass.  There is no tenderness. There is no rebound and no guarding. No HSM.  MUSCULOSKELETAL: Normal range of motion. Mild kyphosis, no tenderness.  Neck collar noted  LYMPH NODES: Has no cervical, supraclavicular, axillary and groin adenopathy.   NEUROLOGICAL: Alert and oriented to person, place, and time. No cranial nerve deficit.  Normal muscle tone. Coordination normal.   GENITOURINARY: Deferred exam.  SKIN: Skin is warm and " dry. No rash noted. No erythema. No pallor.   Fading ecchymoses on her face with few scalp lacerations noted in the frontal area which are intact  EXTREMITIES: No cyanosis, no clubbing, no edema. No Deformity.  PSYCHIATRIC: Normal mood, affect and behavior.      Lab Results   Reviewed in chart   hemoglobin was 10.4  Electrolytes were stable with a creatinine of 1.05 and a GFR of 52              Electronically signed by    Svitlana Harris MD                            Sincerely,        PADMINI Villafuerte

## 2023-08-28 ENCOUNTER — LAB REQUISITION (OUTPATIENT)
Dept: LAB | Facility: CLINIC | Age: 83
End: 2023-08-28
Payer: MEDICARE

## 2023-08-28 ENCOUNTER — TRANSITIONAL CARE UNIT VISIT (OUTPATIENT)
Dept: GERIATRICS | Facility: CLINIC | Age: 83
End: 2023-08-28
Payer: MEDICARE

## 2023-08-28 VITALS
OXYGEN SATURATION: 99 % | HEART RATE: 84 BPM | BODY MASS INDEX: 19.17 KG/M2 | SYSTOLIC BLOOD PRESSURE: 111 MMHG | RESPIRATION RATE: 18 BRPM | TEMPERATURE: 97.3 F | HEIGHT: 62 IN | DIASTOLIC BLOOD PRESSURE: 50 MMHG | WEIGHT: 104.2 LBS

## 2023-08-28 DIAGNOSIS — R52 PAIN MANAGEMENT: ICD-10-CM

## 2023-08-28 DIAGNOSIS — S12.001A: Primary | ICD-10-CM

## 2023-08-28 DIAGNOSIS — Z51.81 ENCOUNTER FOR THERAPEUTIC DRUG LEVEL MONITORING: ICD-10-CM

## 2023-08-28 DIAGNOSIS — R53.81 PHYSICAL DECONDITIONING: ICD-10-CM

## 2023-08-28 DIAGNOSIS — S09.90XD INJURY OF HEAD, SUBSEQUENT ENCOUNTER: ICD-10-CM

## 2023-08-28 PROCEDURE — 99310 SBSQ NF CARE HIGH MDM 45: CPT | Performed by: NURSE PRACTITIONER

## 2023-08-28 NOTE — PROGRESS NOTES
[facility] GERIATRIC SERVICES  Chief Complaint   Patient presents with    [facility] F/U     [place] Medical Record Number:  [id number]  Place of Service where encounter took place:  [facility] ([facility]) [[id number]]  Code Status:  comfort focused     HISTORY:      HPI:  Diann Low  is 83 year old (1940) undergoing physical and occupational therapy. with RA, osteoporosis, and hypothyroidism who presents with mechanical fall and direct trauma with a door from rushing down stairs resulting in large scalp lacerations, C1 and odontoid process fractures, and left ankle sprain.      Today she is seen to review VS. Labs routine visit and to establish care. She denied CP, SOB, cough or congestion. Denied constipation or diarrhea. Appetite poor due to collar. Dietary consult placed. Large scalp incision without S/S infection. She will follow up with neurology 9/6/23.  Her pain is controlled. Labs reviewed from 8/21/23  however in review of labs done on 8/29/23 her NA was 118. She was sent to the ER for further evaluation on 8/29/23.    ALLERGIES:Infliximab, Sulfa antibiotics, Hydrocodone-acetaminophen, Oxycodone-acetaminophen, Penicillins, and Erythromycin    PAST MEDICAL HISTORY:   Past Medical History:   Diagnosis Date    Skin cancer     Spider veins        PAST SURGICAL HISTORY:   has no past surgical history on file.    FAMILY HISTORY: family history is not on file.    SOCIAL HISTORY:  reports that she has never smoked. She has never used smokeless tobacco.    ROS:  Constitutional: Positive  for activity change,  positive appetite change, fatigue and fever. Related to collar   HENT: Negative for congestion.    Respiratory: Negative for cough, shortness of breath and wheezing.    Cardiovascular: Negative for chest pain and leg swelling.   Gastrointestinal: Negative for abdominal distention, abdominal pain, constipation, diarrhea and nausea.   Genitourinary: Negative for dysuria.   Musculoskeletal: Negative  "for arthralgia. Negative for back pain.   Skin: Negative for color change and wound. Large scalp wound covered with vaseline   Neurological: Negative for dizziness.   Psychiatric/Behavioral: Negative for agitation, behavioral problems and confusion.     Physical Exam:  Constitutional:       Appearance: Patient is well-developed.   HENT:      Head: Normocephalic. C-1 fracture   Eyes:      Conjunctiva/sclera: Conjunctivae normal.   Neck:      Musculoskeletal: Normal range of motion.   Cardiovascular:      Rate and Rhythm: Normal rate and regular rhythm.      Heart sounds: Normal heart sounds. No murmur.   Pulmonary:      Effort: No respiratory distress.      Breath sounds: Normal breath sounds. No wheezing or rales.   Abdominal:      General: Bowel sounds are normal. There is no distension.      Palpations: Abdomen is soft.      Tenderness: There is no abdominal tenderness.   Musculoskeletal:       Normal range of motion.     Skin:General:        Skin is warm.   Neurological:         Mental Status: Patient is alert and oriented to person, place, and time.   Psychiatric:         Behavior: Behavior normal.     Vitals:/50   Pulse 84   Temp 97.3  F (36.3  C)   Resp 18   Ht 1.575 m (5' 2\")   Wt 47.3 kg (104 lb 3.2 oz)   SpO2 99%   BMI 19.06 kg/m   and Body mass index is 19.06 kg/m .    Lab/Diagnostic data:   No results found for this or any previous visit (from the past 240 hour(s)).     MEDICATIONS:     Review of your medicines            Accurate as of August 28, 2023 10:01 PM. If you have any questions, ask your nurse or doctor.                CONTINUE these medicines which may have CHANGED, or have new prescriptions. If we are uncertain of the size of tablets/capsules you have at home, strength may be listed as something that might have changed.        Dose / Directions   predniSONE 1 MG tablet  Commonly known as: DELTASONE  This may have changed: Another medication with the same name was removed. Continue " taking this medication, and follow the directions you see here.  Used for: Rheumatoid arthritis, seropositive, multiple sites (H)  Changed by: Roberta Martienz CNP      TAKE 1 TABLET DAILY  Quantity: 90 tablet  Refills: 0            CONTINUE these medicines which have NOT CHANGED        Dose / Directions   acetaminophen 500 MG tablet  Commonly known as: TYLENOL      Dose: 1,000 mg  Take 1,000 mg by mouth every 6 hours as needed for mild pain  Refills: 0     alendronate 70 MG tablet  Commonly known as: FOSAMAX  Used for: Osteoporosis, unspecified osteoporosis type, unspecified pathological fracture presence      Dose: 70 mg  Take 1 tablet (70 mg) by mouth every 7 days  Quantity: 12 tablet  Refills: 3     calcium carbonate-vitamin D 500-200 MG-UNIT tablet  Commonly known as: OSCAL w/D      Dose: 1 tablet  Take 1 tablet by mouth 2 times daily  Refills: 0     carboxymethylcellulose PF 0.5 % ophthalmic solution  Commonly known as: REFRESH PLUS      Dose: 1 drop  1 drop 3 times daily as needed for dry eyes  Refills: 0     clindamycin 300 MG capsule  Commonly known as: CLEOCIN      Dose: 600 mg  Take 600 mg by mouth For dental procedures  Refills: 0     ferrous fumarate 65 mg ([place]. FE)-Vitamin C 125 mg  MG Tabs tablet  Commonly known as: VITRON C      Dose: 1 tablet  Take 1 tablet by mouth daily  Refills: 0     folic acid 1 MG tablet  Commonly known as: FOLVITE      Dose: 1 tablet  Take 1 tablet by mouth daily  Refills: 0     levothyroxine 25 MCG tablet  Commonly known as: SYNTHROID/LEVOTHROID  Used for: Osteoporosis, unspecified osteoporosis type, unspecified pathological fracture presence      Dose: 25 mcg  Take 1 tablet (25 mcg) by mouth daily  Quantity: 90 tablet  Refills: 1     methotrexate 2.5 MG tablet  Used for: Rheumatoid arthritis, seropositive, multiple sites (H)      Dose: 10 mg  Take 4 tablets (10 mg) by mouth every 7 days  Quantity: 48 tablet  Refills: 0     MULTIVITAMIN ADULT PO      Refills: 0     White  Petrolatum ointment      Apply topically 2 times daily  Refills: 0            STOP taking      ORENCIA IV  Stopped by: Roberta Martinez CNP                 ASSESSMENT/PLAN  Encounter Diagnoses   Name Primary?    Unspecified nondisplaced fracture of first cervical vertebra, initial encounter for closed fracture (H) Yes    Injury of head, subsequent encounter     Physical deconditioning     Pain management      Nondisplaced fracture of first cervical vertebrae follow-up with neurosurgery, pain management c-collar when out of bed or head of the bed greater than 30 degrees    Scalp laceration Vaseline to incision twice daily x14 days to end on 9/5/2023    Physical deconditioning PT OT    Osteoporosis on alendronate weekly    Hypothyroidism on levothyroxine    Rheumatoid arthritis continue methotrexate 10 mg daily every 7 days, prednisone 1 mg daily    Pain management - PRN ES Tylenol      Electronically signed by: Roberta Martinez CNP

## 2023-08-28 NOTE — LETTER
8/28/2023        RE: Diann Low  [address]  [facility] [id number]        [facility] GERIATRIC SERVICES  Chief Complaint   Patient presents with     [facility] F/U     [facility] Record Number:  [id number]  Place of Service where encounter took place:  [facility] ([facility]) [[id number]]  Code Status:  comfort focused     HISTORY:      HPI:  Diann Low  is 83 year old (1940) undergoing physical and occupational therapy. with RA, osteoporosis, and hypothyroidism who presents with mechanical fall and direct trauma with a door from rushing down stairs resulting in large scalp lacerations, C1 and odontoid process fractures, and left ankle sprain.      Today she is seen to review VS. Labs routine visit and to establish care. She denied CP, SOB, cough or congestion. Denied constipation or diarrhea. Appetite poor due to collar. Dietary consult placed. Large scalp incision without S/S infection. She will follow up with neurology 9/6/23.  Her pain is controlled. Labs reviewed from 8/21/23  however in review of labs done on 8/29/23 her NA was 118. She was sent to the ER for further evaluation on 8/29/23.    ALLERGIES:Infliximab, Sulfa antibiotics, Hydrocodone-acetaminophen, Oxycodone-acetaminophen, Penicillins, and Erythromycin    PAST MEDICAL HISTORY:   Past Medical History:   Diagnosis Date     Skin cancer      Spider veins        PAST SURGICAL HISTORY:   has no past surgical history on file.    FAMILY HISTORY: family history is not on file.    SOCIAL HISTORY:  reports that she has never smoked. She has never used smokeless tobacco.    ROS:  Constitutional: Positive  for activity change,  positive appetite change, fatigue and fever. Related to collar   HENT: Negative for congestion.    Respiratory: Negative for cough, shortness of breath and wheezing.    Cardiovascular: Negative for chest pain and leg swelling.   Gastrointestinal: Negative for abdominal distention, abdominal pain,  "constipation, diarrhea and nausea.   Genitourinary: Negative for dysuria.   Musculoskeletal: Negative for arthralgia. Negative for back pain.   Skin: Negative for color change and wound. Large scalp wound covered with vaseline   Neurological: Negative for dizziness.   Psychiatric/Behavioral: Negative for agitation, behavioral problems and confusion.     Physical Exam:  Constitutional:       Appearance: Patient is well-developed.   HENT:      Head: Normocephalic. C-1 fracture   Eyes:      Conjunctiva/sclera: Conjunctivae normal.   Neck:      Musculoskeletal: Normal range of motion.   Cardiovascular:      Rate and Rhythm: Normal rate and regular rhythm.      Heart sounds: Normal heart sounds. No murmur.   Pulmonary:      Effort: No respiratory distress.      Breath sounds: Normal breath sounds. No wheezing or rales.   Abdominal:      General: Bowel sounds are normal. There is no distension.      Palpations: Abdomen is soft.      Tenderness: There is no abdominal tenderness.   Musculoskeletal:       Normal range of motion.     Skin:General:        Skin is warm.   Neurological:         Mental Status: Patient is alert and oriented to person, place, and time.   Psychiatric:         Behavior: Behavior normal.     Vitals:/50   Pulse 84   Temp 97.3  F (36.3  C)   Resp 18   Ht 1.575 m (5' 2\")   Wt 47.3 kg (104 lb 3.2 oz)   SpO2 99%   BMI 19.06 kg/m   and Body mass index is 19.06 kg/m .    Lab/Diagnostic data:   No results found for this or any previous visit (from the past 240 hour(s)).     MEDICATIONS:     Review of your medicines            Accurate as of August 28, 2023 10:01 PM. If you have any questions, ask your nurse or doctor.                CONTINUE these medicines which may have CHANGED, or have new prescriptions. If we are uncertain of the size of tablets/capsules you have at home, strength may be listed as something that might have changed.        Dose / Directions   predniSONE 1 MG tablet  Commonly " known as: DELTASONE  This may have changed: Another medication with the same name was removed. Continue taking this medication, and follow the directions you see here.  Used for: Rheumatoid arthritis, seropositive, multiple sites (H)  Changed by: Roberta Martinez CNP      TAKE 1 TABLET DAILY  Quantity: 90 tablet  Refills: 0            CONTINUE these medicines which have NOT CHANGED        Dose / Directions   acetaminophen 500 MG tablet  Commonly known as: TYLENOL      Dose: 1,000 mg  Take 1,000 mg by mouth every 6 hours as needed for mild pain  Refills: 0     alendronate 70 MG tablet  Commonly known as: FOSAMAX  Used for: Osteoporosis, unspecified osteoporosis type, unspecified pathological fracture presence      Dose: 70 mg  Take 1 tablet (70 mg) by mouth every 7 days  Quantity: 12 tablet  Refills: 3     calcium carbonate-vitamin D 500-200 MG-UNIT tablet  Commonly known as: OSCAL w/D      Dose: 1 tablet  Take 1 tablet by mouth 2 times daily  Refills: 0     carboxymethylcellulose PF 0.5 % ophthalmic solution  Commonly known as: REFRESH PLUS      Dose: 1 drop  1 drop 3 times daily as needed for dry eyes  Refills: 0     clindamycin 300 MG capsule  Commonly known as: CLEOCIN      Dose: 600 mg  Take 600 mg by mouth For dental procedures  Refills: 0     ferrous fumarate 65 mg ([place]. FE)-Vitamin C 125 mg  MG Tabs tablet  Commonly known as: VITRON C      Dose: 1 tablet  Take 1 tablet by mouth daily  Refills: 0     folic acid 1 MG tablet  Commonly known as: FOLVITE      Dose: 1 tablet  Take 1 tablet by mouth daily  Refills: 0     levothyroxine 25 MCG tablet  Commonly known as: SYNTHROID/LEVOTHROID  Used for: Osteoporosis, unspecified osteoporosis type, unspecified pathological fracture presence      Dose: 25 mcg  Take 1 tablet (25 mcg) by mouth daily  Quantity: 90 tablet  Refills: 1     methotrexate 2.5 MG tablet  Used for: Rheumatoid arthritis, seropositive, multiple sites (H)      Dose: 10 mg  Take 4 tablets (10 mg) by  mouth every 7 days  Quantity: 48 tablet  Refills: 0     MULTIVITAMIN ADULT PO      Refills: 0     White Petrolatum ointment      Apply topically 2 times daily  Refills: 0            STOP taking      ORENCIA IV  Stopped by: Robreta Martinez CNP                 ASSESSMENT/PLAN  Encounter Diagnoses   Name Primary?     Unspecified nondisplaced fracture of first cervical vertebra, initial encounter for closed fracture (H) Yes     Injury of head, subsequent encounter      Physical deconditioning      Pain management      Nondisplaced fracture of first cervical vertebrae follow-up with neurosurgery, pain management c-collar when out of bed or head of the bed greater than 30 degrees    Scalp laceration Vaseline to incision twice daily x14 days to end on 9/5/2023    Physical deconditioning PT OT    Osteoporosis on alendronate weekly    Hypothyroidism on levothyroxine    Rheumatoid arthritis continue methotrexate 10 mg daily every 7 days, prednisone 1 mg daily    Pain management - PRN ES Tylenol      Electronically signed by: Roberta Martinez CNP       Sincerely,        Roberta aMrtinez CNP

## 2023-08-29 ENCOUNTER — MEDICAL CORRESPONDENCE (OUTPATIENT)
Dept: HEALTH INFORMATION MANAGEMENT | Facility: CLINIC | Age: 83
End: 2023-08-29

## 2023-08-29 ENCOUNTER — HOSPITAL ENCOUNTER (INPATIENT)
Facility: CLINIC | Age: 83
LOS: 2 days | Discharge: NURSING, REHAB OR LONG-TERM CARE | DRG: 641 | End: 2023-08-31
Attending: STUDENT IN AN ORGANIZED HEALTH CARE EDUCATION/TRAINING PROGRAM | Admitting: HOSPITALIST
Payer: MEDICARE

## 2023-08-29 ENCOUNTER — APPOINTMENT (OUTPATIENT)
Dept: CT IMAGING | Facility: CLINIC | Age: 83
DRG: 641 | End: 2023-08-29
Attending: STUDENT IN AN ORGANIZED HEALTH CARE EDUCATION/TRAINING PROGRAM
Payer: MEDICARE

## 2023-08-29 ENCOUNTER — TRANSFERRED RECORDS (OUTPATIENT)
Dept: HEALTH INFORMATION MANAGEMENT | Facility: CLINIC | Age: 83
End: 2023-08-29

## 2023-08-29 DIAGNOSIS — E03.9 HYPOTHYROIDISM, UNSPECIFIED TYPE: Primary | ICD-10-CM

## 2023-08-29 DIAGNOSIS — E87.1 HYPONATREMIA: ICD-10-CM

## 2023-08-29 DIAGNOSIS — R29.6 REPEATED FALLS: ICD-10-CM

## 2023-08-29 LAB
ALBUMIN SERPL-MCNC: 3.3 G/DL (ref 3.5–5)
ALP SERPL-CCNC: 71 U/L (ref 45–120)
ALT SERPL W P-5'-P-CCNC: 15 U/L (ref 0–45)
ANION GAP SERPL CALCULATED.3IONS-SCNC: 6 MMOL/L (ref 5–18)
ANION GAP SERPL CALCULATED.3IONS-SCNC: 9 MMOL/L (ref 7–15)
AST SERPL W P-5'-P-CCNC: 27 U/L (ref 0–40)
BILIRUB DIRECT SERPL-MCNC: 0.2 MG/DL
BILIRUB SERPL-MCNC: 0.6 MG/DL (ref 0–1)
BUN SERPL-MCNC: 8.6 MG/DL (ref 8–23)
BUN SERPL-MCNC: 9 MG/DL (ref 8–28)
CALCIUM SERPL-MCNC: 8 MG/DL (ref 8.5–10.5)
CALCIUM SERPL-MCNC: 8.8 MG/DL (ref 8.8–10.2)
CHLORIDE BLD-SCNC: 94 MMOL/L (ref 98–107)
CHLORIDE SERPL-SCNC: 83 MMOL/L (ref 98–107)
CO2 SERPL-SCNC: 21 MMOL/L (ref 22–31)
CREAT SERPL-MCNC: 0.72 MG/DL (ref 0.51–0.95)
CREAT SERPL-MCNC: 0.72 MG/DL (ref 0.6–1.1)
DEPRECATED HCO3 PLAS-SCNC: 26 MMOL/L (ref 22–29)
GFR SERPL CREATININE-BSD FRML MDRD: 81 ML/MIN/1.73M2
GFR SERPL CREATININE-BSD FRML MDRD: 81 ML/MIN/1.73M2
GLUCOSE BLD-MCNC: 126 MG/DL (ref 70–125)
GLUCOSE SERPL-MCNC: 89 MG/DL (ref 70–99)
OSMOLALITY SERPL: 245 MMOL/KG (ref 280–301)
OSMOLALITY UR: 160 MMOL/KG (ref 100–1200)
POTASSIUM BLD-SCNC: 4.1 MMOL/L (ref 3.5–5)
POTASSIUM SERPL-SCNC: 5.1 MMOL/L (ref 3.4–5.3)
PROT SERPL-MCNC: 6.1 G/DL (ref 6–8)
SODIUM SERPL-SCNC: 118 MMOL/L (ref 136–145)
SODIUM SERPL-SCNC: 121 MMOL/L (ref 136–145)
SODIUM UR-SCNC: 32 MMOL/L
T4 FREE SERPL-MCNC: 0.12 NG/DL (ref 0.9–1.7)
TSH SERPL DL<=0.005 MIU/L-ACNC: 6.47 UIU/ML (ref 0.3–5)

## 2023-08-29 PROCEDURE — 84443 ASSAY THYROID STIM HORMONE: CPT | Performed by: HOSPITALIST

## 2023-08-29 PROCEDURE — G1010 CDSM STANSON: HCPCS

## 2023-08-29 PROCEDURE — P9604 ONE-WAY ALLOW PRORATED TRIP: HCPCS | Performed by: NURSE PRACTITIONER

## 2023-08-29 PROCEDURE — 36415 COLL VENOUS BLD VENIPUNCTURE: CPT | Performed by: NURSE PRACTITIONER

## 2023-08-29 PROCEDURE — 99222 1ST HOSP IP/OBS MODERATE 55: CPT | Mod: AI | Performed by: HOSPITALIST

## 2023-08-29 PROCEDURE — 36415 COLL VENOUS BLD VENIPUNCTURE: CPT | Performed by: HOSPITALIST

## 2023-08-29 PROCEDURE — 80048 BASIC METABOLIC PNL TOTAL CA: CPT | Performed by: NURSE PRACTITIONER

## 2023-08-29 PROCEDURE — 82248 BILIRUBIN DIRECT: CPT | Performed by: HOSPITALIST

## 2023-08-29 PROCEDURE — 84300 ASSAY OF URINE SODIUM: CPT | Performed by: HOSPITALIST

## 2023-08-29 PROCEDURE — 82310 ASSAY OF CALCIUM: CPT | Performed by: HOSPITALIST

## 2023-08-29 PROCEDURE — 84439 ASSAY OF FREE THYROXINE: CPT | Performed by: HOSPITALIST

## 2023-08-29 PROCEDURE — 83935 ASSAY OF URINE OSMOLALITY: CPT | Performed by: HOSPITALIST

## 2023-08-29 PROCEDURE — 120N000001 HC R&B MED SURG/OB

## 2023-08-29 PROCEDURE — 83930 ASSAY OF BLOOD OSMOLALITY: CPT | Performed by: HOSPITALIST

## 2023-08-29 PROCEDURE — 258N000003 HC RX IP 258 OP 636: Performed by: STUDENT IN AN ORGANIZED HEALTH CARE EDUCATION/TRAINING PROGRAM

## 2023-08-29 PROCEDURE — 96360 HYDRATION IV INFUSION INIT: CPT

## 2023-08-29 PROCEDURE — 258N000003 HC RX IP 258 OP 636: Performed by: HOSPITALIST

## 2023-08-29 PROCEDURE — 99291 CRITICAL CARE FIRST HOUR: CPT | Mod: 25

## 2023-08-29 RX ORDER — ACETAMINOPHEN 650 MG/1
650 SUPPOSITORY RECTAL EVERY 6 HOURS PRN
Status: DISCONTINUED | OUTPATIENT
Start: 2023-08-29 | End: 2023-08-31 | Stop reason: HOSPADM

## 2023-08-29 RX ORDER — ACETAMINOPHEN 325 MG/1
650 TABLET ORAL EVERY 6 HOURS PRN
Status: DISCONTINUED | OUTPATIENT
Start: 2023-08-29 | End: 2023-08-31 | Stop reason: HOSPADM

## 2023-08-29 RX ORDER — LIDOCAINE 40 MG/G
CREAM TOPICAL
Status: DISCONTINUED | OUTPATIENT
Start: 2023-08-29 | End: 2023-08-31 | Stop reason: HOSPADM

## 2023-08-29 RX ORDER — SODIUM CHLORIDE 9 MG/ML
INJECTION, SOLUTION INTRAVENOUS CONTINUOUS
Status: DISCONTINUED | OUTPATIENT
Start: 2023-08-29 | End: 2023-08-30

## 2023-08-29 RX ADMIN — SODIUM CHLORIDE 1000 ML: 9 INJECTION, SOLUTION INTRAVENOUS at 16:47

## 2023-08-29 RX ADMIN — SODIUM CHLORIDE: 9 INJECTION, SOLUTION INTRAVENOUS at 21:00

## 2023-08-29 ASSESSMENT — ACTIVITIES OF DAILY LIVING (ADL)
ADLS_ACUITY_SCORE: 35
ADLS_ACUITY_SCORE: 38

## 2023-08-29 NOTE — H&P
"Hospital Medicine Service History and Physical  [facility]    Diann Low is a 83 year old female who  has a past medical history of Skin cancer and Spider veins.   Chief Complaint: abnormal labs  Hospital Course   8/22/23 - Discharged from [facility] after a mechanical fall with C1 & odontoid Fx and left ankle sprain. She was discharged to TCU  ----------  8/29/23 - Sent from TCU to  ED for Na low, 118 on arrival.  Assessment & Plan   Hyponatremia  Presents Na 118, 1wk ago Na 136, appears acute though clinically she's impressively stable. There is risk of the IVF ordered on arrival drops her Na.  -I requested ED MD check head CT for possible cerebral salt wasting  -hepatic panel  -TSH  -UrOsm  -Serum Osm  -UrNa  -Cortisol and BMP in the am  -Nephrology consult  -repeat BMP 1 hour after IVF (I discussed with RN)    Scalp laceration  Per [facility] discharge:  Wound care plan is to place Vaseline on incisions twice daily for 2 weeks.     Med rec is pending     Estimated body mass index is 19.06 kg/m  as calculated from the following:    Height as of 8/27/23: 1.575 m (5' 2\").    Weight as of 8/27/23: 47.3 kg (104 lb 3.2 oz).  DVTP: mechanical  Code Status: No Order  Disposition: Inpatient   Discharge: likely 2 days    History of Present Illness  Diann Low is remarkably oriented.  Says she has been feeling fairly normal except for the past 2 days she has noticed more \"heaviness\" of her head which she also calls \"funniness\".  Over the past 2 days has also noticed increased urinary frequency.  Because of her neck injury she is unable to visually inspect her urine.  Denies chest pain, shortness of breath, abdominal pain, nausea, fever, chills.  She denies any new focal weakness or numbness.     ED triage note:  Pt arrives for abnormal labs. Na 118. Cl 83. Pt sent here from [facility] TCU. Has pain d/t recent fall. 1/10 at rest, with movement 7/10. Two fractures in back of neck r/t fall. Louise ARRIOLA" neck brace in place. A&Ox4     ED course:  In the ED, patient afebrile, vitally stable  Sodium 118     No results found for this visit on 08/29/23.   Medications   0.9% sodium chloride BOLUS (1,000 mLs Intravenous $New Bag 8/29/23 0434)       Physical exam:  Appears NAD in the bed wearing a c-collar  Scalp lacerations with Vaseline, healing  Anicteric sclera, PERRL  Neck exam obscured by c-collar  Does have some active range of motion of the neck she is volitionally engaging in  Regular rate and rhythm without murmur  Lungs clear to auscultation bilaterally  Abdomen soft nontender  No lower extremity edema  Normal affect, alert, she is impressively oriented to place and time and situation  Vital signs reviewed by me    Wt Readings from Last 4 Encounters:   08/27/23 47.3 kg (104 lb 3.2 oz)   08/24/23 47.2 kg (104 lb)   07/25/23 46.3 kg (102 lb)   07/21/23 46.3 kg (102 lb 1.6 oz)        reports that she has never smoked. She has never used smokeless tobacco.  family history is not on file.   has no past surgical history on file.   Allergies   Allergen Reactions    Infliximab Rash     Throat began to close/tighten    Sulfa Antibiotics Rash    Hydrocodone-Acetaminophen Nausea    Oxycodone-Acetaminophen Nausea    Penicillins Rash    Erythromycin Nausea       Pasha Mitchell MD, MPH  Hospitalist  [facility]  [facility]   Phone: #[phone]

## 2023-08-29 NOTE — ED TRIAGE NOTES
Pt arrives for abnormal labs. Na 118. Cl 83. Pt sent here from [facility] TCU. Has pain d/t recent fall. 1/10 at rest, with movement 7/10. Two fractures in back of neck r/t fall. [place] J neck brace in place. A&Ox4     Triage Assessment       Row Name 08/29/23 1443       Triage Assessment (Adult)    Airway WDL WDL       Respiratory WDL    Respiratory WDL WDL       Skin Circulation/Temperature WDL    Skin Circulation/Temperature WDL WDL       Cardiac WDL    Cardiac WDL WDL       Peripheral/Neurovascular WDL    Peripheral Neurovascular WDL WDL       Cognitive/Neuro/Behavioral WDL    Cognitive/Neuro/Behavioral WDL WDL

## 2023-08-29 NOTE — PHARMACY-ADMISSION MEDICATION HISTORY
Pharmacist Admission Medication History    Admission medication history is complete. The information provided in this note is only as accurate as the sources available at the time of the update.    Medication reconciliation/reorder completed by provider prior to medication history? No    Information Source(s):   [facility] MAR  via N/A    Pertinent Information:     Changes made to PTA medication list:  Added: None  Deleted: None  Changed: None    Medication Affordability:       Allergies reviewed with patient and updates made in EHR: yes    Medication History Completed By: Bob Jordan RPH 8/29/2023 6:24 PM    Prior to Admission medications    Medication Sig Last Dose Taking? Auth Provider Long Term End Date   acetaminophen (TYLENOL) 500 MG tablet Take 1,000 mg by mouth every 6 hours as needed for mild pain 8/29/2023 Yes Reported, Patient     alendronate (FOSAMAX) 70 MG tablet Take 1 tablet (70 mg) by mouth every 7 days 8/27/2023 Yes Sukumar Jenkins MD Yes    calcium carbonate-vitamin D (OSCAL W/D) 500-200 MG-UNIT tablet Take 1 tablet by mouth 2 times daily 8/29/2023 Yes Reported, Patient     clindamycin (CLEOCIN) 300 MG capsule Take 600 mg by mouth For dental procedures Unknown Yes Reported, Patient     ferrous fumarate 65 mg, [place]. FE,-Vitamin C 125 mg (VITRON C)  MG TABS tablet Take 1 tablet by mouth every other day 8/29/2023 Yes Reported, Patient     folic acid (FOLVITE) 1 MG tablet Take 1 tablet by mouth daily 8/29/2023 Yes Reported, Patient     levothyroxine (SYNTHROID/LEVOTHROID) 25 MCG tablet Take 1 tablet (25 mcg) by mouth daily 8/29/2023 Yes Sukumar Jenkins MD Yes    methotrexate 2.5 MG tablet Take 4 tablets (10 mg) by mouth every 7 days 8/23/2023 Yes Nano Olivier MBBS Yes    Multiple Vitamin (MULTIVITAMIN ADULT PO) Take 1 tablet by mouth daily 8/29/2023 Yes Reported, Patient     predniSONE (DELTASONE) 1 MG tablet TAKE 1 TABLET DAILY 8/29/2023 Yes Nano Olivier MBBS Yes    White  Petrolatum ointment Apply topically 2 times daily 8/29/2023 at wound care forehead Yes Reported, Patient     carboxymethylcellulose PF (REFRESH PLUS) 0.5 % ophthalmic solution 1 drop 3 times daily as needed for dry eyes 8/22/2023  Reported, Patient

## 2023-08-29 NOTE — ED NOTES
Huddled with admitting MD Dr. Mitchell. Plan to draw BMP 1hr after end of IV NS infusion to see if sodium level improves or drops. RN to notify MD if serum sodium level noted to drop.

## 2023-08-29 NOTE — ED PROVIDER NOTES
EMERGENCY DEPARTMENT ENCOUNTER       ED Course & Medical Decision Making     3:31 PM I met with the patient, obtained history, performed an initial exam, and discussed options and plan for diagnostics and treatment here in the ED.    4:25 PM Rechecked on the patient.   5:29 PM Spoke with Dr Mitchell regarding admission    Final Impression  83 year old female presents for evaluation of hyponatremia.  Patient had a mechanical fall with significant injuries including cervical spine fracture as documented below, this occurred on 8/17/2023, was seen at [facility], that hospitalization has been reviewed.  Patient in a [place] J collar at time of evaluation.  Has been staying at the TCU at [facility] in [facility], they checked labs today and found her sodium to be 118 and sent her into the ED for further evaluation.  Patient is not overly symptomatic, she states she has felt maybe a little bit off the last few days, though otherwise is totally alert, oriented, ambulatory and lucid.  Spent 28 minutes attempting to call get a hold of any physician at work covering for [facility], though could not get a hold of any live person to help coordinate care and outpatient sodium replacements given her relatively minimal symptoms.  On discussion with patient and her son it sounds like she is had relatively minimal oral intake over the last few weeks since she has been in the hospital in TCU, much of the food has been bland and likely low sodium in nature, additionally given her neck pain and the limitations of being in a cervical collar 24/7 has also had decreased total oral intake which likely all contributed to her low sodium which I suspect is most likely related to decreased oral intake of sodium and not some other more nefarious process.  Patient given 1 L NS bolus while in the ED, will recheck BMP and admit to hospitalist service.  Discussed with hospitalist service, we will also add on a CT head as well.    Prior to making a final  disposition on this patient the results of patient's tests and other diagnostic studies were discussed with the patient. All questions were answered. Patient expressed understanding of the plan and was amenable to it.    Medical Decision Making    History:  Supplemental history from: son  External Record(s) reviewed as documented below;  8/29/2023 BMP from [facility], shows sodium of 118, potassium 51, chloride 83.  Creatinine normal at 0.72  8/17/2023 -8/22/2023 admission at [facility], admitted after a mechanical fall, sustained scalp lacerations, C1 and odontoid process fractures and a left ankle sprain, seen by neurosurgery, placed in a c-collar and was ultimately managed nonoperatively, though will be followed by neuro spine team on an outpatient basis    Work Up:  Chart documentation includes differential considered and any EKGs or imaging independently interpreted by provider, where specified.    External consultation:  Discussion of management with another provider: Hospitalist, Care facility RN    Complicating factors:  Care impacted by chronic illness: RA, osteoporosis, hypothyroidism  Care affected by social determinants of health: N/A    Disposition considerations: Admit.      Medications   0.9% sodium chloride BOLUS (1,000 mLs Intravenous $New Bag 8/29/23 1647)     Final Impression     1. Hyponatremia        Critical Care     Performed by: Dr Lucas Addison  Authorized by: Dr Lucas Addison  Total critical care time: 30 minutes  Critical care was necessary to treat or prevent imminent or life-threatening deterioration of the following conditions: Hyponatremia requiring admission  Critical care was time spent personally by me on the following activities: development of treatment plan with patient or surrogate, discussions with consultants, examination of patient, evaluation of patient's response to treatment, obtaining history from patient or surrogate, ordering and performing treatments and  interventions, ordering and review of laboratory studies, ordering and review of radiographic studies, re-evaluation of patient's condition and monitoring for potential decompensation.  Critical care time was exclusive of separately billable procedures and treating other patients.      Chief Complaint     Chief Complaint   Patient presents with    Abnormal Labs     Pt arrives for abnormal labs. Na 118. Cl 83. Pt sent here from [facility] TCU. Has pain d/t recent fall. 1/10 at rest, with movement 7/10. Two fractures in back of neck r/t fall. [place] J neck brace in place. A&Ox4    HPI     Diann Low is a 83 year old female who presents for evaluation of abnormal labs.     The patient presents from [facility] TCU for abnormal labs of low sodium levels 118. Her previous labs were about 141. She denies any medication changes from the [facility] to TCU. She otherwise has been feeling good. Since yesterday, she has been having generalized weakness. She also been urinating a lot. She has trouble swallowing, chewing and decrease appetite. She denies any other medical concerns or complaints at this moment.     INara Her am serving as a scribe to document services personally performed by Dr. Lucas Addison MD, based on my observation and the provider's statements to me. I, Dr. Lucas Addison MD attest that Nara Her is acting in a scribe capacity, has observed my performance of the services and has documented them in accordance with my direction.    Physical Exam     /61   Pulse 76   Temp 97.7  F (36.5  C) (Temporal)   Resp 16   SpO2 99%   Constitutional: Awake, alert, in no acute distress.  Head: Normocephalic, atraumatic. Cervical collar in place.   ENT: Mucous membranes moist.  Eyes: Conjunctiva normal.  Respiratory: Respirations even, unlabored, in no acute respiratory distress.  Cardiovascular: Regular rate and rhythm. Good peripheral perfusion.  GI: Abdomen soft, non-tender.  Musculoskeletal:  Moves all 4 extremities equally.  Good  strength in bilateral upper extremities, ambulatory without obvious deficit or assistance.  Integument: Warm, dry.  Neurologic: Alert & oriented x 3. Normal speech. Grossly normal motor and sensory function. No focal deficits noted.  Psychiatric: Normal mood    Labs & Imaging           Lucas Addison MD  08/29/23 2632

## 2023-08-30 LAB
ANION GAP SERPL CALCULATED.3IONS-SCNC: 6 MMOL/L (ref 5–18)
BUN SERPL-MCNC: 8 MG/DL (ref 8–28)
CALCIUM SERPL-MCNC: 8.2 MG/DL (ref 8.5–10.5)
CHLORIDE BLD-SCNC: 96 MMOL/L (ref 98–107)
CO2 SERPL-SCNC: 25 MMOL/L (ref 22–31)
CORTIS SERPL-MCNC: 18.7 UG/DL
CREAT SERPL-MCNC: 0.74 MG/DL (ref 0.6–1.1)
GFR SERPL CREATININE-BSD FRML MDRD: 79 ML/MIN/1.73M2
GLUCOSE BLD-MCNC: 101 MG/DL (ref 70–125)
OSMOLALITY UR: 140 MMOL/KG (ref 100–1200)
OSMOLALITY UR: 157 MMOL/KG (ref 100–1200)
POTASSIUM BLD-SCNC: 4.5 MMOL/L (ref 3.5–5)
SODIUM SERPL-SCNC: 125 MMOL/L (ref 136–145)
SODIUM SERPL-SCNC: 126 MMOL/L (ref 136–145)
SODIUM SERPL-SCNC: 127 MMOL/L (ref 136–145)
SODIUM SERPL-SCNC: 128 MMOL/L (ref 136–145)

## 2023-08-30 PROCEDURE — 250N000012 HC RX MED GY IP 250 OP 636 PS 637: Performed by: HOSPITALIST

## 2023-08-30 PROCEDURE — 84295 ASSAY OF SERUM SODIUM: CPT | Performed by: HOSPITALIST

## 2023-08-30 PROCEDURE — 84295 ASSAY OF SERUM SODIUM: CPT | Performed by: PHYSICIAN ASSISTANT

## 2023-08-30 PROCEDURE — 258N000003 HC RX IP 258 OP 636: Performed by: HOSPITALIST

## 2023-08-30 PROCEDURE — 99232 SBSQ HOSP IP/OBS MODERATE 35: CPT | Performed by: HOSPITALIST

## 2023-08-30 PROCEDURE — 36415 COLL VENOUS BLD VENIPUNCTURE: CPT | Performed by: PHYSICIAN ASSISTANT

## 2023-08-30 PROCEDURE — 120N000001 HC R&B MED SURG/OB

## 2023-08-30 PROCEDURE — 36415 COLL VENOUS BLD VENIPUNCTURE: CPT | Performed by: HOSPITALIST

## 2023-08-30 PROCEDURE — 82533 TOTAL CORTISOL: CPT | Performed by: HOSPITALIST

## 2023-08-30 PROCEDURE — 250N000013 HC RX MED GY IP 250 OP 250 PS 637: Performed by: HOSPITALIST

## 2023-08-30 PROCEDURE — 99222 1ST HOSP IP/OBS MODERATE 55: CPT | Performed by: PHYSICIAN ASSISTANT

## 2023-08-30 PROCEDURE — 83935 ASSAY OF URINE OSMOLALITY: CPT | Performed by: HOSPITALIST

## 2023-08-30 PROCEDURE — 82310 ASSAY OF CALCIUM: CPT | Performed by: HOSPITALIST

## 2023-08-30 RX ORDER — CARBOXYMETHYLCELLULOSE SODIUM 5 MG/ML
1 SOLUTION/ DROPS OPHTHALMIC 3 TIMES DAILY PRN
Status: DISCONTINUED | OUTPATIENT
Start: 2023-08-30 | End: 2023-08-31 | Stop reason: HOSPADM

## 2023-08-30 RX ORDER — LEVOTHYROXINE SODIUM 25 UG/1
25 TABLET ORAL DAILY
Status: DISCONTINUED | OUTPATIENT
Start: 2023-08-30 | End: 2023-08-30

## 2023-08-30 RX ORDER — FOLIC ACID 1 MG/1
1000 TABLET ORAL DAILY
Status: DISCONTINUED | OUTPATIENT
Start: 2023-08-30 | End: 2023-08-31 | Stop reason: HOSPADM

## 2023-08-30 RX ORDER — PREDNISONE 1 MG/1
1 TABLET ORAL DAILY
Status: DISCONTINUED | OUTPATIENT
Start: 2023-08-30 | End: 2023-08-31 | Stop reason: HOSPADM

## 2023-08-30 RX ORDER — MULTIVITAMIN,THERAPEUTIC
1 TABLET ORAL DAILY
Status: DISCONTINUED | OUTPATIENT
Start: 2023-08-30 | End: 2023-08-31 | Stop reason: HOSPADM

## 2023-08-30 RX ORDER — SODIUM CHLORIDE 1 G/1
1 TABLET ORAL
Status: CANCELLED | OUTPATIENT
Start: 2023-08-30

## 2023-08-30 RX ADMIN — THERA TABS 1 TABLET: TAB at 11:59

## 2023-08-30 RX ADMIN — FOLIC ACID 1000 MCG: 1 TABLET ORAL at 11:59

## 2023-08-30 RX ADMIN — LEVOTHYROXINE SODIUM 37.5 MCG: 0.07 TABLET ORAL at 18:38

## 2023-08-30 RX ADMIN — ACETAMINOPHEN 650 MG: 325 TABLET ORAL at 08:08

## 2023-08-30 RX ADMIN — SODIUM CHLORIDE: 9 INJECTION, SOLUTION INTRAVENOUS at 06:48

## 2023-08-30 RX ADMIN — METHOTREXATE 10 MG: 2.5 TABLET ORAL at 12:00

## 2023-08-30 RX ADMIN — ACETAMINOPHEN 650 MG: 325 TABLET ORAL at 00:09

## 2023-08-30 ASSESSMENT — ACTIVITIES OF DAILY LIVING (ADL)
ADLS_ACUITY_SCORE: 38

## 2023-08-30 NOTE — PLAN OF CARE
Problem: Electrolyte Imbalance  Goal: Electrolyte Imbalance: Plan of Care  Outcome: Progressing     Problem: Plan of Care - These are the overarching goals to be used throughout the patient stay.    Goal: Optimal Comfort and Wellbeing    Goal Outcome Evaluation:  VSS, afebrile. Pt only complaint is mild head pain, PRN tylenol and repositioning. [place] collar in place w/good fit and support, no skin breakdown. Remains AxOx4, uses call light appropriately for needs. IVF infusing. Up to BR w/SBA.

## 2023-08-30 NOTE — CONSULTS
Chart reviewed. Will need full CM assessment.   Discussed with MD. Nephrology consult pending.     [facility] in admissions confirms TCU bed hold.

## 2023-08-30 NOTE — PLAN OF CARE
Problem: Electrolyte Imbalance  Goal: Electrolyte Imbalance: Plan of Care  Outcome: Progressing     Problem: Plan of Care - These are the overarching goals to be used throughout the patient stay.    Goal: Optimal Comfort and Wellbeing  Intervention: Monitor Pain and Promote Comfort  Recent Flowsheet Documentation  Taken 8/30/2023 0808 by Veena Aguillon RN  Pain Management Interventions: medication (see MAR)     Pt A/O x 4. VSS on RA. Reported pain in neck this morning managed with tylenol with good relief throughout the day. C-collar in place. CMS intact. Sodium lab draw q 6 hours. Pt to transfer to  bedd 218. Son at bed side most of the day. Pt able to make needs known. Up SBA. Voiding spontaneously. Discharge pending clinical progression and Na trends.

## 2023-08-30 NOTE — PROGRESS NOTES
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 She was discharged to TCU.    8/29/23 - Sent from TCU to  ED for hyponatremia per TCU lab check. In the ED, her sodium was 118. She was minimally symptomatic. She received 1 L NS.  Given recent hospitalization and injury, head CT was ordered which was negative for subarachnoid hemorrhage or other etiology causing cerebral salt wasting.  8/30/23 - Her synthroid was increased given low free T4 and elevated TSH. Na improved to 127. IVF was held given rise rate of Na. Nephrology was consulted.    Assessment & Plan   Hypotonic Hyponatremia  Na 118 7am yesterday, now 127 24 hours later.   -Stop IVF for now  -trend Ur osm  -Nephrology consult    Hypothyroidism  TSH elevated, Free T4 low  -increase synthroid    Rheumatoid arthritis  -Home prednisone 1 mg daily  -Hold methotrexate    Scalp laceration  Per [facility] discharge:  Wound care plan is to place Vaseline on incisions twice daily for 2 weeks.     Hypoalbuminemia       Discharge: likely tomorrow when sodium normalizes  DVT Prophylaxis:  Pneumatic Compression Devices  Code Status: No CPR- Do NOT Intubate  Diet: Regular Diet Adult  Cardiac monitor: None  Body mass index is 19.02 kg/m .    Interval History  Says she drink water in the TCU \"not as much as I should\". Head \"heaviness\" has improved, overall improved today. No Cp or SOB.     NAD in the bed  Glasses  In c-collar  Gnarled hands  Pulses regular  No murmur  Lungs CTA bl  Mucus membranes moist      Last 24H PRN:     acetaminophen (TYLENOL) tablet 650 mg, 650 mg at 08/30/23 0808 **OR** acetaminophen (TYLENOL) Suppository 650 mg      N/A  \   N/A   / N/A     127 (L) 96 (L) 8 / 101   4.5 25 0.74 \     ALT: 15 AST: 27 AP: 71 " TBILI: 0.6   ALB: 3.3 (L) TOT PROTEIN: 6.1 LIPASE: N/A     TSH: 6.47 (H) T4: 0.12 (L) A1C: N/A       Recent Results (from the past 24 hour(s))   Head CT w/o contrast    Addendum: 8/29/2023    Dr. Addison was contacted by me on 8/29/2023 6:50 PM CDT regarding the cervical spine fractures. Per provider the fractures are known from an outside CT cervical spine from 08/17/2023, not available for review.      Narrative    EXAM: CT HEAD W/O CONTRAST  LOCATION: [facility]  DATE: 8/29/2023    INDICATION: Hyponatremia  COMPARISON: None.  TECHNIQUE: Routine CT Head without IV contrast. Multiplanar reformats. Dose reduction techniques were used.    FINDINGS:  INTRACRANIAL CONTENTS: No intracranial hemorrhage, extraaxial collection, or mass effect.  No CT evidence of acute infarct. Mild to moderate presumed chronic small vessel ischemic changes. Mild generalized volume loss. No hydrocephalus.     VISUALIZED ORBITS/SINUSES/MASTOIDS: Prior bilateral cataract surgery. Visualized portions of the orbits are otherwise unremarkable. No significant paranasal sinus mucosal disease. No middle ear or mastoid effusion.    BONES/SOFT TISSUES:  Mild right frontal scalp hematoma and laceration. No skull fracture. C1 burst fracture and type II dens fracture.      Impression    IMPRESSION:  1.  No CT evidence for acute intracranial process. MRI would better assess for osmotic demyelination.  2.  Small right frontal scalp laceration and hematoma without underlying calvarial fracture.  3.  C1 burst fracture and type II dens fracture. CT cervical spine is recommended to further evaluate.     Wt Readings from Last 5 Encounters:   08/29/23 47.2 kg (104 lb)   08/27/23 47.3 kg (104 lb 3.2 oz)   08/24/23 47.2 kg (104 lb)   07/25/23 46.3 kg (102 lb)   07/21/23 46.3 kg (102 lb 1.6 oz)       Pasha Mitchell MD, MPH  Hospitalist,Hospital Medicine  [facility]: [facility]  Phone: #[phone]    Medications:    Personally Reviewed.  Medications      sodium chloride (PF)  3 mL Intracatheter Q8H       Clinically Significant Risk Factors Present on Admission         # Hyponatremia: Lowest Na = 118 mmol/L in last 2 days, will monitor as appropriate  # Hypocalcemia: Lowest Ca = 8 mg/dL in last 2 days, will monitor and replace as appropriate     # Hypoalbuminemia: Lowest albumin = 3.3 g/dL at 8/29/2023  5:46 PM, will monitor as appropriate

## 2023-08-30 NOTE — CONSULTS
"    RENAL CONSULT NOTE    REQUESTING PHYSICIAN: Dr. Mitchell     REASON FOR CONSULT: Hyponatremia     ASSESSMENT/PLAN:    Acute hypo-osmolar hyponatremia - Baseline normonatremic including during recent admit at [facility] though Na+ had been trending down to 136 8/21/23. Then acutely down to 118 with rapid correction with IVF. Urine Osm 140, serum Osm 245 consistent with low solute and hypovolemic state rather than excess ADH. Possible contribution from suboptimally treated hypothyroidism as well with elevated TSH this admit. Baseline normal renal function.     Recs:   - Hold further IVF  - Should not require D5 bolus in this setting unless Na+ rapidly correcting further   - Na+ checks Q6 hours for now   - No need for fluid restriction, allow to eat and drink as able   - Agree with increase in levothyroxine   - Start protein supplements between meals     Hypothyroidism - TSH elevated this admit and levothyroxine dose increased, unlikely that this is significant contributor to current acute hyponatremia but hypothyroidism can cause a state of excess ADH so would continue to correct as appropriate.     RA - on prednisone and methotrexate, adequate pain control important but avoid NSAID use     Recent fall, C1 and odontoid process fractures - s/p admission at [facility] earlier this month, to follow up with neurosurgery as outpatient     HPI: Diann Low is an 82 yo F with PMH significant for osteoporosis, hypothyroidism, and rheumatoid arthritis who was admitted 8/29/2023 after labs at TCU noted her to be hyponatremic. She was recently admitted to [facility] after a fall with head laceration and C1 fracture. In the ED, Na+ was 118 and she was given a bolus of NS and started on maintenance IV NS at 100 mL/hour with correction 118 -> 127 in ~25 hours. Nephrology was consulted for hyponatremia management.     Patient evaluated in ED with son at bedside. She reports feeling very well today, admits to being \"sluggish\" when she came to " the ED but denies any AMS or confusion. She feels her pain has been well-controlled and was only taking Tylenol PRN at the TCU. Denies any NSAID use. She does admit to having a poor appetite at the TCU and was not eating hardly at all; she did have some nausea but no vomiting. Overall food was not appealing to her and did not taste good and has had some trouble chewing with C-collar in place. She was trying to stay hydrated but does not feel she was drinking water excessively. She was able to tolerate most of her breakfast today. Feels she has been urinating normally and does not have any edema.     Discussed with ED RN at bedside today.     REVIEW OF SYSTEMS:  Comprehensive ROS negative except per HPI     ALLERGIES/SENSITIVITIES:  Allergies   Allergen Reactions    Infliximab Rash     Throat began to close/tighten    Sulfa Antibiotics Rash    Hydrocodone-Acetaminophen Nausea    Oxycodone-Acetaminophen Nausea    Penicillins Rash    Erythromycin Nausea     Social History     Tobacco Use    Smoking status: Never    Smokeless tobacco: Never             PHYSICAL EXAM:  Physical Exam   Temp: 97.8  F (36.6  C) Temp src: Oral BP: 130/60 Pulse: 72   Resp: 20 SpO2: 99 % O2 Device: None (Room air)    Vitals:    08/29/23 2228   Weight: 47.2 kg (104 lb)     Vital Signs with Ranges  Temp:  [97.7  F (36.5  C)-97.9  F (36.6  C)] 97.8  F (36.6  C)  Pulse:  [72-90] 72  Resp:  [16-20] 20  BP: (116-134)/(56-61) 130/60  SpO2:  [97 %-99 %] 99 %  I/O last 3 completed shifts:  In: 980 [I.V.:980]  Out: 2400 [Urine:2400]    @TMAXR(24)@    Patient Vitals for the past 72 hrs:   Weight   08/29/23 2228 47.2 kg (104 lb)       General - A & O x 3, NAD  HEENT - + head laceration, + C collar   Neck - no carotid bruits, no JVD  Respiratory - Lungs CTA bilat without wheeze, rhonchi, rales  Cardiovascular - AP RRR with murmur  Abdomen - soft, BS present, no bruits, no fluid wave  Extremities - well perfused, no edema  Integumentary - intact, good  turgor, no rash/lesions  Neurologic - grossly intact  Psych:  Judgement intact, affect WNL    Laboratory:   No results for input(s): WBC, RBC, HGB, HCT, PLT in the last 168 hours.    Basic Metabolic Panel:  Recent Labs   Lab 08/30/23  0730 08/30/23  0024 08/29/23 2005 08/29/23  0646   * 125* 121* 118*   POTASSIUM 4.5  --  4.1 5.1   CHLORIDE 96*  --  94* 83*   CO2 25  --  21* 26   BUN 8  --  9 8.6   CR 0.74  --  0.72 0.72     --  126* 89   RUBIO 8.2*  --  8.0* 8.8       INRNo lab results found in last 7 days.    Recent Labs   Lab Test 08/30/23  0730 08/29/23 2005   POTASSIUM 4.5 4.1   CHLORIDE 96* 94*   BUN 8 9      Recent Labs   Lab Test 08/29/23  1746 07/20/23  1028 05/23/23  1014 03/28/23  1322   ALBUMIN 3.3* 3.9 3.9 4.1   BILITOTAL 0.6 0.7  --   --    ALT 15 10 9 12   AST 27 31  --   --    PROTEIN  --   --  Negative Negative       Personally reviewed today's laboratory studies      Thank you for involving us in the care of this patient. We will continue to follow along with you.    Veena Rader PA-C   [facility]  [phone]

## 2023-08-31 VITALS
HEIGHT: 62 IN | OXYGEN SATURATION: 99 % | HEART RATE: 79 BPM | DIASTOLIC BLOOD PRESSURE: 58 MMHG | BODY MASS INDEX: 19.14 KG/M2 | TEMPERATURE: 97.5 F | SYSTOLIC BLOOD PRESSURE: 123 MMHG | RESPIRATION RATE: 16 BRPM | WEIGHT: 104 LBS

## 2023-08-31 LAB
SODIUM SERPL-SCNC: 130 MMOL/L (ref 136–145)
SODIUM SERPL-SCNC: 132 MMOL/L (ref 136–145)
SODIUM SERPL-SCNC: 132 MMOL/L (ref 136–145)

## 2023-08-31 PROCEDURE — 99231 SBSQ HOSP IP/OBS SF/LOW 25: CPT | Performed by: PHYSICIAN ASSISTANT

## 2023-08-31 PROCEDURE — 250N000013 HC RX MED GY IP 250 OP 250 PS 637: Performed by: HOSPITALIST

## 2023-08-31 PROCEDURE — 84295 ASSAY OF SERUM SODIUM: CPT | Performed by: PHYSICIAN ASSISTANT

## 2023-08-31 PROCEDURE — 99239 HOSP IP/OBS DSCHRG MGMT >30: CPT | Performed by: HOSPITALIST

## 2023-08-31 PROCEDURE — 36415 COLL VENOUS BLD VENIPUNCTURE: CPT | Performed by: PHYSICIAN ASSISTANT

## 2023-08-31 PROCEDURE — 258N000003 HC RX IP 258 OP 636: Performed by: HOSPITALIST

## 2023-08-31 PROCEDURE — 250N000012 HC RX MED GY IP 250 OP 636 PS 637: Performed by: HOSPITALIST

## 2023-08-31 RX ORDER — LEVOTHYROXINE SODIUM 75 UG/1
37.5 TABLET ORAL DAILY
Qty: 30 TABLET | Refills: 0 | Status: SHIPPED | OUTPATIENT
Start: 2023-09-01 | End: 2023-09-21

## 2023-08-31 RX ADMIN — SODIUM CHLORIDE 250 ML: 9 INJECTION, SOLUTION INTRAVENOUS at 12:32

## 2023-08-31 RX ADMIN — FOLIC ACID 1000 MCG: 1 TABLET ORAL at 08:14

## 2023-08-31 RX ADMIN — PREDNISONE 1 MG: 1 TABLET ORAL at 08:13

## 2023-08-31 RX ADMIN — THERA TABS 1 TABLET: TAB at 11:10

## 2023-08-31 RX ADMIN — ACETAMINOPHEN 650 MG: 325 TABLET ORAL at 08:13

## 2023-08-31 RX ADMIN — Medication: at 11:10

## 2023-08-31 RX ADMIN — LEVOTHYROXINE SODIUM 37.5 MCG: 0.07 TABLET ORAL at 09:26

## 2023-08-31 ASSESSMENT — ACTIVITIES OF DAILY LIVING (ADL)
ADLS_ACUITY_SCORE: 38
ADLS_ACUITY_SCORE: 40

## 2023-08-31 NOTE — PLAN OF CARE
Goal Outcome Evaluation:    A/O x 4. VSS on room air. Denies pain. [place] J collar on at all time.  Large laceration on forehead r/t fall. Up to restroom with standby assist.    Problem: Plan of Care - These are the overarching goals to be used throughout the patient stay.    Goal: Absence of Hospital-Acquired Illness or Injury  Intervention: Prevent Skin Injury  Recent Flowsheet Documentation  Taken 8/31/2023 0014 by Ronit Holley, RN  Body Position: position changed independently     Problem: Plan of Care - These are the overarching goals to be used throughout the patient stay.    Goal: Optimal Comfort and Wellbeing  Outcome: Progressing  Intervention: Monitor Pain and Promote Comfort  Recent Flowsheet Documentation  Taken 8/31/2023 0014 by Ronit Holley, RN  Pain Management Interventions: medication (see MAR)

## 2023-08-31 NOTE — PROGRESS NOTES
VSS. A&Ox4. Denies pain. Has a large laceration on her forehead from a fall. Call appropriately for needs. SBA. Pt tolerating regular diet. IV saline locked. Voids spontaneously. Bed alarm on.

## 2023-08-31 NOTE — PROGRESS NOTES
RENAL PROGRESS NOTE    CC:  Hyponatremia     ASSESSMENT & PLAN:     Acute hypo-osmolar hyponatremia - Baseline normonatremic including during recent admit at [facility] though Na+ had been trending down to 136 8/21/23. Then acutely down to 118 with rapid correction with IVF. Urine Osm 140, serum Osm 245 consistent with low solute and hypovolemic state rather than excess ADH. Possible contribution from suboptimally treated hypothyroidism as well with elevated TSH this admit. Baseline normal renal function. Na+ improved to 132 today without further intervention.      Recs:   - Hold further IVF  - Should be following Na+ regularly in TCU, at least weekly   - No need for fluid restriction, allow to eat and drink as able   - Agree with increase in levothyroxine   - Continue protein supplements between meals   - Offered patient follow up in renal clinic which she declined today but knows she will be able to make an appointment if there is concern about the trend of her labs at TCU   - No objection to discharge from renal standpoint      Hypothyroidism - TSH elevated this admit and levothyroxine dose increased, unlikely that this is significant contributor to current acute hyponatremia but hypothyroidism can cause a state of excess ADH so would continue to correct as appropriate.      RA - on prednisone and methotrexate, adequate pain control important but avoid NSAID use      Recent fall, C1 and odontoid process fractures - s/p admission at [facility] earlier this month, to follow up with neurosurgery as outpatient     SUBJECTIVE: Up in chair today, reports feeling very well and has been working on eating. She feels she will be able to maintain intake outside the hospital and is tolerating the protein supplements ok. Discussed with Dr. Mitchell plans for discharge.     OBJECTIVE:  Physical Exam   Temp: 97.5  F (36.4  C) Temp src: Oral BP: 123/58 Pulse: 79   Resp: 16 SpO2: 99 % O2 Device: None (Room air)    Vitals:    08/29/23 2228    Weight: 47.2 kg (104 lb)     Vital Signs with Ranges  Temp:  [97.5  F (36.4  C)-98.1  F (36.7  C)] 97.5  F (36.4  C)  Pulse:  [76-83] 79  Resp:  [12-16] 16  BP: ()/(54-65) 123/58  SpO2:  [94 %-99 %] 99 %  I/O last 3 completed shifts:  In: -   Out: 500 [Urine:500]    @TMAXR(24)@    Patient Vitals for the past 72 hrs:   Weight   08/29/23 2228 47.2 kg (104 lb)     Intake/Output Summary (Last 24 hours) at 8/31/2023 1024  Last data filed at 8/31/2023 0800  Gross per 24 hour   Intake 180 ml   Output 350 ml   Net -170 ml       PHYSICAL EXAM:  General - Alert and oriented x3, appears comfortable, NAD  HENT - Prominent scalp laceration, + C collar   Cardiovascular - Regular rate and rhythm, no rub  Respiratory - Clear to auscultation bilaterally, no crackles or wheezes  Abd: BS present, no guarding or pain with palpation, no ascites  Extremities - No lower extremity edema bilaterally  Skin: dry, intact, no rash, good turgor  Neuro:  Grossly intact, no focal deficits  MSK:  Grossly intact  Psych:  Normal affect    LABORATORY STUDIES:   No results for input(s): WBC, RBC, HGB, HCT, PLT in the last 168 hours.    Basic Metabolic Panel:  Recent Labs   Lab 08/31/23  0604 08/30/23  2358 08/30/23  1737 08/30/23  1204 08/30/23  0730 08/30/23  0024 08/29/23  2005 08/29/23  0646   * 132* 126* 128* 127* 125* 121* 118*   POTASSIUM  --   --   --   --  4.5  --  4.1 5.1   CHLORIDE  --   --   --   --  96*  --  94* 83*   CO2  --   --   --   --  25  --  21* 26   BUN  --   --   --   --  8  --  9 8.6   CR  --   --   --   --  0.74  --  0.72 0.72   GLC  --   --   --   --  101  --  126* 89   RUBIO  --   --   --   --  8.2*  --  8.0* 8.8       INRNo lab results found in last 7 days.     Recent Labs   Lab Test 07/20/23  1028 05/23/23  1014   WBC 7.1 6.3   HGB 11.6* 11.8    187       Personally reviewed current labs      Veena Rader PA-C   [facility]  [phone]

## 2023-08-31 NOTE — DISCHARGE SUMMARY
[facility]  Hospitalist Discharge Summary      Date of Admission:  8/29/2023  Date of Discharge:  8/31/2023  Discharging Provider: Pasha Mitchell MD  Discharge Service: Hospitalist Service    Discharge Diagnoses   Hypo-osmolar hyponatremia  Hypothyroidism  Rheumatoid arthritis  C1 & C2 fracture    Clinically Significant Risk Factors          Follow-ups Needed After Discharge   Titrate synthroid as indicated    Unresulted Labs Ordered in the Past 30 Days of this Admission       No orders found from 7/30/2023 to 8/30/2023.          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 She was discharged to TCU.     8/29/23 - Sent from TCU to  ED for hyponatremia per TCU lab check. In the ED, her sodium was 118. She was minimally symptomatic. She received 1 L NS.  Given recent hospitalization and injury, head CT was ordered which was negative for subarachnoid hemorrhage or other etiology causing cerebral salt wasting.  8/30/23 - Her synthroid was increased given low free T4 and elevated TSH. Na improved to 127. IVF was held given rise rate of Na. Nephrology was consulted who felt low solute and hypovolemic state + hypothyroidism were the etiologies. Protein supplements were recommended.  8/31/23 - Na was 132. She remained asymptomatic and discharged. Son provided transport and had opportunity to ask questions.    Consultations This Hospital Stay   NEPHROLOGY IP CONSULT  CARE MANAGEMENT / SOCIAL WORK IP CONSULT    Code Status   No CPR- Do NOT Intubate    Time Spent on this Encounter   IPasha MD, personally saw the patient today and spent greater than 30 minutes discharging this patient.       Pasha  YESY Mitchell MD  [address] [address]  Phone: [phone]  Fax: [phone]  ______________________________________________________________________    Physical Exam   Vital Signs: Temp: 97.5  F (36.4  C) Temp src: Oral BP: 123/58 Pulse: 79   Resp: 16 SpO2: 99 % O2 Device: None (Room air)    Weight: 104 lbs 0 oz  NAD in the bed  C-collar unchanged, scalp lacerations unchanged  RRR without murmur  Lungs cta  Moving all 4 extremities  Alert, appropriate, makes good eye contact       Primary Care Physician   Meagan Gamboa    Discharge Orders   No discharge procedures on file.    Significant Results and Procedures   Most Recent 3 CBC's:  Recent Labs   Lab Test 07/20/23  1028 05/23/23  1014 03/28/23  1322   WBC 7.1 6.3 6.2   HGB 11.6* 11.8 12.2   MCV 98 99 101*    187 197     Most Recent 3 BMP's:  Recent Labs   Lab Test 08/31/23  0604 08/30/23  2358 08/30/23  1737 08/30/23  1204 08/30/23  0730 08/30/23  0024 08/29/23  2005 08/29/23  0646   * 132* 126*   < > 127*   < > 121* 118*   POTASSIUM  --   --   --   --  4.5  --  4.1 5.1   CHLORIDE  --   --   --   --  96*  --  94* 83*   CO2  --   --   --   --  25  --  21* 26   BUN  --   --   --   --  8  --  9 8.6   CR  --   --   --   --  0.74  --  0.72 0.72   ANIONGAP  --   --   --   --  6  --  6 9   RUBIO  --   --   --   --  8.2*  --  8.0* 8.8   GLC  --   --   --   --  101  --  126* 89    < > = values in this interval not displayed.     Most Recent 2 LFT's:  Recent Labs   Lab Test 08/29/23  1746 07/20/23  1028   AST 27 31   ALT 15 10   ALKPHOS 71 53   BILITOTAL 0.6 0.7   ,   Results for orders placed or performed during the hospital encounter of 08/29/23   Head CT w/o contrast    Addendum: 8/29/2023    Dr. Addison was contacted by me on 8/29/2023 6:50 PM CDT regarding the cervical spine fractures. Per provider the fractures are known from an outside CT cervical spine from 08/17/2023, not  available for review.      Narrative    EXAM: CT HEAD W/O CONTRAST  LOCATION: [facility]  DATE: 8/29/2023    INDICATION: Hyponatremia  COMPARISON: None.  TECHNIQUE: Routine CT Head without IV contrast. Multiplanar reformats. Dose reduction techniques were used.    FINDINGS:  INTRACRANIAL CONTENTS: No intracranial hemorrhage, extraaxial collection, or mass effect.  No CT evidence of acute infarct. Mild to moderate presumed chronic small vessel ischemic changes. Mild generalized volume loss. No hydrocephalus.     VISUALIZED ORBITS/SINUSES/MASTOIDS: Prior bilateral cataract surgery. Visualized portions of the orbits are otherwise unremarkable. No significant paranasal sinus mucosal disease. No middle ear or mastoid effusion.    BONES/SOFT TISSUES:  Mild right frontal scalp hematoma and laceration. No skull fracture. C1 burst fracture and type II dens fracture.      Impression    IMPRESSION:  1.  No CT evidence for acute intracranial process. MRI would better assess for osmotic demyelination.  2.  Small right frontal scalp laceration and hematoma without underlying calvarial fracture.  3.  C1 burst fracture and type II dens fracture. CT cervical spine is recommended to further evaluate.       Discharge Medications   Current Discharge Medication List        CONTINUE these medications which have CHANGED    Details   levothyroxine (SYNTHROID/LEVOTHROID) 75 MCG tablet Take 0.5 tablets (37.5 mcg) by mouth daily  Qty: 30 tablet, Refills: 0    Associated Diagnoses: Hypothyroidism, unspecified type           CONTINUE these medications which have NOT CHANGED    Details   acetaminophen (TYLENOL) 500 MG tablet Take 1,000 mg by mouth every 6 hours as needed for mild pain      alendronate (FOSAMAX) 70 MG tablet Take 1 tablet (70 mg) by mouth every 7 days  Qty: 12 tablet, Refills: 3    Comments: Only send when patient requests  Associated Diagnoses: Osteoporosis, unspecified osteoporosis type, unspecified  pathological fracture presence      calcium carbonate-vitamin D (OSCAL W/D) 500-200 MG-UNIT tablet Take 1 tablet by mouth 2 times daily      clindamycin (CLEOCIN) 300 MG capsule Take 600 mg by mouth For dental procedures      ferrous fumarate 65 mg, [place]. FE,-Vitamin C 125 mg (VITRON C)  MG TABS tablet Take 1 tablet by mouth every other day      folic acid (FOLVITE) 1 MG tablet Take 1 tablet by mouth daily      methotrexate 2.5 MG tablet Take 4 tablets (10 mg) by mouth every 7 days  Qty: 48 tablet, Refills: 0    Associated Diagnoses: Rheumatoid arthritis, seropositive, multiple sites (H)      Multiple Vitamin (MULTIVITAMIN ADULT PO) Take 1 tablet by mouth daily      predniSONE (DELTASONE) 1 MG tablet TAKE 1 TABLET DAILY  Qty: 90 tablet, Refills: 0    Associated Diagnoses: Rheumatoid arthritis, seropositive, multiple sites (H)      White Petrolatum ointment Apply topically 2 times daily      carboxymethylcellulose PF (REFRESH PLUS) 0.5 % ophthalmic solution 1 drop 3 times daily as needed for dry eyes           Allergies   Allergies   Allergen Reactions    Infliximab Rash     Throat began to close/tighten    Sulfa Antibiotics Rash    Hydrocodone-Acetaminophen Nausea    Oxycodone-Acetaminophen Nausea    Penicillins Rash    Erythromycin Nausea

## 2023-09-04 VITALS
WEIGHT: 104 LBS | HEIGHT: 62 IN | HEART RATE: 87 BPM | SYSTOLIC BLOOD PRESSURE: 135 MMHG | RESPIRATION RATE: 20 BRPM | TEMPERATURE: 96.8 F | OXYGEN SATURATION: 98 % | DIASTOLIC BLOOD PRESSURE: 48 MMHG | BODY MASS INDEX: 19.14 KG/M2

## 2023-09-04 NOTE — PROGRESS NOTES
[facility] GERIATRIC SERVICES  Chief Complaint   Patient presents with    [facility] F/U     [place] [facility] Record Number:  [id number]  Place of Service where encounter took place:  [facility] ([facility]) [[id number]]  Code Status:  comfort focused     HISTORY:      HPI:  Diann Low  is 83 year old (1940) undergoing physical and occupational therapy. with RA, osteoporosis, and hypothyroidism who presents with mechanical fall and direct trauma with a door from rushing down stairs resulting in large scalp lacerations, C1 and odontoid process fractures, and left ankle sprain.  She was recovering in this facility when she was sent back to the ER on 8/29/23 for hyponatremia Excerpted from records In the ED, her sodium was 118. She was minimally symptomatic. She received 1 L NS.  Given recent hospitalization and injury, head CT was ordered which was negative for subarachnoid hemorrhage or other etiology causing cerebral salt wasting.  8/30/23 - Her synthroid was increased given low free T4 and elevated TSH. Na improved to 127. IVF was held given rise rate of Na. Nephrology was consulted who felt low solute and hypovolemic state + hypothyroidism were the etiologies. Protein supplements were recommended.  8/31/23 - Na was 132. She remained asymptomatic and discharged. Son provided transport and had opportunity to ask questions.    Today she is seen to review VS. Labs routine visit and She denied CP, SOB, cough or congestion. Denied constipation or diarrhea. Appetite is improving.  She reports she is eating softer foods to accommodate chewing with the collar on. large scalp incision without S/S infection. She will follow up with neurology 9/6/23 at the [facility].  Her son will be picking her up at 8:30 and an early breakfast was ordered.  Her pain is controlled.  Last sodium on 8/31/2023 was 130 recheck sodium scheduled for 9/7/2023.  She continues to have slight discomfort left ankle however she reports it is  tolerable    ALLERGIES:Infliximab, Sulfa antibiotics, Hydrocodone-acetaminophen, Oxycodone-acetaminophen, Penicillins, and Erythromycin    PAST MEDICAL HISTORY:   Past Medical History:   Diagnosis Date    Skin cancer     Spider veins        PAST SURGICAL HISTORY:   has no past surgical history on file.    FAMILY HISTORY: family history is not on file.    SOCIAL HISTORY:  reports that she has never smoked. She has never used smokeless tobacco.    ROS:  Constitutional: Positive  for activity change,  positive appetite change, fatigue and fever. Related to collar improving with soft foods  HENT: Negative for congestion.    Respiratory: Negative for cough, shortness of breath and wheezing.    Cardiovascular: Negative for chest pain and leg swelling.   Gastrointestinal: Negative for abdominal distention, abdominal pain, constipation, diarrhea and nausea.   Genitourinary: Negative for dysuria.   Musculoskeletal: Negative for arthralgia. Negative for back pain.   Skin: Negative for color change and wound. Large scalp wound covered with vaseline   Neurological: Negative for dizziness.   Psychiatric/Behavioral: Negative for agitation, behavioral problems and confusion.     Physical Exam:  Constitutional:       Appearance: Patient is well-developed.   HENT:      Head: Normocephalic. C-1 fracture wearing a hat please had 75% rating [place] J collar  Eyes:      Conjunctiva/sclera: Conjunctivae normal.   Neck:      Musculoskeletal: Normal range of motion.   Cardiovascular:      Rate and Rhythm: Normal rate and regular rhythm.      Heart sounds: Normal heart sounds. No murmur.   Pulmonary:      Effort: No respiratory distress.      Breath sounds: Normal breath sounds. No wheezing or rales.   Abdominal:      General: Bowel sounds are normal. There is no distension.      Palpations: Abdomen is soft.      Tenderness: There is no abdominal tenderness.   Musculoskeletal:       Normal range of motion.     Skin:General:        Skin is  "warm.   Neurological:         Mental Status: Patient is alert and oriented to person, place, and time.   Psychiatric:         Behavior: Behavior normal.     Vitals:/48   Pulse 87   Temp 96.8  F (36  C)   Resp 20   Ht 1.575 m (5' 2\")   Wt 47.2 kg (104 lb)   SpO2 98%   BMI 19.02 kg/m   and Body mass index is 19.02 kg/m .    Lab/Diagnostic data:   Recent Results (from the past 240 hour(s))   Basic metabolic panel    Collection Time: 08/29/23  6:46 AM   Result Value Ref Range    Sodium 118 (LL) 136 - 145 mmol/L    Potassium 5.1 3.4 - 5.3 mmol/L    Chloride 83 (L) 98 - 107 mmol/L    Carbon Dioxide (CO2) 26 22 - 29 mmol/L    Anion Gap 9 7 - 15 mmol/L    Urea Nitrogen 8.6 8.0 - 23.0 mg/dL    Creatinine 0.72 0.51 - 0.95 mg/dL    Calcium 8.8 8.8 - 10.2 mg/dL    Glucose 89 70 - 99 mg/dL    GFR Estimate 81 >60 mL/min/1.73m2   Osmolality    Collection Time: 08/29/23  5:46 PM   Result Value Ref Range    Osmolality Blood 245 (LL) 280 - 301 mmol/kg   Hepatic panel    Collection Time: 08/29/23  5:46 PM   Result Value Ref Range    Bilirubin Total 0.6 0.0 - 1.0 mg/dL    Bilirubin Direct 0.2 <=0.5 mg/dL    Protein Total 6.1 6.0 - 8.0 g/dL    Albumin 3.3 (L) 3.5 - 5.0 g/dL    Alkaline Phosphatase 71 45 - 120 U/L    AST 27 0 - 40 U/L    ALT 15 0 - 45 U/L   TSH with free T4 reflex    Collection Time: 08/29/23  5:46 PM   Result Value Ref Range    TSH 6.47 (H) 0.30 - 5.00 uIU/mL   T4 free    Collection Time: 08/29/23  5:46 PM   Result Value Ref Range    Free T4 0.12 (L) 0.90 - 1.70 ng/dL   Osmolality urine    Collection Time: 08/29/23  5:58 PM   Result Value Ref Range    Osmolality Urine 160 100 - 1,200 mmol/kg   Sodium random urine    Collection Time: 08/29/23  5:58 PM   Result Value Ref Range    Sodium Urine mmol/L 32 mmol/L   Osmolality urine    Collection Time: 08/29/23  5:58 PM   Result Value Ref Range    Osmolality Urine 157 100 - 1,200 mmol/kg   Basic metabolic panel    Collection Time: 08/29/23  8:05 PM   Result Value " Ref Range    Sodium 121 (L) 136 - 145 mmol/L    Potassium 4.1 3.5 - 5.0 mmol/L    Chloride 94 (L) 98 - 107 mmol/L    Carbon Dioxide (CO2) 21 (L) 22 - 31 mmol/L    Anion Gap 6 5 - 18 mmol/L    Urea Nitrogen 9 8 - 28 mg/dL    Creatinine 0.72 0.60 - 1.10 mg/dL    Calcium 8.0 (L) 8.5 - 10.5 mg/dL    Glucose 126 (H) 70 - 125 mg/dL    GFR Estimate 81 >60 mL/min/1.73m2   Sodium    Collection Time: 08/30/23 12:24 AM   Result Value Ref Range    Sodium 125 (L) 136 - 145 mmol/L   Osmolality urine    Collection Time: 08/30/23  4:39 AM   Result Value Ref Range    Osmolality Urine 140 100 - 1,200 mmol/kg   Basic metabolic panel    Collection Time: 08/30/23  7:30 AM   Result Value Ref Range    Sodium 127 (L) 136 - 145 mmol/L    Potassium 4.5 3.5 - 5.0 mmol/L    Chloride 96 (L) 98 - 107 mmol/L    Carbon Dioxide (CO2) 25 22 - 31 mmol/L    Anion Gap 6 5 - 18 mmol/L    Urea Nitrogen 8 8 - 28 mg/dL    Creatinine 0.74 0.60 - 1.10 mg/dL    Calcium 8.2 (L) 8.5 - 10.5 mg/dL    Glucose 101 70 - 125 mg/dL    GFR Estimate 79 >60 mL/min/1.73m2   Cortisol    Collection Time: 08/30/23  7:30 AM   Result Value Ref Range    Cortisol 18.7   ug/dL   Sodium    Collection Time: 08/30/23 12:04 PM   Result Value Ref Range    Sodium 128 (L) 136 - 145 mmol/L   Sodium    Collection Time: 08/30/23  5:37 PM   Result Value Ref Range    Sodium 126 (L) 136 - 145 mmol/L   Sodium    Collection Time: 08/30/23 11:58 PM   Result Value Ref Range    Sodium 132 (L) 136 - 145 mmol/L   Sodium    Collection Time: 08/31/23  6:04 AM   Result Value Ref Range    Sodium 132 (L) 136 - 145 mmol/L   Sodium    Collection Time: 08/31/23 11:55 AM   Result Value Ref Range    Sodium 130 (L) 136 - 145 mmol/L        MEDICATIONS:     Review of your medicines            Accurate as of September 5, 2023  5:07 PM. If you have any questions, ask your nurse or doctor.                CONTINUE these medicines which have NOT CHANGED        Dose / Directions   acetaminophen 500 MG tablet  Commonly  known as: TYLENOL      Dose: 1,000 mg  Take 1,000 mg by mouth every 6 hours as needed for mild pain  Refills: 0     alendronate 70 MG tablet  Commonly known as: FOSAMAX  Used for: Osteoporosis, unspecified osteoporosis type, unspecified pathological fracture presence      Dose: 70 mg  Take 1 tablet (70 mg) by mouth every 7 days  Quantity: 12 tablet  Refills: 3     calcium carbonate-vitamin D 500-200 MG-UNIT tablet  Commonly known as: OSCAL w/D      Dose: 1 tablet  Take 1 tablet by mouth 2 times daily  Refills: 0     carboxymethylcellulose PF 0.5 % ophthalmic solution  Commonly known as: REFRESH PLUS      Dose: 1 drop  1 drop 3 times daily as needed for dry eyes  Refills: 0     clindamycin 300 MG capsule  Commonly known as: CLEOCIN      Dose: 600 mg  Take 600 mg by mouth For dental procedures  Refills: 0     ferrous fumarate 65 mg ([place]. FE)-Vitamin C 125 mg  MG Tabs tablet  Commonly known as: VITRON C      Dose: 1 tablet  Take 1 tablet by mouth every other day  Refills: 0     folic acid 1 MG tablet  Commonly known as: FOLVITE      Dose: 1 tablet  Take 1 tablet by mouth daily  Refills: 0     levothyroxine 75 MCG tablet  Commonly known as: SYNTHROID/LEVOTHROID  Used for: Hypothyroidism, unspecified type      Dose: 37.5 mcg  Take 0.5 tablets (37.5 mcg) by mouth daily  Quantity: 30 tablet  Refills: 0     methotrexate 2.5 MG tablet  Used for: Rheumatoid arthritis, seropositive, multiple sites (H)      Dose: 10 mg  Take 4 tablets (10 mg) by mouth every 7 days  Quantity: 48 tablet  Refills: 0     MULTIVITAMIN ADULT PO      Dose: 1 tablet  Take 1 tablet by mouth daily  Refills: 0     predniSONE 1 MG tablet  Commonly known as: DELTASONE  Used for: Rheumatoid arthritis, seropositive, multiple sites (H)      TAKE 1 TABLET DAILY  Quantity: 90 tablet  Refills: 0     White Petrolatum ointment      Apply topically 2 times daily  Refills: 0              ASSESSMENT/PLAN  Encounter Diagnoses   Name Primary?    Unspecified  nondisplaced fracture of first cervical vertebra, initial encounter for closed fracture (H) Yes    Physical deconditioning     Pain management      Nondisplaced fracture of first cervical vertebrae follow-up with neurosurgery, pain management c-collar when out of bed or head of the bed greater than 30 degrees    Scalp laceration healing well without signs or symptoms of infection    Physical deconditioning PT OT    Osteoporosis on alendronate weekly    Hypothyroidism on levothyroxine    Rheumatoid arthritis continue methotrexate 10 mg daily every 7 days, prednisone 1 mg daily    Pain management - PRN ES Tylenol      Electronically signed by: Roberta Martinez CNP

## 2023-09-05 ENCOUNTER — TRANSITIONAL CARE UNIT VISIT (OUTPATIENT)
Dept: GERIATRICS | Facility: CLINIC | Age: 83
End: 2023-09-05
Payer: MEDICARE

## 2023-09-05 DIAGNOSIS — S12.001A: Primary | ICD-10-CM

## 2023-09-05 DIAGNOSIS — R53.81 PHYSICAL DECONDITIONING: ICD-10-CM

## 2023-09-05 DIAGNOSIS — R52 PAIN MANAGEMENT: ICD-10-CM

## 2023-09-05 PROCEDURE — 99309 SBSQ NF CARE MODERATE MDM 30: CPT | Performed by: NURSE PRACTITIONER

## 2023-09-05 NOTE — LETTER
9/5/2023        RE: Diann Low  [address]  [facility] [address]        [facility] GERIATRIC SERVICES  Chief Complaint   Patient presents with     [facility] F/U     [place] [facility] Record Number:  [id number]  Place of Service where encounter took place:  [facility] ([facility]) [[id number]]  Code Status:  comfort focused     HISTORY:      HPI:  Diann Low  is 83 year old (1940) undergoing physical and occupational therapy. with RA, osteoporosis, and hypothyroidism who presents with mechanical fall and direct trauma with a door from rushing down stairs resulting in large scalp lacerations, C1 and odontoid process fractures, and left ankle sprain.  She was recovering in this facility when she was sent back to the ER on 8/29/23 for hyponatremia Excerpted from records In the ED, her sodium was 118. She was minimally symptomatic. She received 1 L NS.  Given recent hospitalization and injury, head CT was ordered which was negative for subarachnoid hemorrhage or other etiology causing cerebral salt wasting.  8/30/23 - Her synthroid was increased given low free T4 and elevated TSH. Na improved to 127. IVF was held given rise rate of Na. Nephrology was consulted who felt low solute and hypovolemic state + hypothyroidism were the etiologies. Protein supplements were recommended.  8/31/23 - Na was 132. She remained asymptomatic and discharged. Son provided transport and had opportunity to ask questions.    Today she is seen to review VS. Labs routine visit and She denied CP, SOB, cough or congestion. Denied constipation or diarrhea. Appetite is improving.  She reports she is eating softer foods to accommodate chewing with the collar on. large scalp incision without S/S infection. She will follow up with neurology 9/6/23 at the [facility].  Her son will be picking her up at 8:30 and an early breakfast was ordered.  Her pain is controlled.  Last sodium on 8/31/2023 was 130 recheck sodium scheduled for  9/7/2023.  She continues to have slight discomfort left ankle however she reports it is tolerable    ALLERGIES:Infliximab, Sulfa antibiotics, Hydrocodone-acetaminophen, Oxycodone-acetaminophen, Penicillins, and Erythromycin    PAST MEDICAL HISTORY:   Past Medical History:   Diagnosis Date     Skin cancer      Spider veins        PAST SURGICAL HISTORY:   has no past surgical history on file.    FAMILY HISTORY: family history is not on file.    SOCIAL HISTORY:  reports that she has never smoked. She has never used smokeless tobacco.    ROS:  Constitutional: Positive  for activity change,  positive appetite change, fatigue and fever. Related to collar improving with soft foods  HENT: Negative for congestion.    Respiratory: Negative for cough, shortness of breath and wheezing.    Cardiovascular: Negative for chest pain and leg swelling.   Gastrointestinal: Negative for abdominal distention, abdominal pain, constipation, diarrhea and nausea.   Genitourinary: Negative for dysuria.   Musculoskeletal: Negative for arthralgia. Negative for back pain.   Skin: Negative for color change and wound. Large scalp wound covered with vaseline   Neurological: Negative for dizziness.   Psychiatric/Behavioral: Negative for agitation, behavioral problems and confusion.     Physical Exam:  Constitutional:       Appearance: Patient is well-developed.   HENT:      Head: Normocephalic. C-1 fracture wearing a hat please had 75% rating [place] J collar  Eyes:      Conjunctiva/sclera: Conjunctivae normal.   Neck:      Musculoskeletal: Normal range of motion.   Cardiovascular:      Rate and Rhythm: Normal rate and regular rhythm.      Heart sounds: Normal heart sounds. No murmur.   Pulmonary:      Effort: No respiratory distress.      Breath sounds: Normal breath sounds. No wheezing or rales.   Abdominal:      General: Bowel sounds are normal. There is no distension.      Palpations: Abdomen is soft.      Tenderness: There is no abdominal  "tenderness.   Musculoskeletal:       Normal range of motion.     Skin:General:        Skin is warm.   Neurological:         Mental Status: Patient is alert and oriented to person, place, and time.   Psychiatric:         Behavior: Behavior normal.     Vitals:/48   Pulse 87   Temp 96.8  F (36  C)   Resp 20   Ht 1.575 m (5' 2\")   Wt 47.2 kg (104 lb)   SpO2 98%   BMI 19.02 kg/m   and Body mass index is 19.02 kg/m .    Lab/Diagnostic data:   Recent Results (from the past 240 hour(s))   Basic metabolic panel    Collection Time: 08/29/23  6:46 AM   Result Value Ref Range    Sodium 118 (LL) 136 - 145 mmol/L    Potassium 5.1 3.4 - 5.3 mmol/L    Chloride 83 (L) 98 - 107 mmol/L    Carbon Dioxide (CO2) 26 22 - 29 mmol/L    Anion Gap 9 7 - 15 mmol/L    Urea Nitrogen 8.6 8.0 - 23.0 mg/dL    Creatinine 0.72 0.51 - 0.95 mg/dL    Calcium 8.8 8.8 - 10.2 mg/dL    Glucose 89 70 - 99 mg/dL    GFR Estimate 81 >60 mL/min/1.73m2   Osmolality    Collection Time: 08/29/23  5:46 PM   Result Value Ref Range    Osmolality Blood 245 (LL) 280 - 301 mmol/kg   Hepatic panel    Collection Time: 08/29/23  5:46 PM   Result Value Ref Range    Bilirubin Total 0.6 0.0 - 1.0 mg/dL    Bilirubin Direct 0.2 <=0.5 mg/dL    Protein Total 6.1 6.0 - 8.0 g/dL    Albumin 3.3 (L) 3.5 - 5.0 g/dL    Alkaline Phosphatase 71 45 - 120 U/L    AST 27 0 - 40 U/L    ALT 15 0 - 45 U/L   TSH with free T4 reflex    Collection Time: 08/29/23  5:46 PM   Result Value Ref Range    TSH 6.47 (H) 0.30 - 5.00 uIU/mL   T4 free    Collection Time: 08/29/23  5:46 PM   Result Value Ref Range    Free T4 0.12 (L) 0.90 - 1.70 ng/dL   Osmolality urine    Collection Time: 08/29/23  5:58 PM   Result Value Ref Range    Osmolality Urine 160 100 - 1,200 mmol/kg   Sodium random urine    Collection Time: 08/29/23  5:58 PM   Result Value Ref Range    Sodium Urine mmol/L 32 mmol/L   Osmolality urine    Collection Time: 08/29/23  5:58 PM   Result Value Ref Range    Osmolality Urine 157 100 " - 1,200 mmol/kg   Basic metabolic panel    Collection Time: 08/29/23  8:05 PM   Result Value Ref Range    Sodium 121 (L) 136 - 145 mmol/L    Potassium 4.1 3.5 - 5.0 mmol/L    Chloride 94 (L) 98 - 107 mmol/L    Carbon Dioxide (CO2) 21 (L) 22 - 31 mmol/L    Anion Gap 6 5 - 18 mmol/L    Urea Nitrogen 9 8 - 28 mg/dL    Creatinine 0.72 0.60 - 1.10 mg/dL    Calcium 8.0 (L) 8.5 - 10.5 mg/dL    Glucose 126 (H) 70 - 125 mg/dL    GFR Estimate 81 >60 mL/min/1.73m2   Sodium    Collection Time: 08/30/23 12:24 AM   Result Value Ref Range    Sodium 125 (L) 136 - 145 mmol/L   Osmolality urine    Collection Time: 08/30/23  4:39 AM   Result Value Ref Range    Osmolality Urine 140 100 - 1,200 mmol/kg   Basic metabolic panel    Collection Time: 08/30/23  7:30 AM   Result Value Ref Range    Sodium 127 (L) 136 - 145 mmol/L    Potassium 4.5 3.5 - 5.0 mmol/L    Chloride 96 (L) 98 - 107 mmol/L    Carbon Dioxide (CO2) 25 22 - 31 mmol/L    Anion Gap 6 5 - 18 mmol/L    Urea Nitrogen 8 8 - 28 mg/dL    Creatinine 0.74 0.60 - 1.10 mg/dL    Calcium 8.2 (L) 8.5 - 10.5 mg/dL    Glucose 101 70 - 125 mg/dL    GFR Estimate 79 >60 mL/min/1.73m2   Cortisol    Collection Time: 08/30/23  7:30 AM   Result Value Ref Range    Cortisol 18.7   ug/dL   Sodium    Collection Time: 08/30/23 12:04 PM   Result Value Ref Range    Sodium 128 (L) 136 - 145 mmol/L   Sodium    Collection Time: 08/30/23  5:37 PM   Result Value Ref Range    Sodium 126 (L) 136 - 145 mmol/L   Sodium    Collection Time: 08/30/23 11:58 PM   Result Value Ref Range    Sodium 132 (L) 136 - 145 mmol/L   Sodium    Collection Time: 08/31/23  6:04 AM   Result Value Ref Range    Sodium 132 (L) 136 - 145 mmol/L   Sodium    Collection Time: 08/31/23 11:55 AM   Result Value Ref Range    Sodium 130 (L) 136 - 145 mmol/L        MEDICATIONS:     Review of your medicines            Accurate as of September 5, 2023  5:07 PM. If you have any questions, ask your nurse or doctor.                CONTINUE these  medicines which have NOT CHANGED        Dose / Directions   acetaminophen 500 MG tablet  Commonly known as: TYLENOL      Dose: 1,000 mg  Take 1,000 mg by mouth every 6 hours as needed for mild pain  Refills: 0     alendronate 70 MG tablet  Commonly known as: FOSAMAX  Used for: Osteoporosis, unspecified osteoporosis type, unspecified pathological fracture presence      Dose: 70 mg  Take 1 tablet (70 mg) by mouth every 7 days  Quantity: 12 tablet  Refills: 3     calcium carbonate-vitamin D 500-200 MG-UNIT tablet  Commonly known as: OSCAL w/D      Dose: 1 tablet  Take 1 tablet by mouth 2 times daily  Refills: 0     carboxymethylcellulose PF 0.5 % ophthalmic solution  Commonly known as: REFRESH PLUS      Dose: 1 drop  1 drop 3 times daily as needed for dry eyes  Refills: 0     clindamycin 300 MG capsule  Commonly known as: CLEOCIN      Dose: 600 mg  Take 600 mg by mouth For dental procedures  Refills: 0     ferrous fumarate 65 mg ([place]. FE)-Vitamin C 125 mg  MG Tabs tablet  Commonly known as: VITRON C      Dose: 1 tablet  Take 1 tablet by mouth every other day  Refills: 0     folic acid 1 MG tablet  Commonly known as: FOLVITE      Dose: 1 tablet  Take 1 tablet by mouth daily  Refills: 0     levothyroxine 75 MCG tablet  Commonly known as: SYNTHROID/LEVOTHROID  Used for: Hypothyroidism, unspecified type      Dose: 37.5 mcg  Take 0.5 tablets (37.5 mcg) by mouth daily  Quantity: 30 tablet  Refills: 0     methotrexate 2.5 MG tablet  Used for: Rheumatoid arthritis, seropositive, multiple sites (H)      Dose: 10 mg  Take 4 tablets (10 mg) by mouth every 7 days  Quantity: 48 tablet  Refills: 0     MULTIVITAMIN ADULT PO      Dose: 1 tablet  Take 1 tablet by mouth daily  Refills: 0     predniSONE 1 MG tablet  Commonly known as: DELTASONE  Used for: Rheumatoid arthritis, seropositive, multiple sites (H)      TAKE 1 TABLET DAILY  Quantity: 90 tablet  Refills: 0     White Petrolatum ointment      Apply topically 2 times  daily  Refills: 0              ASSESSMENT/PLAN  Encounter Diagnoses   Name Primary?     Unspecified nondisplaced fracture of first cervical vertebra, initial encounter for closed fracture (H) Yes     Physical deconditioning      Pain management      Nondisplaced fracture of first cervical vertebrae follow-up with neurosurgery, pain management c-collar when out of bed or head of the bed greater than 30 degrees    Scalp laceration healing well without signs or symptoms of infection    Physical deconditioning PT OT    Osteoporosis on alendronate weekly    Hypothyroidism on levothyroxine    Rheumatoid arthritis continue methotrexate 10 mg daily every 7 days, prednisone 1 mg daily    Pain management - PRN ES Tylenol      Electronically signed by: Roberta Martinez CNP       Sincerely,        Roberta Martinez CNP

## 2023-09-06 ENCOUNTER — LAB REQUISITION (OUTPATIENT)
Dept: LAB | Facility: CLINIC | Age: 83
End: 2023-09-06
Payer: MEDICARE

## 2023-09-06 DIAGNOSIS — Z51.81 ENCOUNTER FOR THERAPEUTIC DRUG LEVEL MONITORING: ICD-10-CM

## 2023-09-07 ENCOUNTER — DOCUMENTATION ONLY (OUTPATIENT)
Dept: OTHER | Facility: CLINIC | Age: 83
End: 2023-09-07
Payer: MEDICARE

## 2023-09-07 ENCOUNTER — TELEPHONE (OUTPATIENT)
Dept: GERIATRICS | Facility: CLINIC | Age: 83
End: 2023-09-07
Payer: MEDICARE

## 2023-09-07 LAB
ANION GAP SERPL CALCULATED.3IONS-SCNC: 15 MMOL/L (ref 7–15)
BUN SERPL-MCNC: 29.1 MG/DL (ref 8–23)
CALCIUM SERPL-MCNC: 9.4 MG/DL (ref 8.8–10.2)
CHLORIDE SERPL-SCNC: 98 MMOL/L (ref 98–107)
CREAT SERPL-MCNC: 0.71 MG/DL (ref 0.51–0.95)
DEPRECATED HCO3 PLAS-SCNC: 24 MMOL/L (ref 22–29)
EGFRCR SERPLBLD CKD-EPI 2021: 83 ML/MIN/1.73M2
GLUCOSE SERPL-MCNC: 76 MG/DL (ref 70–99)
POTASSIUM SERPL-SCNC: 4.5 MMOL/L (ref 3.4–5.3)
SODIUM SERPL-SCNC: 137 MMOL/L (ref 136–145)

## 2023-09-07 PROCEDURE — 82310 ASSAY OF CALCIUM: CPT | Performed by: NURSE PRACTITIONER

## 2023-09-07 PROCEDURE — 36415 COLL VENOUS BLD VENIPUNCTURE: CPT | Performed by: NURSE PRACTITIONER

## 2023-09-07 PROCEDURE — P9604 ONE-WAY ALLOW PRORATED TRIP: HCPCS | Performed by: NURSE PRACTITIONER

## 2023-09-07 NOTE — TELEPHONE ENCOUNTER
[facility] Geriatrics Lab Note     Provider: NICOL Zelaya  Facility: [facility]  Facility Type:  TCU    Allergies   Allergen Reactions    Infliximab Rash     Throat began to close/tighten    Sulfa Antibiotics Rash    Hydrocodone-Acetaminophen Nausea    Oxycodone-Acetaminophen Nausea    Penicillins Rash    Erythromycin Nausea       Labs Reviewed by provider: JOSÉ ANTONIO     Verbal Order/Direction given by Provider: No new orders.      Provider giving Order:  NICOL Zelaya    Verbal Order given to: Rocío([phone])    Maynor Baeza RN

## 2023-09-11 ENCOUNTER — DISCHARGE SUMMARY NURSING HOME (OUTPATIENT)
Dept: GERIATRICS | Facility: CLINIC | Age: 83
End: 2023-09-11
Payer: MEDICARE

## 2023-09-11 VITALS
RESPIRATION RATE: 20 BRPM | BODY MASS INDEX: 19.14 KG/M2 | TEMPERATURE: 98.2 F | WEIGHT: 104 LBS | HEIGHT: 62 IN | SYSTOLIC BLOOD PRESSURE: 116 MMHG | OXYGEN SATURATION: 98 % | HEART RATE: 73 BPM | DIASTOLIC BLOOD PRESSURE: 42 MMHG

## 2023-09-11 DIAGNOSIS — R53.81 PHYSICAL DECONDITIONING: Primary | ICD-10-CM

## 2023-09-11 DIAGNOSIS — S12.001A: ICD-10-CM

## 2023-09-11 DIAGNOSIS — R52 PAIN MANAGEMENT: ICD-10-CM

## 2023-09-11 PROCEDURE — 99316 NF DSCHRG MGMT 30 MIN+: CPT | Performed by: NURSE PRACTITIONER

## 2023-09-11 RX ORDER — ACETAMINOPHEN 500 MG
1000 TABLET ORAL EVERY 6 HOURS PRN
COMMUNITY
End: 2024-01-15

## 2023-09-11 NOTE — PROGRESS NOTES
[facility] GERIATRIC SERVICES  Chief Complaint   Patient presents with    Discharge Summary [facility] Medical Record Number:  [id number]  Place of Service where encounter took place:  [facility] ([facility]) [[id number]]  Code Status:  comfort focused     HISTORY:      HPI:  Diann Low  is 83 year old (1940) undergoing physical and occupational therapy. with RA, osteoporosis, and hypothyroidism who presents with mechanical fall and direct trauma with a door from rushing down stairs resulting in large scalp lacerations, C1 and odontoid process fractures, and left ankle sprain.  She was recovering in this facility when she was sent back to the ER on 8/29/23 for hyponatremia Excerpted from records In the ED, her sodium was 118. She was minimally symptomatic. She received 1 L NS.  Given recent hospitalization and injury, head CT was ordered which was negative for subarachnoid hemorrhage or other etiology causing cerebral salt wasting.  8/30/23 - Her synthroid was increased given low free T4 and elevated TSH. Na improved to 127. IVF was held given rise rate of Na. Nephrology was consulted who felt low solute and hypovolemic state + hypothyroidism were the etiologies. Protein supplements were recommended.  8/31/23 - Na was 132. She remained asymptomatic and discharged. Son provided transport and had opportunity to ask questions.    Today she is seen to review VS. Labs routine visit and a face-to-face for discharge.  She will discharge back to [facility] on 9/12/2023 with current medications and treatments.  She will have outpatient PT OT.  She denied CP, SOB, cough or congestion. Denied constipation she did have a small bout of diarrhea however she reported it resolved.. Appetite is improving.  . large scalp incision without S/S infection.  She reported no pain with the scalp incision or vision changes.  She did follow-up at the [facility] on 9/6/2023 and she is to  wear her collar for approximately another 3 months.  She does tell writer she will follow-up in 6 weeks and they will redo x-rays to see if she can stop wearing the collar at that time.  She did return to the ER recently for low sodium however it has improved and she is now 137    ALLERGIES:Infliximab, Sulfa antibiotics, Hydrocodone-acetaminophen, Oxycodone-acetaminophen, Penicillins, and Erythromycin    PAST MEDICAL HISTORY:   Past Medical History:   Diagnosis Date    Skin cancer     Spider veins        PAST SURGICAL HISTORY:   has no past surgical history on file.    FAMILY HISTORY: family history is not on file.    SOCIAL HISTORY:  reports that she has never smoked. She has never used smokeless tobacco.    ROS:  Constitutional: Positive  for activity change,  positive appetite change, fatigue and fever. Related to collar improving with soft foods  HENT: Negative for congestion.    Respiratory: Negative for cough, shortness of breath and wheezing.    Cardiovascular: Negative for chest pain and leg swelling.   Gastrointestinal: Negative for abdominal distention, abdominal pain, constipation, diarrhea and nausea.   Genitourinary: Negative for dysuria.   Musculoskeletal: Negative for arthralgia. Negative for back pain.   Skin: Negative for color change and wound. Large scalp wound covered with vaseline   Neurological: Negative for dizziness.   Psychiatric/Behavioral: Negative for agitation, behavioral problems and confusion.     Physical Exam:  Constitutional:       Appearance: Patient is well-developed.   HENT:      Head: Normocephalic. C-1 fracture wearing  [place] collar  Eyes:      Conjunctiva/sclera: Conjunctivae normal.   Neck:      Musculoskeletal: Normal range of motion.   Cardiovascular:      Rate and Rhythm: Normal rate and regular rhythm.      Heart sounds: Normal heart sounds. No murmur.   Pulmonary:      Effort: No respiratory distress.      Breath sounds: Normal breath sounds. No wheezing or rales.  "  Abdominal:      General: Bowel sounds are normal. There is no distension.      Palpations: Abdomen is soft.      Tenderness: There is no abdominal tenderness.   Musculoskeletal:       Normal range of motion.     Skin:General:        Skin is warm.   Neurological:         Mental Status: Patient is alert and oriented to person, place, and time.   Psychiatric:         Behavior: Behavior normal.     Vitals:/42   Pulse 73   Temp 98.2  F (36.8  C)   Resp 20   Ht 1.575 m (5' 2\")   Wt 47.2 kg (104 lb)   SpO2 98%   BMI 19.02 kg/m   and Body mass index is 19.02 kg/m .    Lab/Diagnostic data:   Recent Results (from the past 240 hour(s))   Basic metabolic panel    Collection Time: 09/07/23  8:13 AM   Result Value Ref Range    Sodium 137 136 - 145 mmol/L    Potassium 4.5 3.4 - 5.3 mmol/L    Chloride 98 98 - 107 mmol/L    Carbon Dioxide (CO2) 24 22 - 29 mmol/L    Anion Gap 15 7 - 15 mmol/L    Urea Nitrogen 29.1 (H) 8.0 - 23.0 mg/dL    Creatinine 0.71 0.51 - 0.95 mg/dL    Calcium 9.4 8.8 - 10.2 mg/dL    Glucose 76 70 - 99 mg/dL    GFR Estimate 83 >60 mL/min/1.73m2        MEDICATIONS:     Review of your medicines            Accurate as of September 11, 2023  5:37 PM. If you have any questions, ask your nurse or doctor.                CONTINUE these medicines which may have CHANGED, or have new prescriptions. If we are uncertain of the size of tablets/capsules you have at home, strength may be listed as something that might have changed.        Dose / Directions   acetaminophen 500 MG tablet  Commonly known as: TYLENOL  This may have changed: Another medication with the same name was removed. Continue taking this medication, and follow the directions you see here.  Changed by: Roberta Martinez CNP      Dose: 1,000 mg  Take 1,000 mg by mouth every 6 hours as needed for mild pain  Refills: 0            CONTINUE these medicines which have NOT CHANGED        Dose / Directions   alendronate 70 MG tablet  Commonly known as: " FOSAMAX  Used for: Osteoporosis, unspecified osteoporosis type, unspecified pathological fracture presence      Dose: 70 mg  Take 1 tablet (70 mg) by mouth every 7 days  Quantity: 12 tablet  Refills: 3     calcium carbonate-vitamin D 500-200 MG-UNIT tablet  Commonly known as: OSCAL w/D      Dose: 1 tablet  Take 1 tablet by mouth 2 times daily  Refills: 0     carboxymethylcellulose PF 0.5 % ophthalmic solution  Commonly known as: REFRESH PLUS      Dose: 1 drop  1 drop 3 times daily as needed for dry eyes  Refills: 0     clindamycin 300 MG capsule  Commonly known as: CLEOCIN      Dose: 600 mg  Take 600 mg by mouth For dental procedures  Refills: 0     ferrous fumarate 65 mg ([place]. FE)-Vitamin C 125 mg  MG Tabs tablet  Commonly known as: VITRON C      Dose: 1 tablet  Take 1 tablet by mouth every other day  Refills: 0     folic acid 1 MG tablet  Commonly known as: FOLVITE      Dose: 1 tablet  Take 1 tablet by mouth daily  Refills: 0     levothyroxine 75 MCG tablet  Commonly known as: SYNTHROID/LEVOTHROID  Used for: Hypothyroidism, unspecified type      Dose: 37.5 mcg  Take 0.5 tablets (37.5 mcg) by mouth daily  Quantity: 30 tablet  Refills: 0     methotrexate 2.5 MG tablet  Used for: Rheumatoid arthritis, seropositive, multiple sites (H)      Dose: 10 mg  Take 4 tablets (10 mg) by mouth every 7 days  Quantity: 48 tablet  Refills: 0     MULTIVITAMIN ADULT PO      Dose: 1 tablet  Take 1 tablet by mouth daily  Refills: 0     predniSONE 1 MG tablet  Commonly known as: DELTASONE  Used for: Rheumatoid arthritis, seropositive, multiple sites (H)      TAKE 1 TABLET DAILY  Quantity: 90 tablet  Refills: 0     White Petrolatum ointment      Apply topically 2 times daily  Refills: 0              ASSESSMENT/PLAN  Encounter Diagnoses   Name Primary?    Physical deconditioning Yes    Unspecified nondisplaced fracture of first cervical vertebra, initial encounter for closed fracture (H)     Pain management      Nondisplaced  fracture of first cervical vertebrae follow-up with neurosurgery, pain management c-collar when out of bed or head of the bed greater than 30 degrees    Scalp laceration healing well without signs or symptoms of infection    Physical deconditioning PT OT    Osteoporosis on alendronate weekly    Hypothyroidism on levothyroxine    Rheumatoid arthritis continue methotrexate 10 mg daily every 7 days, prednisone 1 mg daily    Pain management - PRN ES Tylenol        DISCHARGE PLAN/FACE TO FACE:  I certify that services are/were furnished while this patient was under the care of a physician and that a physician or an allowed non-physician practitioner (NPP), had a face-to-face encounter that meets the physician face-to-face encounter requirements. The encounter was in whole, or in part, related to the primary reason for home health. The patient is confined to his/her home and needs intermittent skilled nursing, physical therapy, speech-language pathology, or the continued need for occupational therapy. A plan of care has been established by a physician and is periodically reviewed by a physician.  Date of Face-to-Face Encounter: 9/11/2023    I certify that, based on my findings, the following services are medically necessary home health services: Outpatient PT OT    My clinical findings support the need for the above skilled services because: Outpatient PT OT for continued strength and endurance following recent C-1 and odontoid fracture      This patient is homebound because: Not applicable she will do outpatient PT OT    The patient is, or has been, under my care and I have initiated the establishment of the plan of care. This patient will be followed by a physician who will periodically review the plan of care.    Schedule follow up visit with primary care provider within 7 days to reestablish care.    Electronically signed by: Roberta Martinez CNP

## 2023-09-11 NOTE — LETTER
9/11/2023        RE: Diann Low  [address]  [facility] [address]         HEALTH GERIATRIC SERVICES  Chief Complaint   Patient presents with     Discharge Summary [facility] Medical Record Number:  [id number]  Place of Service where encounter took place:  [facility] ([facility]) [[id number]]  Code Status:  comfort focused     HISTORY:      HPI:  Diann Low  is 83 year old (1940) undergoing physical and occupational therapy. with RA, osteoporosis, and hypothyroidism who presents with mechanical fall and direct trauma with a door from rushing down stairs resulting in large scalp lacerations, C1 and odontoid process fractures, and left ankle sprain.  She was recovering in this facility when she was sent back to the ER on 8/29/23 for hyponatremia Excerpted from records In the ED, her sodium was 118. She was minimally symptomatic. She received 1 L NS.  Given recent hospitalization and injury, head CT was ordered which was negative for subarachnoid hemorrhage or other etiology causing cerebral salt wasting.  8/30/23 - Her synthroid was increased given low free T4 and elevated TSH. Na improved to 127. IVF was held given rise rate of Na. Nephrology was consulted who felt low solute and hypovolemic state + hypothyroidism were the etiologies. Protein supplements were recommended.  8/31/23 - Na was 132. She remained asymptomatic and discharged. Son provided transport and had opportunity to ask questions.    Today she is seen to review VS. Labs routine visit and a face-to-face for discharge.  She will discharge back to [facility] on 9/12/2023 with current medications and treatments.  She will have outpatient PT OT.  She denied CP, SOB, cough or congestion. Denied constipation she did have a small bout of diarrhea however she reported it resolved.. Appetite is improving.  . large scalp incision without S/S infection.  She reported no pain with the scalp  incision or vision changes.  She did follow-up at the [facility] on 9/6/2023 and she is to wear her collar for approximately another 3 months.  She does tell writer she will follow-up in 6 weeks and they will redo x-rays to see if she can stop wearing the collar at that time.  She did return to the ER recently for low sodium however it has improved and she is now 137    ALLERGIES:Infliximab, Sulfa antibiotics, Hydrocodone-acetaminophen, Oxycodone-acetaminophen, Penicillins, and Erythromycin    PAST MEDICAL HISTORY:   Past Medical History:   Diagnosis Date     Skin cancer      Spider veins        PAST SURGICAL HISTORY:   has no past surgical history on file.    FAMILY HISTORY: family history is not on file.    SOCIAL HISTORY:  reports that she has never smoked. She has never used smokeless tobacco.    ROS:  Constitutional: Positive  for activity change,  positive appetite change, fatigue and fever. Related to collar improving with soft foods  HENT: Negative for congestion.    Respiratory: Negative for cough, shortness of breath and wheezing.    Cardiovascular: Negative for chest pain and leg swelling.   Gastrointestinal: Negative for abdominal distention, abdominal pain, constipation, diarrhea and nausea.   Genitourinary: Negative for dysuria.   Musculoskeletal: Negative for arthralgia. Negative for back pain.   Skin: Negative for color change and wound. Large scalp wound covered with vaseline   Neurological: Negative for dizziness.   Psychiatric/Behavioral: Negative for agitation, behavioral problems and confusion.     Physical Exam:  Constitutional:       Appearance: Patient is well-developed.   HENT:      Head: Normocephalic. C-1 fracture wearing  [place] collar  Eyes:      Conjunctiva/sclera: Conjunctivae normal.   Neck:      Musculoskeletal: Normal range of motion.   Cardiovascular:      Rate and Rhythm: Normal rate and regular rhythm.      Heart sounds: Normal heart sounds. No murmur.   Pulmonary:      Effort:  "No respiratory distress.      Breath sounds: Normal breath sounds. No wheezing or rales.   Abdominal:      General: Bowel sounds are normal. There is no distension.      Palpations: Abdomen is soft.      Tenderness: There is no abdominal tenderness.   Musculoskeletal:       Normal range of motion.     Skin:General:        Skin is warm.   Neurological:         Mental Status: Patient is alert and oriented to person, place, and time.   Psychiatric:         Behavior: Behavior normal.     Vitals:/42   Pulse 73   Temp 98.2  F (36.8  C)   Resp 20   Ht 1.575 m (5' 2\")   Wt 47.2 kg (104 lb)   SpO2 98%   BMI 19.02 kg/m   and Body mass index is 19.02 kg/m .    Lab/Diagnostic data:   Recent Results (from the past 240 hour(s))   Basic metabolic panel    Collection Time: 09/07/23  8:13 AM   Result Value Ref Range    Sodium 137 136 - 145 mmol/L    Potassium 4.5 3.4 - 5.3 mmol/L    Chloride 98 98 - 107 mmol/L    Carbon Dioxide (CO2) 24 22 - 29 mmol/L    Anion Gap 15 7 - 15 mmol/L    Urea Nitrogen 29.1 (H) 8.0 - 23.0 mg/dL    Creatinine 0.71 0.51 - 0.95 mg/dL    Calcium 9.4 8.8 - 10.2 mg/dL    Glucose 76 70 - 99 mg/dL    GFR Estimate 83 >60 mL/min/1.73m2        MEDICATIONS:     Review of your medicines            Accurate as of September 11, 2023  5:37 PM. If you have any questions, ask your nurse or doctor.                CONTINUE these medicines which may have CHANGED, or have new prescriptions. If we are uncertain of the size of tablets/capsules you have at home, strength may be listed as something that might have changed.        Dose / Directions   acetaminophen 500 MG tablet  Commonly known as: TYLENOL  This may have changed: Another medication with the same name was removed. Continue taking this medication, and follow the directions you see here.  Changed by: Roberta Martinez CNP      Dose: 1,000 mg  Take 1,000 mg by mouth every 6 hours as needed for mild pain  Refills: 0            CONTINUE these medicines which " have NOT CHANGED        Dose / Directions   alendronate 70 MG tablet  Commonly known as: FOSAMAX  Used for: Osteoporosis, unspecified osteoporosis type, unspecified pathological fracture presence      Dose: 70 mg  Take 1 tablet (70 mg) by mouth every 7 days  Quantity: 12 tablet  Refills: 3     calcium carbonate-vitamin D 500-200 MG-UNIT tablet  Commonly known as: OSCAL w/D      Dose: 1 tablet  Take 1 tablet by mouth 2 times daily  Refills: 0     carboxymethylcellulose PF 0.5 % ophthalmic solution  Commonly known as: REFRESH PLUS      Dose: 1 drop  1 drop 3 times daily as needed for dry eyes  Refills: 0     clindamycin 300 MG capsule  Commonly known as: CLEOCIN      Dose: 600 mg  Take 600 mg by mouth For dental procedures  Refills: 0     ferrous fumarate 65 mg ([place]. FE)-Vitamin C 125 mg  MG Tabs tablet  Commonly known as: VITRON C      Dose: 1 tablet  Take 1 tablet by mouth every other day  Refills: 0     folic acid 1 MG tablet  Commonly known as: FOLVITE      Dose: 1 tablet  Take 1 tablet by mouth daily  Refills: 0     levothyroxine 75 MCG tablet  Commonly known as: SYNTHROID/LEVOTHROID  Used for: Hypothyroidism, unspecified type      Dose: 37.5 mcg  Take 0.5 tablets (37.5 mcg) by mouth daily  Quantity: 30 tablet  Refills: 0     methotrexate 2.5 MG tablet  Used for: Rheumatoid arthritis, seropositive, multiple sites (H)      Dose: 10 mg  Take 4 tablets (10 mg) by mouth every 7 days  Quantity: 48 tablet  Refills: 0     MULTIVITAMIN ADULT PO      Dose: 1 tablet  Take 1 tablet by mouth daily  Refills: 0     predniSONE 1 MG tablet  Commonly known as: DELTASONE  Used for: Rheumatoid arthritis, seropositive, multiple sites (H)      TAKE 1 TABLET DAILY  Quantity: 90 tablet  Refills: 0     White Petrolatum ointment      Apply topically 2 times daily  Refills: 0              ASSESSMENT/PLAN  Encounter Diagnoses   Name Primary?     Physical deconditioning Yes     Unspecified nondisplaced fracture of first cervical  vertebra, initial encounter for closed fracture (H)      Pain management      Nondisplaced fracture of first cervical vertebrae follow-up with neurosurgery, pain management c-collar when out of bed or head of the bed greater than 30 degrees    Scalp laceration healing well without signs or symptoms of infection    Physical deconditioning PT OT    Osteoporosis on alendronate weekly    Hypothyroidism on levothyroxine    Rheumatoid arthritis continue methotrexate 10 mg daily every 7 days, prednisone 1 mg daily    Pain management - PRN ES Tylenol        DISCHARGE PLAN/FACE TO FACE:  I certify that services are/were furnished while this patient was under the care of a physician and that a physician or an allowed non-physician practitioner (NPP), had a face-to-face encounter that meets the physician face-to-face encounter requirements. The encounter was in whole, or in part, related to the primary reason for home health. The patient is confined to his/her home and needs intermittent skilled nursing, physical therapy, speech-language pathology, or the continued need for occupational therapy. A plan of care has been established by a physician and is periodically reviewed by a physician.  Date of Face-to-Face Encounter: 9/11/2023    I certify that, based on my findings, the following services are medically necessary home health services: Outpatient PT OT    My clinical findings support the need for the above skilled services because: Outpatient PT OT for continued strength and endurance following recent C-1 and odontoid fracture      This patient is homebound because: Not applicable she will do outpatient PT OT    The patient is, or has been, under my care and I have initiated the establishment of the plan of care. This patient will be followed by a physician who will periodically review the plan of care.    Schedule follow up visit with primary care provider within 7 days to reestablish care.    Electronically signed by:  Roberta Martinez, CNP            Sincerely,        Roberta Martinez, CNP

## 2023-09-15 ENCOUNTER — TRANSFERRED RECORDS (OUTPATIENT)
Dept: HEALTH INFORMATION MANAGEMENT | Facility: CLINIC | Age: 83
End: 2023-09-15
Payer: MEDICARE

## 2023-09-18 ENCOUNTER — OFFICE VISIT (OUTPATIENT)
Dept: INTERNAL MEDICINE | Facility: CLINIC | Age: 83
End: 2023-09-18
Payer: MEDICARE

## 2023-09-18 VITALS
HEART RATE: 76 BPM | BODY MASS INDEX: 18.4 KG/M2 | DIASTOLIC BLOOD PRESSURE: 60 MMHG | HEIGHT: 62 IN | TEMPERATURE: 97.9 F | RESPIRATION RATE: 20 BRPM | OXYGEN SATURATION: 99 % | WEIGHT: 100 LBS | SYSTOLIC BLOOD PRESSURE: 112 MMHG

## 2023-09-18 DIAGNOSIS — R79.89 ELEVATED SERUM CREATININE: ICD-10-CM

## 2023-09-18 DIAGNOSIS — Z79.52 LONG-TERM CORTICOSTEROID USE: ICD-10-CM

## 2023-09-18 DIAGNOSIS — Z13.29 SCREENING FOR ENDOCRINE, NUTRITIONAL, METABOLIC AND IMMUNITY DISORDER: ICD-10-CM

## 2023-09-18 DIAGNOSIS — R60.0 LOCALIZED EDEMA: ICD-10-CM

## 2023-09-18 DIAGNOSIS — D64.9 LOW HEMOGLOBIN: Primary | ICD-10-CM

## 2023-09-18 DIAGNOSIS — Z13.228 SCREENING FOR ENDOCRINE, NUTRITIONAL, METABOLIC AND IMMUNITY DISORDER: ICD-10-CM

## 2023-09-18 DIAGNOSIS — E03.8 SUBCLINICAL HYPOTHYROIDISM: ICD-10-CM

## 2023-09-18 DIAGNOSIS — Z13.21 SCREENING FOR ENDOCRINE, NUTRITIONAL, METABOLIC AND IMMUNITY DISORDER: ICD-10-CM

## 2023-09-18 DIAGNOSIS — E87.5 HYPERKALEMIA: ICD-10-CM

## 2023-09-18 DIAGNOSIS — M85.89 OSTEOPENIA OF MULTIPLE SITES: ICD-10-CM

## 2023-09-18 DIAGNOSIS — M05.79 RHEUMATOID ARTHRITIS, SEROPOSITIVE, MULTIPLE SITES (H): ICD-10-CM

## 2023-09-18 DIAGNOSIS — R63.6 UNDERWEIGHT: ICD-10-CM

## 2023-09-18 DIAGNOSIS — E87.1 HYPONATREMIA: ICD-10-CM

## 2023-09-18 DIAGNOSIS — E73.9 LACTOSE INTOLERANCE: ICD-10-CM

## 2023-09-18 DIAGNOSIS — Z13.0 SCREENING FOR ENDOCRINE, NUTRITIONAL, METABOLIC AND IMMUNITY DISORDER: ICD-10-CM

## 2023-09-18 DIAGNOSIS — K90.41 GLUTEN INTOLERANCE: ICD-10-CM

## 2023-09-18 LAB
ANION GAP SERPL CALCULATED.3IONS-SCNC: 10 MMOL/L (ref 7–15)
BASOPHILS # BLD AUTO: 0 10E3/UL (ref 0–0.2)
BASOPHILS NFR BLD AUTO: 1 %
BUN SERPL-MCNC: 25.5 MG/DL (ref 8–23)
CALCIUM SERPL-MCNC: 10 MG/DL (ref 8.8–10.2)
CHLORIDE SERPL-SCNC: 103 MMOL/L (ref 98–107)
CREAT SERPL-MCNC: 0.96 MG/DL (ref 0.51–0.95)
DEPRECATED HCO3 PLAS-SCNC: 25 MMOL/L (ref 22–29)
EGFRCR SERPLBLD CKD-EPI 2021: 58 ML/MIN/1.73M2
EOSINOPHIL # BLD AUTO: 0.1 10E3/UL (ref 0–0.7)
EOSINOPHIL NFR BLD AUTO: 1 %
ERYTHROCYTE [DISTWIDTH] IN BLOOD BY AUTOMATED COUNT: 15.8 % (ref 10–15)
GLUCOSE SERPL-MCNC: 118 MG/DL (ref 70–99)
HCT VFR BLD AUTO: 33.6 % (ref 35–47)
HGB BLD-MCNC: 10.9 G/DL (ref 11.7–15.7)
IMM GRANULOCYTES # BLD: 0 10E3/UL
IMM GRANULOCYTES NFR BLD: 0 %
LYMPHOCYTES # BLD AUTO: 1.4 10E3/UL (ref 0.8–5.3)
LYMPHOCYTES NFR BLD AUTO: 24 %
MCH RBC QN AUTO: 33.4 PG (ref 26.5–33)
MCHC RBC AUTO-ENTMCNC: 32.4 G/DL (ref 31.5–36.5)
MCV RBC AUTO: 103 FL (ref 78–100)
MONOCYTES # BLD AUTO: 0.6 10E3/UL (ref 0–1.3)
MONOCYTES NFR BLD AUTO: 9 %
NEUTROPHILS # BLD AUTO: 3.9 10E3/UL (ref 1.6–8.3)
NEUTROPHILS NFR BLD AUTO: 65 %
PLATELET # BLD AUTO: 251 10E3/UL (ref 150–450)
POTASSIUM SERPL-SCNC: 5.5 MMOL/L (ref 3.4–5.3)
RBC # BLD AUTO: 3.26 10E6/UL (ref 3.8–5.2)
SODIUM SERPL-SCNC: 138 MMOL/L (ref 136–145)
WBC # BLD AUTO: 6 10E3/UL (ref 4–11)

## 2023-09-18 PROCEDURE — 80048 BASIC METABOLIC PNL TOTAL CA: CPT | Performed by: NURSE PRACTITIONER

## 2023-09-18 PROCEDURE — 36415 COLL VENOUS BLD VENIPUNCTURE: CPT | Performed by: NURSE PRACTITIONER

## 2023-09-18 PROCEDURE — 90662 IIV NO PRSV INCREASED AG IM: CPT | Performed by: NURSE PRACTITIONER

## 2023-09-18 PROCEDURE — 99214 OFFICE O/P EST MOD 30 MIN: CPT | Mod: 25 | Performed by: NURSE PRACTITIONER

## 2023-09-18 PROCEDURE — 85025 COMPLETE CBC W/AUTO DIFF WBC: CPT | Performed by: NURSE PRACTITIONER

## 2023-09-18 PROCEDURE — 86008 ALLG SPEC IGE RECOMB EA: CPT | Mod: 59 | Performed by: NURSE PRACTITIONER

## 2023-09-18 PROCEDURE — G0008 ADMIN INFLUENZA VIRUS VAC: HCPCS | Performed by: NURSE PRACTITIONER

## 2023-09-18 PROCEDURE — 86003 ALLG SPEC IGE CRUDE XTRC EA: CPT | Performed by: NURSE PRACTITIONER

## 2023-09-18 PROCEDURE — 86364 TISS TRNSGLTMNASE EA IG CLAS: CPT | Performed by: NURSE PRACTITIONER

## 2023-09-18 NOTE — PATIENT INSTRUCTIONS
Flu shot given today.    Your labs are processing, I will release results on my chart once they are back.    Continue PT and OT.    Cut back on your Ensure to a half a can per day and see if this helps with the diarrhea.    Compression stockings, on in the Am, off in the PM.    I will see you back in one month for follow up, before then if anything comes up.

## 2023-09-18 NOTE — PROGRESS NOTES
Assessment & Plan     Hyponatremia: Last sodium 11 days ago was 137. Will check today.   - Basic metabolic panel  (Ca, Cl, CO2, Creat, Gluc, K, Na, BUN)    Rheumatoid arthritis, seropositive, multiple sites (H)/Long-term corticosteroid use: Continues on methotrexate, prednisone, and Orencia. Managed per Rheumatology.     Subclinical hypothyroidism: Last TSH 2 weeks ago was 6.47. Will recheck at 8 week martha.     Osteopenia of multiple sites: On fosamax. Stable.     Gluten intolerance: Diarrhea with gluten foods. Will check food allergy testing.   - Tissue transglutaminase jessica IgA and IgG    Localized edema: Left foot and ankle. Will order compression stockings, on in the AM, off in the PM.   - Orthotics and Prosthetics DME Compression; Leg; Knee; Bilateral; 15/20 mmHg; 12 Pair    Lactose intolerance: Will check milk allergy panel.   - Milk Components Allergy Panel    Underweight: BMI today was 18.29. She is eating well at home, will do 0.5 of an ensure daily. Will monitor closely.     Screening for endocrine, nutritional, metabolic and immunity disorder  - CBC with platelets and differential       MED REC REQUIRED  Post Medication Reconciliation Status: discharge medications reconciled and changed, per note/orders      [facility]    Nicholas Tidwell is a 83 year old, presenting for the following health issues:  Follow Up (Hospital follow up- [facility] and [facility]- low sodium)        2/1/2023     9:09 AM   Additional Questions   Roomed by Tamika KWONG     The patient presents today with her Son Isael for follow up.    She fell, causing a C1 fracture, was admitted to 08/17 to the [facility] post mechanical fall while rushing down stairs. She ended up with large scalp lacerations, C1 and odontoid process fractures, and a left ankle sprain.    She was discharged to the [facility] TCU on 08/22/23; was found to have a low sodium at 118 on 08/29; and  was admitted to [facility] from 08/29-08/31/23. She then returned to the TCU, and was discharged back to her Senior Living Apartment on 09/11/23.    She continues with PT and OT at [facility].    Her last sodium on 09/07/23 was 137. Will recheck labs today.    She reports that her left foot is still somewhat swollen, pain and bruising has subsided. Discussed wearing compression stockings to help with this.    She follows with Neurosurgery at [facility], will be in her cervical collar until Thanksgiving time.    She since discharge from the TCU she has been eating more normal foods for her. She has also been drinking more ensure than normal. She reports having some diarrhea associated with her diet change. Will test her for possible gluten intolerance today with blood work.    For now she will cut back on her ensure intake.    She would like a flu shot today.    She has been eating good overall since discharging home.     Hospital Follow-up Visit:    Hospital/Nursing Home/IP Rehab Facility: [facility]  Date of Admission: 8/29/23  Date of Discharge: 8/31/23  Reason(s) for Admission: Low sodium    Was your hospitalization related to COVID-19? No   Problems taking medications regularly:  None  Medication changes since discharge: None  Problems adhering to non-medication therapy:  None    Summary of hospitalization:  [facility] discharge summary reviewed  Diagnostic Tests/Treatments reviewed.  Follow up needed: Wellness visit  Other Healthcare Providers Involved in Patient s Care:         Physical Therapy and Occupational Therapy  Update since discharge: improved.         Plan of care communicated with patient and family             Review of Systems   Constitutional, HEENT, cardiovascular, pulmonary, GI, , musculoskeletal, neuro, skin, endocrine and psych systems are negative, except as otherwise noted.      Objective    /60 (BP Location: Right arm, Patient Position:  "Sitting)   Pulse 76   Temp 97.9  F (36.6  C)   Resp 20   Ht 1.575 m (5' 2\")   Wt 45.4 kg (100 lb)   SpO2 99%   BMI 18.29 kg/m    Body mass index is 18.29 kg/m .  Physical Exam   GENERAL: healthy, alert and no distress  EYES: Eyes grossly normal to inspection  RESP: lungs clear to auscultation - no rales, rhonchi or wheezes  CV: regular rate and rhythm, normal S1 S2, no S3 or S4, no murmur, click or rub  MS: no gross musculoskeletal defects noted, trace edema left ankle, no bruising  SKIN: no suspicious lesions or rashes  NEURO: Normal strength and tone, mentation intact and speech normal  PSYCH: mentation appears normal, affect normal/bright            "

## 2023-09-19 ENCOUNTER — TELEPHONE (OUTPATIENT)
Dept: RHEUMATOLOGY | Facility: CLINIC | Age: 83
End: 2023-09-19
Payer: MEDICARE

## 2023-09-19 DIAGNOSIS — M05.79 RHEUMATOID ARTHRITIS, SEROPOSITIVE, MULTIPLE SITES (H): Primary | ICD-10-CM

## 2023-09-19 LAB
A-LACTALB IGE QN: <0.1 KU(A)/L
B-LACTOGLOB MF77 IGE QN: <0.1 KU(A)/L
CASEIN IGE QN: <0.1 KU(A)/L
TTG IGA SER-ACNC: 0.5 U/ML
TTG IGG SER-ACNC: <0.6 U/ML

## 2023-09-19 NOTE — TELEPHONE ENCOUNTER
Patient was supposed to have an infusion  and it was cancelled because the orders were . Patient would like new orders placed.

## 2023-09-20 ENCOUNTER — TELEPHONE (OUTPATIENT)
Dept: ENDOCRINOLOGY | Facility: CLINIC | Age: 83
End: 2023-09-20
Payer: MEDICARE

## 2023-09-20 DIAGNOSIS — E03.9 HYPOTHYROIDISM, UNSPECIFIED TYPE: Primary | ICD-10-CM

## 2023-09-20 NOTE — TELEPHONE ENCOUNTER
[facility] Call Center    Phone Message    May a detailed message be left on voicemail: yes     Reason for Call: Other: Diann was in the hospital recently and the physician she saw [place] labs and informed that she needs an increase dosage of her levothyroxine (SYNTHROID/LEVOTHROID) 75 MCG tablet. Please review notes and contact patient to assist. Thank you      Action Taken: Other: endo    Travel Screening: Not Applicable

## 2023-09-20 NOTE — TELEPHONE ENCOUNTER
Component      Latest Ref Rng 8/29/2023  5:46 PM   TSH      0.30 - 5.00 uIU/mL 6.47 (H)    T4 Free      0.90 - 1.70 ng/dL 0.12 (L)       Legend:  (H) High  (L) Low    Per notes from 7/21:  -Continue levothyroxine 25 mcg daily at bedtime

## 2023-09-21 RX ORDER — LEVOTHYROXINE SODIUM 75 UG/1
37.5 TABLET ORAL DAILY
Qty: 15 TABLET | Refills: 0 | Status: SHIPPED | OUTPATIENT
Start: 2023-09-21 | End: 2023-10-09

## 2023-09-21 NOTE — TELEPHONE ENCOUNTER
It looks like the hospitalist who took care of her recommended increasing levothyroxine to HALF of 75 mcg tablet daily--is this what she has been taking?.  This is okay to continue.  If she has been taking this since 9/1/2023 (this is when it was prescribed), I would like her to have lab draw in early October and we can decide whether dose needs to be adjusted: I have placed lab orders.

## 2023-09-21 NOTE — TELEPHONE ENCOUNTER
Spoke to pt and discussed below per provider:    It looks like the hospitalist who took care of her recommended increasing levothyroxine to HALF of 75 mcg tablet daily--is this what she has been taking?.  This is okay to continue.  If she has been taking this since 9/1/2023 (this is when it was prescribed), I would like her to have lab draw in early October and we can decide whether dose needs to be adjusted: I have placed lab orders.     Pt has been taking as directed only has 7 days worth left. Scheduled lab appt and sent in rx.

## 2023-09-22 RX ORDER — DIPHENHYDRAMINE HYDROCHLORIDE 50 MG/ML
50 INJECTION INTRAMUSCULAR; INTRAVENOUS
Status: CANCELLED
Start: 2023-09-22

## 2023-09-22 RX ORDER — HEPARIN SODIUM (PORCINE) LOCK FLUSH IV SOLN 100 UNIT/ML 100 UNIT/ML
5 SOLUTION INTRAVENOUS
Status: CANCELLED | OUTPATIENT
Start: 2023-09-22

## 2023-09-22 RX ORDER — METHYLPREDNISOLONE SODIUM SUCCINATE 125 MG/2ML
125 INJECTION, POWDER, LYOPHILIZED, FOR SOLUTION INTRAMUSCULAR; INTRAVENOUS ONCE
Status: CANCELLED
Start: 2023-09-22 | End: 2023-09-22

## 2023-09-22 RX ORDER — ACETAMINOPHEN 325 MG/1
650 TABLET ORAL ONCE
Status: CANCELLED
Start: 2023-09-22 | End: 2023-09-22

## 2023-09-22 RX ORDER — DIPHENHYDRAMINE HCL 25 MG
25 CAPSULE ORAL ONCE
Status: CANCELLED
Start: 2023-09-22 | End: 2023-09-22

## 2023-09-22 RX ORDER — ALBUTEROL SULFATE 90 UG/1
1-2 AEROSOL, METERED RESPIRATORY (INHALATION)
Status: CANCELLED
Start: 2023-09-22

## 2023-09-22 RX ORDER — HEPARIN SODIUM,PORCINE 10 UNIT/ML
5 VIAL (ML) INTRAVENOUS
Status: CANCELLED | OUTPATIENT
Start: 2023-09-22

## 2023-09-22 RX ORDER — ALBUTEROL SULFATE 0.83 MG/ML
2.5 SOLUTION RESPIRATORY (INHALATION)
Status: CANCELLED | OUTPATIENT
Start: 2023-09-22

## 2023-09-22 RX ORDER — METHYLPREDNISOLONE SODIUM SUCCINATE 125 MG/2ML
125 INJECTION, POWDER, LYOPHILIZED, FOR SOLUTION INTRAMUSCULAR; INTRAVENOUS
Status: CANCELLED
Start: 2023-09-22

## 2023-09-22 RX ORDER — MEPERIDINE HYDROCHLORIDE 25 MG/ML
25 INJECTION INTRAMUSCULAR; INTRAVENOUS; SUBCUTANEOUS EVERY 30 MIN PRN
Status: CANCELLED | OUTPATIENT
Start: 2023-09-22

## 2023-09-22 RX ORDER — NALOXONE HYDROCHLORIDE 0.4 MG/ML
0.2 INJECTION, SOLUTION INTRAMUSCULAR; INTRAVENOUS; SUBCUTANEOUS
Status: CANCELLED | OUTPATIENT
Start: 2023-09-22

## 2023-09-22 RX ORDER — EPINEPHRINE 1 MG/ML
0.3 INJECTION, SOLUTION, CONCENTRATE INTRAVENOUS EVERY 5 MIN PRN
Status: CANCELLED | OUTPATIENT
Start: 2023-09-22

## 2023-09-25 ENCOUNTER — TRANSFERRED RECORDS (OUTPATIENT)
Dept: HEALTH INFORMATION MANAGEMENT | Facility: CLINIC | Age: 83
End: 2023-09-25

## 2023-09-26 DIAGNOSIS — M05.79 RHEUMATOID ARTHRITIS, SEROPOSITIVE, MULTIPLE SITES (H): ICD-10-CM

## 2023-09-28 ENCOUNTER — OFFICE VISIT (OUTPATIENT)
Dept: FAMILY MEDICINE | Facility: CLINIC | Age: 83
End: 2023-09-28
Payer: MEDICARE

## 2023-09-28 VITALS
SYSTOLIC BLOOD PRESSURE: 117 MMHG | HEART RATE: 76 BPM | DIASTOLIC BLOOD PRESSURE: 72 MMHG | OXYGEN SATURATION: 97 % | RESPIRATION RATE: 16 BRPM | TEMPERATURE: 97.8 F

## 2023-09-28 DIAGNOSIS — T14.8XXA LOCAL INFECTION OF WOUND: ICD-10-CM

## 2023-09-28 DIAGNOSIS — S93.402A SPRAIN OF LEFT ANKLE, UNSPECIFIED LIGAMENT, INITIAL ENCOUNTER: Primary | ICD-10-CM

## 2023-09-28 DIAGNOSIS — L08.9 LOCAL INFECTION OF WOUND: ICD-10-CM

## 2023-09-28 PROCEDURE — 99213 OFFICE O/P EST LOW 20 MIN: CPT | Performed by: FAMILY MEDICINE

## 2023-09-28 RX ORDER — CEPHALEXIN 500 MG/1
500 CAPSULE ORAL 3 TIMES DAILY
Qty: 21 CAPSULE | Refills: 0 | Status: SHIPPED | OUTPATIENT
Start: 2023-09-28 | End: 2023-10-05

## 2023-10-03 ENCOUNTER — TELEPHONE (OUTPATIENT)
Dept: RHEUMATOLOGY | Facility: CLINIC | Age: 83
End: 2023-10-03
Payer: MEDICARE

## 2023-10-05 ENCOUNTER — LAB (OUTPATIENT)
Dept: INFUSION THERAPY | Facility: CLINIC | Age: 83
End: 2023-10-05
Attending: STUDENT IN AN ORGANIZED HEALTH CARE EDUCATION/TRAINING PROGRAM
Payer: MEDICARE

## 2023-10-05 DIAGNOSIS — E87.1 HYPONATREMIA: ICD-10-CM

## 2023-10-05 DIAGNOSIS — E03.9 HYPOTHYROIDISM, UNSPECIFIED TYPE: ICD-10-CM

## 2023-10-05 DIAGNOSIS — E87.5 HYPERKALEMIA: ICD-10-CM

## 2023-10-05 DIAGNOSIS — R79.89 ELEVATED SERUM CREATININE: ICD-10-CM

## 2023-10-05 DIAGNOSIS — D64.9 LOW HEMOGLOBIN: ICD-10-CM

## 2023-10-05 LAB
ANION GAP SERPL CALCULATED.3IONS-SCNC: 10 MMOL/L (ref 7–15)
BASO+EOS+MONOS # BLD AUTO: ABNORMAL 10*3/UL
BASO+EOS+MONOS NFR BLD AUTO: ABNORMAL %
BASOPHILS # BLD AUTO: 0 10E3/UL (ref 0–0.2)
BASOPHILS NFR BLD AUTO: 0 %
BUN SERPL-MCNC: 23.5 MG/DL (ref 8–23)
CALCIUM SERPL-MCNC: 9.9 MG/DL (ref 8.8–10.2)
CHLORIDE SERPL-SCNC: 102 MMOL/L (ref 98–107)
CREAT SERPL-MCNC: 1.24 MG/DL (ref 0.51–0.95)
DEPRECATED HCO3 PLAS-SCNC: 28 MMOL/L (ref 22–29)
EGFRCR SERPLBLD CKD-EPI 2021: 42 ML/MIN/1.73M2
EOSINOPHIL # BLD AUTO: 0.1 10E3/UL (ref 0–0.7)
EOSINOPHIL NFR BLD AUTO: 1 %
ERYTHROCYTE [DISTWIDTH] IN BLOOD BY AUTOMATED COUNT: 14.8 % (ref 10–15)
FERRITIN SERPL-MCNC: 84 NG/ML (ref 11–328)
GLUCOSE SERPL-MCNC: 123 MG/DL (ref 70–99)
HCT VFR BLD AUTO: 32.6 % (ref 35–47)
HGB BLD-MCNC: 10.7 G/DL (ref 11.7–15.7)
IMM GRANULOCYTES # BLD: 0 10E3/UL
IMM GRANULOCYTES NFR BLD: 0 %
IRON BINDING CAPACITY (ROCHE): 232 UG/DL (ref 240–430)
IRON SATN MFR SERPL: 37 % (ref 15–46)
IRON SERPL-MCNC: 85 UG/DL (ref 37–145)
LYMPHOCYTES # BLD AUTO: 1.3 10E3/UL (ref 0.8–5.3)
LYMPHOCYTES NFR BLD AUTO: 16 %
MCH RBC QN AUTO: 33.1 PG (ref 26.5–33)
MCHC RBC AUTO-ENTMCNC: 32.8 G/DL (ref 31.5–36.5)
MCV RBC AUTO: 101 FL (ref 78–100)
MONOCYTES # BLD AUTO: 0.7 10E3/UL (ref 0–1.3)
MONOCYTES NFR BLD AUTO: 9 %
NEUTROPHILS # BLD AUTO: 5.9 10E3/UL (ref 1.6–8.3)
NEUTROPHILS NFR BLD AUTO: 74 %
NRBC # BLD AUTO: 0 10E3/UL
NRBC BLD AUTO-RTO: 0 /100
PLATELET # BLD AUTO: 208 10E3/UL (ref 150–450)
POTASSIUM SERPL-SCNC: 4.8 MMOL/L (ref 3.4–5.3)
RBC # BLD AUTO: 3.23 10E6/UL (ref 3.8–5.2)
SODIUM SERPL-SCNC: 140 MMOL/L (ref 135–145)
T4 FREE SERPL-MCNC: 1.37 NG/DL (ref 0.9–1.7)
TSH SERPL DL<=0.005 MIU/L-ACNC: 2.82 UIU/ML (ref 0.3–4.2)
VIT B12 SERPL-MCNC: 521 PG/ML (ref 232–1245)
WBC # BLD AUTO: 8.1 10E3/UL (ref 4–11)

## 2023-10-05 PROCEDURE — 83550 IRON BINDING TEST: CPT

## 2023-10-05 PROCEDURE — 85025 COMPLETE CBC W/AUTO DIFF WBC: CPT

## 2023-10-05 PROCEDURE — 84439 ASSAY OF FREE THYROXINE: CPT

## 2023-10-05 PROCEDURE — 82607 VITAMIN B-12: CPT

## 2023-10-05 PROCEDURE — 36415 COLL VENOUS BLD VENIPUNCTURE: CPT

## 2023-10-05 PROCEDURE — 84443 ASSAY THYROID STIM HORMONE: CPT

## 2023-10-05 PROCEDURE — 80048 BASIC METABOLIC PNL TOTAL CA: CPT

## 2023-10-05 PROCEDURE — 82728 ASSAY OF FERRITIN: CPT

## 2023-10-06 NOTE — PROGRESS NOTES
Assessment:       Sprain of left ankle, unspecified ligament, initial encounter  - Ankle/Foot Bracing Supplies DME Ankle Brace; Left    Local infection of wound  - cephALEXin (KEFLEX) 500 MG capsule  Dispense: 21 capsule; Refill: 0    Plan:     Times consistent of a sprained left ankle.  Ankle brace provided.  Recommend rest, ice, elevation, compression.  Indication for imaging at this time.    Patient with a local infection of her wound.  We will treat with cephalexin.  Follow-up if symptoms getting worse or not improving as expected.    MEDICATIONS:   Orders Placed This Encounter   Medications    cephALEXin (KEFLEX) 500 MG capsule     Sig: Take 1 capsule (500 mg) by mouth 3 times daily for 7 days     Dispense:  21 capsule     Refill:  0       Subjective:       83 year old female presents for evaluation of a wound that she sustained on her left lower extremity on August 17 after she fell.  It had been healing well but now has developed some redness surrounding the area of the wound as well as some drainage.  No fevers or chills.    She also likely sprained her left ankle several days ago and it has been bothering her.  She is wondering what she can do for it.  She has been able to weight-bear but it hurts slightly over the lateral aspect of her ankle.        Patient Active Problem List   Diagnosis    Rheumatoid arthritis involving both hands, unspecified whether rheumatoid factor present (H)    History of falling    Arthritis, rheumatoid (H)    Presence of intraocular lens    Rheumatoid arthritis, seropositive, multiple sites (H)    Osteoporosis, unspecified osteoporosis type, unspecified pathological fracture presence    Hypothyroidism    Injury of head    Repeated falls    Unspecified nondisplaced fracture of first cervical vertebra, initial encounter for closed fracture (H)    Hyponatremia       Past Medical History:   Diagnosis Date    Skin cancer     Spider veins        No past surgical history on  file.    Current Outpatient Medications   Medication    acetaminophen (TYLENOL) 500 MG tablet    alendronate (FOSAMAX) 70 MG tablet    calcium carbonate-vitamin D (OSCAL W/D) 500-200 MG-UNIT tablet    carboxymethylcellulose PF (REFRESH PLUS) 0.5 % ophthalmic solution    cephALEXin (KEFLEX) 500 MG capsule    ferrous fumarate 65 mg, [place]. FE,-Vitamin C 125 mg (VITRON C)  MG TABS tablet    folic acid (FOLVITE) 1 MG tablet    levothyroxine (SYNTHROID/LEVOTHROID) 75 MCG tablet    methotrexate 2.5 MG tablet    Multiple Vitamin (MULTIVITAMIN ADULT PO)    predniSONE (DELTASONE) 1 MG tablet    White Petrolatum ointment    clindamycin (CLEOCIN) 300 MG capsule     No current facility-administered medications for this visit.       Allergies   Allergen Reactions    Infliximab Rash     Throat began to close/tighten    Sulfa Antibiotics Rash    Hydrocodone-Acetaminophen Nausea    Oxycodone-Acetaminophen Nausea    Penicillins Rash    Erythromycin Nausea       No family history on file.    Social History     Socioeconomic History    Marital status: Single   Tobacco Use    Smoking status: Never    Smokeless tobacco: Never   Vaping Use    Vaping Use: Never used         Review of Systems  Pertinent items are noted in HPI.      Objective:     /72   Pulse 76   Temp 97.8  F (36.6  C) (Oral)   Resp 16   SpO2 97%      General appearance: alert, appears stated age, and cooperative  Extremities: Patient with some very mild soft tissue tenderness to palpation of the lateral aspect of her left ankle.  No tenderness at the medial or lateral malleolus.  Significant swelling, ecchymosis, or erythema noted.  Patient also with a wound on her left lower extremity that does not appear to be healing with some surrounding erythema that is developed over the last several days.  Mildly tender to the touch.        This note has been dictated using voice recognition software. Any grammatical or context distortions are unintentional and  inherent to the software

## 2023-10-09 RX ORDER — LEVOTHYROXINE SODIUM 75 UG/1
37.5 TABLET ORAL DAILY
Qty: 45 TABLET | Refills: 1 | Status: SHIPPED | OUTPATIENT
Start: 2023-10-09 | End: 2024-01-15

## 2023-10-11 ENCOUNTER — INFUSION THERAPY VISIT (OUTPATIENT)
Dept: INFUSION THERAPY | Facility: CLINIC | Age: 83
End: 2023-10-11
Attending: NURSE PRACTITIONER
Payer: MEDICARE

## 2023-10-11 VITALS
SYSTOLIC BLOOD PRESSURE: 129 MMHG | RESPIRATION RATE: 18 BRPM | DIASTOLIC BLOOD PRESSURE: 65 MMHG | OXYGEN SATURATION: 98 % | HEART RATE: 80 BPM | TEMPERATURE: 97.6 F

## 2023-10-11 DIAGNOSIS — M05.79 RHEUMATOID ARTHRITIS, SEROPOSITIVE, MULTIPLE SITES (H): Primary | ICD-10-CM

## 2023-10-11 PROCEDURE — 258N000003 HC RX IP 258 OP 636: Performed by: INTERNAL MEDICINE

## 2023-10-11 PROCEDURE — 96365 THER/PROPH/DIAG IV INF INIT: CPT

## 2023-10-11 PROCEDURE — 250N000028 HC RX JA MOD (INTRAVENOUS), IP 250 OP 636: Mod: JZ,JA | Performed by: INTERNAL MEDICINE

## 2023-10-11 RX ORDER — METHYLPREDNISOLONE SODIUM SUCCINATE 125 MG/2ML
125 INJECTION, POWDER, LYOPHILIZED, FOR SOLUTION INTRAMUSCULAR; INTRAVENOUS ONCE
Status: CANCELLED
Start: 2023-10-25 | End: 2023-10-25

## 2023-10-11 RX ORDER — HEPARIN SODIUM (PORCINE) LOCK FLUSH IV SOLN 100 UNIT/ML 100 UNIT/ML
5 SOLUTION INTRAVENOUS
Status: CANCELLED | OUTPATIENT
Start: 2023-10-25

## 2023-10-11 RX ORDER — MEPERIDINE HYDROCHLORIDE 50 MG/ML
25 INJECTION INTRAMUSCULAR; INTRAVENOUS; SUBCUTANEOUS EVERY 30 MIN PRN
Status: CANCELLED | OUTPATIENT
Start: 2023-10-25

## 2023-10-11 RX ORDER — DIPHENHYDRAMINE HYDROCHLORIDE 50 MG/ML
50 INJECTION INTRAMUSCULAR; INTRAVENOUS
Status: CANCELLED
Start: 2023-10-25

## 2023-10-11 RX ORDER — ALBUTEROL SULFATE 90 UG/1
1-2 AEROSOL, METERED RESPIRATORY (INHALATION)
Status: DISCONTINUED | OUTPATIENT
Start: 2023-10-11 | End: 2023-10-11 | Stop reason: HOSPADM

## 2023-10-11 RX ORDER — DIPHENHYDRAMINE HYDROCHLORIDE 50 MG/ML
50 INJECTION INTRAMUSCULAR; INTRAVENOUS
Status: DISCONTINUED | OUTPATIENT
Start: 2023-10-11 | End: 2023-10-11 | Stop reason: HOSPADM

## 2023-10-11 RX ORDER — HEPARIN SODIUM,PORCINE 10 UNIT/ML
5 VIAL (ML) INTRAVENOUS
Status: CANCELLED | OUTPATIENT
Start: 2023-10-25

## 2023-10-11 RX ORDER — NALOXONE HYDROCHLORIDE 0.4 MG/ML
0.2 INJECTION, SOLUTION INTRAMUSCULAR; INTRAVENOUS; SUBCUTANEOUS
Status: CANCELLED | OUTPATIENT
Start: 2023-10-25

## 2023-10-11 RX ORDER — EPINEPHRINE 1 MG/ML
0.3 INJECTION, SOLUTION INTRAMUSCULAR; SUBCUTANEOUS EVERY 5 MIN PRN
Status: CANCELLED | OUTPATIENT
Start: 2023-10-25

## 2023-10-11 RX ORDER — METHYLPREDNISOLONE SODIUM SUCCINATE 125 MG/2ML
125 INJECTION, POWDER, LYOPHILIZED, FOR SOLUTION INTRAMUSCULAR; INTRAVENOUS
Status: DISCONTINUED | OUTPATIENT
Start: 2023-10-11 | End: 2023-10-11 | Stop reason: HOSPADM

## 2023-10-11 RX ORDER — EPINEPHRINE 1 MG/ML
0.3 INJECTION, SOLUTION INTRAMUSCULAR; SUBCUTANEOUS EVERY 5 MIN PRN
Status: DISCONTINUED | OUTPATIENT
Start: 2023-10-11 | End: 2023-10-11 | Stop reason: HOSPADM

## 2023-10-11 RX ORDER — METHYLPREDNISOLONE SODIUM SUCCINATE 125 MG/2ML
125 INJECTION, POWDER, LYOPHILIZED, FOR SOLUTION INTRAMUSCULAR; INTRAVENOUS
Status: CANCELLED
Start: 2023-10-25

## 2023-10-11 RX ORDER — NALOXONE HYDROCHLORIDE 0.4 MG/ML
0.2 INJECTION, SOLUTION INTRAMUSCULAR; INTRAVENOUS; SUBCUTANEOUS
Status: DISCONTINUED | OUTPATIENT
Start: 2023-10-11 | End: 2023-10-11 | Stop reason: HOSPADM

## 2023-10-11 RX ORDER — ACETAMINOPHEN 325 MG/1
650 TABLET ORAL ONCE
Status: CANCELLED
Start: 2023-10-25 | End: 2023-10-25

## 2023-10-11 RX ORDER — MEPERIDINE HYDROCHLORIDE 50 MG/ML
25 INJECTION INTRAMUSCULAR; INTRAVENOUS; SUBCUTANEOUS EVERY 30 MIN PRN
Status: DISCONTINUED | OUTPATIENT
Start: 2023-10-11 | End: 2023-10-11 | Stop reason: HOSPADM

## 2023-10-11 RX ORDER — ALBUTEROL SULFATE 0.83 MG/ML
2.5 SOLUTION RESPIRATORY (INHALATION)
Status: CANCELLED | OUTPATIENT
Start: 2023-10-25

## 2023-10-11 RX ORDER — ALBUTEROL SULFATE 90 UG/1
1-2 AEROSOL, METERED RESPIRATORY (INHALATION)
Status: CANCELLED
Start: 2023-10-25

## 2023-10-11 RX ORDER — ALBUTEROL SULFATE 0.83 MG/ML
2.5 SOLUTION RESPIRATORY (INHALATION)
Status: DISCONTINUED | OUTPATIENT
Start: 2023-10-11 | End: 2023-10-11 | Stop reason: HOSPADM

## 2023-10-11 RX ORDER — DIPHENHYDRAMINE HCL 25 MG
25 CAPSULE ORAL ONCE
Status: CANCELLED
Start: 2023-10-25 | End: 2023-10-25

## 2023-10-11 RX ADMIN — SODIUM CHLORIDE 500 MG: 9 INJECTION, SOLUTION INTRAVENOUS at 13:57

## 2023-10-11 NOTE — PROGRESS NOTES
Infusion Nursing Note:  Diann Low presents today for Orencia infusion.    Patient seen by provider today: No   present during visit today: Not Applicable.    Note: Patient received medications as ordered. No pre-medications.      Intravenous Access:  Peripheral IV placed.    Treatment Conditions:  Biological Infusion Checklist:  ~~~ NOTE: If the patient answers yes to any of the questions below, hold the infusion and contact ordering provider or on-call provider.    Have you recently had an elevated temperature, fever, chills, productive cough, coughing for 3 weeks or longer or hemoptysis,  abnormal vital signs, night sweats,  chest pain or have you noticed a decrease in your appetite, unexplained weight loss or fatigue? No  Do you have any open wounds or new incisions? No  Do you have any upcoming hospitalizations or surgeries? Does not include esophagogastroduodenoscopy, colonoscopy, endoscopic retrograde cholangiopancreatography (ERCP), endoscopic ultrasound (EUS), dental procedures or joint aspiration/steroid injections No  Do you currently have any signs of illness or infection or are you on any antibiotics? No  Have you had any new, sudden or worsening abdominal pain? No  Have you or anyone in your household received a live vaccination in the past 4 weeks? Please note: No live vaccines while on biologic/chemotherapy until 6 months after the last treatment. Patient can receive the flu vaccine (shot only), pneumovax and the Covid vaccine. It is optimal for the patient to get these vaccines mid cycle, but they can be given at any time as long as it is not on the day of the infusion. No  Have you recently been diagnosed with any new nervous system diseases (ie. Multiple sclerosis, Guillain [place], seizures, neurological changes) or cancer diagnosis? Are you on any form of radiation or chemotherapy? No  Are you pregnant or breast feeding or do you have plans of pregnancy in the future? No  Have you  been having any signs of worsening depression or suicidal ideations?  (benlysta only) No  Have there been any other new onset medical symptoms? No  Have you had any new blood clots? (IVIG only) No      Post Infusion Assessment:  Patient tolerated infusion without incident.       Discharge Plan:   Patient discharged in stable condition accompanied by: delores Solorio RN

## 2023-10-12 ASSESSMENT — ENCOUNTER SYMPTOMS
PALPITATIONS: 0
DYSURIA: 0
NERVOUS/ANXIOUS: 0
PARESTHESIAS: 1
HEADACHES: 0
EYE PAIN: 0
HEMATOCHEZIA: 0
JOINT SWELLING: 1
CONSTIPATION: 0
DIZZINESS: 0
ABDOMINAL PAIN: 0
CHILLS: 1
NAUSEA: 0
MYALGIAS: 1
COUGH: 0
ARTHRALGIAS: 1
SHORTNESS OF BREATH: 0
HEMATURIA: 0
BREAST MASS: 0
FEVER: 0
HEARTBURN: 0
SORE THROAT: 0
WEAKNESS: 1
FREQUENCY: 1
DIARRHEA: 1

## 2023-10-12 ASSESSMENT — ACTIVITIES OF DAILY LIVING (ADL): CURRENT_FUNCTION: NO ASSISTANCE NEEDED

## 2023-10-16 ENCOUNTER — ANCILLARY PROCEDURE (OUTPATIENT)
Dept: GENERAL RADIOLOGY | Facility: CLINIC | Age: 83
End: 2023-10-16
Attending: NURSE PRACTITIONER
Payer: MEDICARE

## 2023-10-16 ENCOUNTER — OFFICE VISIT (OUTPATIENT)
Dept: INTERNAL MEDICINE | Facility: CLINIC | Age: 83
End: 2023-10-16
Payer: MEDICARE

## 2023-10-16 VITALS
DIASTOLIC BLOOD PRESSURE: 58 MMHG | HEIGHT: 62 IN | RESPIRATION RATE: 18 BRPM | SYSTOLIC BLOOD PRESSURE: 106 MMHG | BODY MASS INDEX: 18.58 KG/M2 | WEIGHT: 101 LBS | TEMPERATURE: 97.6 F | OXYGEN SATURATION: 96 % | HEART RATE: 71 BPM

## 2023-10-16 DIAGNOSIS — Z79.52 LONG-TERM CORTICOSTEROID USE: ICD-10-CM

## 2023-10-16 DIAGNOSIS — M79.672 LEFT FOOT PAIN: ICD-10-CM

## 2023-10-16 DIAGNOSIS — E87.1 HYPONATREMIA: ICD-10-CM

## 2023-10-16 DIAGNOSIS — H61.23 BILATERAL IMPACTED CERUMEN: ICD-10-CM

## 2023-10-16 DIAGNOSIS — M05.79 RHEUMATOID ARTHRITIS, SEROPOSITIVE, MULTIPLE SITES (H): ICD-10-CM

## 2023-10-16 DIAGNOSIS — E03.8 SUBCLINICAL HYPOTHYROIDISM: ICD-10-CM

## 2023-10-16 DIAGNOSIS — Z00.00 MEDICARE ANNUAL WELLNESS VISIT, SUBSEQUENT: Primary | ICD-10-CM

## 2023-10-16 DIAGNOSIS — M85.89 OSTEOPENIA OF MULTIPLE SITES: ICD-10-CM

## 2023-10-16 DIAGNOSIS — N32.81 OAB (OVERACTIVE BLADDER): ICD-10-CM

## 2023-10-16 DIAGNOSIS — R35.0 URINARY FREQUENCY: ICD-10-CM

## 2023-10-16 LAB
ALBUMIN UR-MCNC: NEGATIVE MG/DL
APPEARANCE UR: CLEAR
BACTERIA #/AREA URNS HPF: ABNORMAL /HPF
BILIRUB UR QL STRIP: NEGATIVE
COLOR UR AUTO: YELLOW
GLUCOSE UR STRIP-MCNC: NEGATIVE MG/DL
HGB UR QL STRIP: ABNORMAL
HYALINE CASTS #/AREA URNS LPF: ABNORMAL /LPF
KETONES UR STRIP-MCNC: NEGATIVE MG/DL
LEUKOCYTE ESTERASE UR QL STRIP: NEGATIVE
NITRATE UR QL: NEGATIVE
PH UR STRIP: 5.5 [PH] (ref 5–8)
RBC #/AREA URNS AUTO: ABNORMAL /HPF
SP GR UR STRIP: 1.01 (ref 1–1.03)
SQUAMOUS #/AREA URNS AUTO: ABNORMAL /LPF
UROBILINOGEN UR STRIP-ACNC: 0.2 E.U./DL
WBC #/AREA URNS AUTO: ABNORMAL /HPF

## 2023-10-16 PROCEDURE — 73630 X-RAY EXAM OF FOOT: CPT | Mod: TC | Performed by: STUDENT IN AN ORGANIZED HEALTH CARE EDUCATION/TRAINING PROGRAM

## 2023-10-16 PROCEDURE — 69209 REMOVE IMPACTED EAR WAX UNI: CPT | Performed by: NURSE PRACTITIONER

## 2023-10-16 PROCEDURE — G0439 PPPS, SUBSEQ VISIT: HCPCS | Performed by: NURSE PRACTITIONER

## 2023-10-16 PROCEDURE — 81001 URINALYSIS AUTO W/SCOPE: CPT | Performed by: NURSE PRACTITIONER

## 2023-10-16 PROCEDURE — 99214 OFFICE O/P EST MOD 30 MIN: CPT | Mod: 25 | Performed by: NURSE PRACTITIONER

## 2023-10-16 RX ORDER — MIRABEGRON 25 MG/1
25 TABLET, FILM COATED, EXTENDED RELEASE ORAL DAILY
Qty: 30 TABLET | Refills: 3 | Status: SHIPPED | OUTPATIENT
Start: 2023-10-16 | End: 2024-01-15

## 2023-10-16 ASSESSMENT — ENCOUNTER SYMPTOMS
HEADACHES: 0
COUGH: 0
WEAKNESS: 1
HEMATURIA: 0
CONSTIPATION: 0
BREAST MASS: 0
DIARRHEA: 1
SORE THROAT: 0
HEARTBURN: 0
FEVER: 0
CHILLS: 1
NERVOUS/ANXIOUS: 0
HEMATOCHEZIA: 0
JOINT SWELLING: 1
EYE PAIN: 0
ARTHRALGIAS: 1
DIZZINESS: 0
NAUSEA: 0
DYSURIA: 0
SHORTNESS OF BREATH: 0
FREQUENCY: 1
PARESTHESIAS: 1
MYALGIAS: 1
PALPITATIONS: 0
ABDOMINAL PAIN: 0

## 2023-10-16 ASSESSMENT — ACTIVITIES OF DAILY LIVING (ADL): CURRENT_FUNCTION: NO ASSISTANCE NEEDED

## 2023-10-16 NOTE — PROGRESS NOTES
SUBJECTIVE:   Diann is a 83 year old who presents for Preventive Visit.      10/16/2023     8:44 AM   Additional Questions   Roomed by Debi MCCLELLAND   Accompanied by son         10/16/2023     8:44 AM   Patient Reported Additional Medications   Patient reports taking the following new medications no       Are you in the first 12 months of your Medicare coverage?  No    The patient presents today with her Son for her medicare wellness visit. She is not fasted today. Reviewed her labs together from 10/05/23.    Her laceration sites have been healing, discussed she could try to put Vitamin E on these areas to help them heal.    She also reports noticing increased urinary frequency. She is urinating about every two hours over night. She is drinking about 32 ounces of water per day. Discussed trying to cut back on her water intake 2 hours prior to bedtime. Okay to take sips for dry mouth as needed during the night.    She also reports dry skin, encouraged her to get a humidifier for her apartment.    She reports that her left lateral foot has been bothering her when she is up walking since her fall. She is not wearing supportive footwear at home during the day, encouraged her to start this. No mechanism of injury known, will x-ray her with her history of osteopenia.    She also reports that when she wakes up in the morning, until she gets going she feels weaker. Once she eats something she does feel like her strength comes back. She eats her last meal at 5-530pm. Discussed trying to eat a snack at night time, before bed.    She is not getting good quality sleep with her frequent urination at night. Urine today did not show an infection, will try OAB medication.     Healthy Habits:     In general, how would you rate your overall health?  Good    Frequency of exercise:  4-5 days/week    Duration of exercise:  30-45 minutes    Do you usually eat at least 4 servings of fruit and vegetables a day, include whole grains    & fiber and  "avoid regularly eating high fat or \"junk\" foods?  Yes    Taking medications regularly:  Yes    Medication side effects:  None    Ability to successfully perform activities of daily living:  No assistance needed    Home Safety:  No safety concerns identified    Hearing Impairment:  Difficulty following a conversation in a noisy restaurant or crowded room, need to ask people to speak up or repeat themselves and difficulty understanding soft or whispered speech    In the past 6 months, have you been bothered by leaking of urine? Yes    In general, how would you rate your overall mental or emotional health?  Excellent    Additional concerns today:  Yes      Today's PHQ-2 Score:       10/16/2023     8:34 AM   PHQ-2 ( 1999 [employer])   Q1: Little interest or pleasure in doing things 0   Q2: Feeling down, depressed or hopeless 0   PHQ-2 Score 0   Q1: Little interest or pleasure in doing things Not at all   Q2: Feeling down, depressed or hopeless Not at all   PHQ-2 Score 0     Have you ever done Advance Care Planning? (For example, a Health Directive, POLST, or a discussion with a medical provider or your loved ones about your wishes): Yes, patient states has an Advance Care Planning document and will bring a copy to the clinic.    Fall risk  Fallen 2 or more times in the past year?: No  Any fall with injury in the past year?: Yes    Cognitive Screening   1) Repeat 3 items (Leader, Season, Table)    2) Clock draw: NORMAL  3) 3 item recall: Recalls 3 objects  Results: 3 items recalled: COGNITIVE IMPAIRMENT LESS LIKELY    Mini-CogTM Copyright JEFF Bai. Licensed by the author for use in [facility]; reprinted with permission (ismael@.[facility]). All rights reserved.      Do you have sleep apnea, excessive snoring or daytime drowsiness? : no    Reviewed and updated as needed this visit by clinical staff   Tobacco  Allergies  Meds              Reviewed and updated as needed this visit by Provider                 Social " History     Tobacco Use    Smoking status: Never    Smokeless tobacco: Never   Substance Use Topics    Alcohol use: Not on file           10/12/2023    10:34 AM   Alcohol Use   Prescreen: >3 drinks/day or >7 drinks/week? No     Do you have a current opioid prescription? No  Do you use any other controlled substances or medications that are not prescribed by a provider? None    Current providers sharing in care for this patient include:   Patient Care Team:  Meagan Gamboa CNP as PCP - General (Nurse Practitioner - Gerontology)  Meagan Gamboa CNP as Assigned PCP  Sukumar Jenkins MD as MD (Endocrinology, Diabetes, and Metabolism)  Sukumar Jenkins MD as Assigned Endocrinology Provider  Nano Olivier MBBS as Assigned Rheumatology Provider  Jocelyn Ndiaye PA-C as Assigned OBGYN Provider  Jon Hancock MD as Assigned Surgical Provider    The following health maintenance items are reviewed in Epic and correct as of today:  Health Maintenance   Topic Date Due    ZOSTER IMMUNIZATION (1 of 2) Never done    RSV VACCINE 60+ (1 - 1-dose 60+ series) Never done    COVID-19 Vaccine (7 - 2023-24 season) 09/01/2023    ANNUAL REVIEW OF HM ORDERS  09/18/2024    TSH W/FREE T4 REFLEX  10/05/2024    MEDICARE ANNUAL WELLNESS VISIT  10/16/2024    FALL RISK ASSESSMENT  10/16/2024    DTAP/TDAP/TD IMMUNIZATION (3 - Td or Tdap) 01/10/2028    ADVANCE CARE PLANNING  10/16/2028    PHQ-2 (once per calendar year)  Completed    INFLUENZA VACCINE  Completed    Pneumococcal Vaccine: 65+ Years  Completed    IPV IMMUNIZATION  Aged Out    HPV IMMUNIZATION  Aged Out    MENINGITIS IMMUNIZATION  Aged Out     Lab work was done last week      Mammogram Screening - Patient over age 75, has elected to discontinue screenings.  Pertinent mammograms are reviewed under the imaging tab.    Review of Systems   Constitutional:  Positive for chills. Negative for fever.   HENT:  Positive for hearing loss. Negative for congestion, ear pain and sore  "throat.    Eyes:  Negative for pain and visual disturbance.   Respiratory:  Negative for cough and shortness of breath.    Cardiovascular:  Negative for chest pain, palpitations and peripheral edema.   Gastrointestinal:  Positive for diarrhea. Negative for abdominal pain, constipation, heartburn, hematochezia and nausea.   Breasts:  Negative for tenderness, breast mass and discharge.   Genitourinary:  Positive for frequency and urgency. Negative for dysuria, genital sores, hematuria, pelvic pain, vaginal bleeding and vaginal discharge.   Musculoskeletal:  Positive for arthralgias, joint swelling and myalgias.   Skin:  Negative for rash.   Neurological:  Positive for weakness and paresthesias. Negative for dizziness and headaches.   Psychiatric/Behavioral:  Negative for mood changes. The patient is not nervous/anxious.      OBJECTIVE:   /58   Pulse 71   Temp 97.6  F (36.4  C)   Resp 18   Ht 1.562 m (5' 1.5\")   Wt 45.8 kg (101 lb)   SpO2 96%   BMI 18.77 kg/m   Estimated body mass index is 18.77 kg/m  as calculated from the following:    Height as of this encounter: 1.562 m (5' 1.5\").    Weight as of this encounter: 45.8 kg (101 lb).  Physical Exam  GENERAL APPEARANCE: healthy, alert and no distress  EYES: Eyes grossly normal to inspection, PERRL and conjunctivae and sclerae normal  HENT: Bilateral ear cannals occluded with cerumen, ear canals and TM's normal, nose and mouth without ulcers or lesions, oropharynx clear and oral mucous membranes moist  NECK: no adenopathy, no asymmetry, masses, or scars and thyroid normal to palpation  RESP: lungs clear to auscultation - no rales, rhonchi or wheezes  CV: regular rate and rhythm, normal S1 S2, no S3 or S4, no murmur, click or rub, trace peripheral edema  ABDOMEN: soft, nontender, no hepatosplenomegaly, no masses and bowel sounds normal  MS: no musculoskeletal defects are noted and gait is age appropriate without ataxia, neck brace on.  SKIN: no suspicious " lesions or rashes  NEURO: Normal strength and tone, sensory exam grossly normal, mentation intact and speech normal  PSYCH: mentation appears normal and affect normal/bright    Diagnostic Test Results: Dexa  Labs reviewed in Epic    ASSESSMENT / PLAN:   Diann was seen today for medicare visit, ankle pain and urinary problem.    Diagnoses and all orders for this visit:    Medicare annual wellness visit, subsequent: Completed today. Discussed recent labs.     Left foot pain: Left lateral foot pain when up walking. X-ray read by provider showed no acute fracture, arthritis. Discussed wearing good supportive slippers at home at all times when up walking. Will refer to podiatry if this continues going forward.   -     XR Foot Left G/E 3 Views; Future    Long-term corticosteroid use/Rheumatoid arthritis, seropositive, multiple sites (H): She continues on Methotrexate and Prednisone. Managed per Rheumatology.     Osteopenia of multiple sites: She continues on Fosamax. Last bone density testing was done 09/2023.    Hyponatremia: Sodium was returned to normal. Stable.     Urinary frequency: At night more so. Urine today did not show infection, will treat for OAB. Discussed stopping fluids 2 hours prior to bedtime.   -     UA Macroscopic with reflex to Microscopic and Culture - Lab Collect; Future    Bilateral impacted cerumen: Bilateral occluded cerumen, she did not tolerate flushing well. ENT referral placed.   -     REMOVE IMPACTED CERUMEN  -     Adult ENT  Referral; Future    Subclinical hypothyroidism: She continues on Levothyroxine. Stable.     OAB (overactive bladder): Will start on Mybetriq. Follow up as scheduled.   -     mirabegron (MYRBETRIQ) 25 MG 24 hr tablet; Take 1 tablet (25 mg) by mouth daily        Patient has been advised of split billing requirements and indicates understanding: Yes      COUNSELING:  Reviewed preventive health counseling, as reflected in patient instructions  Special attention  given to:       Regular exercise       Healthy diet/nutrition       Vision screening       Hearing screening        She reports that she has never smoked. She has never used smokeless tobacco.      Appropriate preventive services were discussed with this patient, including applicable screening as appropriate for fall prevention, nutrition, physical activity, Tobacco-use cessation, weight loss and cognition.  Checklist reviewing preventive services available has been given to the patient.    Reviewed patients plan of care and provided an AVS. The Complex Care Plan (for patients with higher acuity and needing more deliberate coordination of services) for Diann meets the Care Plan requirement. This Care Plan has been established and reviewed with the Patient and son.          Meagan Gamboa CNP  [facility]    Identified Health Risks:  I have reviewed Opioid Use Disorder and Substance Use Disorder risk factors and made any needed referrals.

## 2023-10-16 NOTE — PATIENT INSTRUCTIONS
Goals are three glasses per day.    Make sure you are doing drinking water by around 730pm, two hours before bedtime.    Try a snack before bedtime.    Labs and x-ray are processing, I will update you of results once they are back.    Continue your current medications.    Try Debrox drops for your ears if needed over the counter.    Follow up in 3 months for a recheck, before then if anything comes up.    Orthopedic slippers on in the house.

## 2023-10-26 ENCOUNTER — OFFICE VISIT (OUTPATIENT)
Dept: RHEUMATOLOGY | Facility: CLINIC | Age: 83
End: 2023-10-26
Payer: MEDICARE

## 2023-10-26 VITALS
WEIGHT: 103.5 LBS | BODY MASS INDEX: 19.24 KG/M2 | SYSTOLIC BLOOD PRESSURE: 108 MMHG | HEART RATE: 79 BPM | DIASTOLIC BLOOD PRESSURE: 60 MMHG | OXYGEN SATURATION: 99 %

## 2023-10-26 DIAGNOSIS — M15.0 PRIMARY OSTEOARTHRITIS INVOLVING MULTIPLE JOINTS: ICD-10-CM

## 2023-10-26 DIAGNOSIS — M05.79 RHEUMATOID ARTHRITIS, SEROPOSITIVE, MULTIPLE SITES (H): Primary | ICD-10-CM

## 2023-10-26 DIAGNOSIS — Z79.899 HIGH RISK MEDICATION USE: ICD-10-CM

## 2023-10-26 DIAGNOSIS — M21.949 ACQUIRED DEFORMITY OF HAND, UNSPECIFIED LATERALITY: ICD-10-CM

## 2023-10-26 PROCEDURE — 99214 OFFICE O/P EST MOD 30 MIN: CPT | Performed by: INTERNAL MEDICINE

## 2023-10-26 NOTE — PROGRESS NOTES
This document was created using a software with less than 100% fidelity, at times resulting in unintended, even erroneous syntax and grammar.  The reader is advised to keep this under consideration while reviewing, interpreting this note.      Rheumatology follow-up visit note         Diagnoses and all orders for this visit:    Rheumatoid arthritis, seropositive, multiple sites (H)    High risk medication use    Primary osteoarthritis involving multiple joints    Acquired deformity of hand, unspecified laterality    Other orders  -     abatacept (ORENCIA) 500 mg in sodium chloride 0.9 % 120 mL infusion       Diann is a 83 year old female presents today for follow-up. while her seropositive rheumatoid arthritis appears to be under good control, with a combination of methotrexate and Orencia that she happens to get every 56 to 60 days infusion, she did have a fall recently at her friends resulting in multiple injuries lacerations of the scalp forehead and fractures to the C-spine vertebrae.  We talked once again about prednisone in this context she is not down to 1 mg she will discontinue that.  She is to stay the course with methotrexate, Orencia.  Her renal impairment as discussed not a candidate for nonsteroidals.  We will meet her at this time and 3 months.    Follow up in 3 months.    HPI    Diann Low is a 83 year old female is here for follow-up of seropositive rheumatoid arthritis, osteoarthritis on Orencia infusions, methotrexate, prednisone 1 mg daily prior to that she was at 2 mg when we first met here.  Recently dog watching her friends she had a fall and had multiple injuries to her forehead and scalp with a significant laceration and fracture cervical spine was at the [facility].  She is making gradual progress.  There is a question if the healing of the C-spine is taking place at expected rate or not.  She has noted no flareup of her joint symptoms.  She does have advanced deformities from  "rheumatoid arthritis but remains highly functional.      She lives independently at a senior living complex.  She recalls that her joint symptoms associated with rheumatoid arthritis began when she was in her 40s.  She considers her current control of pain and her functional ability as adequate.  She gets some discomfort in her hands such as with the weather change.  Overall she is content with how things are in terms of functional ability.  She does not take Tylenol and ibuprofen on a regular basis.  She is on methotrexate and folic acid.  She is due for her labs last done in June.  We will recheck those today.  Review of the chart from [facility] which showed that her anti-CCP antibody was negative and rheumatoid factor was positive in 2015 and these will be rechecked today.  Her SSA SSB profile is negative.  She has had \"couple of knuckles changed\".  She has not had any major joint arthroplasty.  Apart from thyroid disease she describes herself otherwise in good health.  She is known to have osteoporosis.  She is on prednisone 2 mg daily for the past several years..        DETAILED EXAMINATION  07/25/23  :  DETAILED EXAMINATION  10/26/23  :    Vitals:    10/26/23 0810   BP: 108/60   Pulse: 79   SpO2: 99%   Weight: 46.9 kg (103 lb 8 oz)     Alert oriented. Head including the face is examined for malar rash, heliotropes, scarring, lupus pernio. Eyes examined for redness such as in episcleritis/scleritis, periorbital lesions.   Neck/ Face examined for parotid gland swelling, range of motion of neck.  Left upper and lower and right upper and lower extremities examined for tenderness, swelling, warmth of the appendicular joints, range of motion, edema, rash.  Some of the important findings included: she does not have evidence of synovitis in the palpable joints of the upper extremities.  No significant deformities of the digits.  While there is no suggest synovitis she has multiple deformities of the digits, " associated both with osteoarthritis such as the DIPs, and rheumatoid arthritis such as metacarpophalangeal joints more prominent in the left hand.  Patient Active Problem List    Diagnosis Date Noted    Hyponatremia 08/29/2023     Priority: Medium    Hypothyroidism 08/19/2023     Priority: Medium    Repeated falls 08/19/2023     Priority: Medium    Unspecified nondisplaced fracture of first cervical vertebra, initial encounter for closed fracture (H) 08/19/2023     Priority: Medium    Injury of head 08/17/2023     Priority: Medium    Osteoporosis, unspecified osteoporosis type, unspecified pathological fracture presence 08/31/2022     Priority: Medium    Rheumatoid arthritis, seropositive, multiple sites (H) 06/20/2022     Priority: Medium    Rheumatoid arthritis involving both hands, unspecified whether rheumatoid factor present (H) 01/14/2022     Priority: Medium    History of falling 10/15/2018     Priority: Medium    Presence of intraocular lens 09/17/2018     Priority: Medium    Arthritis, rheumatoid (H) 09/12/2007     Priority: Medium     No past surgical history on file.   Past Medical History:   Diagnosis Date    Skin cancer     Spider veins      Allergies   Allergen Reactions    Infliximab Rash     Throat began to close/tighten    Sulfa Antibiotics Rash    Hydrocodone-Acetaminophen Nausea    Oxycodone-Acetaminophen Nausea    Penicillins Rash    Erythromycin Nausea     Current Outpatient Medications   Medication Sig Dispense Refill    acetaminophen (TYLENOL) 500 MG tablet Take 1,000 mg by mouth every 6 hours as needed for mild pain      alendronate (FOSAMAX) 70 MG tablet Take 1 tablet (70 mg) by mouth every 7 days 12 tablet 3    calcium carbonate-vitamin D (OSCAL W/D) 500-200 MG-UNIT tablet Take 1 tablet by mouth 2 times daily      carboxymethylcellulose PF (REFRESH PLUS) 0.5 % ophthalmic solution 1 drop 3 times daily as needed for dry eyes      clindamycin (CLEOCIN) 300 MG capsule Take 600 mg by mouth For  dental procedures (Patient not taking: Reported on 9/18/2023)      ferrous fumarate 65 mg, Sun'aq. FE,-Vitamin C 125 mg (VITRON C)  MG TABS tablet Take 1 tablet by mouth every other day      folic acid (FOLVITE) 1 MG tablet Take 1 tablet by mouth daily      levothyroxine (SYNTHROID/LEVOTHROID) 75 MCG tablet Take 0.5 tablets (37.5 mcg) by mouth daily 45 tablet 1    methotrexate 2.5 MG tablet TAKE 4 TABLETS EVERY 7 DAYS 48 tablet 0    mirabegron (MYRBETRIQ) 25 MG 24 hr tablet Take 1 tablet (25 mg) by mouth daily 30 tablet 3    Multiple Vitamin (MULTIVITAMIN ADULT PO) Take 1 tablet by mouth daily      predniSONE (DELTASONE) 1 MG tablet TAKE 1 TABLET DAILY 90 tablet 0    White Petrolatum ointment Apply topically 2 times daily       family history is not on file.  Social Connections: Not on file          WBC Count   Date Value Ref Range Status   10/05/2023 8.1 4.0 - 11.0 10e3/uL Final     Comment:     Preliminary ANC is 5.9     RBC Count   Date Value Ref Range Status   10/05/2023 3.23 (L) 3.80 - 5.20 10e6/uL Final     Hemoglobin   Date Value Ref Range Status   10/05/2023 10.7 (L) 11.7 - 15.7 g/dL Final     Hematocrit   Date Value Ref Range Status   10/05/2023 32.6 (L) 35.0 - 47.0 % Final     MCV   Date Value Ref Range Status   10/05/2023 101 (H) 78 - 100 fL Final     MCH   Date Value Ref Range Status   10/05/2023 33.1 (H) 26.5 - 33.0 pg Final     Platelet Count   Date Value Ref Range Status   10/05/2023 208 150 - 450 10e3/uL Final     % Lymphocytes   Date Value Ref Range Status   10/05/2023 16 % Final     AST   Date Value Ref Range Status   08/29/2023 27 0 - 40 U/L Final     ALT   Date Value Ref Range Status   08/29/2023 15 0 - 45 U/L Final     Albumin   Date Value Ref Range Status   08/29/2023 3.3 (L) 3.5 - 5.0 g/dL Final     Alkaline Phosphatase   Date Value Ref Range Status   08/29/2023 71 45 - 120 U/L Final     Creatinine   Date Value Ref Range Status   10/05/2023 1.24 (H) 0.51 - 0.95 mg/dL Final     GFR Estimate    Date Value Ref Range Status   10/05/2023 42 (L) >60 mL/min/1.73m2 Final     GFR, ESTIMATED POCT   Date Value Ref Range Status   11/25/2022 >60 >60 mL/min/1.73m2 Final

## 2023-10-31 ENCOUNTER — TRANSFERRED RECORDS (OUTPATIENT)
Dept: HEALTH INFORMATION MANAGEMENT | Facility: CLINIC | Age: 83
End: 2023-10-31
Payer: MEDICARE

## 2023-11-01 ENCOUNTER — TRANSFERRED RECORDS (OUTPATIENT)
Dept: HEALTH INFORMATION MANAGEMENT | Facility: CLINIC | Age: 83
End: 2023-11-01
Payer: MEDICARE

## 2023-11-27 ENCOUNTER — INFUSION THERAPY VISIT (OUTPATIENT)
Dept: INFUSION THERAPY | Facility: CLINIC | Age: 83
End: 2023-11-27
Attending: INTERNAL MEDICINE
Payer: MEDICARE

## 2023-11-27 VITALS
TEMPERATURE: 95 F | HEART RATE: 86 BPM | OXYGEN SATURATION: 95 % | DIASTOLIC BLOOD PRESSURE: 69 MMHG | SYSTOLIC BLOOD PRESSURE: 119 MMHG

## 2023-11-27 DIAGNOSIS — M05.79 RHEUMATOID ARTHRITIS, SEROPOSITIVE, MULTIPLE SITES (H): Primary | ICD-10-CM

## 2023-11-27 PROCEDURE — 96365 THER/PROPH/DIAG IV INF INIT: CPT

## 2023-11-27 PROCEDURE — 250N000028 HC RX JA MOD (INTRAVENOUS), IP 250 OP 636: Mod: JZ,JA | Performed by: INTERNAL MEDICINE

## 2023-11-27 PROCEDURE — 258N000003 HC RX IP 258 OP 636: Performed by: INTERNAL MEDICINE

## 2023-11-27 RX ORDER — NALOXONE HYDROCHLORIDE 0.4 MG/ML
0.2 INJECTION, SOLUTION INTRAMUSCULAR; INTRAVENOUS; SUBCUTANEOUS
Status: DISCONTINUED | OUTPATIENT
Start: 2023-11-27 | End: 2023-11-27 | Stop reason: HOSPADM

## 2023-11-27 RX ORDER — HEPARIN SODIUM (PORCINE) LOCK FLUSH IV SOLN 100 UNIT/ML 100 UNIT/ML
5 SOLUTION INTRAVENOUS
Status: CANCELLED | OUTPATIENT
Start: 2023-12-05

## 2023-11-27 RX ORDER — DIPHENHYDRAMINE HYDROCHLORIDE 50 MG/ML
50 INJECTION INTRAMUSCULAR; INTRAVENOUS
Status: DISCONTINUED | OUTPATIENT
Start: 2023-11-27 | End: 2023-11-27 | Stop reason: HOSPADM

## 2023-11-27 RX ORDER — NALOXONE HYDROCHLORIDE 0.4 MG/ML
0.2 INJECTION, SOLUTION INTRAMUSCULAR; INTRAVENOUS; SUBCUTANEOUS
Status: CANCELLED | OUTPATIENT
Start: 2023-12-05

## 2023-11-27 RX ORDER — ALBUTEROL SULFATE 0.83 MG/ML
2.5 SOLUTION RESPIRATORY (INHALATION)
Status: DISCONTINUED | OUTPATIENT
Start: 2023-11-27 | End: 2023-11-27 | Stop reason: HOSPADM

## 2023-11-27 RX ORDER — ACETAMINOPHEN 325 MG/1
650 TABLET ORAL ONCE
Status: CANCELLED
Start: 2023-12-05 | End: 2023-12-05

## 2023-11-27 RX ORDER — METHYLPREDNISOLONE SODIUM SUCCINATE 125 MG/2ML
125 INJECTION, POWDER, LYOPHILIZED, FOR SOLUTION INTRAMUSCULAR; INTRAVENOUS
Status: DISCONTINUED | OUTPATIENT
Start: 2023-11-27 | End: 2023-11-27 | Stop reason: HOSPADM

## 2023-11-27 RX ORDER — MEPERIDINE HYDROCHLORIDE 50 MG/ML
25 INJECTION INTRAMUSCULAR; INTRAVENOUS; SUBCUTANEOUS EVERY 30 MIN PRN
Status: CANCELLED | OUTPATIENT
Start: 2023-12-05

## 2023-11-27 RX ORDER — ALBUTEROL SULFATE 90 UG/1
1-2 AEROSOL, METERED RESPIRATORY (INHALATION)
Status: DISCONTINUED | OUTPATIENT
Start: 2023-11-27 | End: 2023-11-27 | Stop reason: HOSPADM

## 2023-11-27 RX ORDER — EPINEPHRINE 1 MG/ML
0.3 INJECTION, SOLUTION INTRAMUSCULAR; SUBCUTANEOUS EVERY 5 MIN PRN
Status: DISCONTINUED | OUTPATIENT
Start: 2023-11-27 | End: 2023-11-27 | Stop reason: HOSPADM

## 2023-11-27 RX ORDER — ALBUTEROL SULFATE 90 UG/1
1-2 AEROSOL, METERED RESPIRATORY (INHALATION)
Status: CANCELLED
Start: 2023-12-05

## 2023-11-27 RX ORDER — METHYLPREDNISOLONE SODIUM SUCCINATE 125 MG/2ML
125 INJECTION, POWDER, LYOPHILIZED, FOR SOLUTION INTRAMUSCULAR; INTRAVENOUS ONCE
Status: CANCELLED
Start: 2023-12-05 | End: 2023-12-05

## 2023-11-27 RX ORDER — METHYLPREDNISOLONE SODIUM SUCCINATE 125 MG/2ML
125 INJECTION, POWDER, LYOPHILIZED, FOR SOLUTION INTRAMUSCULAR; INTRAVENOUS
Status: CANCELLED
Start: 2023-12-05

## 2023-11-27 RX ORDER — DIPHENHYDRAMINE HCL 25 MG
25 CAPSULE ORAL ONCE
Status: CANCELLED
Start: 2023-12-05 | End: 2023-12-05

## 2023-11-27 RX ORDER — ALBUTEROL SULFATE 0.83 MG/ML
2.5 SOLUTION RESPIRATORY (INHALATION)
Status: CANCELLED | OUTPATIENT
Start: 2023-12-05

## 2023-11-27 RX ORDER — DIPHENHYDRAMINE HYDROCHLORIDE 50 MG/ML
50 INJECTION INTRAMUSCULAR; INTRAVENOUS
Status: CANCELLED
Start: 2023-12-05

## 2023-11-27 RX ORDER — HEPARIN SODIUM,PORCINE 10 UNIT/ML
5 VIAL (ML) INTRAVENOUS
Status: CANCELLED | OUTPATIENT
Start: 2023-12-05

## 2023-11-27 RX ORDER — EPINEPHRINE 1 MG/ML
0.3 INJECTION, SOLUTION INTRAMUSCULAR; SUBCUTANEOUS EVERY 5 MIN PRN
Status: CANCELLED | OUTPATIENT
Start: 2023-12-05

## 2023-11-27 RX ORDER — MEPERIDINE HYDROCHLORIDE 50 MG/ML
25 INJECTION INTRAMUSCULAR; INTRAVENOUS; SUBCUTANEOUS EVERY 30 MIN PRN
Status: DISCONTINUED | OUTPATIENT
Start: 2023-11-27 | End: 2023-11-27 | Stop reason: HOSPADM

## 2023-11-27 RX ADMIN — SODIUM CHLORIDE 500 MG: 9 INJECTION, SOLUTION INTRAVENOUS at 10:02

## 2023-11-27 NOTE — PROGRESS NOTES
Infusion Nursing Note:  Diann Low presents today for orencia.    Intravenous Access:  Peripheral IV placed.    Treatment Conditions:  Biological Infusion Checklist:  ~~~ NOTE: If the patient answers yes to any of the questions below, hold the infusion and contact ordering provider or on-call provider.    Have you recently had an elevated temperature, fever, chills, productive cough, coughing for 3 weeks or longer or hemoptysis,  abnormal vital signs, night sweats,  chest pain or have you noticed a decrease in your appetite, unexplained weight loss or fatigue? No  Do you have any open wounds or new incisions? No  Do you have any upcoming hospitalizations or surgeries? Does not include esophagogastroduodenoscopy, colonoscopy, endoscopic retrograde cholangiopancreatography (ERCP), endoscopic ultrasound (EUS), dental procedures or joint aspiration/steroid injections No  Do you currently have any signs of illness or infection or are you on any antibiotics? No  Have you had any new, sudden or worsening abdominal pain? No  Have you or anyone in your household received a live vaccination in the past 4 weeks? Please note: No live vaccines while on biologic/chemotherapy until 6 months after the last treatment. Patient can receive the flu vaccine (shot only), pneumovax and the Covid vaccine. It is optimal for the patient to get these vaccines mid cycle, but they can be given at any time as long as it is not on the day of the infusion. No  Have you recently been diagnosed with any new nervous system diseases (ie. Multiple sclerosis, Guillain [place], seizures, neurological changes) or cancer diagnosis? Are you on any form of radiation or chemotherapy? No  Are you pregnant or breast feeding or do you have plans of pregnancy in the future? No  Have you been having any signs of worsening depression or suicidal ideations?  (benlysta only) No  Have there been any other new onset medical symptoms? No  Have you had any new blood  clots? (IVIG only) No      Post Infusion Assessment:  Patient tolerated infusion without incident.       Discharge Plan:   Patient discharged in stable condition accompanied by: self.      Ashely DE LEON RN

## 2024-01-15 ENCOUNTER — OFFICE VISIT (OUTPATIENT)
Dept: FAMILY MEDICINE | Facility: CLINIC | Age: 84
End: 2024-01-15
Payer: MEDICARE

## 2024-01-15 VITALS
DIASTOLIC BLOOD PRESSURE: 50 MMHG | TEMPERATURE: 97.8 F | HEART RATE: 90 BPM | HEIGHT: 62 IN | BODY MASS INDEX: 18.48 KG/M2 | WEIGHT: 100.4 LBS | SYSTOLIC BLOOD PRESSURE: 110 MMHG | OXYGEN SATURATION: 100 % | RESPIRATION RATE: 12 BRPM

## 2024-01-15 DIAGNOSIS — K59.00 CONSTIPATION, UNSPECIFIED CONSTIPATION TYPE: ICD-10-CM

## 2024-01-15 DIAGNOSIS — Z23 NEED FOR SHINGLES VACCINE: ICD-10-CM

## 2024-01-15 DIAGNOSIS — L98.8 SKIN PLAQUE: ICD-10-CM

## 2024-01-15 DIAGNOSIS — M81.0 OSTEOPOROSIS, UNSPECIFIED OSTEOPOROSIS TYPE, UNSPECIFIED PATHOLOGICAL FRACTURE PRESENCE: ICD-10-CM

## 2024-01-15 DIAGNOSIS — S12.100G CLOSED ODONTOID FRACTURE WITH DELAYED HEALING, SUBSEQUENT ENCOUNTER: ICD-10-CM

## 2024-01-15 DIAGNOSIS — R13.19 ESOPHAGEAL DYSPHAGIA: ICD-10-CM

## 2024-01-15 DIAGNOSIS — R63.0 POOR APPETITE: ICD-10-CM

## 2024-01-15 DIAGNOSIS — Z29.11 NEED FOR VACCINATION AGAINST RESPIRATORY SYNCYTIAL VIRUS: ICD-10-CM

## 2024-01-15 DIAGNOSIS — D22.71 MELANOCYTIC NEVUS OF RIGHT LOWER EXTREMITY: ICD-10-CM

## 2024-01-15 DIAGNOSIS — M05.79 RHEUMATOID ARTHRITIS, SEROPOSITIVE, MULTIPLE SITES (H): ICD-10-CM

## 2024-01-15 DIAGNOSIS — R10.9 ABDOMINAL CRAMPING: ICD-10-CM

## 2024-01-15 DIAGNOSIS — Z91.81 HISTORY OF FALLING: ICD-10-CM

## 2024-01-15 DIAGNOSIS — E03.9 HYPOTHYROIDISM, UNSPECIFIED TYPE: Primary | ICD-10-CM

## 2024-01-15 PROCEDURE — 99213 OFFICE O/P EST LOW 20 MIN: CPT | Performed by: STUDENT IN AN ORGANIZED HEALTH CARE EDUCATION/TRAINING PROGRAM

## 2024-01-15 RX ORDER — LEVOTHYROXINE SODIUM 75 UG/1
37.5 TABLET ORAL DAILY
Qty: 45 TABLET | Refills: 1 | Status: SHIPPED | OUTPATIENT
Start: 2024-01-15 | End: 2024-02-05 | Stop reason: DRUGHIGH

## 2024-01-15 NOTE — PROGRESS NOTES
Assessment & Plan     Hypothyroidism, unspecified type  Diann is an 83-year-old female with hypothyroidism which is well-controlled on Synthroid 75 mcg daily, with last TSH being done October 5, 2023 within normal limits.  Continue current medication.  - levothyroxine (SYNTHROID/LEVOTHROID) 75 MCG tablet  Dispense: 45 tablet; Refill: 1    Esophageal dysphagia  She reports esophageal dysphagia new since 3 weeks ago, in the setting of recent cervical fracture, being managed by neurosurgery, discussed that further below.  No pain with swallowing.  Only occurs with bulky foods such as bread and or meats.  Not occurring with soft foods or liquids.  Possibly related to recent cervical fracture however I am more concerned with possible esophageal etiologies.  Recommend for GI referral for EGD for further evaluation.  Patient was amenable, did recommend though that patient run this by neurosurgery prior to getting any such procedure done.  She reports understanding.      - Adult GI  Referral - Procedure Only    Poor appetite  Reports chronic poor appetite, however has not lost any weight.  There is no decrease in taste or smell, reports that she has good mood.  Do recommend that she take meal supplements such as Ensure which she is only taking rarely now.  Patient was agreeable.  Will continue to watch weight.    History of falling    Odontoid fracture  Had history of fall downstairs with head trauma on August 17, 2023, with history of seropositive rheumatoid arthritis on prednisone and methotrexate and abatacept and osteoporosis.  Type II odontoid fracture, in neck collar.  Other than decreased ability to swallow no other possible neurologic deficits were realized on history.  She has plan to follow-up with neurosurgery in January for repeat CT scan, she was also encouraged to talk to them on the phone about these new symptoms of esophageal dysphagia.  She will be talking to them today on the phone.  Has gone  through physical therapy and was released from their care.    Abdominal cramping    Constipation, unspecified constipation type  Patient with abdominal cramping which resolves with defecation, currently defecating 3 times daily, the 1 that occurs early in the morning at around 2 AM is hard and small amount.  Abdominal cramping immediately improves after defecation.  She is only drinking about 16 ounces of fluid a day.  Discussed with patient's constipation, the need to drink 64 ounces of water daily, and eating 20 to 25 g of fiber daily may use a fiber supplement as needed.  We will attempt this lifestyle change and if not improving would recommend for MiraLAX treatment.    Skin plaque  Patient with skin plaque on the right upper chest, consistent in appearance with granuloma annulare.  Discussed the diagnosis, patient does not wish for any treatment.  We will continue to monitor.    Melanocytic nevus of right lower extremity  Patient with melanocytic nevus of right lower extremity on the posterior calf which is 10 mm x 10 mm, with variegation of color from red to light brown.  Has asymmetric appearance.  Recommended for biopsy with punch biopsy, patient was agreeable and appointment was made for patient to return to have punch biopsy done.    Need for shingles vaccine      Need for vaccination against respiratory syncytial virus      Rheumatoid arthritis, seropositive, multiple sites (H)  On methotrexate and Orencia, follow-up with rheumatology.  Recently stopped prednisone due to having fall with C1 fracture.  Has follow-up January 29, 2024    Osteoporosis  Following with endocrinology currently on alendronate.             Follow-up in 2 to 3 months    Abdifatah Alvarado MD  [facility] is a 83 year old, presenting for the following health issues:  Follow Up (3 month follow up. No appetite. Ongoing frequent urination at night. Difficulty swallowing due to brace.  Unusual bowel movements and cramps. Dry mouth. Spot on chest and back of right calf. ) and Establish Care        1/15/2024     8:18 AM   Additional Questions   Roomed by Crystal GOLDBERG   Accompanied by Son       History of Present Illness       Reason for visit:  Follow-up visit    She eats 2-3 servings of fruits and vegetables daily.She consumes 0 sweetened beverage(s) daily.She exercises with enough effort to increase her heart rate 10 to 19 minutes per day.  She exercises with enough effort to increase her heart rate 3 or less days per week.   She is taking medications regularly.       Says the neck brace she has now is more tolerable than the first one she had, but still has some issues with it feeling uncomfortable from time to time. Sometimes has difficulty swallowing, which started about three weeks ago, but got worse over the past week. Has a hard time swallowing, things like toast, meat or other bulky things. No issues swallowing with soft foods and fluids.  She has not been having this issue previously. No pain with swallowing. Sometimes regurgitates the food.     Has had issues with appetite.  Has had low appetite, but forces herself to eat.  Will eat three meals a day. Weight has been stable.  No issues with taste or smell. Will have an ensure from time to time.  May feel weak in the morning. Does like the vanilla flavor.     Has issues with bowel movements.  Will wake early in the morning at about 2 AM and feeling that she needs to have a bowel movement.  Will need to have another after breakfast.  Sometimes has a third BM later in the day but not typical. This started a couple of weeks ago. The first stool is like a little ball of stool, a small amount.  The abdominal cramping goes away after stooling.  The one after breakfast is softer. Drinks about 16 oz of water a day. Sometimes will drink cocoa or teat but not often.                 Review of Systems   Constitutional, HEENT, cardiovascular, pulmonary,  "GI, , musculoskeletal, neuro, skin, endocrine and psych systems are negative, except as otherwise noted.      Objective    /50 (BP Location: Right arm, Patient Position: Sitting, Cuff Size: Adult Small)   Pulse 90   Temp 97.8  F (36.6  C) (Oral)   Resp 12   Ht 1.562 m (5' 1.5\")   Wt 45.5 kg (100 lb 6.4 oz)   SpO2 100%   BMI 18.66 kg/m    Body mass index is 18.66 kg/m .  Physical Exam   GENERAL: healthy, alert and no distress  NECK: In cervical neck brace  MS: no gross musculoskeletal defects noted, no edema  SKIN: Has raised red plaque with central flattening and clearing that is approximately 1 x 1 cm in size on the right upper chest.  Has melanocytic nevus on the right posterior calf that is 10 x 10 mm in size with variegation of color from red to light brown, asymmetric in appearance, borders are well-defined  NEURO: Normal strength and tone, mentation intact and speech normal  PSYCH: mentation appears normal, affect normal/bright    TSH   Date Value Ref Range Status   10/05/2023 2.82 0.30 - 4.20 uIU/mL Final   08/29/2023 6.47 (H) 0.30 - 5.00 uIU/mL Final               "

## 2024-01-25 ENCOUNTER — INFUSION THERAPY VISIT (OUTPATIENT)
Dept: INFUSION THERAPY | Facility: HOSPITAL | Age: 84
End: 2024-01-25
Attending: INTERNAL MEDICINE
Payer: MEDICARE

## 2024-01-25 VITALS
HEART RATE: 73 BPM | DIASTOLIC BLOOD PRESSURE: 56 MMHG | BODY MASS INDEX: 18.59 KG/M2 | WEIGHT: 100 LBS | TEMPERATURE: 97.9 F | SYSTOLIC BLOOD PRESSURE: 122 MMHG | RESPIRATION RATE: 16 BRPM | OXYGEN SATURATION: 98 %

## 2024-01-25 DIAGNOSIS — M05.79 RHEUMATOID ARTHRITIS, SEROPOSITIVE, MULTIPLE SITES (H): ICD-10-CM

## 2024-01-25 DIAGNOSIS — M05.79 RHEUMATOID ARTHRITIS, SEROPOSITIVE, MULTIPLE SITES (H): Primary | ICD-10-CM

## 2024-01-25 DIAGNOSIS — E03.8 SUBCLINICAL HYPOTHYROIDISM: ICD-10-CM

## 2024-01-25 LAB
ALBUMIN SERPL BCG-MCNC: 4.2 G/DL (ref 3.5–5.2)
ALT SERPL W P-5'-P-CCNC: 6 U/L (ref 0–50)
CREAT SERPL-MCNC: 0.78 MG/DL (ref 0.51–0.95)
EGFRCR SERPLBLD CKD-EPI 2021: 74 ML/MIN/1.73M2
ERYTHROCYTE [DISTWIDTH] IN BLOOD BY AUTOMATED COUNT: 14.9 % (ref 10–15)
HCT VFR BLD AUTO: 34.6 % (ref 35–47)
HGB BLD-MCNC: 11.4 G/DL (ref 11.7–15.7)
MCH RBC QN AUTO: 31.6 PG (ref 26.5–33)
MCHC RBC AUTO-ENTMCNC: 32.9 G/DL (ref 31.5–36.5)
MCV RBC AUTO: 96 FL (ref 78–100)
PLATELET # BLD AUTO: 179 10E3/UL (ref 150–450)
RBC # BLD AUTO: 3.61 10E6/UL (ref 3.8–5.2)
T4 FREE SERPL-MCNC: 1.22 NG/DL (ref 0.9–1.7)
TSH SERPL DL<=0.005 MIU/L-ACNC: 5.22 UIU/ML (ref 0.3–4.2)
WBC # BLD AUTO: 6.5 10E3/UL (ref 4–11)

## 2024-01-25 PROCEDURE — 82040 ASSAY OF SERUM ALBUMIN: CPT

## 2024-01-25 PROCEDURE — 258N000003 HC RX IP 258 OP 636: Performed by: INTERNAL MEDICINE

## 2024-01-25 PROCEDURE — 85027 COMPLETE CBC AUTOMATED: CPT

## 2024-01-25 PROCEDURE — 250N000028 HC RX JA MOD (INTRAVENOUS), IP 250 OP 636: Mod: JZ,JA | Performed by: INTERNAL MEDICINE

## 2024-01-25 PROCEDURE — 36415 COLL VENOUS BLD VENIPUNCTURE: CPT

## 2024-01-25 PROCEDURE — 84460 ALANINE AMINO (ALT) (SGPT): CPT

## 2024-01-25 PROCEDURE — 84443 ASSAY THYROID STIM HORMONE: CPT

## 2024-01-25 PROCEDURE — 96365 THER/PROPH/DIAG IV INF INIT: CPT

## 2024-01-25 PROCEDURE — 82565 ASSAY OF CREATININE: CPT

## 2024-01-25 PROCEDURE — 84439 ASSAY OF FREE THYROXINE: CPT

## 2024-01-25 RX ORDER — DIPHENHYDRAMINE HYDROCHLORIDE 50 MG/ML
50 INJECTION INTRAMUSCULAR; INTRAVENOUS
Status: CANCELLED
Start: 2024-02-03

## 2024-01-25 RX ORDER — NALOXONE HYDROCHLORIDE 0.4 MG/ML
0.2 INJECTION, SOLUTION INTRAMUSCULAR; INTRAVENOUS; SUBCUTANEOUS
Status: CANCELLED | OUTPATIENT
Start: 2024-02-03

## 2024-01-25 RX ORDER — ALBUTEROL SULFATE 90 UG/1
1-2 AEROSOL, METERED RESPIRATORY (INHALATION)
Status: CANCELLED
Start: 2024-02-03

## 2024-01-25 RX ORDER — HEPARIN SODIUM,PORCINE 10 UNIT/ML
5 VIAL (ML) INTRAVENOUS
Status: CANCELLED | OUTPATIENT
Start: 2024-02-03

## 2024-01-25 RX ORDER — ALBUTEROL SULFATE 90 UG/1
1-2 AEROSOL, METERED RESPIRATORY (INHALATION)
Status: DISCONTINUED | OUTPATIENT
Start: 2024-01-25 | End: 2024-01-25 | Stop reason: HOSPADM

## 2024-01-25 RX ORDER — DIPHENHYDRAMINE HCL 25 MG
25 CAPSULE ORAL ONCE
Status: CANCELLED
Start: 2024-02-03 | End: 2024-02-03

## 2024-01-25 RX ORDER — MEPERIDINE HYDROCHLORIDE 50 MG/ML
25 INJECTION INTRAMUSCULAR; INTRAVENOUS; SUBCUTANEOUS EVERY 30 MIN PRN
Status: CANCELLED | OUTPATIENT
Start: 2024-02-03

## 2024-01-25 RX ORDER — ACETAMINOPHEN 325 MG/1
650 TABLET ORAL ONCE
Status: CANCELLED
Start: 2024-02-03 | End: 2024-02-03

## 2024-01-25 RX ORDER — ALBUTEROL SULFATE 0.83 MG/ML
2.5 SOLUTION RESPIRATORY (INHALATION)
Status: DISCONTINUED | OUTPATIENT
Start: 2024-01-25 | End: 2024-01-25 | Stop reason: HOSPADM

## 2024-01-25 RX ORDER — HEPARIN SODIUM (PORCINE) LOCK FLUSH IV SOLN 100 UNIT/ML 100 UNIT/ML
5 SOLUTION INTRAVENOUS
Status: CANCELLED | OUTPATIENT
Start: 2024-02-03

## 2024-01-25 RX ORDER — METHYLPREDNISOLONE SODIUM SUCCINATE 125 MG/2ML
125 INJECTION, POWDER, LYOPHILIZED, FOR SOLUTION INTRAMUSCULAR; INTRAVENOUS
Status: CANCELLED
Start: 2024-02-03

## 2024-01-25 RX ORDER — EPINEPHRINE 1 MG/ML
0.3 INJECTION, SOLUTION INTRAMUSCULAR; SUBCUTANEOUS EVERY 5 MIN PRN
Status: CANCELLED | OUTPATIENT
Start: 2024-02-03

## 2024-01-25 RX ORDER — MEPERIDINE HYDROCHLORIDE 50 MG/ML
25 INJECTION INTRAMUSCULAR; INTRAVENOUS; SUBCUTANEOUS EVERY 30 MIN PRN
Status: DISCONTINUED | OUTPATIENT
Start: 2024-01-25 | End: 2024-01-25 | Stop reason: HOSPADM

## 2024-01-25 RX ORDER — NALOXONE HYDROCHLORIDE 0.4 MG/ML
0.2 INJECTION, SOLUTION INTRAMUSCULAR; INTRAVENOUS; SUBCUTANEOUS
Status: DISCONTINUED | OUTPATIENT
Start: 2024-01-25 | End: 2024-01-25 | Stop reason: HOSPADM

## 2024-01-25 RX ORDER — ALBUTEROL SULFATE 0.83 MG/ML
2.5 SOLUTION RESPIRATORY (INHALATION)
Status: CANCELLED | OUTPATIENT
Start: 2024-02-03

## 2024-01-25 RX ORDER — METHYLPREDNISOLONE SODIUM SUCCINATE 125 MG/2ML
125 INJECTION, POWDER, LYOPHILIZED, FOR SOLUTION INTRAMUSCULAR; INTRAVENOUS
Status: DISCONTINUED | OUTPATIENT
Start: 2024-01-25 | End: 2024-01-25 | Stop reason: HOSPADM

## 2024-01-25 RX ORDER — EPINEPHRINE 1 MG/ML
0.3 INJECTION, SOLUTION INTRAMUSCULAR; SUBCUTANEOUS EVERY 5 MIN PRN
Status: DISCONTINUED | OUTPATIENT
Start: 2024-01-25 | End: 2024-01-25 | Stop reason: HOSPADM

## 2024-01-25 RX ORDER — METHYLPREDNISOLONE SODIUM SUCCINATE 125 MG/2ML
125 INJECTION, POWDER, LYOPHILIZED, FOR SOLUTION INTRAMUSCULAR; INTRAVENOUS ONCE
Status: CANCELLED
Start: 2024-02-03 | End: 2024-02-03

## 2024-01-25 RX ORDER — DIPHENHYDRAMINE HYDROCHLORIDE 50 MG/ML
50 INJECTION INTRAMUSCULAR; INTRAVENOUS
Status: DISCONTINUED | OUTPATIENT
Start: 2024-01-25 | End: 2024-01-25 | Stop reason: HOSPADM

## 2024-01-25 RX ADMIN — SODIUM CHLORIDE 500 MG: 9 INJECTION, SOLUTION INTRAVENOUS at 09:45

## 2024-01-25 RX ADMIN — SODIUM CHLORIDE 250 ML: 9 INJECTION, SOLUTION INTRAVENOUS at 09:45

## 2024-01-25 NOTE — PROGRESS NOTES
Infusion Nursing Note:  Diann Low presents today for orencia.    Patient seen by provider today: No   present during visit today: Not Applicable.    Note: pt does not take pre meds. Has follow up appt with Dr Olivier on Monday. .      Intravenous Access:  Peripheral IV placed.    Treatment Conditions:  Lab Results   Component Value Date    HGB 11.4 (L) 01/25/2024    WBC 6.5 01/25/2024    ANEUTAUTO 5.9 10/05/2023     01/25/2024        Lab Results   Component Value Date     10/05/2023    POTASSIUM 4.8 10/05/2023    CR 1.24 (H) 10/05/2023    RUBIO 9.9 10/05/2023    BILITOTAL 0.6 08/29/2023    ALBUMIN 3.3 (L) 08/29/2023    ALT 15 08/29/2023    AST 27 08/29/2023       Results reviewed, labs MET treatment parameters, ok to proceed with treatment.      Post Infusion Assessment:  Patient tolerated infusion without incident.  Access discontinued per protocol.       Discharge Plan:   Patient and/or family verbalized understanding of discharge instructions and all questions answered.      Analisa Bermeo RN     ~~~ NOTE: If the patient answers yes to any of the questions below, hold the infusion and contact ordering provider or on-call provider.    Do you currently have any signs of illness or infection or are you on any antibiotics? No  Have you recently had an elevated temperature, fever, chills, productive cough, coughing for 3 weeks or longer or hemoptysis, abnormal vital signs, night sweats, chest pain or have you noticed a decrease in your appetite, unexplained weight loss or fatigue? No  Have you had any new, sudden, or worsening abdominal pain? No  Do you have any open wounds or new incisions? (exclude for patients with hidradenitis suppurativa) No  Have you recently been diagnosed with any new nervous system diseases (ie. Multiple sclerosis, Guillain [place], seizures, neurological changes) or cancer diagnosis? Are you on any form of radiation or chemotherapy? No  Have there been any other  new onset medical symptoms? No  Are you pregnant or breast feeding or do you have plans of pregnancy in the future? No; N/A  Do you have any upcoming hospitalizations or surgeries? Does not include esophagogastroduodenoscopy, colonoscopy, endoscopic retrograde cholangiopancreatography (ERCP), endoscopic ultrasound (EUS), dental procedures (including cleanings, fillings, implants, extractions)  or joint aspiration/steroid injections No  Have you or anyone in your household received a live vaccination in the past 4 weeks? Please note: No live vaccines while on biologic/chemotherapy until 6 months after the last treatment. Patient can receive the flu vaccine (shot only).  It is optimal for the patient to get it mid cycle, but it can be given at any time as long as it is not on the day of the infusion. No  If applicable to prescribed medication, confirm negative PPD or quantiferon gold MTB. If positive, verify has negative chest x-ray or the patient is at least 4 weeks post initiation of INH/B6 therapy and have clearance from provider before infusion (Y/N:[id number])  If applicable to prescribed medication, confirm negative hepatitis B surface antigen or hepatitis C. If positive, clearance from provider before infusion. (Y/N: [id number])  Rheumatology patients receiving tocilizumab (Actemra): If labs were drawn within the past week, hold dosing until cleared to infuse If AST/ALT > 2 X upper limit normal; ANC < 1.0. NO; N/A  Patients receiving belimumab (Benlysta): Have you been having any signs of worsening depression or suicidal ideations? No; N/A

## 2024-01-29 ENCOUNTER — OFFICE VISIT (OUTPATIENT)
Dept: RHEUMATOLOGY | Facility: CLINIC | Age: 84
End: 2024-01-29
Payer: MEDICARE

## 2024-01-29 VITALS
WEIGHT: 100.1 LBS | BODY MASS INDEX: 18.61 KG/M2 | SYSTOLIC BLOOD PRESSURE: 104 MMHG | DIASTOLIC BLOOD PRESSURE: 56 MMHG | HEART RATE: 77 BPM | OXYGEN SATURATION: 98 %

## 2024-01-29 DIAGNOSIS — M15.0 PRIMARY OSTEOARTHRITIS INVOLVING MULTIPLE JOINTS: ICD-10-CM

## 2024-01-29 DIAGNOSIS — Z79.899 HIGH RISK MEDICATION USE: ICD-10-CM

## 2024-01-29 DIAGNOSIS — M05.79 RHEUMATOID ARTHRITIS, SEROPOSITIVE, MULTIPLE SITES (H): Primary | ICD-10-CM

## 2024-01-29 PROCEDURE — 99214 OFFICE O/P EST MOD 30 MIN: CPT | Performed by: INTERNAL MEDICINE

## 2024-01-29 RX ORDER — METHOTREXATE 2.5 MG/1
10 TABLET ORAL
Qty: 48 TABLET | Refills: 0 | Status: SHIPPED | OUTPATIENT
Start: 2024-01-29 | End: 2024-05-01

## 2024-01-29 NOTE — PROGRESS NOTES
Rheumatology follow-up visit note     Diann is a 83 year old female presents today for follow-up.    Diann was seen today for recheck.    Diagnoses and all orders for this visit:    Rheumatoid arthritis, seropositive, multiple sites (H)  -     methotrexate 2.5 MG tablet; Take 4 tablets (10 mg) by mouth every 7 days for 90 days    High risk medication use    Primary osteoarthritis involving multiple joints    Other orders  -     abatacept (ORENCIA) 500 mg in sodium chloride 0.9 % 120 mL infusion    This patient with severe seropositive deforming rheumatoid arthritis on Orencia and methotrexate with intermittent renal impairment has begun to notice worsening pain and stiffness at about week 6 no further infusion cycles which she has, uncharacteristically, been getting every 8 weeks.  She is going to go to every 6 weeks of Orencia.  I outlined to her that the common recommended frequency would be every month if needed that can be done.  She is going to continue methotrexate her renal function is improved nonetheless she would avoid nonsteroidals.  At this time we will meet here in 3 months with labs before    Follow up in 3 months.    HPI    Diann Low is a 83 year old female is here for follow-up of seropositive rheumatoid arthritis, osteoarthritis on Orencia infusions, methotrexate, no longer over time she has noted that her joint symptoms tend to get worse during the last week to 10 days of her Orencia cycle which as it happens is every 60 days while recommended is half that there.  She wonders if the frequency can be increased.  She would like to try every 6 weeks instead of going to every 4 weeks.  About a week after infusion of Orencia she feels back to her normal self.  Prednisone 1 mg daily prior to that she was at 2 mg when we first met here.  She is making progress with her cervical injury.  She hopes to be able to drive in near future.      She is accompanied by her son.  She does have advanced  deformities from rheumatoid arthritis but remains highly functional.         DETAILED EXAMINATION  01/29/24  :    Vitals:    01/29/24 0841   BP: 104/56   Pulse: 77   SpO2: 98%   Weight: 45.4 kg (100 lb 1.6 oz)   No longer on alert oriented. Head including the face is examined for malar rash, heliotropes, scarring, lupus pernio. Eyes examined for redness such as in episcleritis/scleritis, periorbital lesions.   Neck/ Face examined for parotid gland swelling, range of motion of neck.  Left upper and lower and right upper and lower extremities examined for tenderness, swelling, warmth of the appendicular joints, range of motion, edema, rash.  Some of the important findings included: she does not have evidence of synovitis in the palpable joints of the upper extremities.  No significant deformities of the digits.  She has significant changes in her hands from rheumatoid arthritis including metacarpophalangeal joints with ulnar deviation more prominent on the left hand, flexion deformity such as right middle and fifth digit of the right hand synovial thickening.  As she has she has cervical collar in place still from the fracture that she sustained several months ago hoping to have that removed in near future.  [place] nodes.  Range of motion of the shoulders  show full abduction.  No JLT effusion or warmth of the knees.  she does not have dactylitis of the digits.     Patient Active Problem List    Diagnosis Date Noted    Hyponatremia 08/29/2023     Priority: Medium    Hypothyroidism 08/19/2023     Priority: Medium    Repeated falls 08/19/2023     Priority: Medium    Unspecified nondisplaced fracture of first cervical vertebra, initial encounter for closed fracture (H) 08/19/2023     Priority: Medium    Injury of head 08/17/2023     Priority: Medium    Osteoporosis, unspecified osteoporosis type, unspecified pathological fracture presence 08/31/2022     Priority: Medium    Rheumatoid arthritis, seropositive, multiple  sites (H) 06/20/2022     Priority: Medium    Rheumatoid arthritis involving both hands, unspecified whether rheumatoid factor present (H) 01/14/2022     Priority: Medium    History of falling 10/15/2018     Priority: Medium    Presence of intraocular lens 09/17/2018     Priority: Medium    Arthritis, rheumatoid (H) 09/12/2007     Priority: Medium     No past surgical history on file.   Past Medical History:   Diagnosis Date    Skin cancer     Spider veins      Allergies   Allergen Reactions    Infliximab Rash     Throat began to close/tighten    Sulfa Antibiotics Rash    Hydrocodone-Acetaminophen Nausea    Oxycodone-Acetaminophen Nausea    Penicillins Rash    Erythromycin Nausea     Current Outpatient Medications   Medication Sig Dispense Refill    alendronate (FOSAMAX) 70 MG tablet Take 1 tablet (70 mg) by mouth every 7 days 12 tablet 3    calcium carbonate-vitamin D (OSCAL W/D) 500-200 MG-UNIT tablet Take 1 tablet by mouth 2 times daily      carboxymethylcellulose PF (REFRESH PLUS) 0.5 % ophthalmic solution 1 drop 3 times daily as needed for dry eyes      clindamycin (CLEOCIN) 300 MG capsule Take 600 mg by mouth For dental procedures      ferrous fumarate 65 mg, [place]. FE,-Vitamin C 125 mg (VITRON C)  MG TABS tablet Take 1 tablet by mouth every other day      folic acid (FOLVITE) 1 MG tablet Take 1 tablet by mouth daily      levothyroxine (SYNTHROID/LEVOTHROID) 75 MCG tablet Take 0.5 tablets (37.5 mcg) by mouth daily 45 tablet 1    methotrexate 2.5 MG tablet TAKE 4 TABLETS EVERY 7 DAYS 48 tablet 0    Multiple Vitamin (MULTIVITAMIN ADULT PO) Take 1 tablet by mouth daily      White Petrolatum ointment Apply topically 2 times daily       family history is not on file.  Social Connections: Not on file          WBC Count   Date Value Ref Range Status   01/25/2024 6.5 4.0 - 11.0 10e3/uL Final     RBC Count   Date Value Ref Range Status   01/25/2024 3.61 (L) 3.80 - 5.20 10e6/uL Final     Hemoglobin   Date Value Ref  Range Status   01/25/2024 11.4 (L) 11.7 - 15.7 g/dL Final     Hematocrit   Date Value Ref Range Status   01/25/2024 34.6 (L) 35.0 - 47.0 % Final     MCV   Date Value Ref Range Status   01/25/2024 96 78 - 100 fL Final     MCH   Date Value Ref Range Status   01/25/2024 31.6 26.5 - 33.0 pg Final     Platelet Count   Date Value Ref Range Status   01/25/2024 179 150 - 450 10e3/uL Final     % Lymphocytes   Date Value Ref Range Status   10/05/2023 16 % Final     AST   Date Value Ref Range Status   08/29/2023 27 0 - 40 U/L Final     ALT   Date Value Ref Range Status   01/25/2024 6 0 - 50 U/L Final     Albumin   Date Value Ref Range Status   01/25/2024 4.2 3.5 - 5.2 g/dL Final   08/29/2023 3.3 (L) 3.5 - 5.0 g/dL Final     Alkaline Phosphatase   Date Value Ref Range Status   08/29/2023 71 45 - 120 U/L Final     Creatinine   Date Value Ref Range Status   01/25/2024 0.78 0.51 - 0.95 mg/dL Final     GFR Estimate   Date Value Ref Range Status   01/25/2024 74 >60 mL/min/1.73m2 Final     GFR, ESTIMATED POCT   Date Value Ref Range Status   11/25/2022 >60 >60 mL/min/1.73m2 Final

## 2024-02-05 RX ORDER — LEVOTHYROXINE SODIUM 50 UG/1
50 TABLET ORAL DAILY
Qty: 90 TABLET | Refills: 0 | Status: SHIPPED | OUTPATIENT
Start: 2024-02-05 | End: 2024-05-06

## 2024-03-14 ENCOUNTER — INFUSION THERAPY VISIT (OUTPATIENT)
Dept: INFUSION THERAPY | Facility: HOSPITAL | Age: 84
End: 2024-03-14
Attending: INTERNAL MEDICINE
Payer: MEDICARE

## 2024-03-14 VITALS
DIASTOLIC BLOOD PRESSURE: 57 MMHG | OXYGEN SATURATION: 97 % | RESPIRATION RATE: 16 BRPM | SYSTOLIC BLOOD PRESSURE: 114 MMHG | HEART RATE: 77 BPM | TEMPERATURE: 97.8 F

## 2024-03-14 DIAGNOSIS — M05.79 RHEUMATOID ARTHRITIS, SEROPOSITIVE, MULTIPLE SITES (H): Primary | ICD-10-CM

## 2024-03-14 PROCEDURE — 96365 THER/PROPH/DIAG IV INF INIT: CPT

## 2024-03-14 PROCEDURE — 250N000028 HC RX JA MOD (INTRAVENOUS), IP 250 OP 636: Mod: JZ,JA | Performed by: INTERNAL MEDICINE

## 2024-03-14 PROCEDURE — 258N000003 HC RX IP 258 OP 636: Performed by: INTERNAL MEDICINE

## 2024-03-14 RX ORDER — ALBUTEROL SULFATE 90 UG/1
1-2 AEROSOL, METERED RESPIRATORY (INHALATION)
Start: 2024-04-18

## 2024-03-14 RX ORDER — MEPERIDINE HYDROCHLORIDE 50 MG/ML
25 INJECTION INTRAMUSCULAR; INTRAVENOUS; SUBCUTANEOUS EVERY 30 MIN PRN
OUTPATIENT
Start: 2024-04-18

## 2024-03-14 RX ORDER — ALBUTEROL SULFATE 0.83 MG/ML
2.5 SOLUTION RESPIRATORY (INHALATION)
Status: DISCONTINUED | OUTPATIENT
Start: 2024-03-14 | End: 2024-03-14 | Stop reason: HOSPADM

## 2024-03-14 RX ORDER — ACETAMINOPHEN 325 MG/1
650 TABLET ORAL ONCE
Start: 2024-04-18 | End: 2024-04-18

## 2024-03-14 RX ORDER — NALOXONE HYDROCHLORIDE 0.4 MG/ML
0.2 INJECTION, SOLUTION INTRAMUSCULAR; INTRAVENOUS; SUBCUTANEOUS
OUTPATIENT
Start: 2024-04-18

## 2024-03-14 RX ORDER — METHYLPREDNISOLONE SODIUM SUCCINATE 125 MG/2ML
125 INJECTION, POWDER, LYOPHILIZED, FOR SOLUTION INTRAMUSCULAR; INTRAVENOUS
Start: 2024-04-18

## 2024-03-14 RX ORDER — NALOXONE HYDROCHLORIDE 0.4 MG/ML
0.2 INJECTION, SOLUTION INTRAMUSCULAR; INTRAVENOUS; SUBCUTANEOUS
Status: DISCONTINUED | OUTPATIENT
Start: 2024-03-14 | End: 2024-03-14 | Stop reason: HOSPADM

## 2024-03-14 RX ORDER — DIPHENHYDRAMINE HCL 25 MG
25 CAPSULE ORAL ONCE
Start: 2024-04-18 | End: 2024-04-18

## 2024-03-14 RX ORDER — EPINEPHRINE 1 MG/ML
0.3 INJECTION, SOLUTION INTRAMUSCULAR; SUBCUTANEOUS EVERY 5 MIN PRN
Status: DISCONTINUED | OUTPATIENT
Start: 2024-03-14 | End: 2024-03-14 | Stop reason: HOSPADM

## 2024-03-14 RX ORDER — DIPHENHYDRAMINE HYDROCHLORIDE 50 MG/ML
50 INJECTION INTRAMUSCULAR; INTRAVENOUS
Status: DISCONTINUED | OUTPATIENT
Start: 2024-03-14 | End: 2024-03-14 | Stop reason: HOSPADM

## 2024-03-14 RX ORDER — ALBUTEROL SULFATE 0.83 MG/ML
2.5 SOLUTION RESPIRATORY (INHALATION)
OUTPATIENT
Start: 2024-04-18

## 2024-03-14 RX ORDER — METHYLPREDNISOLONE SODIUM SUCCINATE 125 MG/2ML
125 INJECTION, POWDER, LYOPHILIZED, FOR SOLUTION INTRAMUSCULAR; INTRAVENOUS ONCE
Start: 2024-04-18 | End: 2024-04-18

## 2024-03-14 RX ORDER — DIPHENHYDRAMINE HYDROCHLORIDE 50 MG/ML
50 INJECTION INTRAMUSCULAR; INTRAVENOUS
Start: 2024-04-18

## 2024-03-14 RX ORDER — HEPARIN SODIUM,PORCINE 10 UNIT/ML
5 VIAL (ML) INTRAVENOUS
OUTPATIENT
Start: 2024-04-18

## 2024-03-14 RX ORDER — EPINEPHRINE 1 MG/ML
0.3 INJECTION, SOLUTION INTRAMUSCULAR; SUBCUTANEOUS EVERY 5 MIN PRN
OUTPATIENT
Start: 2024-04-18

## 2024-03-14 RX ORDER — METHYLPREDNISOLONE SODIUM SUCCINATE 125 MG/2ML
125 INJECTION, POWDER, LYOPHILIZED, FOR SOLUTION INTRAMUSCULAR; INTRAVENOUS
Status: DISCONTINUED | OUTPATIENT
Start: 2024-03-14 | End: 2024-03-14 | Stop reason: HOSPADM

## 2024-03-14 RX ORDER — HEPARIN SODIUM (PORCINE) LOCK FLUSH IV SOLN 100 UNIT/ML 100 UNIT/ML
5 SOLUTION INTRAVENOUS
OUTPATIENT
Start: 2024-04-18

## 2024-03-14 RX ORDER — ALBUTEROL SULFATE 90 UG/1
1-2 AEROSOL, METERED RESPIRATORY (INHALATION)
Status: DISCONTINUED | OUTPATIENT
Start: 2024-03-14 | End: 2024-03-14 | Stop reason: HOSPADM

## 2024-03-14 RX ORDER — MEPERIDINE HYDROCHLORIDE 50 MG/ML
25 INJECTION INTRAMUSCULAR; INTRAVENOUS; SUBCUTANEOUS EVERY 30 MIN PRN
Status: DISCONTINUED | OUTPATIENT
Start: 2024-03-14 | End: 2024-03-14 | Stop reason: HOSPADM

## 2024-03-14 RX ADMIN — SODIUM CHLORIDE 500 MG: 9 INJECTION, SOLUTION INTRAVENOUS at 10:45

## 2024-03-14 RX ADMIN — SODIUM CHLORIDE 250 ML: 9 INJECTION, SOLUTION INTRAVENOUS at 10:47

## 2024-03-14 NOTE — PROGRESS NOTES
Infusion Nursing Note:  Diann Low presents today for orencia.    Patient seen by provider today: No   present during visit today: Not Applicable.    Note: Pt does not take pre meds. Pt so glad to be out of her neck brace. Dr Olivier changed pt to every 6 weeks for treatment..      Intravenous Access:  Peripheral IV placed.    Treatment Conditions:  Not Applicable.      Post Infusion Assessment:  Patient tolerated infusion without incident.  Access discontinued per protocol.       Discharge Plan:   Patient and/or family verbalized understanding of discharge instructions and all questions answered.      Analisa Bermeo RN .    ~~~ NOTE: If the patient answers yes to any of the questions below, hold the infusion and contact ordering provider or on-call provider.    Do you currently have any signs of illness or infection or are you on any antibiotics? No  Have you recently had an elevated temperature, fever, chills, productive cough, coughing for 3 weeks or longer or hemoptysis, abnormal vital signs, night sweats, chest pain or have you noticed a decrease in your appetite, unexplained weight loss or fatigue? No  Have you had any new, sudden, or worsening abdominal pain? No  Do you have any open wounds or new incisions? (exclude for patients with hidradenitis suppurativa) No  Have you recently been diagnosed with any new nervous system diseases (ie. Multiple sclerosis, Guillain [place], seizures, neurological changes) or cancer diagnosis? Are you on any form of radiation or chemotherapy? No  Have there been any other new onset medical symptoms? No  Are you pregnant or breast feeding or do you have plans of pregnancy in the future? No; N/A  Do you have any upcoming hospitalizations or surgeries? Does not include esophagogastroduodenoscopy, colonoscopy, endoscopic retrograde cholangiopancreatography (ERCP), endoscopic ultrasound (EUS), dental procedures (including cleanings, fillings, implants,  extractions)  or joint aspiration/steroid injections No  Have you or anyone in your household received a live vaccination in the past 4 weeks? Please note: No live vaccines while on biologic/chemotherapy until 6 months after the last treatment. Patient can receive the flu vaccine (shot only).  It is optimal for the patient to get it mid cycle, but it can be given at any time as long as it is not on the day of the infusion. No  If applicable to prescribed medication, confirm negative PPD or quantiferon gold MTB. If positive, verify has negative chest x-ray or the patient is at least 4 weeks post initiation of INH/B6 therapy and have clearance from provider before infusion (Y/N:[id number])  If applicable to prescribed medication, confirm negative hepatitis B surface antigen or hepatitis C. If positive, clearance from provider before infusion. (Y/N: [id number])  Rheumatology patients receiving tocilizumab (Actemra): If labs were drawn within the past week, hold dosing until cleared to infuse If AST/ALT > 2 X upper limit normal; ANC < 1.0. NO; N/A  Patients receiving belimumab (Benlysta): Have you been having any signs of worsening depression or suicidal ideations? No; N/A

## 2024-04-04 ENCOUNTER — TELEPHONE (OUTPATIENT)
Dept: FAMILY MEDICINE | Facility: CLINIC | Age: 84
End: 2024-04-04
Payer: MEDICARE

## 2024-04-04 NOTE — TELEPHONE ENCOUNTER
Reason for call:  Patient reporting a symptom  WEAKNESS + WEIGHT LOSS     Symptom or request: WEAKNESS + WEIGHT LOSS     Duration (how long have symptoms been present): 1 MONTH    Have you been treated for this before? No    Additional comments: PATIENT WANTS TO BE SEEN BY BALAJI TERRELL     PATIENT REQUEST TO BE FIT INTO HIS SCHEDULE    Phone Number patient can be reached at:  Cell number on file:    Telephone Information:   Mobile [phone]       Best Time:  ASAP    Can we leave a detailed message on this number:  YES    Call taken on 4/4/2024 at 9:13 AM by Nina Merino

## 2024-04-04 NOTE — TELEPHONE ENCOUNTER
Outgoing call to patient. Booked appointment on 4/16 with Dr. Alvarado. No further questions at this time.

## 2024-04-16 ENCOUNTER — OFFICE VISIT (OUTPATIENT)
Dept: FAMILY MEDICINE | Facility: CLINIC | Age: 84
End: 2024-04-16
Payer: MEDICARE

## 2024-04-16 VITALS
RESPIRATION RATE: 14 BRPM | WEIGHT: 99.1 LBS | TEMPERATURE: 97.7 F | BODY MASS INDEX: 18.24 KG/M2 | DIASTOLIC BLOOD PRESSURE: 52 MMHG | HEART RATE: 75 BPM | SYSTOLIC BLOOD PRESSURE: 114 MMHG | OXYGEN SATURATION: 97 % | HEIGHT: 62 IN

## 2024-04-16 DIAGNOSIS — R53.82 CHRONIC FATIGUE: Primary | ICD-10-CM

## 2024-04-16 DIAGNOSIS — E55.9 VITAMIN D DEFICIENCY, UNSPECIFIED: ICD-10-CM

## 2024-04-16 DIAGNOSIS — E03.9 HYPOTHYROIDISM, UNSPECIFIED TYPE: ICD-10-CM

## 2024-04-16 DIAGNOSIS — Z91.81 HISTORY OF FALLING: ICD-10-CM

## 2024-04-16 LAB
ALBUMIN UR-MCNC: NEGATIVE MG/DL
APPEARANCE UR: CLEAR
BASOPHILS # BLD AUTO: 0 10E3/UL (ref 0–0.2)
BASOPHILS NFR BLD AUTO: 0 %
BILIRUB UR QL STRIP: NEGATIVE
COLOR UR AUTO: YELLOW
EOSINOPHIL # BLD AUTO: 0.1 10E3/UL (ref 0–0.7)
EOSINOPHIL NFR BLD AUTO: 2 %
ERYTHROCYTE [DISTWIDTH] IN BLOOD BY AUTOMATED COUNT: 14.9 % (ref 10–15)
GLUCOSE UR STRIP-MCNC: NEGATIVE MG/DL
HCT VFR BLD AUTO: 35.1 % (ref 35–47)
HGB BLD-MCNC: 11.7 G/DL (ref 11.7–15.7)
HGB UR QL STRIP: NEGATIVE
IMM GRANULOCYTES # BLD: 0 10E3/UL
IMM GRANULOCYTES NFR BLD: 0 %
KETONES UR STRIP-MCNC: NEGATIVE MG/DL
LEUKOCYTE ESTERASE UR QL STRIP: NEGATIVE
LYMPHOCYTES # BLD AUTO: 1.7 10E3/UL (ref 0.8–5.3)
LYMPHOCYTES NFR BLD AUTO: 23 %
MCH RBC QN AUTO: 32.7 PG (ref 26.5–33)
MCHC RBC AUTO-ENTMCNC: 33.3 G/DL (ref 31.5–36.5)
MCV RBC AUTO: 98 FL (ref 78–100)
MONOCYTES # BLD AUTO: 0.8 10E3/UL (ref 0–1.3)
MONOCYTES NFR BLD AUTO: 11 %
NEUTROPHILS # BLD AUTO: 4.7 10E3/UL (ref 1.6–8.3)
NEUTROPHILS NFR BLD AUTO: 64 %
NITRATE UR QL: NEGATIVE
PH UR STRIP: 5.5 [PH] (ref 5–8)
PLATELET # BLD AUTO: 179 10E3/UL (ref 150–450)
RBC # BLD AUTO: 3.58 10E6/UL (ref 3.8–5.2)
SP GR UR STRIP: 1.02 (ref 1–1.03)
UROBILINOGEN UR STRIP-ACNC: 0.2 E.U./DL
WBC # BLD AUTO: 7.3 10E3/UL (ref 4–11)

## 2024-04-16 PROCEDURE — 36415 COLL VENOUS BLD VENIPUNCTURE: CPT | Performed by: STUDENT IN AN ORGANIZED HEALTH CARE EDUCATION/TRAINING PROGRAM

## 2024-04-16 PROCEDURE — 82306 VITAMIN D 25 HYDROXY: CPT | Performed by: STUDENT IN AN ORGANIZED HEALTH CARE EDUCATION/TRAINING PROGRAM

## 2024-04-16 PROCEDURE — 84100 ASSAY OF PHOSPHORUS: CPT | Performed by: STUDENT IN AN ORGANIZED HEALTH CARE EDUCATION/TRAINING PROGRAM

## 2024-04-16 PROCEDURE — 82248 BILIRUBIN DIRECT: CPT | Performed by: STUDENT IN AN ORGANIZED HEALTH CARE EDUCATION/TRAINING PROGRAM

## 2024-04-16 PROCEDURE — 80053 COMPREHEN METABOLIC PANEL: CPT | Performed by: STUDENT IN AN ORGANIZED HEALTH CARE EDUCATION/TRAINING PROGRAM

## 2024-04-16 PROCEDURE — 84443 ASSAY THYROID STIM HORMONE: CPT | Performed by: STUDENT IN AN ORGANIZED HEALTH CARE EDUCATION/TRAINING PROGRAM

## 2024-04-16 PROCEDURE — 99213 OFFICE O/P EST LOW 20 MIN: CPT | Performed by: STUDENT IN AN ORGANIZED HEALTH CARE EDUCATION/TRAINING PROGRAM

## 2024-04-16 PROCEDURE — 81003 URINALYSIS AUTO W/O SCOPE: CPT | Performed by: STUDENT IN AN ORGANIZED HEALTH CARE EDUCATION/TRAINING PROGRAM

## 2024-04-16 PROCEDURE — 82607 VITAMIN B-12: CPT | Performed by: STUDENT IN AN ORGANIZED HEALTH CARE EDUCATION/TRAINING PROGRAM

## 2024-04-16 PROCEDURE — 82746 ASSAY OF FOLIC ACID SERUM: CPT | Performed by: STUDENT IN AN ORGANIZED HEALTH CARE EDUCATION/TRAINING PROGRAM

## 2024-04-16 PROCEDURE — 84134 ASSAY OF PREALBUMIN: CPT | Performed by: STUDENT IN AN ORGANIZED HEALTH CARE EDUCATION/TRAINING PROGRAM

## 2024-04-16 PROCEDURE — 85025 COMPLETE CBC W/AUTO DIFF WBC: CPT | Performed by: STUDENT IN AN ORGANIZED HEALTH CARE EDUCATION/TRAINING PROGRAM

## 2024-04-16 NOTE — PROGRESS NOTES
Assessment & Plan     Chronic fatigue    Diann is an 83-year-old female with rheumatoid arthritis on methotrexate and Orencia, hypothyroidism, esophageal dysphagia, history of odontoid fracture in August 2023 who presents today for chronic fatigue, which is worse in the morning.  Reports that fades over time throughout the day, and is usually present when doing household chores.  Able to walk without any difficulty, walking 3-4 blocks but could walk further as she is not winded or out of breath walking a distance.      Thyroid has not been well-controlled, last TSH showed subtherapeutic dosing of Synthroid, currently taking 50 mcg Synthroid daily.  Did change recently how she is taking the Synthroid, taking it now in the morning at 5 AM over the last couple of weeks whereas previously she was taking it at nighttime.  Did reiterate to patient the importance of taking this medication 30 minutes prior to food and not with any other medications.    Weight is stable, appetite is normal, she is eating 3 meals a day and a snack at night/afternoon.    Because this fatigue has been present for several months, and does seem to coincide with her recovery from her odontoid fracture in August, I believe that the most likely cause is deconditioning.  Iatrogenic causes also possible as patient is on methotrexate.    Will obtain TSH to see if patient is still subtherapeutic on dosing, will increase TSH if that is the case.  She will be seeing rheumatologist, encouraged her to speak with them about this, and if decreasing methotrexate would be reasonable.  Of most importance is increasing exercise, recommended for physical therapy, she reports that there is an exercise class that she can do at her facility every day, encouraged her to do this.  Encouraged her to walk 5 minutes in the morning at the start of the morning as well.    Will obtain lab work to rule out other causes of fatigue.        - TSH with free T4 reflex  - UA  Macroscopic with reflex to Microscopic and Culture - Lab Collect  - CBC with platelets and differential  - Hepatic panel (Albumin, ALT, AST, Bili, Alk Phos, TP)  - Renal panel (Alb, BUN, Ca, Cl, CO2, Creat, Gluc, Phos, K, Na)  - Vitamin B12  - Folate  - Prealbumin  - Vitamin D Deficiency  - UA Macroscopic with reflex to Microscopic and Culture - Lab Collect  - TSH with free T4 reflex  - CBC with platelets and differential  - Renal panel (Alb, BUN, Ca, Cl, CO2, Creat, Gluc, Phos, K, Na)  - Vitamin B12  - Folate  - Prealbumin  - Vitamin D Deficiency  - Alkaline phosphatase  - ALT  - AST  - Bilirubin  total  - Bilirubin direct  - Protein total    History of falling  Hypothyroidism  Vitamin D deficiency, unspecified    - Vitamin D Deficiency  - Vitamin D Deficiency      Follow-up in 1 to 2 months    Nicholas Tidwell is a 84 year old, presenting for the following health issues:  Weight Loss (Weakness in the morning, have to sit down, balance issues. Was 97.1 lbs at home this morning./Dry mouth, especially at night. Frequent urination at night.)        4/16/2024    10:01 AM   Additional Questions   Roomed by Crystal GOLDBERG     History of Present Illness       Reason for visit:  Morning weakness, weight loss    She eats 2-3 servings of fruits and vegetables daily.She consumes 0 sweetened beverage(s) daily.She exercises with enough effort to increase her heart rate 10 to 19 minutes per day.  She exercises with enough effort to increase her heart rate 3 or less days per week.   She is taking medications regularly.     When she wakes up she feels weak, will get easily fatigued after a few minutes. Can't get the whole bed made before taking a break.  Can walk 3-4 blocks, but has not tried  to go further, able to walk that far without any fatigue.  Has been having no issue with fatigue after about lunch time.  Can last minutes to hours.  Feels fine when she goes to bed. This has been going on for about four months    Is waking up  "between 1-3 times a night to urinate. Is not sure how long this has been on for but thinks months.     Is eating 3 different meals a day; eats healthily, incoporates fruits and vegetables every day. Eating bread and meats.  Uses a 'luncheon plate' and eats about 3/4 of this for lunch and dinner.  For breakfast has toast, cereal. Does not frequently eat snacks, but has started to do this prior to bed.     Just started taking synthroid at 5 AM prior to breakfast and not with other medication for past two weeks, prior to this taking it in the evening.               Review of Systems  Constitutional, neuro, ENT, endocrine, pulmonary, cardiac, gastrointestinal, genitourinary, musculoskeletal, integument and psychiatric systems are negative, except as otherwise noted.      Objective    /52 (BP Location: Right arm, Patient Position: Sitting, Cuff Size: Adult Regular)   Pulse 75   Temp 97.7  F (36.5  C) (Oral)   Resp 14   Ht 1.562 m (5' 1.5\")   Wt 45 kg (99 lb 1.6 oz)   SpO2 97%   BMI 18.42 kg/m    Body mass index is 18.42 kg/m .  Physical Exam   GENERAL: alert and no distress  EYES: Eyes grossly normal to inspection, PERRL and conjunctivae and sclerae normal  NECK: no adenopathy, no asymmetry, masses, or scars  RESP: lungs clear to auscultation - no rales, rhonchi or wheezes  CV: regular rate and rhythm, normal S1 S2, no S3 or S4, no murmur, click or rub, no peripheral edema  MS: no gross musculoskeletal defects noted, no edema  SKIN: no suspicious lesions or rashes  NEURO: Normal strength and tone, mentation intact and speech normal  PSYCH: mentation appears normal, affect normal/bright    Lab on 01/25/2024   Component Date Value Ref Range Status    TSH 01/25/2024 5.22 (H)  0.30 - 4.20 uIU/mL Final    Albumin 01/25/2024 4.2  3.5 - 5.2 g/dL Final    ALT 01/25/2024 6  0 - 50 U/L Final    WBC Count 01/25/2024 6.5  4.0 - 11.0 10e3/uL Final    RBC Count 01/25/2024 3.61 (L)  3.80 - 5.20 10e6/uL Final    Hemoglobin " 01/25/2024 11.4 (L)  11.7 - 15.7 g/dL Final    Hematocrit 01/25/2024 34.6 (L)  35.0 - 47.0 % Final    MCV 01/25/2024 96  78 - 100 fL Final    MCH 01/25/2024 31.6  26.5 - 33.0 pg Final    MCHC 01/25/2024 32.9  31.5 - 36.5 g/dL Final    RDW 01/25/2024 14.9  10.0 - 15.0 % Final    Platelet Count 01/25/2024 179  150 - 450 10e3/uL Final    Creatinine 01/25/2024 0.78  0.51 - 0.95 mg/dL Final    GFR Estimate 01/25/2024 74  >60 mL/min/1.73m2 Final    Free T4 01/25/2024 1.22  0.90 - 1.70 ng/dL Final           Signed Electronically by: Abdifatah Alvarado MD

## 2024-04-17 LAB
ALBUMIN SERPL BCG-MCNC: 4.6 G/DL (ref 3.5–5.2)
ALP SERPL-CCNC: 70 U/L (ref 40–150)
ALT SERPL W P-5'-P-CCNC: 14 U/L (ref 0–50)
ANION GAP SERPL CALCULATED.3IONS-SCNC: 9 MMOL/L (ref 7–15)
AST SERPL W P-5'-P-CCNC: 42 U/L (ref 0–45)
BILIRUB DIRECT SERPL-MCNC: <0.2 MG/DL (ref 0–0.3)
BILIRUB SERPL-MCNC: 0.4 MG/DL
BUN SERPL-MCNC: 28.6 MG/DL (ref 8–23)
CALCIUM SERPL-MCNC: 9.8 MG/DL (ref 8.8–10.2)
CHLORIDE SERPL-SCNC: 102 MMOL/L (ref 98–107)
CREAT SERPL-MCNC: 0.86 MG/DL (ref 0.51–0.95)
DEPRECATED HCO3 PLAS-SCNC: 27 MMOL/L (ref 22–29)
EGFRCR SERPLBLD CKD-EPI 2021: 65 ML/MIN/1.73M2
FOLATE SERPL-MCNC: >40 NG/ML (ref 4.6–34.8)
GLUCOSE SERPL-MCNC: 96 MG/DL (ref 70–99)
PHOSPHATE SERPL-MCNC: 3.8 MG/DL (ref 2.5–4.5)
POTASSIUM SERPL-SCNC: 4.5 MMOL/L (ref 3.4–5.3)
PROT SERPL-MCNC: 7 G/DL (ref 6.4–8.3)
SODIUM SERPL-SCNC: 138 MMOL/L (ref 135–145)
TSH SERPL DL<=0.005 MIU/L-ACNC: 3.37 UIU/ML (ref 0.3–4.2)
VIT B12 SERPL-MCNC: 695 PG/ML (ref 232–1245)
VIT D+METAB SERPL-MCNC: 72 NG/ML (ref 20–50)

## 2024-04-18 LAB — PREALB SERPL-MCNC: 16 MG/DL (ref 20–40)

## 2024-04-23 ENCOUNTER — MYC MEDICAL ADVICE (OUTPATIENT)
Dept: FAMILY MEDICINE | Facility: CLINIC | Age: 84
End: 2024-04-23
Payer: MEDICARE

## 2024-04-23 NOTE — TELEPHONE ENCOUNTER
4/16/24 LOV     Chronic fatigue     Diann is an 83-year-old female with rheumatoid arthritis on methotrexate and Orencia, hypothyroidism, esophageal dysphagia, history of odontoid fracture in August 2023 who presents today for chronic fatigue, which is worse in the morning.  Reports that fades over time throughout the day, and is usually present when doing household chores.  Able to walk without any difficulty, walking 3-4 blocks but could walk further as she is not winded or out of breath walking a distance.       Thyroid has not been well-controlled, last TSH showed subtherapeutic dosing of Synthroid, currently taking 50 mcg Synthroid daily.  Did change recently how she is taking the Synthroid, taking it now in the morning at 5 AM over the last couple of weeks whereas previously she was taking it at nighttime.  Did reiterate to patient the importance of taking this medication 30 minutes prior to food and not with any other medications.     Weight is stable, appetite is normal, she is eating 3 meals a day and a snack at night/afternoon.     Because this fatigue has been present for several months, and does seem to coincide with her recovery from her odontoid fracture in August, I believe that the most likely cause is deconditioning.  Iatrogenic causes also possible as patient is on methotrexate.

## 2024-04-23 NOTE — TELEPHONE ENCOUNTER
Ms. Low,    I apologize for not getting back to you sooner about your results.  1 result that was abnormal was prealbumin which can be a marker for malnutrition.  Otherwise the labs were normal, and I cannot find any other cause of your symptoms through the lab work .    I believe that malnutrition is likely playing a role here.  At our visit you told me you are eating 3 meals a day and eating snack in the evening or afternoon.  This amount of food might not be enough.  I recommend eating an extra snack during the day, I think there is a reason why you feel better after eating.    My understanding from our discussion is that your appetite was normal, do feel like you could eat another snack or 2 during the day?    I still would recommend going to the physical therapy/exercise class that you have available to you, to attempt to increase strength as well.    Please try adding 2 snacks in, 1 before breakfast, 1 in between breakfast and lunc and 1 in between breakfast, also continue the snack you are already eating sometime in afternoon/evening.  I would like to see if this helps with your symptoms over the next 2 weeks.  Please let me know.    Best regards,    Abdifatah Alvarado MD

## 2024-04-24 ENCOUNTER — MYC MEDICAL ADVICE (OUTPATIENT)
Dept: FAMILY MEDICINE | Facility: CLINIC | Age: 84
End: 2024-04-24
Payer: MEDICARE

## 2024-04-24 NOTE — TELEPHONE ENCOUNTER
I sent the patient a message through [employer], detailing that the vitamin D level is actually above normal, but only mildly so, and calcium was normal.  Because of her frailty, would not recommend stopping vitamin D.

## 2024-05-01 ENCOUNTER — OFFICE VISIT (OUTPATIENT)
Dept: RHEUMATOLOGY | Facility: CLINIC | Age: 84
End: 2024-05-01
Payer: MEDICARE

## 2024-05-01 VITALS
OXYGEN SATURATION: 99 % | HEART RATE: 71 BPM | SYSTOLIC BLOOD PRESSURE: 104 MMHG | WEIGHT: 99.5 LBS | DIASTOLIC BLOOD PRESSURE: 58 MMHG | BODY MASS INDEX: 18.5 KG/M2

## 2024-05-01 DIAGNOSIS — M05.79 RHEUMATOID ARTHRITIS, SEROPOSITIVE, MULTIPLE SITES (H): Primary | ICD-10-CM

## 2024-05-01 DIAGNOSIS — R76.8 RHEUMATOID FACTOR POSITIVE: ICD-10-CM

## 2024-05-01 DIAGNOSIS — Z79.899 HIGH RISK MEDICATION USE: ICD-10-CM

## 2024-05-01 DIAGNOSIS — M15.0 PRIMARY OSTEOARTHRITIS INVOLVING MULTIPLE JOINTS: ICD-10-CM

## 2024-05-01 PROCEDURE — 99214 OFFICE O/P EST MOD 30 MIN: CPT | Performed by: INTERNAL MEDICINE

## 2024-05-01 PROCEDURE — G2211 COMPLEX E/M VISIT ADD ON: HCPCS | Performed by: INTERNAL MEDICINE

## 2024-05-01 RX ORDER — DIPHENHYDRAMINE HYDROCHLORIDE 50 MG/ML
50 INJECTION INTRAMUSCULAR; INTRAVENOUS
Status: CANCELLED
Start: 2024-05-06

## 2024-05-01 RX ORDER — MEPERIDINE HYDROCHLORIDE 25 MG/ML
25 INJECTION INTRAMUSCULAR; INTRAVENOUS; SUBCUTANEOUS EVERY 30 MIN PRN
Status: CANCELLED | OUTPATIENT
Start: 2024-05-06

## 2024-05-01 RX ORDER — DIPHENHYDRAMINE HCL 25 MG
25 CAPSULE ORAL ONCE
Status: CANCELLED
Start: 2024-05-06 | End: 2024-05-06

## 2024-05-01 RX ORDER — EPINEPHRINE 1 MG/ML
0.3 INJECTION, SOLUTION, CONCENTRATE INTRAVENOUS EVERY 5 MIN PRN
Status: CANCELLED | OUTPATIENT
Start: 2024-05-06

## 2024-05-01 RX ORDER — ACETAMINOPHEN 325 MG/1
650 TABLET ORAL ONCE
Status: CANCELLED
Start: 2024-05-06 | End: 2024-05-06

## 2024-05-01 RX ORDER — METHYLPREDNISOLONE SODIUM SUCCINATE 125 MG/2ML
125 INJECTION, POWDER, LYOPHILIZED, FOR SOLUTION INTRAMUSCULAR; INTRAVENOUS
Status: CANCELLED
Start: 2024-05-06

## 2024-05-01 RX ORDER — HEPARIN SODIUM,PORCINE 10 UNIT/ML
5-20 VIAL (ML) INTRAVENOUS DAILY PRN
Status: CANCELLED | OUTPATIENT
Start: 2024-05-06

## 2024-05-01 RX ORDER — HEPARIN SODIUM (PORCINE) LOCK FLUSH IV SOLN 100 UNIT/ML 100 UNIT/ML
5 SOLUTION INTRAVENOUS
Status: CANCELLED | OUTPATIENT
Start: 2024-05-06

## 2024-05-01 RX ORDER — ALBUTEROL SULFATE 0.83 MG/ML
2.5 SOLUTION RESPIRATORY (INHALATION)
Status: CANCELLED | OUTPATIENT
Start: 2024-05-06

## 2024-05-01 RX ORDER — METHOTREXATE 2.5 MG/1
10 TABLET ORAL
Qty: 48 TABLET | Refills: 0 | Status: SHIPPED | OUTPATIENT
Start: 2024-05-01 | End: 2024-08-01

## 2024-05-01 RX ORDER — METHYLPREDNISOLONE SODIUM SUCCINATE 125 MG/2ML
125 INJECTION, POWDER, LYOPHILIZED, FOR SOLUTION INTRAMUSCULAR; INTRAVENOUS ONCE
Status: CANCELLED
Start: 2024-05-06 | End: 2024-05-06

## 2024-05-01 RX ORDER — ALBUTEROL SULFATE 90 UG/1
1-2 AEROSOL, METERED RESPIRATORY (INHALATION)
Status: CANCELLED
Start: 2024-05-06

## 2024-05-01 NOTE — PROGRESS NOTES
Rheumatology follow-up visit note     Diann is a 84 year old female presents today for follow-up.    Diann was seen today for recheck.    Diagnoses and all orders for this visit:    Rheumatoid arthritis, seropositive, multiple sites (H)  -     methotrexate 2.5 MG tablet; Take 4 tablets (10 mg) by mouth every 7 days    High risk medication use    Primary osteoarthritis involving multiple joints    Rheumatoid factor positive    Other orders  -     sodium chloride 0.9% BOLUS 250 mL  -     sodium chloride (PF) 0.9% PF flush 3-20 mL  -     heparin lock flush 10 UNIT/ML injection 5-20 mL  -     heparin 100 unit/mL injection 5 mL  -     alteplase (CATHFLO ACTIVASE) injection 2 mg  -     acetaminophen (TYLENOL) tablet 650 mg  -     diphenhydrAMINE (BENADRYL) capsule 25 mg  -     diphenhydrAMINE (BENADRYL) 25 mg in sodium chloride 0.9 % 50.5 mL intermittent infusion  -     methylPREDNISolone sodium succinate (solu-MEDROL) injection 125 mg  -     Cancel: abatacept (ORENCIA) 500 mg in sodium chloride 0.9 % 120 mL infusion  -     diphenhydrAMINE (BENADRYL) injection 50 mg  -     famotidine (PEPCID) injection 20 mg  -     methylPREDNISolone sodium succinate (solu-MEDROL) injection 125 mg  -     EPINEPHrine PF (ADRENALIN) injection 0.3 mg  -     sodium chloride 0.9% BOLUS 500 mL  -     albuterol (PROVENTIL HFA/VENTOLIN HFA) inhaler  -     albuterol (PROVENTIL) neb solution 2.5 mg  -     meperidine (DEMEROL) injection 25 mg  -     abatacept (ORENCIA) 500 mg in sodium chloride 0.9 % 120 mL infusion        Well-controlled seropositive rheumatoid arthritis, doing well with combination of methotrexate, Orencia, over-the-counter folic acid.  She will stay the course.  Management of OA reviewed.   The longitudinal plan of care for the diagnosis(es)/condition(s) as documented were addressed during this visit. Due to the added complexity in care, I will continue to support Diann in the subsequent management and with ongoing  continuity of care.     Follow up in 3 months.    ELENITA    Diann Low is a 84 year old female is here for follow-up of seropositive rheumatoid arthritis, osteoarthritis on Orencia infusions q 6 week, methotrexate.  She is no longer on prednisone.  She has noted no flareup of her joint symptoms over the past few months.  Able to do all her day-to-day activities.  There is no stiffness in the morning.  Recent labs are reviewed within acceptable range.  She is making progress with her cervical injury.  She hopes to be able to drive in near future.  She is accompanied by her son.  She does have advanced deformities from rheumatoid arthritis but remains highly functional.  DETAILED EXAMINATION  05/01/24  :    Vitals:    05/01/24 0800   BP: 104/58   Pulse: 71   SpO2: 99%   Weight: 45.1 kg (99 lb 8 oz)     Alert oriented. Head including the face is examined for malar rash, heliotropes, scarring, lupus pernio. Eyes examined for redness such as in episcleritis/scleritis, periorbital lesions.   Neck/ Face examined for parotid gland swelling, range of motion of neck.  Left upper and lower and right upper and lower extremities examined for tenderness, swelling, warmth of the appendicular joints, range of motion, edema, rash.  Some of the important findings included: she does not have evidence of synovitis in the palpable joints of the upper extremities.  + significant deformities of the digits.  + [place] nodes.  Range of motion of the shoulders  show full abduction.  No JLT effusion or warmth of the knees.  she does not have dactylitis of the digits.     Patient Active Problem List    Diagnosis Date Noted    Hyponatremia 08/29/2023     Priority: Medium    Hypothyroidism 08/19/2023     Priority: Medium    Repeated falls 08/19/2023     Priority: Medium    Unspecified nondisplaced fracture of first cervical vertebra, initial encounter for closed fracture (H) 08/19/2023     Priority: Medium    Injury of head 08/17/2023      Priority: Medium    Osteoporosis, unspecified osteoporosis type, unspecified pathological fracture presence 08/31/2022     Priority: Medium    Rheumatoid arthritis, seropositive, multiple sites (H) 06/20/2022     Priority: Medium    Rheumatoid arthritis involving both hands, unspecified whether rheumatoid factor present (H) 01/14/2022     Priority: Medium    History of falling 10/15/2018     Priority: Medium    Presence of intraocular lens 09/17/2018     Priority: Medium    Arthritis, rheumatoid (H) 09/12/2007     Priority: Medium     No past surgical history on file.   Past Medical History:   Diagnosis Date    Skin cancer     Spider veins      Allergies   Allergen Reactions    Infliximab Rash     Throat began to close/tighten    Sulfa Antibiotics Rash    Hydrocodone-Acetaminophen Nausea    Oxycodone-Acetaminophen Nausea    Penicillins Rash    Erythromycin Nausea     Current Outpatient Medications   Medication Sig Dispense Refill    alendronate (FOSAMAX) 70 MG tablet Take 1 tablet (70 mg) by mouth every 7 days 12 tablet 3    calcium carbonate-vitamin D (OSCAL W/D) 500-200 MG-UNIT tablet Take 1 tablet by mouth 2 times daily      carboxymethylcellulose PF (REFRESH PLUS) 0.5 % ophthalmic solution 1 drop 3 times daily as needed for dry eyes      clindamycin (CLEOCIN) 300 MG capsule Take 600 mg by mouth For dental procedures      ferrous fumarate 65 mg, [place]. FE,-Vitamin C 125 mg (VITRON C)  MG TABS tablet Take 1 tablet by mouth every other day      folic acid (FOLVITE) 1 MG tablet Take 1 tablet by mouth daily      levothyroxine (SYNTHROID/LEVOTHROID) 50 MCG tablet Take 1 tablet (50 mcg) by mouth daily 90 tablet 0    methotrexate 2.5 MG tablet Take 4 tablets (10 mg) by mouth every 7 days for 90 days 48 tablet 0    Multiple Vitamin (MULTIVITAMIN ADULT PO) Take 1 tablet by mouth daily      White Petrolatum ointment Apply topically 2 times daily       family history is not on file.  Social Connections: Unknown  (4/29/2021)    [facility]    Social Connection and Isolation Panel [NHANES]     Frequency of Communication with Friends and Family: Three times a week     Frequency of Social Gatherings with Friends and Family: Twice a week     Attends [employer] Services: Patient declined     Active Member of Clubs or Organizations: Patient declined     Attends Club or Organization Meetings: Patient declined     Marital Status:           WBC Count   Date Value Ref Range Status   04/16/2024 7.3 4.0 - 11.0 10e3/uL Final     RBC Count   Date Value Ref Range Status   04/16/2024 3.58 (L) 3.80 - 5.20 10e6/uL Final     Hemoglobin   Date Value Ref Range Status   04/16/2024 11.7 11.7 - 15.7 g/dL Final     Hematocrit   Date Value Ref Range Status   04/16/2024 35.1 35.0 - 47.0 % Final     MCV   Date Value Ref Range Status   04/16/2024 98 78 - 100 fL Final     MCH   Date Value Ref Range Status   04/16/2024 32.7 26.5 - 33.0 pg Final     Platelet Count   Date Value Ref Range Status   04/16/2024 179 150 - 450 10e3/uL Final     % Lymphocytes   Date Value Ref Range Status   04/16/2024 23 % Final     AST   Date Value Ref Range Status   04/16/2024 42 0 - 45 U/L Final     ALT   Date Value Ref Range Status   04/16/2024 14 0 - 50 U/L Final     Albumin   Date Value Ref Range Status   04/16/2024 4.6 3.5 - 5.2 g/dL Final   08/29/2023 3.3 (L) 3.5 - 5.0 g/dL Final     Alkaline Phosphatase   Date Value Ref Range Status   04/16/2024 70 40 - 150 U/L Final     Comment:     Reference intervals for this test were updated on 11/14/2023 to more accurately reflect our healthy population. There may be differences in the flagging of prior results with similar values performed with this method. Interpretation of those prior results can be made in the context of the updated reference intervals.     Creatinine   Date Value Ref Range Status   04/16/2024 0.86 0.51 - 0.95 mg/dL Final     GFR Estimate   Date Value Ref Range Status    04/16/2024 65 >60 mL/min/1.73m2 Final     GFR, ESTIMATED POCT   Date Value Ref Range Status   11/25/2022 >60 >60 mL/min/1.73m2 Final

## 2024-05-06 DIAGNOSIS — E03.8 SUBCLINICAL HYPOTHYROIDISM: ICD-10-CM

## 2024-05-06 RX ORDER — LEVOTHYROXINE SODIUM 50 UG/1
50 TABLET ORAL DAILY
Qty: 60 TABLET | Refills: 0 | Status: SHIPPED | OUTPATIENT
Start: 2024-05-06 | End: 2024-06-17

## 2024-05-06 NOTE — TELEPHONE ENCOUNTER
Pt had labs done 4/16 under pcp name.      I would suggest increasing your levothyroxine to 50 mcg daily. I will send a prescription for a 50 mcg tablet and you can take 1 daily.       Requested Prescriptions   Pending Prescriptions Disp Refills    levothyroxine (SYNTHROID/LEVOTHROID) 50 MCG tablet 90 tablet 0     Sig: Take 1 tablet (50 mcg) by mouth daily       Thyroid Protocol Passed - 5/6/2024 10:19 AM        Passed - Patient is 12 years or older        Passed - Recent (12 mo) or future (30 days) visit within the authorizing provider's specialty     The patient must have completed an in-person or virtual visit within the past 12 months or has a future visit scheduled within the next 90 days with the authorizing provider s specialty.  Urgent care and e-visits do not quality as an office visit for this protocol.          Passed - Medication is active on med list        Passed - Medication indicated for associated diagnosis     Medication is associated with one or more of the following diagnoses:  Hypothyroidism  Thyroid stimulating hormone suppression therapy  Thyroid cancer          Passed - Normal TSH on file in past 12 months     Recent Labs   Lab Test 04/16/24  1125   TSH 3.37              Passed - No active pregnancy on record     If patient is pregnant or has had a positive pregnancy test, please check TSH.          Passed - No positive pregnancy test in past 12 months     If patient is pregnant or has had a positive pregnancy test, please check TSH.

## 2024-05-07 ENCOUNTER — INFUSION THERAPY VISIT (OUTPATIENT)
Dept: INFUSION THERAPY | Facility: HOSPITAL | Age: 84
End: 2024-05-07
Attending: INTERNAL MEDICINE
Payer: MEDICARE

## 2024-05-07 VITALS
OXYGEN SATURATION: 96 % | HEART RATE: 76 BPM | TEMPERATURE: 98.1 F | DIASTOLIC BLOOD PRESSURE: 63 MMHG | SYSTOLIC BLOOD PRESSURE: 132 MMHG | RESPIRATION RATE: 16 BRPM

## 2024-05-07 DIAGNOSIS — M05.79 RHEUMATOID ARTHRITIS, SEROPOSITIVE, MULTIPLE SITES (H): Primary | ICD-10-CM

## 2024-05-07 PROCEDURE — 96365 THER/PROPH/DIAG IV INF INIT: CPT

## 2024-05-07 PROCEDURE — 258N000003 HC RX IP 258 OP 636: Performed by: INTERNAL MEDICINE

## 2024-05-07 PROCEDURE — 250N000028 HC RX JA MOD (INTRAVENOUS), IP 250 OP 636: Mod: JZ,JA | Performed by: INTERNAL MEDICINE

## 2024-05-07 RX ORDER — MEPERIDINE HYDROCHLORIDE 50 MG/ML
25 INJECTION INTRAMUSCULAR; INTRAVENOUS; SUBCUTANEOUS EVERY 30 MIN PRN
Status: CANCELLED | OUTPATIENT
Start: 2024-06-18

## 2024-05-07 RX ORDER — HEPARIN SODIUM (PORCINE) LOCK FLUSH IV SOLN 100 UNIT/ML 100 UNIT/ML
5 SOLUTION INTRAVENOUS
Status: CANCELLED | OUTPATIENT
Start: 2024-06-18

## 2024-05-07 RX ORDER — EPINEPHRINE 1 MG/ML
0.3 INJECTION, SOLUTION INTRAMUSCULAR; SUBCUTANEOUS EVERY 5 MIN PRN
Status: CANCELLED | OUTPATIENT
Start: 2024-06-18

## 2024-05-07 RX ORDER — DIPHENHYDRAMINE HCL 25 MG
25 CAPSULE ORAL ONCE
Status: CANCELLED
Start: 2024-06-18 | End: 2024-06-18

## 2024-05-07 RX ORDER — ALBUTEROL SULFATE 0.83 MG/ML
2.5 SOLUTION RESPIRATORY (INHALATION)
Status: CANCELLED | OUTPATIENT
Start: 2024-06-18

## 2024-05-07 RX ORDER — METHYLPREDNISOLONE SODIUM SUCCINATE 125 MG/2ML
125 INJECTION, POWDER, LYOPHILIZED, FOR SOLUTION INTRAMUSCULAR; INTRAVENOUS
Status: DISCONTINUED | OUTPATIENT
Start: 2024-05-07 | End: 2024-05-07 | Stop reason: HOSPADM

## 2024-05-07 RX ORDER — ACETAMINOPHEN 325 MG/1
650 TABLET ORAL ONCE
Status: CANCELLED
Start: 2024-06-18 | End: 2024-06-18

## 2024-05-07 RX ORDER — ALBUTEROL SULFATE 90 UG/1
1-2 AEROSOL, METERED RESPIRATORY (INHALATION)
Status: DISCONTINUED | OUTPATIENT
Start: 2024-05-07 | End: 2024-05-07 | Stop reason: HOSPADM

## 2024-05-07 RX ORDER — EPINEPHRINE 1 MG/ML
0.3 INJECTION, SOLUTION INTRAMUSCULAR; SUBCUTANEOUS EVERY 5 MIN PRN
Status: DISCONTINUED | OUTPATIENT
Start: 2024-05-07 | End: 2024-05-07 | Stop reason: HOSPADM

## 2024-05-07 RX ORDER — DIPHENHYDRAMINE HYDROCHLORIDE 50 MG/ML
50 INJECTION INTRAMUSCULAR; INTRAVENOUS
Status: CANCELLED
Start: 2024-06-18

## 2024-05-07 RX ORDER — HEPARIN SODIUM,PORCINE 10 UNIT/ML
5-20 VIAL (ML) INTRAVENOUS DAILY PRN
Status: CANCELLED | OUTPATIENT
Start: 2024-06-18

## 2024-05-07 RX ORDER — METHYLPREDNISOLONE SODIUM SUCCINATE 125 MG/2ML
125 INJECTION, POWDER, LYOPHILIZED, FOR SOLUTION INTRAMUSCULAR; INTRAVENOUS
Status: CANCELLED
Start: 2024-06-18

## 2024-05-07 RX ORDER — MEPERIDINE HYDROCHLORIDE 50 MG/ML
25 INJECTION INTRAMUSCULAR; INTRAVENOUS; SUBCUTANEOUS EVERY 30 MIN PRN
Status: DISCONTINUED | OUTPATIENT
Start: 2024-05-07 | End: 2024-05-07 | Stop reason: HOSPADM

## 2024-05-07 RX ORDER — ALBUTEROL SULFATE 90 UG/1
1-2 AEROSOL, METERED RESPIRATORY (INHALATION)
Status: CANCELLED
Start: 2024-06-18

## 2024-05-07 RX ORDER — METHYLPREDNISOLONE SODIUM SUCCINATE 125 MG/2ML
125 INJECTION, POWDER, LYOPHILIZED, FOR SOLUTION INTRAMUSCULAR; INTRAVENOUS ONCE
Status: CANCELLED
Start: 2024-06-18 | End: 2024-06-18

## 2024-05-07 RX ORDER — DIPHENHYDRAMINE HYDROCHLORIDE 50 MG/ML
50 INJECTION INTRAMUSCULAR; INTRAVENOUS
Status: DISCONTINUED | OUTPATIENT
Start: 2024-05-07 | End: 2024-05-07 | Stop reason: HOSPADM

## 2024-05-07 RX ORDER — ALBUTEROL SULFATE 0.83 MG/ML
2.5 SOLUTION RESPIRATORY (INHALATION)
Status: DISCONTINUED | OUTPATIENT
Start: 2024-05-07 | End: 2024-05-07 | Stop reason: HOSPADM

## 2024-05-07 RX ADMIN — SODIUM CHLORIDE 500 MG: 9 INJECTION, SOLUTION INTRAVENOUS at 13:13

## 2024-05-07 RX ADMIN — SODIUM CHLORIDE 100 ML: 9 INJECTION, SOLUTION INTRAVENOUS at 13:13

## 2024-05-07 NOTE — PROGRESS NOTES
Infusion Nursing Note:  Diann Low presents today for orencia.    Patient seen by provider today: No   present during visit today: Not Applicable.    Note: Pt. Denies any new concerns or changes. Pt. Refuses premedications.      Intravenous Access:  Peripheral IV placed.    Treatment Conditions:  Biological Infusion Checklist:  ~~~ NOTE: If the patient answers yes to any of the questions below, hold the infusion and contact ordering provider or on-call provider.    Have you recently had an elevated temperature, fever, chills, productive cough, coughing for 3 weeks or longer or hemoptysis,  abnormal vital signs, night sweats,  chest pain or have you noticed a decrease in your appetite, unexplained weight loss or fatigue? No  Do you have any open wounds or new incisions? No  Do you have any upcoming hospitalizations or surgeries? Does not include esophagogastroduodenoscopy, colonoscopy, endoscopic retrograde cholangiopancreatography (ERCP), endoscopic ultrasound (EUS), dental procedures or joint aspiration/steroid injections No  Do you currently have any signs of illness or infection or are you on any antibiotics? No  Have you had any new, sudden or worsening abdominal pain? No  Have you or anyone in your household received a live vaccination in the past 4 weeks? Please note: No live vaccines while on biologic/chemotherapy until 6 months after the last treatment. Patient can receive the flu vaccine (shot only), pneumovax and the Covid vaccine. It is optimal for the patient to get these vaccines mid cycle, but they can be given at any time as long as it is not on the day of the infusion. No  Have you recently been diagnosed with any new nervous system diseases (ie. Multiple sclerosis, Guillain Nicholls, seizures, neurological changes) or cancer diagnosis? Are you on any form of radiation or chemotherapy? No  Are you pregnant or breast feeding or do you have plans of pregnancy in the future? No  Have you  been having any signs of worsening depression or suicidal ideations?  (benlysta only) No  Have there been any other new onset medical symptoms? No  Have you had any new blood clots? (IVIG only) No      Post Infusion Assessment:  Patient tolerated infusion without incident.  Site patent and intact, free from redness, edema or discomfort.  No evidence of extravasations.  Access discontinued per protocol.       Discharge Plan:   Patient and/or family verbalized understanding of discharge instructions and all questions answered.      Smita Campos RN

## 2024-05-23 ENCOUNTER — NURSE TRIAGE (OUTPATIENT)
Dept: NURSING | Facility: CLINIC | Age: 84
End: 2024-05-23
Payer: MEDICARE

## 2024-05-23 NOTE — TELEPHONE ENCOUNTER
"Nurse Triage SBAR    Is this a 2nd Level Triage? YES, LICENSED PRACTITIONER REVIEW IS REQUIRED    Situation: weakness and fatigue    Background:   Patient saw Dr. Alvarado a month ago and has followed his suggestions but has not had any improvement with symptoms. Patient having a lot of gas, frequent bowel movements, no appetite, and feels very weak in the morning. Yesterday patient had four formed bowel movements which she feels is too much.   Patient is drinking as much water as she can. No nausea but sometimes some lightheadedness. No lightheadedness this morning \"as long as I take my time doing things\".  An example of the weakness and fatigue is that patient needs to sit down a couple of times while making her bed before she finishes.   Patient has had some rapid heart beats that is not constant but notices it once in a while. Patient has not been paying much attention to it so unable to say if there is a pattern or a trigger. At time of call patient heart beat is normal \"because I'm sitting down\". When I try to rush it gets going very fast.      Assessment: ADS    Protocol Recommended Disposition:   Call ADS/Go to ED/UCC Now (Or To Office with PCP Approval)    Recommendation: disposition     Routed to provider    Does the patient meet one of the following criteria for ADS visit consideration? 16+ years old, with an [facility] PCP     TIP  Providers, please consider if this condition is appropriate for management at one of our Acute and Diagnostic Services sites.     If patient is a good candidate, please use dotphrase <dot>triageresponse and select Refer to ADS to document.            Reason for Disposition   MODERATE weakness (i.e., interferes with work, school, normal activities) and cause unknown (Exceptions: Weakness from acute minor illness or from poor fluid intake; weakness is chronic and not worse.)    Additional Information   Negative: SEVERE difficulty breathing (e.g., struggling for each breath, speaks in " single words)   Negative: Shock suspected (e.g., cold/pale/clammy skin, too weak to stand, low BP, rapid pulse)   Negative: Difficult to awaken or acting confused (e.g., disoriented, slurred speech)   Negative: Fainted > 15 minutes ago and still feels too weak or dizzy to stand   Negative: SEVERE weakness (i.e., unable to walk or barely able to walk, requires support) and new-onset or worsening   Negative: Sounds like a life-threatening emergency to the triager   Negative: Weakness of the face, arm or leg on one side of the body   Negative: Has diabetes mellitus and weakness from low blood sugar (i.e., < 60 mg/dL or 3.5 mmol/L)   Negative: Recent heat exposure, suspected cause of weakness   Negative: Vomiting is main symptom   Negative: Diarrhea is main symptom   Negative: Difficulty breathing   Negative: Heart beating < 50 beats per minute OR > 140 beats per minute   Negative: Extra heartbeats, irregular heart beating, or heart is beating very fast (i.e., 'palpitations')   Negative: Follows large amount of bleeding (e.g., from vomiting, rectum, vagina) (Exception: Small transient weakness from sight of a small amount blood.)   Negative: Black or tarry bowel movements   Negative: MODERATE weakness or fatigue from poor fluid intake with no improvement after 2 hours of rest and fluids   Negative: Drinking very little and dehydration suspected (e.g., no urine > 12 hours, very dry mouth, very lightheaded)   Negative: Patient sounds very sick or weak to the triager    Protocols used: Weakness (Generalized) and Fatigue-A-OH

## 2024-05-23 NOTE — TELEPHONE ENCOUNTER
Writer followed up with covering provider. Ok to schedule pt with PCP on 05/24/2024 with strict ED precautions.     Writer called pt and relayed recommendations. Pt agreeable with plan and ED precautions.     Pt scheduled for appt with PCP on 05/24/2024.    Ann Bone RN

## 2024-05-23 NOTE — TELEPHONE ENCOUNTER
Provider Response to 2nd Level Triage Request    I have reviewed the RN documentation. My recommendation is:  OK to schedule PCP clinic appointment for follow up. If she starts to get more lightheaded or dizzy, she should go to the ER for possible EKG and IV fluids.

## 2024-05-24 ENCOUNTER — OFFICE VISIT (OUTPATIENT)
Dept: FAMILY MEDICINE | Facility: CLINIC | Age: 84
End: 2024-05-24
Payer: MEDICARE

## 2024-05-24 VITALS
WEIGHT: 98.4 LBS | TEMPERATURE: 97.9 F | SYSTOLIC BLOOD PRESSURE: 108 MMHG | OXYGEN SATURATION: 98 % | DIASTOLIC BLOOD PRESSURE: 58 MMHG | BODY MASS INDEX: 18.58 KG/M2 | HEART RATE: 80 BPM | HEIGHT: 61 IN | RESPIRATION RATE: 20 BRPM

## 2024-05-24 DIAGNOSIS — R63.6 UNDERWEIGHT: ICD-10-CM

## 2024-05-24 DIAGNOSIS — R10.9 ABDOMINAL DISCOMFORT: ICD-10-CM

## 2024-05-24 DIAGNOSIS — R19.7 DIARRHEA, UNSPECIFIED TYPE: ICD-10-CM

## 2024-05-24 DIAGNOSIS — R53.83 OTHER FATIGUE: Primary | ICD-10-CM

## 2024-05-24 DIAGNOSIS — R55 PRE-SYNCOPE: ICD-10-CM

## 2024-05-24 DIAGNOSIS — R42 LIGHTHEADEDNESS: ICD-10-CM

## 2024-05-24 DIAGNOSIS — R63.0 POOR APPETITE: ICD-10-CM

## 2024-05-24 LAB
ATRIAL RATE - MUSE: 72 BPM
DIASTOLIC BLOOD PRESSURE - MUSE: NORMAL MMHG
INTERPRETATION ECG - MUSE: NORMAL
P AXIS - MUSE: 55 DEGREES
PR INTERVAL - MUSE: 172 MS
QRS DURATION - MUSE: 90 MS
QT - MUSE: 430 MS
QTC - MUSE: 470 MS
R AXIS - MUSE: -21 DEGREES
SYSTOLIC BLOOD PRESSURE - MUSE: NORMAL MMHG
T AXIS - MUSE: 55 DEGREES
VENTRICULAR RATE- MUSE: 72 BPM

## 2024-05-24 PROCEDURE — 83993 ASSAY FOR CALPROTECTIN FECAL: CPT | Performed by: STUDENT IN AN ORGANIZED HEALTH CARE EDUCATION/TRAINING PROGRAM

## 2024-05-24 PROCEDURE — 82784 ASSAY IGA/IGD/IGG/IGM EACH: CPT | Performed by: STUDENT IN AN ORGANIZED HEALTH CARE EDUCATION/TRAINING PROGRAM

## 2024-05-24 PROCEDURE — 36415 COLL VENOUS BLD VENIPUNCTURE: CPT | Performed by: STUDENT IN AN ORGANIZED HEALTH CARE EDUCATION/TRAINING PROGRAM

## 2024-05-24 PROCEDURE — 99214 OFFICE O/P EST MOD 30 MIN: CPT | Performed by: STUDENT IN AN ORGANIZED HEALTH CARE EDUCATION/TRAINING PROGRAM

## 2024-05-24 PROCEDURE — G2211 COMPLEX E/M VISIT ADD ON: HCPCS | Performed by: STUDENT IN AN ORGANIZED HEALTH CARE EDUCATION/TRAINING PROGRAM

## 2024-05-24 PROCEDURE — 87507 IADNA-DNA/RNA PROBE TQ 12-25: CPT | Mod: GZ | Performed by: STUDENT IN AN ORGANIZED HEALTH CARE EDUCATION/TRAINING PROGRAM

## 2024-05-24 PROCEDURE — 93010 ELECTROCARDIOGRAM REPORT: CPT | Mod: OFF | Performed by: INTERNAL MEDICINE

## 2024-05-24 PROCEDURE — 86258 DGP ANTIBODY EACH IG CLASS: CPT | Performed by: STUDENT IN AN ORGANIZED HEALTH CARE EDUCATION/TRAINING PROGRAM

## 2024-05-24 PROCEDURE — 99000 SPECIMEN HANDLING OFFICE-LAB: CPT | Performed by: STUDENT IN AN ORGANIZED HEALTH CARE EDUCATION/TRAINING PROGRAM

## 2024-05-24 PROCEDURE — 82550 ASSAY OF CK (CPK): CPT | Performed by: STUDENT IN AN ORGANIZED HEALTH CARE EDUCATION/TRAINING PROGRAM

## 2024-05-24 PROCEDURE — 93005 ELECTROCARDIOGRAM TRACING: CPT | Performed by: STUDENT IN AN ORGANIZED HEALTH CARE EDUCATION/TRAINING PROGRAM

## 2024-05-24 PROCEDURE — 86231 EMA EACH IG CLASS: CPT | Mod: 90 | Performed by: STUDENT IN AN ORGANIZED HEALTH CARE EDUCATION/TRAINING PROGRAM

## 2024-05-24 RX ORDER — MIRTAZAPINE 7.5 MG/1
7.5 TABLET, FILM COATED ORAL AT BEDTIME
Qty: 30 TABLET | Refills: 0 | Status: SHIPPED | OUTPATIENT
Start: 2024-05-24 | End: 2024-07-24

## 2024-05-24 NOTE — PROGRESS NOTES
Assessment & Plan     Other fatigue  Diann is an 83-year-old female with rheumatoid arthritis on methotrexate and Orencia, hypothyroidism, history of odontoid fracture in August 2023 who presents today for follow-up of chronic fatigue, which is worse in the morning.  Fatigue seems to be getting worse.    Able to walk without any difficulty, walking 3-4 blocks but could walk further as she is not winded or out of breath walking a distance.       Thyroid has been well-controlled, last TSH within normal range currently taking 50 mcg Synthroid daily. Weight is stable, appetite is decreased, she is eating 3 meals a day and 3 snacks a day.    Because this fatigue has been present for several months, and does seem to coincide with her recovery from her odontoid fracture in August, I believe that the most likely cause is deconditioning.  Iatrogenic causes also possible as patient is on methotrexate.    Lab testing was done at her last visit in April, which did show mildly decreased prealbumin at 16, otherwise had normal testing to include TSH, CBC, urinalysis, renal function, liver, B12, folate.  Previously did have cortisol testing done in July 2023 when coming off of chronic prednisone, with normal results.    Unable to change methotrexate, rheumatologist does  wish her to continue this medication despite the fatigue.  Fatigue and anorexia are both known side effects of the medication.  .    Of most importance is increasing exercise, recommended for physical therapy, she reports that there is an exercise class that she can do at her facility every day, encouraged her to do this.  Encouraged her to walk 5 minutes in the morning at the start of the morning as well.       Patient is becoming frail, although not having weight loss, is having significant fatigue and inability to perform normal household chores in the morning.  MRI took for other causes, only showing some mild malnutrition with decreased prealbumin.  She is  supplementing with vitamin D.         Poor appetite  Underweight  Previously reported esophageal dysphagia, however no longer having any issues with swallowing.  Reports that she just has a poor appetite, and even though she is eating 3 meals a day and eating snacks 3 times daily, she never actually feels hungry for food and she must force herself to eat her plate size.  Food generally covers for each meal a small lunch plate and she does eat all of it.  Has been using Ensure as caloric supplement, 1 every day.      Still has taste and smell.      There is no significant weight loss since January 2022, although patient is underweight with BMI of 18.2    As above, potentially due to methotrexate use.    After discussion, there may be some mood component, and because of this I do recommend that we attempt mirtazapine to improve mood as well as appetite.  Will start at a low-dose of 7.5 mg with intention to increase to 50 mg if not having side effects on this dose in 2 weeks.  Will monitor for improvement in appetite, and for weight gain    If no improvement, may consider workup for gastroparesis.    - mirtazapine (REMERON) 7.5 MG tablet  Dispense: 30 tablet; Refill: 0      Abdominal discomfort  Diarrhea, unspecified type    Reports frequent abdominal discomfort with flatulence and with loose stool, occurring several times a week.  This is been going on for at least a year if not 2 years.  She has used Imodium and this has helped in the past, although she reports she had not been taking it lately she does have at home.    Has attempted Lactaid, without significant improvement.    She did have prior workup for this issue in September 2023, tissue transglutaminase antibodies were normal however IgA was not completed and other antibody testing for celiac disease was not completed.  Milk allergy panel was done is normal.      Symptoms are most consistent irritable bowel syndrome, as discomfort does improve with  stooling.    Again, as above, this is potentially iatrogenic, she has symptoms that are consistent with what we would expect from methotrexate, with diarrhea.  This is seen in 13% of patients on this medication.,  Stomach pain/discomfort is present in 9% of patients on this medicine.    However will further rule out celiac disease, evaluate for Giardia, and IBD.    Recommend continuing with loperamide as needed for diarrhea.  Recommended attempting a diet that is dairy free to see if this is effective in reducing symptoms.  This may be effective in some patients with irritable bowel syndrome    - IgA [LAB73]  - Deamidated Giladin Peptide Jannette IgA IgG [MEK5280]  - Endomysial Antibody IgA by IFA [[id number]]  - Calprotectin Feces  - Adult GI  Referral - Consult Only  - Enteric Bacteria and Virus Panel by [facility] Stool  - IgA [LAB73]  - Deamidated Giladin Peptide Jannette IgA IgG [[id number]]  - Endomysial Antibody IgA by IFA [[id number]]  - Calprotectin Feces  - Enteric Bacteria and Virus Panel by [facility] Stool      Lightheadedness  Presyncope    She reports having lightheadedness, especially in the morning but can occur at anytime of day, it is not associated with position change.  Usually happening when she is doing household chores.  Has had presyncope a couple of times, but no syncopal episodes and no falls.  Sometimes feels palpitations during these events.  If she sits down, she does start to feel better.  These events are happening at least 3-4 times a week.  This is been ongoing for some time, not sure exactly how long.    EKG was done today, showing sinus rhythm with possible prior septal infarct.    Recommend that we obtain echocardiogram as well as Holter monitor, she is agreeable to this workup.  Will follow-up after these results.    - EKG 12-lead, tracing only  - Echocardiogram Complete  - Adult Holter Monitor 48 hour                    Subjective   Diann is a 84 year old, presenting for the following health  issues:  Generalized Weakness      5/24/2024    11:29 AM   Additional Questions   Roomed by ARABELLA Adair     Generalized Weakness    History of Present Illness       Reason for visit:  Weakness, bowel issues, light headedness, lack of appetite    She eats 0-1 servings of fruits and vegetables daily.She consumes 0 sweetened beverage(s) daily.She exercises with enough effort to increase her heart rate 10 to 19 minutes per day.  She exercises with enough effort to increase her heart rate 4 days per week.   She is taking medications regularly.       Reports that the fatigue and lack of energy has gotten worse over the past time since our last appointment. Now the fatigue may last until dinner time when it was lasting just in the morning before.  Some days, the fatigue is very bad, and other days it is not present at all.      States she did have broccolli one day and she did not feel well the next day.  She will usually eat it two nights in a row.     Is eating three meals a day that are about luncheon plate size.  Is adding snacks into the diet but sometimes misses them.  Attempts to try morning, evening and night time snacks, but misses this sometimes. Does feel full. Does not feel full with first few bites, just by end of meal.     At times feeling lightheadedness in the morning. She will feel need to sit down. Usually is just doing routine chores about the house, not with position change.  She will sometimes feel like she is going to pass out but never does.  She feels better when she sits down.  Often physical heart is racing during this episode.    Has been drinking a ensure every morning.     Reports low appetite.       Had been having diarrhea, loose stools, that has been ongoing for two years. Now having 1-2 stools daily. Will have urgency but no incontinence. Abdominal pain is rare.  Abdominal discomfort is rare, as long as she can stool.  Cramping will happen if the stool is not coming out or she resists the  "urge.    Denies hematochezia, melena.           Review of Systems  Constitutional, neuro, ENT, endocrine, pulmonary, cardiac, gastrointestinal, genitourinary, musculoskeletal, integument and psychiatric systems are negative, except as otherwise noted.      Objective    /58 (BP Location: Right arm, Patient Position: Sitting, Cuff Size: Adult Small)   Pulse 80   Temp 97.9  F (36.6  C) (Oral)   Resp 20   Ht 1.562 m (5' 1.5\")   Wt 44.6 kg (98 lb 6.4 oz)   SpO2 98%   BMI 18.29 kg/m    Body mass index is 18.29 kg/m .  Physical Exam   GENERAL: alert and no distress  EYES: Eyes grossly normal to inspection, PERRL and conjunctivae and sclerae normal  NECK: no adenopathy, no asymmetry, masses, or scars  RESP: lungs clear to auscultation - no rales, rhonchi or wheezes  CV: regular rate and rhythm, normal S1 S2, no S3 or S4, no murmur, click or rub, no peripheral edema  MS: no gross musculoskeletal defects noted, no edema  SKIN: no suspicious lesions or rashes  NEURO: Normal strength and tone, mentation intact and speech normal  PSYCH: mentation appears normal, affect normal/bright    EKG - Reviewed and interpreted by me Normal Sinus Rhythm, normal axis, normal intervals, no acute ST/T changes c/w ischemia, no LVH by voltage criteria, there are no prior tracings available  Office Visit on 04/16/2024   Component Date Value Ref Range Status    Color Urine 04/16/2024 Yellow  Colorless, Straw, Light Yellow, Yellow Final    Appearance Urine 04/16/2024 Clear  Clear Final    Glucose Urine 04/16/2024 Negative  Negative mg/dL Final    Bilirubin Urine 04/16/2024 Negative  Negative Final    Ketones Urine 04/16/2024 Negative  Negative mg/dL Final    Specific Gravity Urine 04/16/2024 1.020  1.005 - 1.030 Final    Blood Urine 04/16/2024 Negative  Negative Final    pH Urine 04/16/2024 5.5  5.0 - 8.0 Final    Protein Albumin Urine 04/16/2024 Negative  Negative mg/dL Final    Urobilinogen Urine 04/16/2024 0.2  0.2, 1.0 E.U./dL " Final    Nitrite Urine 04/16/2024 Negative  Negative Final    Leukocyte Esterase Urine 04/16/2024 Negative  Negative Final    TSH 04/16/2024 3.37  0.30 - 4.20 uIU/mL Final    Sodium 04/16/2024 138  135 - 145 mmol/L Final    Reference intervals for this test were updated on 09/26/2023 to more accurately reflect our healthy population. There may be differences in the flagging of prior results with similar values performed with this method. Interpretation of those prior results can be made in the context of the updated reference intervals.     Potassium 04/16/2024 4.5  3.4 - 5.3 mmol/L Final    Chloride 04/16/2024 102  98 - 107 mmol/L Final    Carbon Dioxide (CO2) 04/16/2024 27  22 - 29 mmol/L Final    Anion Gap 04/16/2024 9  7 - 15 mmol/L Final    Glucose 04/16/2024 96  70 - 99 mg/dL Final    Urea Nitrogen 04/16/2024 28.6 (H)  8.0 - 23.0 mg/dL Final    Creatinine 04/16/2024 0.86  0.51 - 0.95 mg/dL Final    GFR Estimate 04/16/2024 65  >60 mL/min/1.73m2 Final    Calcium 04/16/2024 9.8  8.8 - 10.2 mg/dL Final    Albumin 04/16/2024 4.6  3.5 - 5.2 g/dL Final    Phosphorus 04/16/2024 3.8  2.5 - 4.5 mg/dL Final    Vitamin B12 04/16/2024 695  232 - 1,245 pg/mL Final    Folic Acid 04/16/2024 >40.0 (H)  4.6 - 34.8 ng/mL Final    Prealbumin 04/16/2024 16.0 (L)  20.0 - 40.0 mg/dL Final    Vitamin D, Total (25-Hydroxy) 04/16/2024 72 (H)  20 - 50 ng/mL Final    indicates supplementation, with increased risk of hypercalciuria    WBC Count 04/16/2024 7.3  4.0 - 11.0 10e3/uL Final    RBC Count 04/16/2024 3.58 (L)  3.80 - 5.20 10e6/uL Final    Hemoglobin 04/16/2024 11.7  11.7 - 15.7 g/dL Final    Hematocrit 04/16/2024 35.1  35.0 - 47.0 % Final    MCV 04/16/2024 98  78 - 100 fL Final    MCH 04/16/2024 32.7  26.5 - 33.0 pg Final    MCHC 04/16/2024 33.3  31.5 - 36.5 g/dL Final    RDW 04/16/2024 14.9  10.0 - 15.0 % Final    Platelet Count 04/16/2024 179  150 - 450 10e3/uL Final    % Neutrophils 04/16/2024 64  % Final    % Lymphocytes  04/16/2024 23  % Final    % Monocytes 04/16/2024 11  % Final    % Eosinophils 04/16/2024 2  % Final    % Basophils 04/16/2024 0  % Final    % Immature Granulocytes 04/16/2024 0  % Final    Absolute Neutrophils 04/16/2024 4.7  1.6 - 8.3 10e3/uL Final    Absolute Lymphocytes 04/16/2024 1.7  0.8 - 5.3 10e3/uL Final    Absolute Monocytes 04/16/2024 0.8  0.0 - 1.3 10e3/uL Final    Absolute Eosinophils 04/16/2024 0.1  0.0 - 0.7 10e3/uL Final    Absolute Basophils 04/16/2024 0.0  0.0 - 0.2 10e3/uL Final    Absolute Immature Granulocytes 04/16/2024 0.0  <=0.4 10e3/uL Final    Alkaline Phosphatase 04/16/2024 70  40 - 150 U/L Final    Reference intervals for this test were updated on 11/14/2023 to more accurately reflect our healthy population. There may be differences in the flagging of prior results with similar values performed with this method. Interpretation of those prior results can be made in the context of the updated reference intervals.    ALT 04/16/2024 14  0 - 50 U/L Final    AST 04/16/2024 42  0 - 45 U/L Final    Bilirubin Total 04/16/2024 0.4  <=1.2 mg/dL Final    Bilirubin Direct 04/16/2024 <0.20  0.00 - 0.30 mg/dL Final    Protein Total 04/16/2024 7.0  6.4 - 8.3 g/dL Final         The longitudinal plan of care for the diagnosis(es)/condition(s) as documented were addressed during this visit. Due to the added complexity in care, I will continue to support Diann in the subsequent management and with ongoing continuity of care.    Signed Electronically by: Abdifatah Alvarado MD

## 2024-05-25 LAB — ENDOMYSIUM IGA TITR SER IF: NORMAL {TITER}

## 2024-05-25 RX ORDER — LOPERAMIDE HCL 2 MG
2 CAPSULE ORAL 4 TIMES DAILY PRN
COMMUNITY

## 2024-05-28 ENCOUNTER — APPOINTMENT (OUTPATIENT)
Dept: LAB | Facility: CLINIC | Age: 84
End: 2024-05-28
Payer: MEDICARE

## 2024-05-28 LAB
ADV 40+41 DNA STL QL NAA+NON-PROBE: NEGATIVE
ASTRO TYP 1-8 RNA STL QL NAA+NON-PROBE: NEGATIVE
C CAYETANENSIS DNA STL QL NAA+NON-PROBE: NEGATIVE
CAMPYLOBACTER DNA SPEC NAA+PROBE: NEGATIVE
CK SERPL-CCNC: 123 U/L (ref 26–192)
CRYPTOSP DNA STL QL NAA+NON-PROBE: NEGATIVE
E COLI O157 DNA STL QL NAA+NON-PROBE: NORMAL
E HISTOLYT DNA STL QL NAA+NON-PROBE: NEGATIVE
EAEC ASTA GENE ISLT QL NAA+PROBE: NEGATIVE
EC STX1+STX2 GENES STL QL NAA+NON-PROBE: NEGATIVE
EPEC EAE GENE STL QL NAA+NON-PROBE: NEGATIVE
ETEC LTA+ST1A+ST1B TOX ST NAA+NON-PROBE: NEGATIVE
G LAMBLIA DNA STL QL NAA+NON-PROBE: NEGATIVE
GLIADIN IGA SER-ACNC: 0.8 U/ML
GLIADIN IGG SER-ACNC: <0.6 U/ML
IGA SERPL-MCNC: 211 MG/DL (ref 84–499)
NOROVIRUS GI+II RNA STL QL NAA+NON-PROBE: NEGATIVE
P SHIGELLOIDES DNA STL QL NAA+NON-PROBE: NEGATIVE
RVA RNA STL QL NAA+NON-PROBE: NEGATIVE
SALMONELLA SP RPOD STL QL NAA+PROBE: NEGATIVE
SAPO I+II+IV+V RNA STL QL NAA+NON-PROBE: NEGATIVE
SHIGELLA SP+EIEC IPAH ST NAA+NON-PROBE: NEGATIVE
V CHOLERAE DNA SPEC QL NAA+PROBE: NEGATIVE
VIBRIO DNA SPEC NAA+PROBE: NEGATIVE
Y ENTEROCOL DNA STL QL NAA+PROBE: NEGATIVE

## 2024-05-29 LAB — CALPROTECTIN STL-MCNT: 218 MG/KG (ref 0–49.9)

## 2024-06-04 ENCOUNTER — TRANSFERRED RECORDS (OUTPATIENT)
Dept: HEALTH INFORMATION MANAGEMENT | Facility: CLINIC | Age: 84
End: 2024-06-04
Payer: MEDICARE

## 2024-06-04 ENCOUNTER — MEDICAL CORRESPONDENCE (OUTPATIENT)
Dept: HEALTH INFORMATION MANAGEMENT | Facility: CLINIC | Age: 84
End: 2024-06-04
Payer: MEDICARE

## 2024-06-06 ENCOUNTER — HOSPITAL ENCOUNTER (OUTPATIENT)
Dept: CARDIOLOGY | Facility: CLINIC | Age: 84
Discharge: HOME | End: 2024-06-06
Attending: STUDENT IN AN ORGANIZED HEALTH CARE EDUCATION/TRAINING PROGRAM
Payer: MEDICARE

## 2024-06-06 DIAGNOSIS — R55 PRE-SYNCOPE: ICD-10-CM

## 2024-06-06 DIAGNOSIS — R42 LIGHTHEADEDNESS: ICD-10-CM

## 2024-06-06 LAB — LVEF ECHO: NORMAL

## 2024-06-06 PROCEDURE — 93225 XTRNL ECG REC<48 HRS REC: CPT

## 2024-06-06 PROCEDURE — 93306 TTE W/DOPPLER COMPLETE: CPT | Mod: 26 | Performed by: INTERNAL MEDICINE

## 2024-06-06 PROCEDURE — 93306 TTE W/DOPPLER COMPLETE: CPT

## 2024-06-11 ENCOUNTER — MYC MEDICAL ADVICE (OUTPATIENT)
Dept: FAMILY MEDICINE | Facility: CLINIC | Age: 84
End: 2024-06-11
Payer: MEDICARE

## 2024-06-17 DIAGNOSIS — E03.8 SUBCLINICAL HYPOTHYROIDISM: ICD-10-CM

## 2024-06-17 PROCEDURE — 93227 XTRNL ECG REC<48 HR R&I: CPT | Performed by: INTERNAL MEDICINE

## 2024-06-17 RX ORDER — LEVOTHYROXINE SODIUM 50 UG/1
50 TABLET ORAL DAILY
Qty: 45 TABLET | Refills: 0 | Status: SHIPPED | OUTPATIENT
Start: 2024-06-17 | End: 2024-07-24

## 2024-06-17 NOTE — TELEPHONE ENCOUNTER
2/5/24:  Once you have been on new levothyroxine dose for 6 to 8 weeks, would you please have blood draw to check thyroid function tests (I have placed orders in the [facility] system).     Requested Prescriptions   Pending Prescriptions Disp Refills    levothyroxine (SYNTHROID/LEVOTHROID) 50 MCG tablet 60 tablet 0     Sig: Take 1 tablet (50 mcg) by mouth daily       Thyroid Protocol Passed - 6/17/2024 10:08 AM        Passed - Patient is 12 years or older        Passed - Recent (12 mo) or future (30 days) visit within the authorizing provider's specialty     The patient must have completed an in-person or virtual visit within the past 12 months or has a future visit scheduled within the next 90 days with the authorizing provider s specialty.  Urgent care and e-visits do not quality as an office visit for this protocol.          Passed - Medication is active on med list        Passed - Medication indicated for associated diagnosis     Medication is associated with one or more of the following diagnoses:  Hypothyroidism  Thyroid stimulating hormone suppression therapy  Thyroid cancer          Passed - Normal TSH on file in past 12 months     Recent Labs   Lab Test 04/16/24  1125   TSH 3.37              Passed - No active pregnancy on record     If patient is pregnant or has had a positive pregnancy test, please check TSH.          Passed - No positive pregnancy test in past 12 months     If patient is pregnant or has had a positive pregnancy test, please check TSH.

## 2024-06-18 ENCOUNTER — INFUSION THERAPY VISIT (OUTPATIENT)
Dept: INFUSION THERAPY | Facility: HOSPITAL | Age: 84
End: 2024-06-18
Attending: INTERNAL MEDICINE
Payer: MEDICARE

## 2024-06-18 VITALS
HEART RATE: 73 BPM | OXYGEN SATURATION: 97 % | SYSTOLIC BLOOD PRESSURE: 120 MMHG | RESPIRATION RATE: 16 BRPM | TEMPERATURE: 98.3 F | DIASTOLIC BLOOD PRESSURE: 58 MMHG

## 2024-06-18 DIAGNOSIS — M05.79 RHEUMATOID ARTHRITIS, SEROPOSITIVE, MULTIPLE SITES (H): Primary | ICD-10-CM

## 2024-06-18 PROCEDURE — 250N000028 HC RX JA MOD (INTRAVENOUS), IP 250 OP 636: Mod: JZ,JA | Performed by: INTERNAL MEDICINE

## 2024-06-18 PROCEDURE — 258N000003 HC RX IP 258 OP 636: Mod: JZ | Performed by: INTERNAL MEDICINE

## 2024-06-18 PROCEDURE — 96365 THER/PROPH/DIAG IV INF INIT: CPT

## 2024-06-18 RX ORDER — MEPERIDINE HYDROCHLORIDE 50 MG/ML
25 INJECTION INTRAMUSCULAR; INTRAVENOUS; SUBCUTANEOUS EVERY 30 MIN PRN
Status: DISCONTINUED | OUTPATIENT
Start: 2024-06-18 | End: 2024-06-18 | Stop reason: HOSPADM

## 2024-06-18 RX ORDER — DIPHENHYDRAMINE HYDROCHLORIDE 50 MG/ML
50 INJECTION INTRAMUSCULAR; INTRAVENOUS
Status: CANCELLED
Start: 2024-07-30

## 2024-06-18 RX ORDER — HEPARIN SODIUM (PORCINE) LOCK FLUSH IV SOLN 100 UNIT/ML 100 UNIT/ML
5 SOLUTION INTRAVENOUS
Status: CANCELLED | OUTPATIENT
Start: 2024-07-30

## 2024-06-18 RX ORDER — ALBUTEROL SULFATE 90 UG/1
1-2 AEROSOL, METERED RESPIRATORY (INHALATION)
Status: CANCELLED
Start: 2024-07-30

## 2024-06-18 RX ORDER — ALBUTEROL SULFATE 90 UG/1
1-2 AEROSOL, METERED RESPIRATORY (INHALATION)
Status: DISCONTINUED | OUTPATIENT
Start: 2024-06-18 | End: 2024-06-18 | Stop reason: HOSPADM

## 2024-06-18 RX ORDER — HEPARIN SODIUM,PORCINE 10 UNIT/ML
5-20 VIAL (ML) INTRAVENOUS DAILY PRN
Status: CANCELLED | OUTPATIENT
Start: 2024-07-30

## 2024-06-18 RX ORDER — DIPHENHYDRAMINE HCL 25 MG
25 CAPSULE ORAL ONCE
Status: CANCELLED
Start: 2024-07-30 | End: 2024-07-30

## 2024-06-18 RX ORDER — EPINEPHRINE 1 MG/ML
0.3 INJECTION, SOLUTION INTRAMUSCULAR; SUBCUTANEOUS EVERY 5 MIN PRN
Status: DISCONTINUED | OUTPATIENT
Start: 2024-06-18 | End: 2024-06-18 | Stop reason: HOSPADM

## 2024-06-18 RX ORDER — ACETAMINOPHEN 325 MG/1
650 TABLET ORAL ONCE
Status: CANCELLED
Start: 2024-07-30 | End: 2024-07-30

## 2024-06-18 RX ORDER — METHYLPREDNISOLONE SODIUM SUCCINATE 125 MG/2ML
125 INJECTION, POWDER, LYOPHILIZED, FOR SOLUTION INTRAMUSCULAR; INTRAVENOUS ONCE
Status: CANCELLED
Start: 2024-07-30 | End: 2024-07-30

## 2024-06-18 RX ORDER — ALBUTEROL SULFATE 0.83 MG/ML
2.5 SOLUTION RESPIRATORY (INHALATION)
Status: CANCELLED | OUTPATIENT
Start: 2024-07-30

## 2024-06-18 RX ORDER — METHYLPREDNISOLONE SODIUM SUCCINATE 125 MG/2ML
125 INJECTION, POWDER, LYOPHILIZED, FOR SOLUTION INTRAMUSCULAR; INTRAVENOUS
Status: DISCONTINUED | OUTPATIENT
Start: 2024-06-18 | End: 2024-06-18 | Stop reason: HOSPADM

## 2024-06-18 RX ORDER — EPINEPHRINE 1 MG/ML
0.3 INJECTION, SOLUTION INTRAMUSCULAR; SUBCUTANEOUS EVERY 5 MIN PRN
Status: CANCELLED | OUTPATIENT
Start: 2024-07-30

## 2024-06-18 RX ORDER — MEPERIDINE HYDROCHLORIDE 50 MG/ML
25 INJECTION INTRAMUSCULAR; INTRAVENOUS; SUBCUTANEOUS EVERY 30 MIN PRN
Status: CANCELLED | OUTPATIENT
Start: 2024-07-30

## 2024-06-18 RX ORDER — DIPHENHYDRAMINE HYDROCHLORIDE 50 MG/ML
50 INJECTION INTRAMUSCULAR; INTRAVENOUS
Status: DISCONTINUED | OUTPATIENT
Start: 2024-06-18 | End: 2024-06-18 | Stop reason: HOSPADM

## 2024-06-18 RX ORDER — ALBUTEROL SULFATE 0.83 MG/ML
2.5 SOLUTION RESPIRATORY (INHALATION)
Status: DISCONTINUED | OUTPATIENT
Start: 2024-06-18 | End: 2024-06-18 | Stop reason: HOSPADM

## 2024-06-18 RX ORDER — METHYLPREDNISOLONE SODIUM SUCCINATE 125 MG/2ML
125 INJECTION, POWDER, LYOPHILIZED, FOR SOLUTION INTRAMUSCULAR; INTRAVENOUS
Status: CANCELLED
Start: 2024-07-30

## 2024-06-18 RX ADMIN — SODIUM CHLORIDE 250 ML: 9 INJECTION, SOLUTION INTRAVENOUS at 09:41

## 2024-06-18 RX ADMIN — SODIUM CHLORIDE 500 MG: 9 INJECTION, SOLUTION INTRAVENOUS at 10:08

## 2024-06-18 NOTE — PROGRESS NOTES
~~~ NOTE: If the patient answers yes to any of the questions below, hold the infusion and contact ordering provider or on-call provider.    Do you currently have any signs of illness or infection or are you on any antibiotics? No  Have you recently had an elevated temperature, fever, chills, productive cough, coughing for 3 weeks or longer or hemoptysis, abnormal vital signs, night sweats, chest pain or have you noticed a decrease in your appetite, unexplained weight loss or fatigue? No  Have you had any new, sudden, or worsening abdominal pain? No  Do you have any open wounds or new incisions? (exclude for patients with hidradenitis suppurativa) No  Have you recently been diagnosed with any new nervous system diseases (ie. Multiple sclerosis, Guillain [place], seizures, neurological changes) or cancer diagnosis? Are you on any form of radiation or chemotherapy? No  Have there been any other new onset medical symptoms? No  Are you pregnant or breast feeding or do you have plans of pregnancy in the future? No; N/A  Do you have any upcoming hospitalizations or surgeries? Does not include esophagogastroduodenoscopy, colonoscopy, endoscopic retrograde cholangiopancreatography (ERCP), endoscopic ultrasound (EUS), dental procedures (including cleanings, fillings, implants, extractions)  or joint aspiration/steroid injections No  Have you or anyone in your household received a live vaccination in the past 4 weeks? Please note: No live vaccines while on biologic/chemotherapy until 6 months after the last treatment. Patient can receive the flu vaccine (shot only).  It is optimal for the patient to get it mid cycle, but it can be given at any time as long as it is not on the day of the infusion. No  If applicable to prescribed medication, confirm negative PPD or quantiferon gold MTB. If positive, verify has negative chest x-ray or the patient is at least 4 weeks post initiation of INH/B6 therapy and have clearance from provider  before infusion (Y/N:[id number])  If applicable to prescribed medication, confirm negative hepatitis B surface antigen or hepatitis C. If positive, clearance from provider before infusion. (Y/N: [id number])  Rheumatology patients receiving tocilizumab (Actemra): If labs were drawn within the past week, hold dosing until cleared to infuse If AST/ALT > 2 X upper limit normal; ANC < 1.0. NO; N/A  Patients receiving belimumab (Benlysta): Have you been having any signs of worsening depression or suicidal ideations? No; N/A     Infusion Nursing Note:  Diann Low presents today for orencia.    Patient seen by provider today: No   present during visit today: Not Applicable.    Note: No pre meds. Pt co dizziness lately and has been seeing her primary doctor for this. No dizziness at the moment..      Intravenous Access:  Peripheral IV placed.    Treatment Conditions:  Not Applicable.      Post Infusion Assessment:  Patient tolerated infusion without incident.  Blood return noted pre and post infusion.  Site patent and intact, free from redness, edema or discomfort.       Discharge Plan:   Patient and/or family verbalized understanding of discharge instructions and all questions answered.      Analisa Bermeo RN

## 2024-07-07 ENCOUNTER — HOSPITAL ENCOUNTER (OUTPATIENT)
Dept: CT IMAGING | Facility: CLINIC | Age: 84
Discharge: HOME | End: 2024-07-07
Attending: PHYSICIAN ASSISTANT | Admitting: PHYSICIAN ASSISTANT
Payer: MEDICARE

## 2024-07-07 DIAGNOSIS — R19.7 DIARRHEA, UNSPECIFIED TYPE: ICD-10-CM

## 2024-07-07 LAB
CREAT BLD-MCNC: 0.9 MG/DL (ref 0.6–1.1)
EGFRCR SERPLBLD CKD-EPI 2021: >60 ML/MIN/1.73M2

## 2024-07-07 PROCEDURE — 74177 CT ABD & PELVIS W/CONTRAST: CPT

## 2024-07-07 PROCEDURE — 82565 ASSAY OF CREATININE: CPT

## 2024-07-07 PROCEDURE — 250N000011 HC RX IP 250 OP 636: Performed by: PHYSICIAN ASSISTANT

## 2024-07-07 RX ORDER — IOPAMIDOL 755 MG/ML
90 INJECTION, SOLUTION INTRAVASCULAR ONCE
Status: COMPLETED | OUTPATIENT
Start: 2024-07-07 | End: 2024-07-07

## 2024-07-07 RX ADMIN — IOPAMIDOL 90 ML: 755 INJECTION, SOLUTION INTRAVENOUS at 14:57

## 2024-07-12 ENCOUNTER — TRANSFERRED RECORDS (OUTPATIENT)
Dept: HEALTH INFORMATION MANAGEMENT | Facility: CLINIC | Age: 84
End: 2024-07-12
Payer: MEDICARE

## 2024-07-15 ENCOUNTER — MYC MEDICAL ADVICE (OUTPATIENT)
Dept: FAMILY MEDICINE | Facility: CLINIC | Age: 84
End: 2024-07-15
Payer: MEDICARE

## 2024-07-15 NOTE — TELEPHONE ENCOUNTER
Diann,    I have reviewed the paperwork from [facility], it does show that you have fat in your liver, but it does not appear to be causing significant damage based on your lab work that we have previously done.  Best way to take care of this is to maintain a healthy diet, and remain active as possible.    The blood vessels in your abdomen to show buildup of plaque, but none of them are blocked.  He could consider starting a statin medication which lowers your cholesterol, or starting baby aspirin to prevent these from being blocked.    I do not believe that you need a colonoscopy.      If you would like to start aspirin 81 mg and rosuvastatin 5 mg, I do believe this would help you.  If you do choose to start the medication I would recommend doing a fasting lipid panel in 3 months.    Best regards,    Abdifatah Alvarado MD

## 2024-07-23 ENCOUNTER — LAB (OUTPATIENT)
Dept: LAB | Facility: CLINIC | Age: 84
End: 2024-07-23
Payer: MEDICARE

## 2024-07-23 DIAGNOSIS — E03.8 SUBCLINICAL HYPOTHYROIDISM: ICD-10-CM

## 2024-07-23 LAB — TSH SERPL DL<=0.005 MIU/L-ACNC: 2.75 UIU/ML (ref 0.3–4.2)

## 2024-07-23 PROCEDURE — 84443 ASSAY THYROID STIM HORMONE: CPT

## 2024-07-23 PROCEDURE — 36415 COLL VENOUS BLD VENIPUNCTURE: CPT

## 2024-07-24 ENCOUNTER — OFFICE VISIT (OUTPATIENT)
Dept: ENDOCRINOLOGY | Facility: CLINIC | Age: 84
End: 2024-07-24
Payer: MEDICARE

## 2024-07-24 VITALS
WEIGHT: 98 LBS | SYSTOLIC BLOOD PRESSURE: 122 MMHG | DIASTOLIC BLOOD PRESSURE: 64 MMHG | BODY MASS INDEX: 18.22 KG/M2 | HEART RATE: 80 BPM

## 2024-07-24 DIAGNOSIS — E03.8 SUBCLINICAL HYPOTHYROIDISM: ICD-10-CM

## 2024-07-24 DIAGNOSIS — M81.0 OSTEOPOROSIS, UNSPECIFIED OSTEOPOROSIS TYPE, UNSPECIFIED PATHOLOGICAL FRACTURE PRESENCE: ICD-10-CM

## 2024-07-24 PROCEDURE — 99215 OFFICE O/P EST HI 40 MIN: CPT | Performed by: INTERNAL MEDICINE

## 2024-07-24 RX ORDER — LEVOTHYROXINE SODIUM 50 UG/1
50 TABLET ORAL DAILY
Qty: 90 TABLET | Refills: 3 | Status: SHIPPED | OUTPATIENT
Start: 2024-07-24

## 2024-07-24 RX ORDER — ALENDRONATE SODIUM 70 MG/1
70 TABLET ORAL
Qty: 12 TABLET | Refills: 3 | Status: SHIPPED | OUTPATIENT
Start: 2024-07-24

## 2024-07-24 NOTE — PATIENT INSTRUCTIONS
-Continue Fosamax 70 mg by mouth once a week. Take the medication on an empty stomach, first thing in the morning with at least 8 ounces of water--do not take with other drinks. Remain upright (do not lie down) and do not eat take anything else by mouth (pills, food or drink) until at least 30 minutes after taking the medication.  -Continue calcium without changes, 1 chewable twice daily  -Continue levothyroxine without changes  -We will schedule lab draw at the time of infusion appointment in October  -Return for a follow-up visit in one year--with labs before visit   -We will communicate results via [facility], or if needed by phone

## 2024-07-24 NOTE — LETTER
7/24/2024      Diann Low  [address]  [facility] [address]      Dear Colleague,    Thank you for referring your patient, Diann Low, to the [facility]. Please see a copy of my visit note below.      ENDOCRINOLOGY FOLLOW-UP         HISTORY OF PRESENT ILLNESS    Diann Low is seen in follow-up for the issues outlined below. Patient is accompanied by her son Isael.    1.  Osteoporosis.  She has been noticing increased weakness in the mornings: Has been needing to rest with simple activities like making her bed.    No falls or fractures in the interim since last visit.    She has been on Fosamax since 8/31/2022: Continues to take the medication once weekly, first thing in the morning with water, and waiting for about 30 minutes before having other food or beverages.  Tolerating Fosamax without side effects.    She has changed the type of calcium supplement to Caltrate chewables, taking 1 chewable twice daily with food (she may occasionally miss second dose): We looked up nutrition information and each chewable contains 600 mg of calcium and 800 IU vitamin D3.    She does not take separate vitamin D3 supplement.    She continues on daily multivitamin ([employer] brand).    She is now off prednisone: Has been off since the end of 2023.    Follow-up DXA scan is scheduled for 7/30/2024.    2.  Hypothyroidism.  She is now on levothyroxine 50 mcg daily.  Typically takes the medication first thing in the morning (around 5 AM or earlier) when she wakes up to go to the bathroom.  She usually goes back to bed after that.    As above, noticing some weakness and fatigue.  Was recently seen in gastroenterology for evaluation of diarrhea: Workup has thus far been unrevealing.    Pertinent endocrine and related history:  1.  Osteoporosis. Clinical diagnosis based on discovery of occult vertebral fractures on thoracic and lumbar spines XR's performed in 7/2022.  -Longstanding history, previously  followed with serial DXA scans in the [facility] system.  -Most recent DXA scan completed at [facility] on 6/14/2022 showed BMD in the range of osteopenia (although TBS adjusted T score was in the range of osteoporosis) and FRAX calculation indicating high risk of fracture.  Lumbar spine L1-L4 T score 1.5 (although discrepant BMD at vertebrae), TBS adjusted T score -3.2.  Left femoral neck T score -1.3, left total hip T score -1.2.  Right femoral neck T score -0.4, right total hip T score -1.1.  FRAX calculated 10 year probability of major osteoporotic fracture is 17.4%, hip fracture is 6.0%.  No prior studies were completed in our system for comparison.  -Review of care everywhere indicates that last DXA scan in the [facility] system was in 2014 (I see records of DXA since 2007 in the [facility] system).  DXA 6/18/2014 showed osteopenia.  -Patient does not recall ever being prescribed medications to reduce risk of fracture.  -Osteoporosis is likely related to postmenopausal status, history of RA and glucocorticoid therapy.  Our work-up for other secondary causes of bone loss has otherwise revealed normal PTH, CMP, no anemia, and normal 24-hour urine calcium excretion; serum protein electrophoresis was normal while urine protein electrophoresis showed trace proteins (we will check urine immunofixation to further evaluate). Screening for celiac disease in primary care clinic in 4/2022 showed normal serology.  2.  Rheumatoid arthritis. Since age 45.  Presently on methotrexate, Orencia infusion and prednisone.  She recalls that she has been on much higher doses of prednisone in the past.  3.  Unintentional weight loss.   4.  Hypothyroidism.  Hypothyroidism.  Subclinical hypothyroidism noted on initial labs in 6/2022.  Follow-up thyroid function test shows an upward trend in TSH, overtly low free T4 level also.  Initiated levothyroxine in 8/2022.    Pertinent Social History: Presently living in independent  living in [place]; previously lived in [place] and moved to the [facility] in 8/2022.  Has 3 children.    PAST MEDICAL HISTORY  Past Medical History:   Diagnosis Date     Skin cancer      Spider veins        MEDICATIONS  Current Outpatient Medications   Medication Sig Dispense Refill     alendronate (FOSAMAX) 70 MG tablet Take 1 tablet (70 mg) by mouth every 7 days 12 tablet 3     calcium carbonate-vitamin D (OSCAL W/D) 500-200 MG-UNIT tablet Take 1 tablet by mouth 2 times daily       carboxymethylcellulose PF (REFRESH PLUS) 0.5 % ophthalmic solution 1 drop 3 times daily as needed for dry eyes       clindamycin (CLEOCIN) 300 MG capsule Take 600 mg by mouth For dental procedures       ferrous fumarate 65 mg, [place]. FE,-Vitamin C 125 mg (VITRON C)  MG TABS tablet Take 1 tablet by mouth every other day       folic acid (FOLVITE) 1 MG tablet Take 1 tablet by mouth daily       levothyroxine (SYNTHROID/LEVOTHROID) 50 MCG tablet Take 1 tablet (50 mcg) by mouth daily 90 tablet 3     loperamide (IMODIUM) 2 MG capsule Take 2 mg by mouth 4 times daily as needed for diarrhea       methotrexate 2.5 MG tablet Take 4 tablets (10 mg) by mouth every 7 days 48 tablet 0     Multiple Vitamin (MULTIVITAMIN ADULT PO) Take 1 tablet by mouth daily         Allergies, family, and social history were reviewed and documented as needed in EHR.     REVIEW OF SYSTEMS  A focused ROS was performed, with pertinent positives and negatives as noted in the HPI.    PHYSICAL EXAM  /64 (BP Location: Right arm, Patient Position: Sitting, Cuff Size: Adult Small)   Pulse 80   Wt 44.5 kg (98 lb)   BMI 18.22 kg/m    Body mass index is 18.22 kg/m .   Constitutional: Vital signs reviewed, as recorded above. Patient is alert, oriented and appears in no acute distress.  Eyes: PER, EOMI, no stare, lid lag, or retraction; no conjunctival injection.  ENMT: OP clear with moist MM. Lips are without lesions.   Cardiovascular: RRR, normal S1/S2, no  audible murmurs, rubs or gallops; left ankle edema.  MSK: Bilateral hand and foot deformities related to RA.  No clubbing or cyanosis.  Neurological: Alert and oriented times 3.       DATA REVIEW  Each of the following laboratory and/or imaging studies were reviewed.    DXA as in HPI.        ASSESSMENT    1.  Osteoporosis.  Clinical diagnosis of osteoporosis based on presence of vertebral compression fractures; DXA has shown osteopenia/low bone density and patient has been assessed to be at high risk of fracture given TBS adjusted T score and FRAX score.    ~On Fosamax since 8/2022 and tolerating without side effect.  We will anticipate DXA scan on 7/30/2024 as scheduled; if BMD is stable or improving, we will plan to continue Fosamax without changes.  ~Calcium intake is optimal, continue current supplement regimen.  ~Vitamin D level checked in 4/2024 was just above the upper limit of normal (due to new normal range in lab), although overall it has been stable compared to prior studies over the past 2 years.  No toxicity (no hypercalcemia).  No additional intervention for now, we will recheck vitamin D in about 3 months.    2.  Vertebral compression fractures.  Discovered on spine x-rays.  Likely due to osteoporosis.  Our evaluation for other causes such as multiple myeloma has not revealed suggestive findings.     3.  Hypothyroidism.  Normal TSH on previsit labs.  Continue levothyroxine without changes.      4.  Weakness/fatigue.  Early morning cortisol was normal in 7/2023.  Thyroid function test is optimal, as above.  Continue follow-up in primary care.    PLAN  -Continue Fosamax 70 mg by mouth once a week. Take the medication on an empty stomach, first thing in the morning with at least 8 ounces of water--do not take with other drinks. Remain upright (do not lie down) and do not eat take anything else by mouth (pills, food or drink) until at least 30 minutes after taking the medication.  -Continue calcium  without changes, 1 chewable twice daily  -Continue levothyroxine without changes  -We will schedule lab draw at the time of infusion appointment in October  -Return for a follow-up visit in one year--with labs before visit   -We will communicate results via [email or web address], or if needed by phone      Orders Placed This Encounter   Procedures     Basic metabolic panel     Vitamin D Deficiency     TSH with free T4 reflex       I spent a total of 40 minutes on the date of encounter reviewing medical records, evaluating the patient, coordinating care and documenting in the EHR, as detailed above.      Sukumar Jenkins MD   Division of Diabetes, Endocrinology and Metabolism  Department of Medicine            Again, thank you for allowing me to participate in the care of your patient.        Sincerely,        Sukumar Jenkins MD

## 2024-07-24 NOTE — PROGRESS NOTES
ENDOCRINOLOGY FOLLOW-UP         HISTORY OF PRESENT ILLNESS    Diann Low is seen in follow-up for the issues outlined below. Patient is accompanied by her son Isael.    1.  Osteoporosis.  She has been noticing increased weakness in the mornings: Has been needing to rest with simple activities like making her bed.    No falls or fractures in the interim since last visit.    She has been on Fosamax since 8/31/2022: Continues to take the medication once weekly, first thing in the morning with water, and waiting for about 30 minutes before having other food or beverages.  Tolerating Fosamax without side effects.    She has changed the type of calcium supplement to Caltrate chewables, taking 1 chewable twice daily with food (she may occasionally miss second dose): We looked up nutrition information and each chewable contains 600 mg of calcium and 800 IU vitamin D3.    She does not take separate vitamin D3 supplement.    She continues on daily multivitamin ([employer] brand).    She is now off prednisone: Has been off since the end of 2023.    Follow-up DXA scan is scheduled for 7/30/2024.    2.  Hypothyroidism.  She is now on levothyroxine 50 mcg daily.  Typically takes the medication first thing in the morning (around 5 AM or earlier) when she wakes up to go to the bathroom.  She usually goes back to bed after that.    As above, noticing some weakness and fatigue.  Was recently seen in gastroenterology for evaluation of diarrhea: Workup has thus far been unrevealing.    Pertinent endocrine and related history:  1.  Osteoporosis. Clinical diagnosis based on discovery of occult vertebral fractures on thoracic and lumbar spines XR's performed in 7/2022.  -Longstanding history, previously followed with serial DXA scans in the [facility] system.  -Most recent DXA scan completed at [facility] on 6/14/2022 showed BMD in the range of osteopenia (although TBS adjusted T score was in the range of osteoporosis) and FRAX  calculation indicating high risk of fracture.  Lumbar spine L1-L4 T score 1.5 (although discrepant BMD at vertebrae), TBS adjusted T score -3.2.  Left femoral neck T score -1.3, left total hip T score -1.2.  Right femoral neck T score -0.4, right total hip T score -1.1.  FRAX calculated 10 year probability of major osteoporotic fracture is 17.4%, hip fracture is 6.0%.  No prior studies were completed in our system for comparison.  -Review of care everywhere indicates that last DXA scan in the [facility] system was in 2014 (I see records of DXA since 2007 in the [facility] system).  DXA 6/18/2014 showed osteopenia.  -Patient does not recall ever being prescribed medications to reduce risk of fracture.  -Osteoporosis is likely related to postmenopausal status, history of RA and glucocorticoid therapy.  Our work-up for other secondary causes of bone loss has otherwise revealed normal PTH, CMP, no anemia, and normal 24-hour urine calcium excretion; serum protein electrophoresis was normal while urine protein electrophoresis showed trace proteins (we will check urine immunofixation to further evaluate). Screening for celiac disease in primary care clinic in 4/2022 showed normal serology.  2.  Rheumatoid arthritis. Since age 45.  Presently on methotrexate, Orencia infusion and prednisone.  She recalls that she has been on much higher doses of prednisone in the past.  3.  Unintentional weight loss.   4.  Hypothyroidism.  Hypothyroidism.  Subclinical hypothyroidism noted on initial labs in 6/2022.  Follow-up thyroid function test shows an upward trend in TSH, overtly low free T4 level also.  Initiated levothyroxine in 8/2022.    Pertinent Social History: Presently living in independent living in [place]; previously lived in [place] and moved to the [facility] in 8/2022.  Has 3 children.    PAST MEDICAL HISTORY  Past Medical History:   Diagnosis Date    Skin cancer     Spider veins        MEDICATIONS  Current  Outpatient Medications   Medication Sig Dispense Refill    alendronate (FOSAMAX) 70 MG tablet Take 1 tablet (70 mg) by mouth every 7 days 12 tablet 3    calcium carbonate-vitamin D (OSCAL W/D) 500-200 MG-UNIT tablet Take 1 tablet by mouth 2 times daily      carboxymethylcellulose PF (REFRESH PLUS) 0.5 % ophthalmic solution 1 drop 3 times daily as needed for dry eyes      clindamycin (CLEOCIN) 300 MG capsule Take 600 mg by mouth For dental procedures      ferrous fumarate 65 mg, [place]. FE,-Vitamin C 125 mg (VITRON C)  MG TABS tablet Take 1 tablet by mouth every other day      folic acid (FOLVITE) 1 MG tablet Take 1 tablet by mouth daily      levothyroxine (SYNTHROID/LEVOTHROID) 50 MCG tablet Take 1 tablet (50 mcg) by mouth daily 90 tablet 3    loperamide (IMODIUM) 2 MG capsule Take 2 mg by mouth 4 times daily as needed for diarrhea      methotrexate 2.5 MG tablet Take 4 tablets (10 mg) by mouth every 7 days 48 tablet 0    Multiple Vitamin (MULTIVITAMIN ADULT PO) Take 1 tablet by mouth daily         Allergies, family, and social history were reviewed and documented as needed in EHR.     REVIEW OF SYSTEMS  A focused ROS was performed, with pertinent positives and negatives as noted in the HPI.    PHYSICAL EXAM  /64 (BP Location: Right arm, Patient Position: Sitting, Cuff Size: Adult Small)   Pulse 80   Wt 44.5 kg (98 lb)   BMI 18.22 kg/m    Body mass index is 18.22 kg/m .   Constitutional: Vital signs reviewed, as recorded above. Patient is alert, oriented and appears in no acute distress.  Eyes: PER, EOMI, no stare, lid lag, or retraction; no conjunctival injection.  ENMT: OP clear with moist MM. Lips are without lesions.   Cardiovascular: RRR, normal S1/S2, no audible murmurs, rubs or gallops; left ankle edema.  MSK: Bilateral hand and foot deformities related to RA.  No clubbing or cyanosis.  Neurological: Alert and oriented times 3.       DATA REVIEW  Each of the following laboratory and/or imaging  studies were reviewed.    DXA as in HPI.        ASSESSMENT    1.  Osteoporosis.  Clinical diagnosis of osteoporosis based on presence of vertebral compression fractures; DXA has shown osteopenia/low bone density and patient has been assessed to be at high risk of fracture given TBS adjusted T score and FRAX score.    ~On Fosamax since 8/2022 and tolerating without side effect.  We will anticipate DXA scan on 7/30/2024 as scheduled; if BMD is stable or improving, we will plan to continue Fosamax without changes.  ~Calcium intake is optimal, continue current supplement regimen.  ~Vitamin D level checked in 4/2024 was just above the upper limit of normal (due to new normal range in lab), although overall it has been stable compared to prior studies over the past 2 years.  No toxicity (no hypercalcemia).  No additional intervention for now, we will recheck vitamin D in about 3 months.    2.  Vertebral compression fractures.  Discovered on spine x-rays.  Likely due to osteoporosis.  Our evaluation for other causes such as multiple myeloma has not revealed suggestive findings.     3.  Hypothyroidism.  Normal TSH on previsit labs.  Continue levothyroxine without changes.      4.  Weakness/fatigue.  Early morning cortisol was normal in 7/2023.  Thyroid function test is optimal, as above.  Continue follow-up in primary care.    PLAN  -Continue Fosamax 70 mg by mouth once a week. Take the medication on an empty stomach, first thing in the morning with at least 8 ounces of water--do not take with other drinks. Remain upright (do not lie down) and do not eat take anything else by mouth (pills, food or drink) until at least 30 minutes after taking the medication.  -Continue calcium without changes, 1 chewable twice daily  -Continue levothyroxine without changes  -We will schedule lab draw at the time of infusion appointment in October  -Return for a follow-up visit in one year--with labs before visit   -We will communicate  results via [employer], or if needed by phone      Orders Placed This Encounter   Procedures    Basic metabolic panel    Vitamin D Deficiency    TSH with free T4 reflex       I spent a total of 40 minutes on the date of encounter reviewing medical records, evaluating the patient, coordinating care and documenting in the EHR, as detailed above.      Sukumar Jenkins MD   Division of Diabetes, Endocrinology and Metabolism  Department of Medicine

## 2024-07-30 ENCOUNTER — ANCILLARY PROCEDURE (OUTPATIENT)
Dept: BONE DENSITY | Facility: CLINIC | Age: 84
End: 2024-07-30
Attending: INTERNAL MEDICINE
Payer: MEDICARE

## 2024-07-30 ENCOUNTER — LAB (OUTPATIENT)
Dept: INFUSION THERAPY | Facility: HOSPITAL | Age: 84
End: 2024-07-30
Attending: INTERNAL MEDICINE
Payer: MEDICARE

## 2024-07-30 VITALS
OXYGEN SATURATION: 99 % | RESPIRATION RATE: 16 BRPM | TEMPERATURE: 97.6 F | DIASTOLIC BLOOD PRESSURE: 62 MMHG | HEART RATE: 77 BPM | SYSTOLIC BLOOD PRESSURE: 121 MMHG

## 2024-07-30 DIAGNOSIS — M05.79 RHEUMATOID ARTHRITIS, SEROPOSITIVE, MULTIPLE SITES (H): ICD-10-CM

## 2024-07-30 DIAGNOSIS — M05.79 RHEUMATOID ARTHRITIS, SEROPOSITIVE, MULTIPLE SITES (H): Primary | ICD-10-CM

## 2024-07-30 DIAGNOSIS — M81.0 OSTEOPOROSIS, UNSPECIFIED OSTEOPOROSIS TYPE, UNSPECIFIED PATHOLOGICAL FRACTURE PRESENCE: ICD-10-CM

## 2024-07-30 LAB
ALBUMIN SERPL BCG-MCNC: 4.3 G/DL (ref 3.5–5.2)
ALT SERPL W P-5'-P-CCNC: 8 U/L (ref 0–50)
CREAT SERPL-MCNC: 0.86 MG/DL (ref 0.51–0.95)
EGFRCR SERPLBLD CKD-EPI 2021: 65 ML/MIN/1.73M2
ERYTHROCYTE [DISTWIDTH] IN BLOOD BY AUTOMATED COUNT: 14.6 % (ref 10–15)
HCT VFR BLD AUTO: 34.8 % (ref 35–47)
HGB BLD-MCNC: 11.8 G/DL (ref 11.7–15.7)
MCH RBC QN AUTO: 32.8 PG (ref 26.5–33)
MCHC RBC AUTO-ENTMCNC: 33.9 G/DL (ref 31.5–36.5)
MCV RBC AUTO: 97 FL (ref 78–100)
PLATELET # BLD AUTO: 163 10E3/UL (ref 150–450)
RBC # BLD AUTO: 3.6 10E6/UL (ref 3.8–5.2)
WBC # BLD AUTO: 5.9 10E3/UL (ref 4–11)

## 2024-07-30 PROCEDURE — 85027 COMPLETE CBC AUTOMATED: CPT

## 2024-07-30 PROCEDURE — 77080 DXA BONE DENSITY AXIAL: CPT | Mod: TC

## 2024-07-30 PROCEDURE — 82040 ASSAY OF SERUM ALBUMIN: CPT

## 2024-07-30 PROCEDURE — 36415 COLL VENOUS BLD VENIPUNCTURE: CPT

## 2024-07-30 PROCEDURE — 77089 TBS DXA CAL W/I&R FX RISK: CPT

## 2024-07-30 PROCEDURE — 84460 ALANINE AMINO (ALT) (SGPT): CPT

## 2024-07-30 PROCEDURE — 250N000028 HC RX JA MOD (INTRAVENOUS), IP 250 OP 636: Mod: JA | Performed by: INTERNAL MEDICINE

## 2024-07-30 PROCEDURE — 96365 THER/PROPH/DIAG IV INF INIT: CPT

## 2024-07-30 PROCEDURE — 258N000003 HC RX IP 258 OP 636: Performed by: INTERNAL MEDICINE

## 2024-07-30 PROCEDURE — 82565 ASSAY OF CREATININE: CPT

## 2024-07-30 RX ORDER — ALBUTEROL SULFATE 0.83 MG/ML
2.5 SOLUTION RESPIRATORY (INHALATION)
Status: CANCELLED | OUTPATIENT
Start: 2024-09-10

## 2024-07-30 RX ORDER — HEPARIN SODIUM,PORCINE 10 UNIT/ML
5-20 VIAL (ML) INTRAVENOUS DAILY PRN
Status: CANCELLED | OUTPATIENT
Start: 2024-09-10

## 2024-07-30 RX ORDER — EPINEPHRINE 1 MG/ML
0.3 INJECTION, SOLUTION INTRAMUSCULAR; SUBCUTANEOUS EVERY 5 MIN PRN
Status: DISCONTINUED | OUTPATIENT
Start: 2024-07-30 | End: 2024-07-30 | Stop reason: HOSPADM

## 2024-07-30 RX ORDER — ALBUTEROL SULFATE 0.83 MG/ML
2.5 SOLUTION RESPIRATORY (INHALATION)
Status: DISCONTINUED | OUTPATIENT
Start: 2024-07-30 | End: 2024-07-30 | Stop reason: HOSPADM

## 2024-07-30 RX ORDER — DIPHENHYDRAMINE HYDROCHLORIDE 50 MG/ML
50 INJECTION INTRAMUSCULAR; INTRAVENOUS
Status: DISCONTINUED | OUTPATIENT
Start: 2024-07-30 | End: 2024-07-30 | Stop reason: HOSPADM

## 2024-07-30 RX ORDER — MEPERIDINE HYDROCHLORIDE 50 MG/ML
25 INJECTION INTRAMUSCULAR; INTRAVENOUS; SUBCUTANEOUS EVERY 30 MIN PRN
Status: DISCONTINUED | OUTPATIENT
Start: 2024-07-30 | End: 2024-07-30 | Stop reason: HOSPADM

## 2024-07-30 RX ORDER — METHYLPREDNISOLONE SODIUM SUCCINATE 125 MG/2ML
125 INJECTION, POWDER, LYOPHILIZED, FOR SOLUTION INTRAMUSCULAR; INTRAVENOUS
Status: CANCELLED
Start: 2024-09-10

## 2024-07-30 RX ORDER — ALBUTEROL SULFATE 90 UG/1
1-2 AEROSOL, METERED RESPIRATORY (INHALATION)
Status: DISCONTINUED | OUTPATIENT
Start: 2024-07-30 | End: 2024-07-30 | Stop reason: HOSPADM

## 2024-07-30 RX ORDER — HEPARIN SODIUM (PORCINE) LOCK FLUSH IV SOLN 100 UNIT/ML 100 UNIT/ML
5 SOLUTION INTRAVENOUS
OUTPATIENT
Start: 2024-09-10

## 2024-07-30 RX ORDER — MEPERIDINE HYDROCHLORIDE 50 MG/ML
25 INJECTION INTRAMUSCULAR; INTRAVENOUS; SUBCUTANEOUS EVERY 30 MIN PRN
Status: CANCELLED | OUTPATIENT
Start: 2024-09-10

## 2024-07-30 RX ORDER — EPINEPHRINE 1 MG/ML
0.3 INJECTION, SOLUTION INTRAMUSCULAR; SUBCUTANEOUS EVERY 5 MIN PRN
Status: CANCELLED | OUTPATIENT
Start: 2024-09-10

## 2024-07-30 RX ORDER — DIPHENHYDRAMINE HYDROCHLORIDE 50 MG/ML
50 INJECTION INTRAMUSCULAR; INTRAVENOUS
Status: CANCELLED
Start: 2024-09-10

## 2024-07-30 RX ORDER — METHYLPREDNISOLONE SODIUM SUCCINATE 125 MG/2ML
125 INJECTION, POWDER, LYOPHILIZED, FOR SOLUTION INTRAMUSCULAR; INTRAVENOUS
Status: DISCONTINUED | OUTPATIENT
Start: 2024-07-30 | End: 2024-07-30 | Stop reason: HOSPADM

## 2024-07-30 RX ORDER — METHYLPREDNISOLONE SODIUM SUCCINATE 125 MG/2ML
125 INJECTION, POWDER, LYOPHILIZED, FOR SOLUTION INTRAMUSCULAR; INTRAVENOUS ONCE
Start: 2024-09-10 | End: 2024-09-10

## 2024-07-30 RX ORDER — DIPHENHYDRAMINE HCL 25 MG
25 CAPSULE ORAL ONCE
Start: 2024-09-10 | End: 2024-09-10

## 2024-07-30 RX ORDER — ACETAMINOPHEN 325 MG/1
650 TABLET ORAL ONCE
Start: 2024-09-10 | End: 2024-09-10

## 2024-07-30 RX ORDER — ALBUTEROL SULFATE 90 UG/1
1-2 AEROSOL, METERED RESPIRATORY (INHALATION)
Status: CANCELLED
Start: 2024-09-10

## 2024-07-30 RX ADMIN — SODIUM CHLORIDE 250 ML: 9 INJECTION, SOLUTION INTRAVENOUS at 12:10

## 2024-07-30 RX ADMIN — SODIUM CHLORIDE 500 MG: 9 INJECTION, SOLUTION INTRAVENOUS at 12:10

## 2024-07-30 NOTE — PROGRESS NOTES
Infusion Nursing Note:  Diann Low presents today for Orencia.    Patient seen by provider today: No   present during visit today: Not Applicable.    Note: Diann arrived ambulatory by herself for Orencia. Plan of care reviewed and she has no questions. Diann does not take pre medications prior to her Orencia infusions.    Intravenous Access:  Labs drawn without difficulty. Diann declined UA today.  Peripheral IV placed.    Treatment Conditions:  Biological Infusion Checklist:  ~~~ NOTE: If the patient answers yes to any of the questions below, hold the infusion and contact ordering provider or on-call provider.    Have you recently had an elevated temperature, fever, chills, productive cough, coughing for 3 weeks or longer or hemoptysis,  abnormal vital signs, night sweats,  chest pain or have you noticed a decrease in your appetite, unexplained weight loss or fatigue? No  Do you have any open wounds or new incisions? No  Do you have any upcoming hospitalizations or surgeries? Does not include esophagogastroduodenoscopy, colonoscopy, endoscopic retrograde cholangiopancreatography (ERCP), endoscopic ultrasound (EUS), dental procedures or joint aspiration/steroid injections No  Do you currently have any signs of illness or infection or are you on any antibiotics? No  Have you had any new, sudden or worsening abdominal pain? No  Have you or anyone in your household received a live vaccination in the past 4 weeks? Please note: No live vaccines while on biologic/chemotherapy until 6 months after the last treatment. Patient can receive the flu vaccine (shot only), pneumovax and the Covid vaccine. It is optimal for the patient to get these vaccines mid cycle, but they can be given at any time as long as it is not on the day of the infusion. No  Have you recently been diagnosed with any new nervous system diseases (ie. Multiple sclerosis, Guillain [place], seizures, neurological changes) or cancer diagnosis?  Are you on any form of radiation or chemotherapy? No  Are you pregnant or breast feeding or do you have plans of pregnancy in the future? No  Have you been having any signs of worsening depression or suicidal ideations?  (benlysta only) No  Have there been any other new onset medical symptoms? No  Have you had any new blood clots? (IVIG only) No    Post Infusion Assessment:  Patient tolerated infusion without incident.  Blood return noted pre and post infusion.  Site patent and intact, free from redness, edema or discomfort.  Access discontinued per protocol.     Discharge Plan:   Patient will return Sept 10th for next appointment.   Patient discharged in stable condition accompanied by: self.  Departure Mode: Ambulatory.      Pao Morel RN

## 2024-08-01 ENCOUNTER — OFFICE VISIT (OUTPATIENT)
Dept: RHEUMATOLOGY | Facility: CLINIC | Age: 84
End: 2024-08-01
Payer: MEDICARE

## 2024-08-01 VITALS
DIASTOLIC BLOOD PRESSURE: 56 MMHG | OXYGEN SATURATION: 97 % | SYSTOLIC BLOOD PRESSURE: 118 MMHG | BODY MASS INDEX: 18.22 KG/M2 | WEIGHT: 98 LBS | HEART RATE: 66 BPM

## 2024-08-01 DIAGNOSIS — M05.79 RHEUMATOID ARTHRITIS, SEROPOSITIVE, MULTIPLE SITES (H): Primary | ICD-10-CM

## 2024-08-01 DIAGNOSIS — M15.0 PRIMARY OSTEOARTHRITIS INVOLVING MULTIPLE JOINTS: ICD-10-CM

## 2024-08-01 DIAGNOSIS — Z79.899 HIGH RISK MEDICATION USE: ICD-10-CM

## 2024-08-01 DIAGNOSIS — R76.8 RHEUMATOID FACTOR POSITIVE: ICD-10-CM

## 2024-08-01 PROCEDURE — 99214 OFFICE O/P EST MOD 30 MIN: CPT | Performed by: INTERNAL MEDICINE

## 2024-08-01 PROCEDURE — G2211 COMPLEX E/M VISIT ADD ON: HCPCS | Performed by: INTERNAL MEDICINE

## 2024-08-01 RX ORDER — METHOTREXATE 2.5 MG/1
10 TABLET ORAL
Qty: 48 TABLET | Refills: 0 | Status: SHIPPED | OUTPATIENT
Start: 2024-08-01

## 2024-08-01 RX ORDER — FOLIC ACID 1 MG/1
2 TABLET ORAL DAILY
Qty: 180 TABLET | Refills: 0 | Status: SHIPPED | OUTPATIENT
Start: 2024-08-01 | End: 2024-10-30

## 2024-08-01 NOTE — PROGRESS NOTES
Rheumatology follow-up visit note     Diann is a 84 year old female presents today for follow-up.    Diann was seen today for recheck.    Diagnoses and all orders for this visit:    Rheumatoid arthritis, seropositive, multiple sites (H)  -     methotrexate 2.5 MG tablet; Take 4 tablets (10 mg) by mouth every 7 days  -     folic acid (FOLVITE) 1 MG tablet; Take 2 tablets (2 mg) by mouth daily for 90 days    High risk medication use  -     folic acid (FOLVITE) 1 MG tablet; Take 2 tablets (2 mg) by mouth daily for 90 days    Primary osteoarthritis involving multiple joints    Rheumatoid factor positive        Continue management of her rheumatoid arthritis as now with methotrexate and Orencia.  For OA, nonpharmacologic measures including ambulation, and for pharmacologic acetaminophen sustained-release not nonsteroidals.The longitudinal plan of care for the diagnosis(es)/condition(s) as documented were addressed during this visit. Due to the added complexity in care, I will continue to support Diann in the subsequent management and with ongoing continuity of care.      Follow up in 3 months. Labs prior.    HPI    Diann Low is a 84 year old female is here for follow-up of   seropositive rheumatoid arthritis, osteoarthritis.  Her current regimen includes methotrexate, Orencia infusion every 6 weeks at [facility].  No longer on prednisone.  Recent labs are within acceptable range.  While she noted 0 pain in all her joints.  She did not 2.0/10 discomfort typically in her lower back, she is able to do her day-to-day activities without difficulty.  She noted that instead of 1 she is indeed taking 2 mg of folic acid as was previously prescribed elsewhere.  She noted no sustained stiffness in the morning.  She has had surgery on her PIPs some of her PIPs.   She is making progress with her cervical injury.  She hopes to be able to drive in near future.  She is accompanied by her son.  She does have advanced  deformities from rheumatoid arthritis but remains highly functional.          DETAILED EXAMINATION  08/01/24  :    Vitals:    08/01/24 0838   BP: 118/56   BP Location: Right arm   Pulse: 66   SpO2: 97%   Weight: 44.5 kg (98 lb)     Alert oriented. Head including the face is examined for malar rash, heliotropes, scarring, lupus pernio. Eyes examined for redness such as in episcleritis/scleritis, periorbital lesions.   Neck/ Face examined for parotid gland swelling, range of motion of neck.  Left upper and lower and right upper and lower extremities examined for tenderness, swelling, warmth of the appendicular joints, range of motion, edema, rash.  Some of the important findings included: she does not have evidence of synovitis in the palpable joints of the upper extremities.  Significant deformities of the digits.  + [place] nodes.  Range of motion of the shoulders  show full abduction.  No JLT effusion or warmth of the knees.  she does not have dactylitis of the digits.     Patient Active Problem List    Diagnosis Date Noted    Hyponatremia 08/29/2023     Priority: Medium    Hypothyroidism 08/19/2023     Priority: Medium    Repeated falls 08/19/2023     Priority: Medium    Unspecified nondisplaced fracture of first cervical vertebra, initial encounter for closed fracture (H) 08/19/2023     Priority: Medium    Injury of head 08/17/2023     Priority: Medium    Osteoporosis, unspecified osteoporosis type, unspecified pathological fracture presence 08/31/2022     Priority: Medium    Rheumatoid arthritis, seropositive, multiple sites (H) 06/20/2022     Priority: Medium    Rheumatoid arthritis involving both hands, unspecified whether rheumatoid factor present (H) 01/14/2022     Priority: Medium    History of falling 10/15/2018     Priority: Medium    Presence of intraocular lens 09/17/2018     Priority: Medium    Arthritis, rheumatoid (H) 09/12/2007     Priority: Medium     No past surgical history on file.   Past  Medical History:   Diagnosis Date    Skin cancer     Spider veins      Allergies   Allergen Reactions    Infliximab Rash     Throat began to close/tighten    Sulfa Antibiotics Rash    Hydrocodone-Acetaminophen Nausea    Oxycodone-Acetaminophen Nausea    Penicillins Rash    Erythromycin Nausea     Current Outpatient Medications   Medication Sig Dispense Refill    alendronate (FOSAMAX) 70 MG tablet Take 1 tablet (70 mg) by mouth every 7 days 12 tablet 3    calcium carbonate-vitamin D (OSCAL W/D) 500-200 MG-UNIT tablet Take 1 tablet by mouth 2 times daily      carboxymethylcellulose PF (REFRESH PLUS) 0.5 % ophthalmic solution 1 drop 3 times daily as needed for dry eyes      clindamycin (CLEOCIN) 300 MG capsule Take 600 mg by mouth For dental procedures      ferrous fumarate 65 mg, [place]. FE,-Vitamin C 125 mg (VITRON C)  MG TABS tablet Take 1 tablet by mouth every other day      folic acid (FOLVITE) 1 MG tablet Take 1 tablet by mouth daily      levothyroxine (SYNTHROID/LEVOTHROID) 50 MCG tablet Take 1 tablet (50 mcg) by mouth daily 90 tablet 3    loperamide (IMODIUM) 2 MG capsule Take 2 mg by mouth 4 times daily as needed for diarrhea      methotrexate 2.5 MG tablet Take 4 tablets (10 mg) by mouth every 7 days 48 tablet 0    Multiple Vitamin (MULTIVITAMIN ADULT PO) Take 1 tablet by mouth daily       family history is not on file.  Social Connections: Unknown (4/29/2021)    [facility], [facility]    Social Connection and Isolation Panel [NHANES]     Frequency of Communication with Friends and Family: Three times a week     Frequency of Social Gatherings with Friends and Family: Twice a week     Attends Yarsani Services: Patient declined     Active Member of Clubs or Organizations: Patient declined     Attends Club or Organization Meetings: Patient declined     Marital Status:           WBC Count   Date Value Ref Range Status   07/30/2024 5.9 4.0 - 11.0 10e3/uL Final     RBC Count   Date  Value Ref Range Status   07/30/2024 3.60 (L) 3.80 - 5.20 10e6/uL Final     Hemoglobin   Date Value Ref Range Status   07/30/2024 11.8 11.7 - 15.7 g/dL Final     Hematocrit   Date Value Ref Range Status   07/30/2024 34.8 (L) 35.0 - 47.0 % Final     MCV   Date Value Ref Range Status   07/30/2024 97 78 - 100 fL Final     MCH   Date Value Ref Range Status   07/30/2024 32.8 26.5 - 33.0 pg Final     Platelet Count   Date Value Ref Range Status   07/30/2024 163 150 - 450 10e3/uL Final     % Lymphocytes   Date Value Ref Range Status   04/16/2024 23 % Final     AST   Date Value Ref Range Status   04/16/2024 42 0 - 45 U/L Final     ALT   Date Value Ref Range Status   07/30/2024 8 0 - 50 U/L Final     Albumin   Date Value Ref Range Status   07/30/2024 4.3 3.5 - 5.2 g/dL Final   08/29/2023 3.3 (L) 3.5 - 5.0 g/dL Final     Alkaline Phosphatase   Date Value Ref Range Status   04/16/2024 70 40 - 150 U/L Final     Comment:     Reference intervals for this test were updated on 11/14/2023 to more accurately reflect our healthy population. There may be differences in the flagging of prior results with similar values performed with this method. Interpretation of those prior results can be made in the context of the updated reference intervals.     Creatinine   Date Value Ref Range Status   07/30/2024 0.86 0.51 - 0.95 mg/dL Final     GFR Estimate   Date Value Ref Range Status   07/30/2024 65 >60 mL/min/1.73m2 Final     Comment:     eGFR calculated using 2021 CKD-EPI equation.     GFR, ESTIMATED POCT   Date Value Ref Range Status   07/07/2024 >60 >60 mL/min/1.73m2 Final

## 2024-08-29 ENCOUNTER — TELEPHONE (OUTPATIENT)
Dept: ENDOCRINOLOGY | Facility: CLINIC | Age: 84
End: 2024-08-29
Payer: MEDICARE

## 2024-08-29 DIAGNOSIS — R53.83 OTHER FATIGUE: ICD-10-CM

## 2024-08-29 DIAGNOSIS — E03.8 SUBCLINICAL HYPOTHYROIDISM: Primary | ICD-10-CM

## 2024-08-29 NOTE — TELEPHONE ENCOUNTER
Spoke to pt- pt states it takes a period of time for pt to feel like herself. Pt feels like she can't do much, she needs to sit down every few minutes. Pt has not seen her primary in a little while, advise pt to reach out to her primary doctor as per last visit with provider, had recommended below. Pt will contact PCP. Informed pt will also let provider know, if provider has any additional recommendations will contact the patient back.     LOV 7/24/24  Osteoporosis   Per notes:  . Weakness/fatigue. Early morning cortisol was normal in 7/2023. Thyroid function test is optimal, as above. Continue follow-up in primary care.

## 2024-08-29 NOTE — TELEPHONE ENCOUNTER
[facility] Call Center    Phone Message    May a detailed message be left on voicemail: yes     Reason for Call: Other: patient requesting a call back, stated she is having issues in the morning with weakness and wants to discuss what this could be. Please call her at [phone]. Thank you     Action Taken: Message routed to:  [facility] ([facility]):      Travel Screening: Not Applicable     Date of Service:

## 2024-08-30 NOTE — TELEPHONE ENCOUNTER
Attempted to contact pt- phone just rings no  and no VM will try again later.    To inform pt of below.

## 2024-08-30 NOTE — TELEPHONE ENCOUNTER
Since her last thyroid function test was normal and we did not have suspicion for low cortisol hormone, I do not suspect new weakness is hormone-related.  Nonetheless, if Ms. Low would like to have lab draw in the coming week, I have placed lab order for blood draw to be drawn at around 8 AM (no later than 9 AM).  Thank you.

## 2024-09-09 ENCOUNTER — OFFICE VISIT (OUTPATIENT)
Dept: FAMILY MEDICINE | Facility: CLINIC | Age: 84
End: 2024-09-09
Payer: MEDICARE

## 2024-09-09 VITALS
HEART RATE: 63 BPM | OXYGEN SATURATION: 98 % | DIASTOLIC BLOOD PRESSURE: 54 MMHG | WEIGHT: 99.1 LBS | TEMPERATURE: 97.1 F | HEIGHT: 61 IN | BODY MASS INDEX: 18.71 KG/M2 | RESPIRATION RATE: 12 BRPM | SYSTOLIC BLOOD PRESSURE: 102 MMHG

## 2024-09-09 DIAGNOSIS — M81.0 OSTEOPOROSIS, UNSPECIFIED OSTEOPOROSIS TYPE, UNSPECIFIED PATHOLOGICAL FRACTURE PRESENCE: ICD-10-CM

## 2024-09-09 DIAGNOSIS — E03.8 SUBCLINICAL HYPOTHYROIDISM: ICD-10-CM

## 2024-09-09 DIAGNOSIS — R10.9 ABDOMINAL DISCOMFORT: ICD-10-CM

## 2024-09-09 DIAGNOSIS — R63.4 UNINTENTIONAL WEIGHT LOSS: ICD-10-CM

## 2024-09-09 DIAGNOSIS — M05.79 RHEUMATOID ARTHRITIS, SEROPOSITIVE, MULTIPLE SITES (H): ICD-10-CM

## 2024-09-09 DIAGNOSIS — R53.83 OTHER FATIGUE: Primary | ICD-10-CM

## 2024-09-09 DIAGNOSIS — R19.7 DIARRHEA, UNSPECIFIED TYPE: ICD-10-CM

## 2024-09-09 PROCEDURE — 99213 OFFICE O/P EST LOW 20 MIN: CPT | Performed by: STUDENT IN AN ORGANIZED HEALTH CARE EDUCATION/TRAINING PROGRAM

## 2024-09-09 PROCEDURE — G2211 COMPLEX E/M VISIT ADD ON: HCPCS | Performed by: STUDENT IN AN ORGANIZED HEALTH CARE EDUCATION/TRAINING PROGRAM

## 2024-09-09 NOTE — PROGRESS NOTES
Assessment & Plan     Other fatigue  Diann is an 83-year-old female with history of rheumatoid arthritis on methotrexate and Orencia, hypothyroidism, history of odontoid fracture in August 2023 presenting for follow-up of chronic fatigue.    Fatigue is stable since her last visit on May 24, 2024.  Lab studies have not been contributory towards finding a cause.  TSH was within normal limits when last done July 23, 2024, liver enzymes are normal, B12 normal and folate was high, kidney function was stable at 65.  Blood count is previously shown anemia but last blood tests have been within normal limits.  Only finding that was abnormal was prealbumin was low to getting possible malnutrition.  Had cortisol testing done July 2023 when coming off of chronic prednisone with normal results.    Fatigue is worse in the morning, gets better throughout the day, is not progressive.    Encouraged exercise, I do believe that this is possibly related to deconditioning, reports that there is an exercise class that she can do at her facility every day, I again encouraged her to do this.  Encouraged her to walk 5 minutes in the morning at the start of the morning as well.    Possible that findings of potential colitis are contributing, encouraged her to follow-up with gastroenterology, see below.    - CBC with platelets and differential    Abdominal discomfort  Diarrhea  Referred to [facility] for abdominal pain and diarrhea.  She reports the diarrhea and abdominal pain are still present.  Of note she did have a fecal calprotectin elevated at 218 with a negative celiac panel and negative stool panel.  As above she is on methotrexate for rheumatoid arthritis.    Due to elevated calprotectin CT enterography was done which showed possible colon thickening/inflammation on the left colon.  She was recommended to have colonoscopy performed for further evaluation, encouraged her to follow-up with [facility] for this to be done.    Osteoporosis,  unspecified osteoporosis type, unspecified pathological fracture presence  Following with endocrinology, she has been on alendronate however continues to have significant bone loss , on review of DEXA scan done July 30, 2024.  Endocrinology had been considering a different medication and has follow-up with her on November 11, 2024, will defer to their management.  Until then continue alendronate.    Rheumatoid arthritis, seropositive, multiple sites (H)  Sees rheumatology, on Orencia and methotrexate, defer management to rheumatology.    Subclinical hypothyroidism  Taking 50 mcg Synthroid daily, TSH is within normal limits, defer management to endocrinology.    Unintentional weight loss  Had been having unintentional weight loss however encouragingly has not lost weight.  Pre -Albumin level was low.  She reports no issues with swallowing, no dysphagia, eating 3 meals a day eating snacks 3 times daily, and although she has poor appetite she is able to eat well.  We generally covers for each meal small lunch plate she do that she does eat all of it as well as using Ensure as a caloric supplement once every day.  Reports that taste and smell are intact.  Encouraging that weight is stable, we will continue to monitor.  Had attempted mirtazapine however patient had side effects to it so we stopped it.      Follow-up in 3 months myself, follow-up with endocrinology in November 2024.  Follow-up with gastroenterology for colonoscopy at earliest convenience.    Nicholas Tidwell is a 84 year old, presenting for the following health issues:  Follow Up (Follow up on imaging at [facility], stated it was done on 08/28/2024/Bone density test.)        9/9/2024     1:43 PM   Additional Questions   Roomed by SHARRON WELCH     History of Present Illness       Reason for visit:  Follow up    She eats 2-3 servings of fruits and vegetables daily.She consumes 0 sweetened beverage(s) daily.She exercises with enough effort to increase  "her heart rate 9 or less minutes per day.  She exercises with enough effort to increase her heart rate 3 or less days per week.   She is taking medications regularly.           Reports that she continues to have fatigue, this has not changed much is still worse in the morning.  Makes it was difficult to do hospital chores.  It is the most bothersome thing that she deals with.  She continues to have abdominal pain and diarrhea.  This has not changed much either.  She has been on a soft diet due to need for orthodontics work, and this has been somewhat challenging for her although she reports still eating normal amounts at meals.    Would like to know if she needs a colonoscopy, also also if there is any other steps that we can take to find cause of fatigue.      Review of Systems  Constitutional, neuro, ENT, endocrine, pulmonary, cardiac, gastrointestinal, genitourinary, musculoskeletal, integument and psychiatric systems are negative, except as otherwise noted.      Objective    /54 (BP Location: Right arm, Patient Position: Sitting, Cuff Size: Adult Small)   Pulse 63   Temp 97.1  F (36.2  C) (Oral)   Resp 12   Ht 1.562 m (5' 1.5\")   Wt 45 kg (99 lb 1.6 oz)   SpO2 98%   BMI 18.42 kg/m    Body mass index is 18.42 kg/m .  Physical Exam   GENERAL: alert and no distress  EYES: Eyes grossly normal to inspection, PERRL and conjunctivae and sclerae normal  NECK: no adenopathy, no asymmetry, masses, or scars  MS: no gross musculoskeletal defects noted, no edema  SKIN: no suspicious lesions or rashes  NEURO: Normal strength and tone, mentation intact and speech normal  PSYCH: mentation appears normal, affect normal/bright    Lab on 07/30/2024   Component Date Value Ref Range Status    Albumin 07/30/2024 4.3  3.5 - 5.2 g/dL Final    ALT 07/30/2024 8  0 - 50 U/L Final    WBC Count 07/30/2024 5.9  4.0 - 11.0 10e3/uL Final    RBC Count 07/30/2024 3.60 (L)  3.80 - 5.20 10e6/uL Final    Hemoglobin 07/30/2024 11.8  " 11.7 - 15.7 g/dL Final    Hematocrit 07/30/2024 34.8 (L)  35.0 - 47.0 % Final    MCV 07/30/2024 97  78 - 100 fL Final    MCH 07/30/2024 32.8  26.5 - 33.0 pg Final    MCHC 07/30/2024 33.9  31.5 - 36.5 g/dL Final    RDW 07/30/2024 14.6  10.0 - 15.0 % Final    Platelet Count 07/30/2024 163  150 - 450 10e3/uL Final    Creatinine 07/30/2024 0.86  0.51 - 0.95 mg/dL Final    GFR Estimate 07/30/2024 65  >60 mL/min/1.73m2 Final    eGFR calculated using 2021 CKD-EPI equation.       The longitudinal plan of care for the diagnosis(es)/condition(s) as documented were addressed during this visit. Due to the added complexity in care, I will continue to support Diann in the subsequent management and with ongoing continuity of care.      Signed Electronically by: Abdifatah Alvarado MD

## 2024-09-10 ENCOUNTER — INFUSION THERAPY VISIT (OUTPATIENT)
Dept: INFUSION THERAPY | Facility: HOSPITAL | Age: 84
End: 2024-09-10
Attending: INTERNAL MEDICINE
Payer: MEDICARE

## 2024-09-10 VITALS
TEMPERATURE: 97.9 F | RESPIRATION RATE: 16 BRPM | DIASTOLIC BLOOD PRESSURE: 56 MMHG | OXYGEN SATURATION: 97 % | SYSTOLIC BLOOD PRESSURE: 120 MMHG | HEART RATE: 74 BPM

## 2024-09-10 DIAGNOSIS — M05.79 RHEUMATOID ARTHRITIS, SEROPOSITIVE, MULTIPLE SITES (H): Primary | ICD-10-CM

## 2024-09-10 PROCEDURE — 258N000003 HC RX IP 258 OP 636: Performed by: INTERNAL MEDICINE

## 2024-09-10 PROCEDURE — 250N000028 HC RX JA MOD (INTRAVENOUS), IP 250 OP 636: Mod: JA | Performed by: INTERNAL MEDICINE

## 2024-09-10 PROCEDURE — 96374 THER/PROPH/DIAG INJ IV PUSH: CPT

## 2024-09-10 RX ORDER — HEPARIN SODIUM,PORCINE 10 UNIT/ML
5-20 VIAL (ML) INTRAVENOUS DAILY PRN
OUTPATIENT
Start: 2024-10-22

## 2024-09-10 RX ORDER — DIPHENHYDRAMINE HYDROCHLORIDE 50 MG/ML
50 INJECTION INTRAMUSCULAR; INTRAVENOUS
Start: 2024-10-22

## 2024-09-10 RX ORDER — DIPHENHYDRAMINE HYDROCHLORIDE 50 MG/ML
50 INJECTION INTRAMUSCULAR; INTRAVENOUS
Status: DISCONTINUED | OUTPATIENT
Start: 2024-09-10 | End: 2024-09-10 | Stop reason: HOSPADM

## 2024-09-10 RX ORDER — METHYLPREDNISOLONE SODIUM SUCCINATE 125 MG/2ML
125 INJECTION, POWDER, LYOPHILIZED, FOR SOLUTION INTRAMUSCULAR; INTRAVENOUS ONCE
Start: 2024-10-22 | End: 2024-10-22

## 2024-09-10 RX ORDER — ALBUTEROL SULFATE 0.83 MG/ML
2.5 SOLUTION RESPIRATORY (INHALATION)
OUTPATIENT
Start: 2024-10-22

## 2024-09-10 RX ORDER — EPINEPHRINE 1 MG/ML
0.3 INJECTION, SOLUTION INTRAMUSCULAR; SUBCUTANEOUS EVERY 5 MIN PRN
OUTPATIENT
Start: 2024-10-22

## 2024-09-10 RX ORDER — METHYLPREDNISOLONE SODIUM SUCCINATE 125 MG/2ML
125 INJECTION, POWDER, LYOPHILIZED, FOR SOLUTION INTRAMUSCULAR; INTRAVENOUS
Status: DISCONTINUED | OUTPATIENT
Start: 2024-09-10 | End: 2024-09-10 | Stop reason: HOSPADM

## 2024-09-10 RX ORDER — MEPERIDINE HYDROCHLORIDE 50 MG/ML
25 INJECTION INTRAMUSCULAR; INTRAVENOUS; SUBCUTANEOUS EVERY 30 MIN PRN
OUTPATIENT
Start: 2024-10-22

## 2024-09-10 RX ORDER — ALBUTEROL SULFATE 90 UG/1
1-2 AEROSOL, METERED RESPIRATORY (INHALATION)
Start: 2024-10-22

## 2024-09-10 RX ORDER — EPINEPHRINE 1 MG/ML
0.3 INJECTION, SOLUTION INTRAMUSCULAR; SUBCUTANEOUS EVERY 5 MIN PRN
Status: DISCONTINUED | OUTPATIENT
Start: 2024-09-10 | End: 2024-09-10 | Stop reason: HOSPADM

## 2024-09-10 RX ORDER — ALBUTEROL SULFATE 0.83 MG/ML
2.5 SOLUTION RESPIRATORY (INHALATION)
Status: DISCONTINUED | OUTPATIENT
Start: 2024-09-10 | End: 2024-09-10 | Stop reason: HOSPADM

## 2024-09-10 RX ORDER — ALBUTEROL SULFATE 90 UG/1
1-2 AEROSOL, METERED RESPIRATORY (INHALATION)
Status: DISCONTINUED | OUTPATIENT
Start: 2024-09-10 | End: 2024-09-10 | Stop reason: HOSPADM

## 2024-09-10 RX ORDER — ACETAMINOPHEN 325 MG/1
650 TABLET ORAL ONCE
Start: 2024-10-22 | End: 2024-10-22

## 2024-09-10 RX ORDER — HEPARIN SODIUM (PORCINE) LOCK FLUSH IV SOLN 100 UNIT/ML 100 UNIT/ML
5 SOLUTION INTRAVENOUS
OUTPATIENT
Start: 2024-10-22

## 2024-09-10 RX ORDER — MEPERIDINE HYDROCHLORIDE 50 MG/ML
25 INJECTION INTRAMUSCULAR; INTRAVENOUS; SUBCUTANEOUS EVERY 30 MIN PRN
Status: DISCONTINUED | OUTPATIENT
Start: 2024-09-10 | End: 2024-09-10 | Stop reason: HOSPADM

## 2024-09-10 RX ORDER — METHYLPREDNISOLONE SODIUM SUCCINATE 125 MG/2ML
125 INJECTION, POWDER, LYOPHILIZED, FOR SOLUTION INTRAMUSCULAR; INTRAVENOUS
Start: 2024-10-22

## 2024-09-10 RX ORDER — DIPHENHYDRAMINE HCL 25 MG
25 CAPSULE ORAL ONCE
Start: 2024-10-22 | End: 2024-10-22

## 2024-09-10 RX ORDER — HEPARIN SODIUM,PORCINE 10 UNIT/ML
5-20 VIAL (ML) INTRAVENOUS DAILY PRN
Status: DISCONTINUED | OUTPATIENT
Start: 2024-09-10 | End: 2024-09-10 | Stop reason: HOSPADM

## 2024-09-10 RX ADMIN — SODIUM CHLORIDE 250 ML: 9 INJECTION, SOLUTION INTRAVENOUS at 11:26

## 2024-09-10 RX ADMIN — SODIUM CHLORIDE 500 MG: 9 INJECTION, SOLUTION INTRAVENOUS at 11:35

## 2024-09-10 NOTE — PROGRESS NOTES
Infusion Nursing Note:  Diann Low presents today for orencia.    Patient seen by provider today: No   present during visit today: Not Applicable.    Note: Patient denies any new concerns or changes.      Intravenous Access:  Peripheral IV placed.    Treatment Conditions:  Biological Infusion Checklist:  ~~~ NOTE: If the patient answers yes to any of the questions below, hold the infusion and contact ordering provider or on-call provider.    Have you recently had an elevated temperature, fever, chills, productive cough, coughing for 3 weeks or longer or hemoptysis,  abnormal vital signs, night sweats,  chest pain or have you noticed a decrease in your appetite, unexplained weight loss or fatigue? No  Do you have any open wounds or new incisions? No  Do you have any upcoming hospitalizations or surgeries? Does not include esophagogastroduodenoscopy, colonoscopy, endoscopic retrograde cholangiopancreatography (ERCP), endoscopic ultrasound (EUS), dental procedures or joint aspiration/steroid injections No  Do you currently have any signs of illness or infection or are you on any antibiotics? No  Have you had any new, sudden or worsening abdominal pain? No  Have you or anyone in your household received a live vaccination in the past 4 weeks? Please note: No live vaccines while on biologic/chemotherapy until 6 months after the last treatment. Patient can receive the flu vaccine (shot only), pneumovax and the Covid vaccine. It is optimal for the patient to get these vaccines mid cycle, but they can be given at any time as long as it is not on the day of the infusion. No  Have you recently been diagnosed with any new nervous system diseases (ie. Multiple sclerosis, Guillain [place], seizures, neurological changes) or cancer diagnosis? Are you on any form of radiation or chemotherapy? No  Are you pregnant or breast feeding or do you have plans of pregnancy in the future? No  Have you been having any signs of  worsening depression or suicidal ideations?  (benlysta only) No  Have there been any other new onset medical symptoms? No  Have you had any new blood clots? (IVIG only) No      Post Infusion Assessment:  Patient tolerated infusion without incident.  Site patent and intact, free from redness, edema or discomfort.  No evidence of extravasations.  Access discontinued per protocol.       Discharge Plan:   Patient and/or family verbalized understanding of discharge instructions and all questions answered.      Smita Campos RN

## 2024-09-11 ENCOUNTER — LAB (OUTPATIENT)
Dept: LAB | Facility: CLINIC | Age: 84
End: 2024-09-11
Payer: MEDICARE

## 2024-09-11 DIAGNOSIS — E03.8 SUBCLINICAL HYPOTHYROIDISM: ICD-10-CM

## 2024-09-11 DIAGNOSIS — R53.83 OTHER FATIGUE: ICD-10-CM

## 2024-09-11 LAB
BASOPHILS # BLD AUTO: 0 10E3/UL (ref 0–0.2)
BASOPHILS NFR BLD AUTO: 1 %
CORTIS SERPL-MCNC: 13.6 UG/DL
EOSINOPHIL # BLD AUTO: 0.2 10E3/UL (ref 0–0.7)
EOSINOPHIL NFR BLD AUTO: 3 %
ERYTHROCYTE [DISTWIDTH] IN BLOOD BY AUTOMATED COUNT: 14.5 % (ref 10–15)
HCT VFR BLD AUTO: 37 % (ref 35–47)
HGB BLD-MCNC: 12.2 G/DL (ref 11.7–15.7)
IMM GRANULOCYTES # BLD: 0 10E3/UL
IMM GRANULOCYTES NFR BLD: 0 %
LYMPHOCYTES # BLD AUTO: 1.7 10E3/UL (ref 0.8–5.3)
LYMPHOCYTES NFR BLD AUTO: 28 %
MCH RBC QN AUTO: 32.8 PG (ref 26.5–33)
MCHC RBC AUTO-ENTMCNC: 33 G/DL (ref 31.5–36.5)
MCV RBC AUTO: 100 FL (ref 78–100)
MONOCYTES # BLD AUTO: 0.9 10E3/UL (ref 0–1.3)
MONOCYTES NFR BLD AUTO: 14 %
NEUTROPHILS # BLD AUTO: 3.3 10E3/UL (ref 1.6–8.3)
NEUTROPHILS NFR BLD AUTO: 55 %
PLATELET # BLD AUTO: 160 10E3/UL (ref 150–450)
RBC # BLD AUTO: 3.72 10E6/UL (ref 3.8–5.2)
TSH SERPL DL<=0.005 MIU/L-ACNC: 2.57 UIU/ML (ref 0.3–4.2)
WBC # BLD AUTO: 6.1 10E3/UL (ref 4–11)

## 2024-09-11 PROCEDURE — 84443 ASSAY THYROID STIM HORMONE: CPT

## 2024-09-11 PROCEDURE — 36415 COLL VENOUS BLD VENIPUNCTURE: CPT

## 2024-09-11 PROCEDURE — 82533 TOTAL CORTISOL: CPT

## 2024-09-11 PROCEDURE — 85025 COMPLETE CBC W/AUTO DIFF WBC: CPT

## 2024-09-16 ENCOUNTER — PATIENT OUTREACH (OUTPATIENT)
Dept: CARE COORDINATION | Facility: CLINIC | Age: 84
End: 2024-09-16
Payer: MEDICARE

## 2024-09-23 ENCOUNTER — OFFICE VISIT (OUTPATIENT)
Dept: INTERNAL MEDICINE | Facility: CLINIC | Age: 84
End: 2024-09-23
Payer: MEDICARE

## 2024-09-23 VITALS
HEIGHT: 62 IN | WEIGHT: 96 LBS | TEMPERATURE: 97.8 F | OXYGEN SATURATION: 99 % | DIASTOLIC BLOOD PRESSURE: 76 MMHG | HEART RATE: 70 BPM | BODY MASS INDEX: 17.66 KG/M2 | RESPIRATION RATE: 16 BRPM | SYSTOLIC BLOOD PRESSURE: 120 MMHG

## 2024-09-23 DIAGNOSIS — E03.9 HYPOTHYROIDISM, UNSPECIFIED TYPE: ICD-10-CM

## 2024-09-23 DIAGNOSIS — M81.0 OSTEOPOROSIS, UNSPECIFIED OSTEOPOROSIS TYPE, UNSPECIFIED PATHOLOGICAL FRACTURE PRESENCE: ICD-10-CM

## 2024-09-23 DIAGNOSIS — R19.5 CHANGE IN CONSISTENCY OF STOOL: ICD-10-CM

## 2024-09-23 DIAGNOSIS — Z01.818 PREOP GENERAL PHYSICAL EXAM: Primary | ICD-10-CM

## 2024-09-23 DIAGNOSIS — M06.9 RHEUMATOID ARTHRITIS INVOLVING BOTH HANDS, UNSPECIFIED WHETHER RHEUMATOID FACTOR PRESENT (H): ICD-10-CM

## 2024-09-23 LAB
ATRIAL RATE - MUSE: 69 BPM
DIASTOLIC BLOOD PRESSURE - MUSE: NORMAL MMHG
INTERPRETATION ECG - MUSE: NORMAL
P AXIS - MUSE: 58 DEGREES
PR INTERVAL - MUSE: 168 MS
QRS DURATION - MUSE: 92 MS
QT - MUSE: 430 MS
QTC - MUSE: 460 MS
R AXIS - MUSE: -11 DEGREES
SYSTOLIC BLOOD PRESSURE - MUSE: NORMAL MMHG
T AXIS - MUSE: 56 DEGREES
VENTRICULAR RATE- MUSE: 69 BPM

## 2024-09-23 PROCEDURE — 99214 OFFICE O/P EST MOD 30 MIN: CPT | Performed by: NURSE PRACTITIONER

## 2024-09-23 PROCEDURE — 93010 ELECTROCARDIOGRAM REPORT: CPT | Mod: OFF | Performed by: STUDENT IN AN ORGANIZED HEALTH CARE EDUCATION/TRAINING PROGRAM

## 2024-09-23 PROCEDURE — 93005 ELECTROCARDIOGRAM TRACING: CPT | Performed by: NURSE PRACTITIONER

## 2024-09-23 NOTE — PROGRESS NOTES
Preoperative Evaluation  [facility]  [address] [address]  Phone: [phone]  Fax: [phone]  Primary Provider: Abdifatah Alvarado MD  Pre-op Performing Provider: Paty Price NP  Sep 23, 2024             9/18/2024   Surgical Information   What procedure is being done? Colonoscopy   Facility or Hospital where procedure/surgery will be performed: [facility]   Who is doing the procedure / surgery? Dr. Sandhu   Date of surgery / procedure: October 1, 2024   Time of surgery / procedure: NA   Where do you plan to recover after surgery? at home with family        Fax number for surgical facility: Note does not need to be faxed, will be available electronically in Epic.    Assessment & Plan     The proposed surgical procedure is considered LOW risk.    Preop general physical exam  Change in consistency of stool  No contraindication for planned procedure of colonoscopy.  Reviewed preoperative guidelines.  - EKG 12-lead, tracing only      Osteoporosis, unspecified osteoporosis type, unspecified pathological fracture presence  Managed with use of Fosamax.  Advise she hold this medication the morning of surgery.    Rheumatoid arthritis involving both hands, unspecified whether rheumatoid factor present (H)  Uses methotrexate once a week.  States she typically takes this medication on Wednesdays.    Hypothyroidism  Currently on levothyroxine 50 mcg daily.  Previous TSH on 9/11/2024 was normal.  Recommend she continue this medication on the morning of surgery with a small sip of water.            - No identified additional risk factors other than previously addressed    Preoperative Medication Instructions  Antiplatelet or Anticoagulation Medication Instructions   - Patient is on no antiplatelet or anticoagulation medications.      Additional Medication Instructions  Okay to take levothyroxine and methotrexate the morning of surgery with small sip of water.     - bisphosphonates (alendronate, ibandronate, risedronate): Hold the morning of surgery.     Recommendation  Approval given to proceed with proposed procedure, without further diagnostic evaluation.    Subjective   Diann is a 84 year old, presenting for the following:  Pre-Op Exam (Colonoscopy on 10/1 at [facility] by Dr Nichols)          9/23/2024     8:38 AM   Additional Questions   Roomed by Alis KWONG related to upcoming procedure: Diann is a pleasant 84-year-old female here today for preoperative evaluation prior to having a colonoscopy.  Has been having irregular bowel movement which she described as a change in the form of her stool.   Going on for a about a year on and off.         9/18/2024   Pre-Op Questionnaire   Have you ever had a heart attack or stroke? No   Have you ever had surgery on your heart or blood vessels, such as a stent placement, a coronary artery bypass, or surgery on an artery in your head, neck, heart, or legs? No   Do you have chest pain with activity? No   Do you have a history of heart failure? No   Do you currently have a cold, bronchitis or symptoms of other infection? No   Do you have a cough, shortness of breath, or wheezing? No   Do you or anyone in your family have previous history of blood clots? No   Do you or does anyone in your family have a serious bleeding problem such as prolonged bleeding following surgeries or cuts? No   Have you ever had problems with anemia or been told to take iron pills? (!) YES, borderline anemia, was taking iron pills. Holding now.    Have you had any abnormal blood loss such as black, tarry or bloody stools, or abnormal vaginal bleeding? No   Have you ever had a blood transfusion? (!) UNKNOWN   Are you willing to have a blood transfusion if it is medically needed before, during, or after your surgery? Yes   Have you or any of your relatives ever had problems with anesthesia? (!) YES, gets very nauseated and will vomit.    Do you have sleep  apnea, excessive snoring or daytime drowsiness? No   Do you have any artifical heart valves or other implanted medical devices like a pacemaker, defibrillator, or continuous glucose monitor? No   Do you have artificial joints? (!) YES   Are you allergic to latex? No        Health Care Directive  Patient has a Health Care Directive on file      Preoperative Review of    reviewed - no record of controlled substances prescribed.          Patient Active Problem List    Diagnosis Date Noted    Hyponatremia 08/29/2023     Priority: Medium    Hypothyroidism 08/19/2023     Priority: Medium    Repeated falls 08/19/2023     Priority: Medium    Unspecified nondisplaced fracture of first cervical vertebra, initial encounter for closed fracture (H) 08/19/2023     Priority: Medium    Injury of head 08/17/2023     Priority: Medium    Osteoporosis, unspecified osteoporosis type, unspecified pathological fracture presence 08/31/2022     Priority: Medium    Rheumatoid arthritis, seropositive, multiple sites (H) 06/20/2022     Priority: Medium    Rheumatoid arthritis involving both hands, unspecified whether rheumatoid factor present (H) 01/14/2022     Priority: Medium    History of falling 10/15/2018     Priority: Medium    Presence of intraocular lens 09/17/2018     Priority: Medium    Arthritis, rheumatoid (H) 09/12/2007     Priority: Medium      Past Medical History:   Diagnosis Date    Skin cancer     Spider veins      No past surgical history on file.  Current Outpatient Medications   Medication Sig Dispense Refill    alendronate (FOSAMAX) 70 MG tablet Take 1 tablet (70 mg) by mouth every 7 days 12 tablet 3    calcium carbonate-vitamin D (OSCAL W/D) 500-200 MG-UNIT tablet Take 1 tablet by mouth 2 times daily      carboxymethylcellulose PF (REFRESH PLUS) 0.5 % ophthalmic solution 1 drop 3 times daily as needed for dry eyes      ferrous fumarate 65 mg, [place]. FE,-Vitamin C 125 mg (VITRON C)  MG TABS tablet Take 1 tablet  by mouth every other day      folic acid (FOLVITE) 1 MG tablet Take 2 tablets (2 mg) by mouth daily for 90 days 180 tablet 0    levothyroxine (SYNTHROID/LEVOTHROID) 50 MCG tablet Take 1 tablet (50 mcg) by mouth daily 90 tablet 3    loperamide (IMODIUM) 2 MG capsule Take 2 mg by mouth 4 times daily as needed for diarrhea      methotrexate 2.5 MG tablet Take 4 tablets (10 mg) by mouth every 7 days 48 tablet 0    Multiple Vitamin (MULTIVITAMIN ADULT PO) Take 1 tablet by mouth daily      clindamycin (CLEOCIN) 300 MG capsule Take 600 mg by mouth For dental procedures (Patient not taking: Reported on 9/23/2024)         Allergies   Allergen Reactions    Infliximab Rash     Throat began to close/tighten    Sulfa Antibiotics Rash    Hydrocodone-Acetaminophen Nausea    Oxycodone-Acetaminophen Nausea    Penicillins Rash    Erythromycin Nausea        Social History     Tobacco Use    Smoking status: Never     Passive exposure: Never    Smokeless tobacco: Never   Substance Use Topics    Alcohol use: Not on file       History   Drug Use Not on file           Constitutional: No fever.  No severe fatigue.    HEENT: Negative for ear pain, changes in hearing (wears hearing aids), frequent nosebleeds, nasal congestion, runny nose, sore throat, no loose teeth, wears upper denture.     Eyes: Denies any changes in vision or eye pain. Wear glassed.     Respiratory: Negative for cough, wheezing or shortness of breath.    Cardiovascular: No palpitations, tachycardia, chest pain or lower extremity edema.    Gastrointestinal: Negative except as noted in HPI.     Genitourinary: Negative for any urinary symptoms or changes in urinary habits.    Integumentary: Negative for any rash or suspicious lesions.    Musculoskeletal: Has history of RA, occasional joint pain, but well managed with current medications.     Neurological: Negative for severe dizziness, headaches or numbness.    Psychiatric: No severe anxiety.  No issues with sleep or  "significant depression.  Denies recreational drug use or regular use of alcohol.    Allergic/immunologic: Negative for any history of complications with anesthesia other than nausea and vomiting.   No history of seasonal allergies.  No known exposure to HIV or hepatitis C.      Objective    /76 (BP Location: Right arm, Patient Position: Sitting)   Pulse 70   Temp 97.8  F (36.6  C)   Resp 16   Ht 1.562 m (5' 1.5\")   Wt 43.5 kg (96 lb)   LMP  (LMP Unknown)   SpO2 99%   BMI 17.85 kg/m     Estimated body mass index is 17.85 kg/m  as calculated from the following:    Height as of this encounter: 1.562 m (5' 1.5\").    Weight as of this encounter: 43.5 kg (96 lb).  Physical Exam  Constitutional: In no acute distress.  Clean appearance.  Eyes: PERRLA.  EOMI.  No conjunctival redness.  Ears: Bilateral TMs are intact without any erythema or effusion.  Grossly normal hearing.  Nose: Nares patent bilaterally.  Normal mucosa.  Oropharynx: Normal mucosa.  Dentition and gingiva is appropriate.  Posterior oropharynx without any abnormalities.  Neck: Supple.  Trachea is midline.  No thyromegaly.  Neck is without tenderness or masses.  Cardiovascular: Regular rate and rhythm.  Normal peripheral perfusion.  No edema.  No varicosities.  Respiratory: Lungs are clear bilaterally.  Normal respiratory effort.  Skin: Skin is without significant rashes or lesions.  No suspicious moles.  Gastrointestinal: Soft and flat.  Normal bowel sounds.  Nontender throughout upon palpation.  No organomegaly or masses.  Negative for CVA tenderness.  Musculoskeletal: Significant deformity of the digits on her hand.  Noted to have nodules on multiple fingers.  Gait normal.  Able to mount exam table without difficulties.  Psychiatric: Appropriate affect and demeanor.  Memory intact.  Good insight and judgment.  Neurologic: Sensation and temperature of extremities appropriate.  No tremor or involuntary movement noted.      Recent Labs   Lab Test " 09/11/24  0829 07/30/24  1130 07/07/24  1503 04/16/24  1125 01/25/24  0926 10/05/23  1303   HGB 12.2 11.8  --  11.7   < > 10.7*    163  --  179   < > 208   NA  --   --   --  138  --  140   POTASSIUM  --   --   --  4.5  --  4.8   CR  --  0.86 0.9 0.86   < > 1.24*    < > = values in this interval not displayed.        Diagnostics  No labs were ordered during this visit.   EKG: appears normal, NSR, normal axis, normal intervals, no acute ST/T changes c/w ischemia, no LVH by voltage criteria, unchanged from previous tracings    Revised Cardiac Risk Index (RCRI)  The patient has the following serious cardiovascular risks for perioperative complications:   - No serious cardiac risks = 0 points     RCRI Interpretation: 0 points: Class I (very low risk - 0.4% complication rate)         Signed Electronically by: Paty Price NP  A copy of this evaluation report is provided to the requesting physician.

## 2024-09-23 NOTE — H&P (VIEW-ONLY)
Preoperative Evaluation  [facility]  [address] [address]  Phone: [phone]  Fax: [phone]  Primary Provider: Abdifatah Alvarado MD  Pre-op Performing Provider: Paty Price NP  Sep 23, 2024             9/18/2024   Surgical Information   What procedure is being done? Colonoscopy   Facility or Hospital where procedure/surgery will be performed: [facility]   Who is doing the procedure / surgery? Dr. Sandhu   Date of surgery / procedure: October 1, 2024   Time of surgery / procedure: NA   Where do you plan to recover after surgery? at home with family        Fax number for surgical facility: Note does not need to be faxed, will be available electronically in Epic.    Assessment & Plan     The proposed surgical procedure is considered LOW risk.    Preop general physical exam  Change in consistency of stool  No contraindication for planned procedure of colonoscopy.  Reviewed preoperative guidelines.  - EKG 12-lead, tracing only      Osteoporosis, unspecified osteoporosis type, unspecified pathological fracture presence  Managed with use of Fosamax.  Advise she hold this medication the morning of surgery.    Rheumatoid arthritis involving both hands, unspecified whether rheumatoid factor present (H)  Uses methotrexate once a week.  States she typically takes this medication on Wednesdays.    Hypothyroidism  Currently on levothyroxine 50 mcg daily.  Previous TSH on 9/11/2024 was normal.  Recommend she continue this medication on the morning of surgery with a small sip of water.            - No identified additional risk factors other than previously addressed    Preoperative Medication Instructions  Antiplatelet or Anticoagulation Medication Instructions   - Patient is on no antiplatelet or anticoagulation medications.      Additional Medication Instructions  Okay to take levothyroxine and methotrexate the morning of surgery with small sip of water.     - bisphosphonates (alendronate, ibandronate, risedronate): Hold the morning of surgery.     Recommendation  Approval given to proceed with proposed procedure, without further diagnostic evaluation.    Subjective   Diann is a 84 year old, presenting for the following:  Pre-Op Exam (Colonoscopy on 10/1 at [facility] by Dr Nichols)          9/23/2024     8:38 AM   Additional Questions   Roomed by Alis KWONG related to upcoming procedure: Diann is a pleasant 84-year-old female here today for preoperative evaluation prior to having a colonoscopy.  Has been having irregular bowel movement which she described as a change in the form of her stool.   Going on for a about a year on and off.         9/18/2024   Pre-Op Questionnaire   Have you ever had a heart attack or stroke? No   Have you ever had surgery on your heart or blood vessels, such as a stent placement, a coronary artery bypass, or surgery on an artery in your head, neck, heart, or legs? No   Do you have chest pain with activity? No   Do you have a history of heart failure? No   Do you currently have a cold, bronchitis or symptoms of other infection? No   Do you have a cough, shortness of breath, or wheezing? No   Do you or anyone in your family have previous history of blood clots? No   Do you or does anyone in your family have a serious bleeding problem such as prolonged bleeding following surgeries or cuts? No   Have you ever had problems with anemia or been told to take iron pills? (!) YES, borderline anemia, was taking iron pills. Holding now.    Have you had any abnormal blood loss such as black, tarry or bloody stools, or abnormal vaginal bleeding? No   Have you ever had a blood transfusion? (!) UNKNOWN   Are you willing to have a blood transfusion if it is medically needed before, during, or after your surgery? Yes   Have you or any of your relatives ever had problems with anesthesia? (!) YES, gets very nauseated and will vomit.    Do you have sleep  apnea, excessive snoring or daytime drowsiness? No   Do you have any artifical heart valves or other implanted medical devices like a pacemaker, defibrillator, or continuous glucose monitor? No   Do you have artificial joints? (!) YES   Are you allergic to latex? No        Health Care Directive  Patient has a Health Care Directive on file      Preoperative Review of    reviewed - no record of controlled substances prescribed.          Patient Active Problem List    Diagnosis Date Noted    Hyponatremia 08/29/2023     Priority: Medium    Hypothyroidism 08/19/2023     Priority: Medium    Repeated falls 08/19/2023     Priority: Medium    Unspecified nondisplaced fracture of first cervical vertebra, initial encounter for closed fracture (H) 08/19/2023     Priority: Medium    Injury of head 08/17/2023     Priority: Medium    Osteoporosis, unspecified osteoporosis type, unspecified pathological fracture presence 08/31/2022     Priority: Medium    Rheumatoid arthritis, seropositive, multiple sites (H) 06/20/2022     Priority: Medium    Rheumatoid arthritis involving both hands, unspecified whether rheumatoid factor present (H) 01/14/2022     Priority: Medium    History of falling 10/15/2018     Priority: Medium    Presence of intraocular lens 09/17/2018     Priority: Medium    Arthritis, rheumatoid (H) 09/12/2007     Priority: Medium      Past Medical History:   Diagnosis Date    Skin cancer     Spider veins      No past surgical history on file.  Current Outpatient Medications   Medication Sig Dispense Refill    alendronate (FOSAMAX) 70 MG tablet Take 1 tablet (70 mg) by mouth every 7 days 12 tablet 3    calcium carbonate-vitamin D (OSCAL W/D) 500-200 MG-UNIT tablet Take 1 tablet by mouth 2 times daily      carboxymethylcellulose PF (REFRESH PLUS) 0.5 % ophthalmic solution 1 drop 3 times daily as needed for dry eyes      ferrous fumarate 65 mg, [place]. FE,-Vitamin C 125 mg (VITRON C)  MG TABS tablet Take 1 tablet  by mouth every other day      folic acid (FOLVITE) 1 MG tablet Take 2 tablets (2 mg) by mouth daily for 90 days 180 tablet 0    levothyroxine (SYNTHROID/LEVOTHROID) 50 MCG tablet Take 1 tablet (50 mcg) by mouth daily 90 tablet 3    loperamide (IMODIUM) 2 MG capsule Take 2 mg by mouth 4 times daily as needed for diarrhea      methotrexate 2.5 MG tablet Take 4 tablets (10 mg) by mouth every 7 days 48 tablet 0    Multiple Vitamin (MULTIVITAMIN ADULT PO) Take 1 tablet by mouth daily      clindamycin (CLEOCIN) 300 MG capsule Take 600 mg by mouth For dental procedures (Patient not taking: Reported on 9/23/2024)         Allergies   Allergen Reactions    Infliximab Rash     Throat began to close/tighten    Sulfa Antibiotics Rash    Hydrocodone-Acetaminophen Nausea    Oxycodone-Acetaminophen Nausea    Penicillins Rash    Erythromycin Nausea        Social History     Tobacco Use    Smoking status: Never     Passive exposure: Never    Smokeless tobacco: Never   Substance Use Topics    Alcohol use: Not on file       History   Drug Use Not on file           Constitutional: No fever.  No severe fatigue.    HEENT: Negative for ear pain, changes in hearing (wears hearing aids), frequent nosebleeds, nasal congestion, runny nose, sore throat, no loose teeth, wears upper denture.     Eyes: Denies any changes in vision or eye pain. Wear glassed.     Respiratory: Negative for cough, wheezing or shortness of breath.    Cardiovascular: No palpitations, tachycardia, chest pain or lower extremity edema.    Gastrointestinal: Negative except as noted in HPI.     Genitourinary: Negative for any urinary symptoms or changes in urinary habits.    Integumentary: Negative for any rash or suspicious lesions.    Musculoskeletal: Has history of RA, occasional joint pain, but well managed with current medications.     Neurological: Negative for severe dizziness, headaches or numbness.    Psychiatric: No severe anxiety.  No issues with sleep or  "significant depression.  Denies recreational drug use or regular use of alcohol.    Allergic/immunologic: Negative for any history of complications with anesthesia other than nausea and vomiting.   No history of seasonal allergies.  No known exposure to HIV or hepatitis C.      Objective    /76 (BP Location: Right arm, Patient Position: Sitting)   Pulse 70   Temp 97.8  F (36.6  C)   Resp 16   Ht 1.562 m (5' 1.5\")   Wt 43.5 kg (96 lb)   LMP  (LMP Unknown)   SpO2 99%   BMI 17.85 kg/m     Estimated body mass index is 17.85 kg/m  as calculated from the following:    Height as of this encounter: 1.562 m (5' 1.5\").    Weight as of this encounter: 43.5 kg (96 lb).  Physical Exam  Constitutional: In no acute distress.  Clean appearance.  Eyes: PERRLA.  EOMI.  No conjunctival redness.  Ears: Bilateral TMs are intact without any erythema or effusion.  Grossly normal hearing.  Nose: Nares patent bilaterally.  Normal mucosa.  Oropharynx: Normal mucosa.  Dentition and gingiva is appropriate.  Posterior oropharynx without any abnormalities.  Neck: Supple.  Trachea is midline.  No thyromegaly.  Neck is without tenderness or masses.  Cardiovascular: Regular rate and rhythm.  Normal peripheral perfusion.  No edema.  No varicosities.  Respiratory: Lungs are clear bilaterally.  Normal respiratory effort.  Skin: Skin is without significant rashes or lesions.  No suspicious moles.  Gastrointestinal: Soft and flat.  Normal bowel sounds.  Nontender throughout upon palpation.  No organomegaly or masses.  Negative for CVA tenderness.  Musculoskeletal: Significant deformity of the digits on her hand.  Noted to have nodules on multiple fingers.  Gait normal.  Able to mount exam table without difficulties.  Psychiatric: Appropriate affect and demeanor.  Memory intact.  Good insight and judgment.  Neurologic: Sensation and temperature of extremities appropriate.  No tremor or involuntary movement noted.      Recent Labs   Lab Test " 09/11/24  0829 07/30/24  1130 07/07/24  1503 04/16/24  1125 01/25/24  0926 10/05/23  1303   HGB 12.2 11.8  --  11.7   < > 10.7*    163  --  179   < > 208   NA  --   --   --  138  --  140   POTASSIUM  --   --   --  4.5  --  4.8   CR  --  0.86 0.9 0.86   < > 1.24*    < > = values in this interval not displayed.        Diagnostics  No labs were ordered during this visit.   EKG: appears normal, NSR, normal axis, normal intervals, no acute ST/T changes c/w ischemia, no LVH by voltage criteria, unchanged from previous tracings    Revised Cardiac Risk Index (RCRI)  The patient has the following serious cardiovascular risks for perioperative complications:   - No serious cardiac risks = 0 points     RCRI Interpretation: 0 points: Class I (very low risk - 0.4% complication rate)         Signed Electronically by: Paty Price NP  A copy of this evaluation report is provided to the requesting physician.

## 2024-09-23 NOTE — PATIENT INSTRUCTIONS
How to Take Your Medication Before Surgery  Preoperative Medication Instructions   Antiplatelet or Anticoagulation Medication Instructions   - Patient is on no antiplatelet or anticoagulation medications.      Additional Medication Instructions  Okay to take levothyroxine and methotrexate the morning of surgery with small sip of water.    - bisphosphonates (alendronate, ibandronate, risedronate): Hold the morning of surgery.      Hold all supplements and vitamins from now until after surgery.     Patient Education   Preparing for Your Surgery  Getting started  A nurse will call you to review your health history and instructions. They will give you an arrival time based on your scheduled surgery time. Please be ready to share:  Your doctor's clinic name and phone number  Your medical, surgical, and anesthesia history  A list of allergies and sensitivities  A list of medicines, including herbal treatments and over-the-counter drugs  Whether the patient has a legal guardian (ask how to send us the papers in advance)  Please tell us if you're pregnant--or if there's any chance you might be pregnant. Some surgeries may injure a fetus (unborn baby), so they require a pregnancy test. Surgeries that are safe for a fetus don't always need a test, and you can choose whether to have one.   If you have a child who's having surgery, please ask for a copy of Preparing for Your Child's Surgery.    Preparing for surgery  Within 10 to 30 days of surgery: Have a pre-op exam (sometimes called an H&P, or History and Physical). This can be done at a clinic or pre-operative center.  If you're having a , you may not need this exam. Talk to your care team.  At your pre-op exam, talk to your care team about all medicines you take. If you need to stop any medicines before surgery, ask when to start taking them again.  We do this for your safety. Many medicines can make you bleed too much during surgery. Some change how well surgery  (anesthesia) drugs work.  Call your insurance company to let them know you're having surgery. (If you don't have insurance, call [phone].)  Call your clinic if there's any change in your health. This includes signs of a cold or flu (sore throat, runny nose, cough, rash, fever). It also includes a scrape or scratch near the surgery site.  If you have questions on the day of surgery, call your hospital or surgery center.  Eating and drinking guidelines  For your safety: Unless your surgeon tells you otherwise, follow the guidelines below.  Follow colonoscopy prep guideline the day before surgery.     Drink clear liquids until 2 hours before you arrive. These are liquids you can see through, like water, Gatorade, and Propel Water. They also include plain black coffee and tea (no cream or milk), candy, and breath mints. You can spit out gum when you arrive.  If you drink alcohol: Stop drinking it the night before surgery.  If your care team tells you to take medicine on the morning of surgery, it's okay to take it with a sip of water.  Preventing infection  Shower or bathe the night before and morning of your surgery. Follow the instructions your clinic gave you. (If no instructions, use regular soap.)  Don't shave or clip hair near your surgery site. We'll remove the hair if needed.  Don't smoke or vape the morning of surgery. You may chew nicotine gum up to 2 hours before surgery. A nicotine patch is okay.  Note: Some surgeries require you to completely quit smoking and nicotine. Check with your surgeon.  Your care team will make every effort to keep you safe from infection. We will:  Clean our hands often with soap and water (or an alcohol-based hand rub).  Clean the skin at your surgery site with a special soap that kills germs.  Give you a special gown to keep you warm. (Cold raises the risk of infection.)  Wear special hair covers, masks, gowns and gloves during surgery.  Give antibiotic medicine, if  prescribed. Not all surgeries need antibiotics.  What to bring on the day of surgery  Photo ID and insurance card  Copy of your health care directive, if you have one  Glasses and hearing aids (bring cases)  You can't wear contacts during surgery  Inhaler and eye drops, if you use them (tell us about these when you arrive)  CPAP machine or breathing device, if you use them  A few personal items, if spending the night  If you have . . .  A pacemaker, ICD (cardiac defibrillator) or other implant: Bring the ID card.  An implanted stimulator: Bring the remote control.  A legal guardian: Bring a copy of the certified (court-stamped) guardianship papers.  Please remove any jewelry, including body piercings. Leave jewelry and other valuables at home.  If you're going home the day of surgery  You must have a responsible adult drive you home. They should stay with you overnight as well.  If you don't have someone to stay with you, and you aren't safe to go home alone, we may keep you overnight. Insurance often won't pay for this.  After surgery  If it's hard to control your pain or you need more pain medicine, please call your surgeon's office.  Questions?   If you have any questions for your care team, list them here: _________________________________________________________________________________________________________________________________________________________________________ ____________________________________ ____________________________________ ____________________________________  For informational purposes only. Not to replace the advice of your health care provider. Copyright   2003, 2019 [facility]. All rights reserved. Clinically reviewed by Myla Fraire MD. [employer] [id number] - REV 12/22.

## 2024-10-01 ENCOUNTER — HOSPITAL ENCOUNTER (OUTPATIENT)
Facility: CLINIC | Age: 84
Discharge: HOME | End: 2024-10-01
Attending: INTERNAL MEDICINE | Admitting: INTERNAL MEDICINE
Payer: MEDICARE

## 2024-10-01 ENCOUNTER — ANESTHESIA EVENT (OUTPATIENT)
Dept: SURGERY | Facility: CLINIC | Age: 84
End: 2024-10-01
Payer: MEDICARE

## 2024-10-01 ENCOUNTER — ANESTHESIA (OUTPATIENT)
Dept: SURGERY | Facility: CLINIC | Age: 84
End: 2024-10-01
Payer: MEDICARE

## 2024-10-01 VITALS
OXYGEN SATURATION: 99 % | RESPIRATION RATE: 14 BRPM | HEART RATE: 72 BPM | SYSTOLIC BLOOD PRESSURE: 126 MMHG | TEMPERATURE: 97.8 F | DIASTOLIC BLOOD PRESSURE: 59 MMHG

## 2024-10-01 LAB — COLONOSCOPY: NORMAL

## 2024-10-01 PROCEDURE — 250N000011 HC RX IP 250 OP 636: Performed by: NURSE ANESTHETIST, CERTIFIED REGISTERED

## 2024-10-01 PROCEDURE — 250N000009 HC RX 250: Performed by: NURSE ANESTHETIST, CERTIFIED REGISTERED

## 2024-10-01 PROCEDURE — 370N000017 HC ANESTHESIA TECHNICAL FEE, PER MIN: Performed by: INTERNAL MEDICINE

## 2024-10-01 PROCEDURE — 710N000012 HC RECOVERY PHASE 2, PER MINUTE: Performed by: INTERNAL MEDICINE

## 2024-10-01 PROCEDURE — 88305 TISSUE EXAM BY PATHOLOGIST: CPT | Mod: TC | Performed by: INTERNAL MEDICINE

## 2024-10-01 PROCEDURE — 272N000001 HC OR GENERAL SUPPLY STERILE: Performed by: INTERNAL MEDICINE

## 2024-10-01 PROCEDURE — 999N000141 HC STATISTIC PRE-PROCEDURE NURSING ASSESSMENT: Performed by: INTERNAL MEDICINE

## 2024-10-01 PROCEDURE — 360N000075 HC SURGERY LEVEL 2, PER MIN: Performed by: INTERNAL MEDICINE

## 2024-10-01 PROCEDURE — 258N000003 HC RX IP 258 OP 636: Performed by: ANESTHESIOLOGY

## 2024-10-01 RX ORDER — LIDOCAINE 40 MG/G
CREAM TOPICAL
Status: DISCONTINUED | OUTPATIENT
Start: 2024-10-01 | End: 2024-10-01 | Stop reason: HOSPADM

## 2024-10-01 RX ORDER — LABETALOL HYDROCHLORIDE 5 MG/ML
10 INJECTION, SOLUTION INTRAVENOUS
Status: DISCONTINUED | OUTPATIENT
Start: 2024-10-01 | End: 2024-10-01 | Stop reason: HOSPADM

## 2024-10-01 RX ORDER — DEXAMETHASONE SODIUM PHOSPHATE 10 MG/ML
4 INJECTION, SOLUTION INTRAMUSCULAR; INTRAVENOUS
Status: DISCONTINUED | OUTPATIENT
Start: 2024-10-01 | End: 2024-10-01 | Stop reason: HOSPADM

## 2024-10-01 RX ORDER — NALOXONE HYDROCHLORIDE 0.4 MG/ML
0.1 INJECTION, SOLUTION INTRAMUSCULAR; INTRAVENOUS; SUBCUTANEOUS
Status: DISCONTINUED | OUTPATIENT
Start: 2024-10-01 | End: 2024-10-01 | Stop reason: HOSPADM

## 2024-10-01 RX ORDER — LIDOCAINE HYDROCHLORIDE 10 MG/ML
INJECTION, SOLUTION INFILTRATION; PERINEURAL PRN
Status: DISCONTINUED | OUTPATIENT
Start: 2024-10-01 | End: 2024-10-01

## 2024-10-01 RX ORDER — PROPOFOL 10 MG/ML
INJECTION, EMULSION INTRAVENOUS CONTINUOUS PRN
Status: DISCONTINUED | OUTPATIENT
Start: 2024-10-01 | End: 2024-10-01

## 2024-10-01 RX ORDER — SODIUM CHLORIDE, SODIUM LACTATE, POTASSIUM CHLORIDE, CALCIUM CHLORIDE 600; 310; 30; 20 MG/100ML; MG/100ML; MG/100ML; MG/100ML
INJECTION, SOLUTION INTRAVENOUS CONTINUOUS
Status: DISCONTINUED | OUTPATIENT
Start: 2024-10-01 | End: 2024-10-01 | Stop reason: HOSPADM

## 2024-10-01 RX ORDER — FENTANYL CITRATE 50 UG/ML
25 INJECTION, SOLUTION INTRAMUSCULAR; INTRAVENOUS EVERY 5 MIN PRN
Status: DISCONTINUED | OUTPATIENT
Start: 2024-10-01 | End: 2024-10-01 | Stop reason: HOSPADM

## 2024-10-01 RX ORDER — ONDANSETRON 4 MG/1
4 TABLET, ORALLY DISINTEGRATING ORAL EVERY 30 MIN PRN
Status: DISCONTINUED | OUTPATIENT
Start: 2024-10-01 | End: 2024-10-01 | Stop reason: HOSPADM

## 2024-10-01 RX ORDER — ONDANSETRON 2 MG/ML
4 INJECTION INTRAMUSCULAR; INTRAVENOUS EVERY 30 MIN PRN
Status: DISCONTINUED | OUTPATIENT
Start: 2024-10-01 | End: 2024-10-01 | Stop reason: HOSPADM

## 2024-10-01 RX ORDER — PROPOFOL 10 MG/ML
INJECTION, EMULSION INTRAVENOUS PRN
Status: DISCONTINUED | OUTPATIENT
Start: 2024-10-01 | End: 2024-10-01

## 2024-10-01 RX ORDER — FENTANYL CITRATE 50 UG/ML
25 INJECTION, SOLUTION INTRAMUSCULAR; INTRAVENOUS
Status: DISCONTINUED | OUTPATIENT
Start: 2024-10-01 | End: 2024-10-01 | Stop reason: HOSPADM

## 2024-10-01 RX ORDER — FENTANYL CITRATE 50 UG/ML
50 INJECTION, SOLUTION INTRAMUSCULAR; INTRAVENOUS EVERY 5 MIN PRN
Status: DISCONTINUED | OUTPATIENT
Start: 2024-10-01 | End: 2024-10-01 | Stop reason: HOSPADM

## 2024-10-01 RX ORDER — HYDROMORPHONE HCL IN WATER/PF 6 MG/30 ML
0.4 PATIENT CONTROLLED ANALGESIA SYRINGE INTRAVENOUS EVERY 5 MIN PRN
Status: DISCONTINUED | OUTPATIENT
Start: 2024-10-01 | End: 2024-10-01 | Stop reason: HOSPADM

## 2024-10-01 RX ORDER — HYDRALAZINE HYDROCHLORIDE 20 MG/ML
2.5-5 INJECTION INTRAMUSCULAR; INTRAVENOUS EVERY 10 MIN PRN
Status: DISCONTINUED | OUTPATIENT
Start: 2024-10-01 | End: 2024-10-01 | Stop reason: HOSPADM

## 2024-10-01 RX ORDER — HYDROMORPHONE HCL IN WATER/PF 6 MG/30 ML
0.2 PATIENT CONTROLLED ANALGESIA SYRINGE INTRAVENOUS EVERY 5 MIN PRN
Status: DISCONTINUED | OUTPATIENT
Start: 2024-10-01 | End: 2024-10-01 | Stop reason: HOSPADM

## 2024-10-01 RX ADMIN — SODIUM CHLORIDE, POTASSIUM CHLORIDE, SODIUM LACTATE AND CALCIUM CHLORIDE: 600; 310; 30; 20 INJECTION, SOLUTION INTRAVENOUS at 07:40

## 2024-10-01 RX ADMIN — PROPOFOL 30 MG: 10 INJECTION, EMULSION INTRAVENOUS at 08:25

## 2024-10-01 RX ADMIN — PROPOFOL 150 MCG/KG/MIN: 10 INJECTION, EMULSION INTRAVENOUS at 08:23

## 2024-10-01 RX ADMIN — LIDOCAINE HYDROCHLORIDE 3 ML: 10 INJECTION, SOLUTION INFILTRATION; PERINEURAL at 08:23

## 2024-10-01 RX ADMIN — PROPOFOL 30 MG: 10 INJECTION, EMULSION INTRAVENOUS at 08:29

## 2024-10-01 ASSESSMENT — ACTIVITIES OF DAILY LIVING (ADL)
ADLS_ACUITY_SCORE: 38
ADLS_ACUITY_SCORE: 38

## 2024-10-01 NOTE — ANESTHESIA CARE TRANSFER NOTE
Patient: Diann Low    Procedure: Procedure(s):  COLONOSCOPY with biopsies       Diagnosis: Acute diarrhea [R19.7]  Diagnosis Additional Information: No value filed.    Anesthesia Type:   MAC     Note:    Oropharynx: oropharynx clear of all foreign objects and spontaneously breathing  Level of Consciousness: drowsy  Oxygen Supplementation: face mask  Level of Supplemental Oxygen (L/min / FiO2): 6  Independent Airway: airway patency satisfactory and stable  Dentition: dentition unchanged  Vital Signs Stable: post-procedure vital signs reviewed and stable  Report to RN Given: handoff report given  Patient transferred to: Phase II    Handoff Report: Identifed the Patient, Identified the Reponsible Provider, Reviewed the pertinent medical history, Discussed the surgical course, Reviewed Intra-OP anesthesia mangement and issues during anesthesia, Set expectations for post-procedure period and Allowed opportunity for questions and acknowledgement of understanding  Vitals:  Vitals Value Taken Time   BP 93/55 10/01/24 0845   Temp     Pulse 73 10/01/24 0845   Resp     SpO2 100 % 10/01/24 0845       Electronically Signed By: AMBER Ireland CRNA  October 1, 2024  8:46 AM

## 2024-10-01 NOTE — ANESTHESIA POSTPROCEDURE EVALUATION
Patient: Diann Low    Procedure: Procedure(s):  COLONOSCOPY with biopsies       Anesthesia Type:  MAC    Note:     Postop Pain Control: Uneventful            Sign Out: Well controlled pain   PONV: No   Neuro/Psych: Uneventful            Sign Out: Acceptable/Baseline neuro status   Airway/Respiratory: Uneventful            Sign Out: Acceptable/Baseline resp. status   CV/Hemodynamics: Uneventful            Sign Out: Acceptable CV status; No obvious hypovolemia; No obvious fluid overload   Other NRE: NONE   DID A NON-ROUTINE EVENT OCCUR? No           Last vitals:  Vitals Value Taken Time   /59 10/01/24 0900   Temp 36.6  C (97.8  F) 10/01/24 0900   Pulse 67 10/01/24 0906   Resp     SpO2 98 % 10/01/24 0906   Vitals shown include unfiled device data.    Electronically Signed By: Joaquín Marquez MD

## 2024-10-01 NOTE — ANESTHESIA PREPROCEDURE EVALUATION
Anesthesia Pre-Procedure Evaluation    Patient: Diann Low   MRN: [id number] : 1940        Procedure : Procedure(s):  COLONOSCOPY          Past Medical History:   Diagnosis Date    PONV (postoperative nausea and vomiting)     Skin cancer     Spider veins       History reviewed. No pertinent surgical history.   Allergies   Allergen Reactions    Infliximab Rash     Throat began to close/tighten    Sulfa Antibiotics Rash    Hydrocodone-Acetaminophen Nausea    Oxycodone-Acetaminophen Nausea    Penicillins Rash    Erythromycin Nausea      Social History     Tobacco Use    Smoking status: Never     Passive exposure: Never    Smokeless tobacco: Never   Substance Use Topics    Alcohol use: Not on file      Wt Readings from Last 1 Encounters:   24 43.5 kg (96 lb)        Anesthesia Evaluation   Pt has had prior anesthetic. Type: General.    History of anesthetic complications  - PONV.      ROS/MED HX  ENT/Pulmonary:       Neurologic:       Cardiovascular:     (+)  - -   -  - -                                 Previous cardiac testing   Echo: Date:  Results:  1.Left ventricular size, wall motion and function are normal. The ejection  fraction is 60-65%.  2.There is borderline concentric left ventricular hypertrophy.  3.Normal right ventricle size and systolic function.    Stress Test:  Date: Results:    ECG Reviewed:  Date: Results:    Cath:  Date: Results:      METS/Exercise Tolerance:     Hematologic:       Musculoskeletal:   (+)  arthritis (on methotrexate),             GI/Hepatic:     (+)        bowel prep,            Renal/Genitourinary:       Endo:     (+)          thyroid problem, hypothyroidism,        (-) chronic steroid usage   Psychiatric/Substance Use:       Infectious Disease:       Malignancy:       Other:            Physical Exam    Airway        Mallampati: I   TM distance: > 3 FB   Neck ROM: full   Mouth opening: > 3 cm    Respiratory Devices and Support         Dental     Comment: Upper  "dentures        Cardiovascular          Rhythm and rate: normal     Pulmonary           breath sounds clear to auscultation           OUTSIDE LABS:  CBC:   Lab Results   Component Value Date    WBC 6.1 09/11/2024    WBC 5.9 07/30/2024    HGB 12.2 09/11/2024    HGB 11.8 07/30/2024    HCT 37.0 09/11/2024    HCT 34.8 (L) 07/30/2024     09/11/2024     07/30/2024     BMP:   Lab Results   Component Value Date     04/16/2024     10/05/2023    POTASSIUM 4.5 04/16/2024    POTASSIUM 4.8 10/05/2023    CHLORIDE 102 04/16/2024    CHLORIDE 102 10/05/2023    CO2 27 04/16/2024    CO2 28 10/05/2023    BUN 28.6 (H) 04/16/2024    BUN 23.5 (H) 10/05/2023    CR 0.86 07/30/2024    CR 0.9 07/07/2024    GLC 96 04/16/2024     (H) 10/05/2023     COAGS: No results found for: \"PTT\", \"INR\", \"FIBR\"  POC: No results found for: \"BGM\", \"HCG\", \"HCGS\"  HEPATIC:   Lab Results   Component Value Date    ALBUMIN 4.3 07/30/2024    PROTTOTAL 7.0 04/16/2024    ALT 8 07/30/2024    AST 42 04/16/2024    ALKPHOS 70 04/16/2024    BILITOTAL 0.4 04/16/2024     OTHER:   Lab Results   Component Value Date    RUBIO 9.8 04/16/2024    PHOS 3.8 04/16/2024    TSH 2.57 09/11/2024    T4 1.22 01/25/2024       Anesthesia Plan    ASA Status:  2    NPO Status:  NPO Appropriate    Anesthesia Type: MAC.     - Reason for MAC: straight local not clinically adequate   Induction: Propofol.   Maintenance: TIVA.        Consents    Anesthesia Plan(s) and associated risks, benefits, and realistic alternatives discussed. Questions answered and patient/representative(s) expressed understanding.     - Discussed:     - Discussed with:  Patient            Postoperative Care    Pain management: Oral pain medications.   PONV prophylaxis: Ondansetron (or other 5HT-3), Dexamethasone or Solumedrol     Comments:               Leslie Goldberg, MD    I have reviewed the pertinent notes and labs in the chart from the past 30 days and (re)examined the patient.  Any " "updates or changes from those notes are reflected in this note.              # Cachexia: Estimated body mass index is 17.85 kg/m  as calculated from the following:    Height as of 9/23/24: 1.562 m (5' 1.5\").    Weight as of 9/23/24: 43.5 kg (96 lb).      "

## 2024-10-01 NOTE — INTERVAL H&P NOTE
"I have reviewed the surgical (or preoperative) H&P that is linked to this encounter, and examined the patient. There are no significant changes    Clinical Conditions Present on Arrival:  Clinically Significant Risk Factors Present on Admission                      # Cachexia: Estimated body mass index is 17.85 kg/m  as calculated from the following:    Height as of 9/23/24: 1.562 m (5' 1.5\").    Weight as of 9/23/24: 43.5 kg (96 lb).       "

## 2024-10-01 NOTE — BRIEF OP NOTE
[facility]    Brief Operative Note    Pre-operative diagnosis: Acute diarrhea [R19.7]  Post-operative diagnosis Same as pre-operative diagnosis    Procedure: COLONOSCOPY with biopsies, N/A - Rectum    Surgeon: Surgeons and Role:     * Manjit Sandhu MD - Primary  Anesthesia: MAC   Estimated Blood Loss: None    Drains: None  Specimens:   ID Type Source Tests Collected by Time Destination   1 :  Biopsy Large Intestine, Colon, Random SURGICAL PATHOLOGY EXAM Manjit Sandhu MD 10/1/2024  8:34 AM      Findings:     1.  Normal colonic mucosa, biopsied to rule out microscopic colitis  2.  Sigmoid diverticulosis  3.  Internal and external hemorrhoids    Complications: None.  Implants: * No implants in log *

## 2024-10-03 LAB
PATH REPORT.COMMENTS IMP SPEC: NORMAL
PATH REPORT.COMMENTS IMP SPEC: NORMAL
PATH REPORT.FINAL DX SPEC: NORMAL
PATH REPORT.GROSS SPEC: NORMAL
PATH REPORT.MICROSCOPIC SPEC OTHER STN: NORMAL
PATH REPORT.RELEVANT HX SPEC: NORMAL
PHOTO IMAGE: NORMAL

## 2024-10-03 PROCEDURE — 88305 TISSUE EXAM BY PATHOLOGIST: CPT | Mod: 26 | Performed by: PATHOLOGY

## 2024-10-22 ENCOUNTER — LAB (OUTPATIENT)
Dept: INFUSION THERAPY | Facility: HOSPITAL | Age: 84
End: 2024-10-22
Attending: INTERNAL MEDICINE
Payer: MEDICARE

## 2024-10-22 VITALS
BODY MASS INDEX: 17.66 KG/M2 | SYSTOLIC BLOOD PRESSURE: 117 MMHG | OXYGEN SATURATION: 96 % | TEMPERATURE: 98 F | RESPIRATION RATE: 16 BRPM | HEART RATE: 91 BPM | DIASTOLIC BLOOD PRESSURE: 59 MMHG | WEIGHT: 95 LBS

## 2024-10-22 DIAGNOSIS — M05.79 RHEUMATOID ARTHRITIS, SEROPOSITIVE, MULTIPLE SITES (H): ICD-10-CM

## 2024-10-22 DIAGNOSIS — M05.79 RHEUMATOID ARTHRITIS, SEROPOSITIVE, MULTIPLE SITES (H): Primary | ICD-10-CM

## 2024-10-22 DIAGNOSIS — E03.8 SUBCLINICAL HYPOTHYROIDISM: ICD-10-CM

## 2024-10-22 DIAGNOSIS — M81.0 OSTEOPOROSIS, UNSPECIFIED OSTEOPOROSIS TYPE, UNSPECIFIED PATHOLOGICAL FRACTURE PRESENCE: ICD-10-CM

## 2024-10-22 LAB
ANION GAP SERPL CALCULATED.3IONS-SCNC: 12 MMOL/L (ref 7–15)
BUN SERPL-MCNC: 23.6 MG/DL (ref 8–23)
CALCIUM SERPL-MCNC: 8.8 MG/DL (ref 8.8–10.4)
CHLORIDE SERPL-SCNC: 104 MMOL/L (ref 98–107)
CREAT SERPL-MCNC: 0.85 MG/DL (ref 0.51–0.95)
EGFRCR SERPLBLD CKD-EPI 2021: 66 ML/MIN/1.73M2
GLUCOSE SERPL-MCNC: 111 MG/DL (ref 70–99)
HCO3 SERPL-SCNC: 23 MMOL/L (ref 22–29)
POTASSIUM SERPL-SCNC: 4.3 MMOL/L (ref 3.4–5.3)
SODIUM SERPL-SCNC: 139 MMOL/L (ref 135–145)
TSH SERPL DL<=0.005 MIU/L-ACNC: 2.42 UIU/ML (ref 0.3–4.2)
VIT D+METAB SERPL-MCNC: 65 NG/ML (ref 20–50)

## 2024-10-22 PROCEDURE — 36415 COLL VENOUS BLD VENIPUNCTURE: CPT

## 2024-10-22 PROCEDURE — 250N000028 HC RX JA MOD (INTRAVENOUS), IP 250 OP 636: Mod: JZ,JA | Performed by: INTERNAL MEDICINE

## 2024-10-22 PROCEDURE — 84460 ALANINE AMINO (ALT) (SGPT): CPT

## 2024-10-22 PROCEDURE — 96365 THER/PROPH/DIAG IV INF INIT: CPT

## 2024-10-22 PROCEDURE — 82947 ASSAY GLUCOSE BLOOD QUANT: CPT

## 2024-10-22 PROCEDURE — 82040 ASSAY OF SERUM ALBUMIN: CPT

## 2024-10-22 PROCEDURE — 80048 BASIC METABOLIC PNL TOTAL CA: CPT

## 2024-10-22 PROCEDURE — 258N000003 HC RX IP 258 OP 636: Performed by: INTERNAL MEDICINE

## 2024-10-22 PROCEDURE — 82306 VITAMIN D 25 HYDROXY: CPT

## 2024-10-22 PROCEDURE — 84443 ASSAY THYROID STIM HORMONE: CPT

## 2024-10-22 RX ORDER — MEPERIDINE HYDROCHLORIDE 50 MG/ML
25 INJECTION INTRAMUSCULAR; INTRAVENOUS; SUBCUTANEOUS EVERY 30 MIN PRN
Status: DISCONTINUED | OUTPATIENT
Start: 2024-10-22 | End: 2024-10-22 | Stop reason: HOSPADM

## 2024-10-22 RX ORDER — DIPHENHYDRAMINE HYDROCHLORIDE 50 MG/ML
50 INJECTION INTRAMUSCULAR; INTRAVENOUS
Start: 2024-12-03

## 2024-10-22 RX ORDER — ALBUTEROL SULFATE 0.83 MG/ML
2.5 SOLUTION RESPIRATORY (INHALATION)
OUTPATIENT
Start: 2024-12-03

## 2024-10-22 RX ORDER — ACETAMINOPHEN 325 MG/1
650 TABLET ORAL ONCE
Start: 2024-12-03 | End: 2024-12-03

## 2024-10-22 RX ORDER — METHYLPREDNISOLONE SODIUM SUCCINATE 125 MG/2ML
125 INJECTION INTRAMUSCULAR; INTRAVENOUS ONCE
Start: 2024-12-03 | End: 2024-12-03

## 2024-10-22 RX ORDER — MEPERIDINE HYDROCHLORIDE 50 MG/ML
25 INJECTION INTRAMUSCULAR; INTRAVENOUS; SUBCUTANEOUS EVERY 30 MIN PRN
OUTPATIENT
Start: 2024-12-03

## 2024-10-22 RX ORDER — ALBUTEROL SULFATE 90 UG/1
1-2 INHALANT RESPIRATORY (INHALATION)
Status: DISCONTINUED | OUTPATIENT
Start: 2024-10-22 | End: 2024-10-22 | Stop reason: HOSPADM

## 2024-10-22 RX ORDER — DIPHENHYDRAMINE HYDROCHLORIDE 50 MG/ML
50 INJECTION INTRAMUSCULAR; INTRAVENOUS
Status: DISCONTINUED | OUTPATIENT
Start: 2024-10-22 | End: 2024-10-22 | Stop reason: HOSPADM

## 2024-10-22 RX ORDER — METHYLPREDNISOLONE SODIUM SUCCINATE 125 MG/2ML
125 INJECTION INTRAMUSCULAR; INTRAVENOUS
Status: DISCONTINUED | OUTPATIENT
Start: 2024-10-22 | End: 2024-10-22 | Stop reason: HOSPADM

## 2024-10-22 RX ORDER — EPINEPHRINE 1 MG/ML
0.3 INJECTION, SOLUTION INTRAMUSCULAR; SUBCUTANEOUS EVERY 5 MIN PRN
Status: DISCONTINUED | OUTPATIENT
Start: 2024-10-22 | End: 2024-10-22 | Stop reason: HOSPADM

## 2024-10-22 RX ORDER — ALBUTEROL SULFATE 90 UG/1
1-2 INHALANT RESPIRATORY (INHALATION)
Start: 2024-12-03

## 2024-10-22 RX ORDER — DIPHENHYDRAMINE HCL 25 MG
25 CAPSULE ORAL ONCE
Start: 2024-12-03 | End: 2024-12-03

## 2024-10-22 RX ORDER — HEPARIN SODIUM (PORCINE) LOCK FLUSH IV SOLN 100 UNIT/ML 100 UNIT/ML
5 SOLUTION INTRAVENOUS
OUTPATIENT
Start: 2024-12-03

## 2024-10-22 RX ORDER — EPINEPHRINE 1 MG/ML
0.3 INJECTION, SOLUTION INTRAMUSCULAR; SUBCUTANEOUS EVERY 5 MIN PRN
OUTPATIENT
Start: 2024-12-03

## 2024-10-22 RX ORDER — METHYLPREDNISOLONE SODIUM SUCCINATE 125 MG/2ML
125 INJECTION INTRAMUSCULAR; INTRAVENOUS
Start: 2024-12-03

## 2024-10-22 RX ORDER — HEPARIN SODIUM,PORCINE 10 UNIT/ML
5-20 VIAL (ML) INTRAVENOUS DAILY PRN
OUTPATIENT
Start: 2024-12-03

## 2024-10-22 RX ORDER — ALBUTEROL SULFATE 0.83 MG/ML
2.5 SOLUTION RESPIRATORY (INHALATION)
Status: DISCONTINUED | OUTPATIENT
Start: 2024-10-22 | End: 2024-10-22 | Stop reason: HOSPADM

## 2024-10-22 RX ADMIN — SODIUM CHLORIDE 500 MG: 9 INJECTION, SOLUTION INTRAVENOUS at 11:34

## 2024-10-22 NOTE — PROGRESS NOTES
~~~ NOTE: If the patient answers yes to any of the questions below, hold the infusion and contact ordering provider or on-call provider.    Do you currently have any signs of illness or infection or are you on any antibiotics? No  Have you recently had an elevated temperature, fever, chills, productive cough, coughing for 3 weeks or longer or hemoptysis, abnormal vital signs, night sweats, chest pain or have you noticed a decrease in your appetite, unexplained weight loss or fatigue? No  Have you had any new, sudden, or worsening abdominal pain? No  Do you have any open wounds or new incisions? (exclude for patients with hidradenitis suppurativa) No  Have you recently been diagnosed with any new nervous system diseases (ie. Multiple sclerosis, Guillain Pinckard, seizures, neurological changes) or cancer diagnosis? Are you on any form of radiation or chemotherapy? No  Have there been any other new onset medical symptoms? No  Are you pregnant or breast feeding or do you have plans of pregnancy in the future? No; N/A  Do you have any upcoming hospitalizations or surgeries? Does not include esophagogastroduodenoscopy, colonoscopy, endoscopic retrograde cholangiopancreatography (ERCP), endoscopic ultrasound (EUS), dental procedures (including cleanings, fillings, implants, extractions)  or joint aspiration/steroid injections No  Have you or anyone in your household received a live vaccination in the past 4 weeks? Please note: No live vaccines while on biologic/chemotherapy until 6 months after the last treatment. Patient can receive the flu vaccine (shot only).  It is optimal for the patient to get it mid cycle, but it can be given at any time as long as it is not on the day of the infusion. No  If applicable to prescribed medication, confirm negative PPD or quantiferon gold MTB. If positive, verify has negative chest x-ray or the patient is at least 4 weeks post initiation of INH/B6 therapy and have clearance from provider  before infusion (Y/N:[id number])  If applicable to prescribed medication, confirm negative hepatitis B surface antigen or hepatitis C. If positive, clearance from provider before infusion. (Y/N: [id number])  Rheumatology patients receiving tocilizumab (Actemra): If labs were drawn within the past week, hold dosing until cleared to infuse If AST/ALT > 2 X upper limit normal; ANC < 1.0. NO; N/A  Patients receiving belimumab (Benlysta): Have you been having any signs of worsening depression or suicidal ideations? No; N/A   Infusion Nursing Note:  Diann Low presents today for orencia.    Patient seen by provider today: No   present during visit today: Not Applicable.    Note: pt had labs drawn today also.      Intravenous Access:  Peripheral IV placed.    Treatment Conditions:  Not Applicable.      Post Infusion Assessment:  Patient tolerated infusion without incident.  Access discontinued per protocol.       Discharge Plan:   Patient and/or family verbalized understanding of discharge instructions and all questions answered.      Analisa Bermeo RN

## 2024-10-23 ENCOUNTER — TELEPHONE (OUTPATIENT)
Dept: RHEUMATOLOGY | Facility: CLINIC | Age: 84
End: 2024-10-23
Payer: MEDICARE

## 2024-10-23 DIAGNOSIS — M05.79 RHEUMATOID ARTHRITIS, SEROPOSITIVE, MULTIPLE SITES (H): ICD-10-CM

## 2024-10-23 LAB
ALBUMIN SERPL BCG-MCNC: 4.3 G/DL (ref 3.5–5.2)
ALT SERPL W P-5'-P-CCNC: 13 U/L (ref 0–50)

## 2024-10-23 RX ORDER — METHOTREXATE 2.5 MG/1
TABLET ORAL
Qty: 48 TABLET | Refills: 3 | Status: SHIPPED | OUTPATIENT
Start: 2024-10-23 | End: 2024-11-06

## 2024-10-27 ENCOUNTER — HOSPITAL ENCOUNTER (EMERGENCY)
Facility: CLINIC | Age: 84
Discharge: HOME | End: 2024-10-28
Attending: EMERGENCY MEDICINE | Admitting: EMERGENCY MEDICINE
Payer: MEDICARE

## 2024-10-27 ENCOUNTER — APPOINTMENT (OUTPATIENT)
Dept: CT IMAGING | Facility: CLINIC | Age: 84
End: 2024-10-27
Attending: EMERGENCY MEDICINE
Payer: MEDICARE

## 2024-10-27 ENCOUNTER — APPOINTMENT (OUTPATIENT)
Dept: RADIOLOGY | Facility: CLINIC | Age: 84
End: 2024-10-27
Attending: EMERGENCY MEDICINE
Payer: MEDICARE

## 2024-10-27 VITALS
HEART RATE: 74 BPM | BODY MASS INDEX: 17.66 KG/M2 | OXYGEN SATURATION: 95 % | TEMPERATURE: 99.4 F | RESPIRATION RATE: 23 BRPM | WEIGHT: 95 LBS | DIASTOLIC BLOOD PRESSURE: 67 MMHG | SYSTOLIC BLOOD PRESSURE: 148 MMHG

## 2024-10-27 DIAGNOSIS — R63.0 DECREASED APPETITE: ICD-10-CM

## 2024-10-27 DIAGNOSIS — R42 POSITIONAL LIGHTHEADEDNESS: ICD-10-CM

## 2024-10-27 DIAGNOSIS — R35.0 URINARY FREQUENCY: ICD-10-CM

## 2024-10-27 DIAGNOSIS — R11.0 NAUSEA: ICD-10-CM

## 2024-10-27 LAB
ALBUMIN SERPL BCG-MCNC: 4.1 G/DL (ref 3.5–5.2)
ALBUMIN UR-MCNC: NEGATIVE MG/DL
ALP SERPL-CCNC: 67 U/L (ref 40–150)
ALT SERPL W P-5'-P-CCNC: 11 U/L (ref 0–50)
ANION GAP SERPL CALCULATED.3IONS-SCNC: 13 MMOL/L (ref 7–15)
APPEARANCE UR: CLEAR
AST SERPL W P-5'-P-CCNC: 35 U/L (ref 0–45)
BASOPHILS # BLD AUTO: 0.1 10E3/UL (ref 0–0.2)
BASOPHILS NFR BLD AUTO: 1 %
BILIRUB SERPL-MCNC: 0.6 MG/DL
BILIRUB UR QL STRIP: NEGATIVE
BUN SERPL-MCNC: 18.8 MG/DL (ref 8–23)
CALCIUM SERPL-MCNC: 9.6 MG/DL (ref 8.8–10.4)
CHLORIDE SERPL-SCNC: 101 MMOL/L (ref 98–107)
COLOR UR AUTO: COLORLESS
CREAT SERPL-MCNC: 0.69 MG/DL (ref 0.51–0.95)
EGFRCR SERPLBLD CKD-EPI 2021: 84 ML/MIN/1.73M2
EOSINOPHIL # BLD AUTO: 0 10E3/UL (ref 0–0.7)
EOSINOPHIL NFR BLD AUTO: 0 %
ERYTHROCYTE [DISTWIDTH] IN BLOOD BY AUTOMATED COUNT: 14.1 % (ref 10–15)
FLUAV RNA SPEC QL NAA+PROBE: NEGATIVE
FLUBV RNA RESP QL NAA+PROBE: NEGATIVE
GLUCOSE SERPL-MCNC: 164 MG/DL (ref 70–99)
GLUCOSE UR STRIP-MCNC: NEGATIVE MG/DL
HCO3 SERPL-SCNC: 23 MMOL/L (ref 22–29)
HCT VFR BLD AUTO: 36.4 % (ref 35–47)
HGB BLD-MCNC: 12.4 G/DL (ref 11.7–15.7)
HGB UR QL STRIP: NEGATIVE
IMM GRANULOCYTES # BLD: 0 10E3/UL
IMM GRANULOCYTES NFR BLD: 0 %
KETONES UR STRIP-MCNC: NEGATIVE MG/DL
LACTATE SERPL-SCNC: 1.7 MMOL/L (ref 0.7–2)
LEUKOCYTE ESTERASE UR QL STRIP: NEGATIVE
LYMPHOCYTES # BLD AUTO: 1.4 10E3/UL (ref 0.8–5.3)
LYMPHOCYTES NFR BLD AUTO: 21 %
MCH RBC QN AUTO: 32.9 PG (ref 26.5–33)
MCHC RBC AUTO-ENTMCNC: 34.1 G/DL (ref 31.5–36.5)
MCV RBC AUTO: 97 FL (ref 78–100)
MONOCYTES # BLD AUTO: 0.5 10E3/UL (ref 0–1.3)
MONOCYTES NFR BLD AUTO: 7 %
NEUTROPHILS # BLD AUTO: 4.5 10E3/UL (ref 1.6–8.3)
NEUTROPHILS NFR BLD AUTO: 70 %
NITRATE UR QL: NEGATIVE
NRBC # BLD AUTO: 0 10E3/UL
NRBC BLD AUTO-RTO: 0 /100
NT-PROBNP SERPL-MCNC: 637 PG/ML (ref 0–1800)
PH UR STRIP: 6 [PH] (ref 5–7)
PLATELET # BLD AUTO: 192 10E3/UL (ref 150–450)
POTASSIUM SERPL-SCNC: 4.3 MMOL/L (ref 3.4–5.3)
PROT SERPL-MCNC: 6.5 G/DL (ref 6.4–8.3)
RBC # BLD AUTO: 3.77 10E6/UL (ref 3.8–5.2)
RBC URINE: <1 /HPF
RSV RNA SPEC NAA+PROBE: NEGATIVE
SARS-COV-2 RNA RESP QL NAA+PROBE: NEGATIVE
SODIUM SERPL-SCNC: 137 MMOL/L (ref 135–145)
SP GR UR STRIP: 1.01 (ref 1–1.03)
SQUAMOUS EPITHELIAL: <1 /HPF
TROPONIN T SERPL HS-MCNC: 8 NG/L
TROPONIN T SERPL HS-MCNC: 9 NG/L
UROBILINOGEN UR STRIP-MCNC: <2 MG/DL
WBC # BLD AUTO: 6.4 10E3/UL (ref 4–11)
WBC URINE: <1 /HPF

## 2024-10-27 PROCEDURE — 250N000011 HC RX IP 250 OP 636: Performed by: EMERGENCY MEDICINE

## 2024-10-27 PROCEDURE — 80053 COMPREHEN METABOLIC PANEL: CPT | Performed by: EMERGENCY MEDICINE

## 2024-10-27 PROCEDURE — 85025 COMPLETE CBC W/AUTO DIFF WBC: CPT | Performed by: EMERGENCY MEDICINE

## 2024-10-27 PROCEDURE — 99285 EMERGENCY DEPT VISIT HI MDM: CPT | Mod: 25

## 2024-10-27 PROCEDURE — 83880 ASSAY OF NATRIURETIC PEPTIDE: CPT | Performed by: EMERGENCY MEDICINE

## 2024-10-27 PROCEDURE — 96374 THER/PROPH/DIAG INJ IV PUSH: CPT | Mod: 59

## 2024-10-27 PROCEDURE — 87040 BLOOD CULTURE FOR BACTERIA: CPT | Performed by: EMERGENCY MEDICINE

## 2024-10-27 PROCEDURE — 36415 COLL VENOUS BLD VENIPUNCTURE: CPT | Performed by: EMERGENCY MEDICINE

## 2024-10-27 PROCEDURE — 84484 ASSAY OF TROPONIN QUANT: CPT | Performed by: EMERGENCY MEDICINE

## 2024-10-27 PROCEDURE — 93005 ELECTROCARDIOGRAM TRACING: CPT | Performed by: EMERGENCY MEDICINE

## 2024-10-27 PROCEDURE — 250N000013 HC RX MED GY IP 250 OP 250 PS 637: Performed by: EMERGENCY MEDICINE

## 2024-10-27 PROCEDURE — 81001 URINALYSIS AUTO W/SCOPE: CPT | Performed by: EMERGENCY MEDICINE

## 2024-10-27 PROCEDURE — 71046 X-RAY EXAM CHEST 2 VIEWS: CPT

## 2024-10-27 PROCEDURE — 74177 CT ABD & PELVIS W/CONTRAST: CPT | Mod: MG

## 2024-10-27 PROCEDURE — 83605 ASSAY OF LACTIC ACID: CPT | Performed by: EMERGENCY MEDICINE

## 2024-10-27 PROCEDURE — G1010 CDSM STANSON: HCPCS

## 2024-10-27 PROCEDURE — 87637 SARSCOV2&INF A&B&RSV AMP PRB: CPT | Performed by: EMERGENCY MEDICINE

## 2024-10-27 RX ORDER — MECLIZINE HYDROCHLORIDE 25 MG/1
25 TABLET ORAL ONCE
Status: COMPLETED | OUTPATIENT
Start: 2024-10-27 | End: 2024-10-27

## 2024-10-27 RX ORDER — ONDANSETRON 4 MG/1
4 TABLET, ORALLY DISINTEGRATING ORAL EVERY 8 HOURS PRN
Qty: 20 TABLET | Refills: 0 | Status: SHIPPED | OUTPATIENT
Start: 2024-10-27

## 2024-10-27 RX ORDER — MECLIZINE HYDROCHLORIDE 25 MG/1
25 TABLET ORAL 3 TIMES DAILY PRN
Qty: 20 TABLET | Refills: 0 | Status: SHIPPED | OUTPATIENT
Start: 2024-10-27

## 2024-10-27 RX ORDER — ONDANSETRON 2 MG/ML
4 INJECTION INTRAMUSCULAR; INTRAVENOUS ONCE
Status: COMPLETED | OUTPATIENT
Start: 2024-10-27 | End: 2024-10-27

## 2024-10-27 RX ORDER — IOPAMIDOL 755 MG/ML
90 INJECTION, SOLUTION INTRAVASCULAR ONCE
Status: COMPLETED | OUTPATIENT
Start: 2024-10-27 | End: 2024-10-27

## 2024-10-27 RX ADMIN — ONDANSETRON 4 MG: 2 INJECTION, SOLUTION INTRAMUSCULAR; INTRAVENOUS at 23:39

## 2024-10-27 RX ADMIN — IOPAMIDOL 75 ML: 755 INJECTION, SOLUTION INTRAVENOUS at 21:02

## 2024-10-27 RX ADMIN — MECLIZINE 25 MG: 25 TABLET ORAL at 23:39

## 2024-10-27 ASSESSMENT — ACTIVITIES OF DAILY LIVING (ADL)
ADLS_ACUITY_SCORE: 0

## 2024-10-27 ASSESSMENT — COLUMBIA-SUICIDE SEVERITY RATING SCALE - C-SSRS
6. HAVE YOU EVER DONE ANYTHING, STARTED TO DO ANYTHING, OR PREPARED TO DO ANYTHING TO END YOUR LIFE?: NO
2. HAVE YOU ACTUALLY HAD ANY THOUGHTS OF KILLING YOURSELF IN THE PAST MONTH?: NO
1. IN THE PAST MONTH, HAVE YOU WISHED YOU WERE DEAD OR WISHED YOU COULD GO TO SLEEP AND NOT WAKE UP?: NO

## 2024-10-27 NOTE — ED PROVIDER NOTES
EMERGENCY DEPARTMENT ENCOUNTER      NAME: Diann Low  AGE: 84 year old female  YOB: 1940  MRN: [id number]  EVALUATION DATE & TIME: 10/27/2024  6:27 PM    PCP: Abdifatah Alvarado    ED PROVIDER: Candido Veras MD      Chief Complaint   Patient presents with    Generalized Weakness         FINAL IMPRESSION:  1. Urinary frequency    2. Positional lightheadedness    3. Nausea    4. Decreased appetite          ED COURSE & MEDICAL DECISION MAKING:    Pertinent Labs & Imaging studies reviewed. (See chart for details)  84 year old female presents to the Emergency Department for evaluation of decreased appetite, generalized weakness, chronic nausea, worse today.  Feels lightheaded when standing    She is urinating more often    Transient right upper quadrant pain earlier today but none currently.  No fever.  Did feel little chilled earlier.  His methotrexate for rheumatoid arthritis    No headache.  No chest pain.  No abdominal pain    No dizziness at rest.  When she stands she feels a little lightheaded        ED Course as of 10/27/24 2345   Sun Oct 27, 2024   1919 Generalized weakness all month gradually worsening.  Also has chills, persistent cough, dysuria, and resolved upper quadrant abdominal pain   1919 On exam well-appearing no focal neurological deficits.  Doubt stroke based on history and exam.  He is immunosuppressed on methotrexate.  Concern is for occult infection.  Also concerned based on the history for possible other cause weakness like hyponatremia, anemia, ACS, deconditioning, COVID-19 influenza, urinary tract infection   1919 Plan for chest imaging, abdominal imaging, UA, CBC, Atik acid, COVID-19 Linzer, proBNP, blood culture, troponin, metabolic panel, UA   1920 Additional history provided by daughter at bedside   2317 Patient reports she is only dizzy when she stands up.  Possible orthostatic hypotension.  Doubt stroke based on age will obtain CT head.  Possible benign positional vertigo.    2317 No symptoms at rest.  Doubt stroke   2319 Patient's not orthostatic based on vital signs taken by nursing   2319 Per nursing and lying down pressures 123/57, sitting 126/62, standing 122/60.  No significant change in HR   2332 Workup unrevealing cause of patient's symptoms.  Plan for discharge home with Zofran for nausea in case she has some type of gastritis.  Meclizine for any type of dizziness.  Recommend close follow-up primary care doctor and return to the ER for any worsening symptoms.  Shared decision making with patient.  Plan for discharge   2344 Troponin T, High Sensitivity: 9   2345 N-Terminal Pro BNP Inpatient: 637   2345 Ketones Urine: Negative   2345 Specific Gravity Urine: 1.006   2345 Sodium: 137   2345 Potassium: 4.3   2345 Creatinine: 0.69   2345 Urea Nitrogen: 18.8   2345 Chloride: 101   2345 Glucose(!): 164   2345 Alkaline Phosphatase: 67   2345 AST: 35   2345 ALT: 11   2345 Protein Total: 6.5   2345 Albumin: 4.1   2345 Bilirubin Total: 0.6     Extensive workup does not reveal explanation for symptoms.  We discharged home with Zofran and meclizine and have her follow-up with primary care doctor    Medical Decision Making  Obtained supplemental history:Supplemental history obtained?: Family Member/Significant Other  Reviewed external records: External records reviewed?: Documented in chart  Care impacted by chronic illness:Other: rheumatoid arthritis, osteoporosis, hyponatremia  Did you consider but not order tests?: Work up considered but not performed and documented in chart, if applicable  Did you interpret images independently?: Independent interpretation of ECG and images noted in documentation, when applicable.  Consultation discussion with other provider:Did you involve another provider (consultant, , pharmacy, etc.)?: No  Discharge. I prescribed additional prescription strength medication(s) as charted. I considered admission, but ultimately discharged patient unrevealing  workup.    MIPS: Adult Minor Head Trauma:Age 65 years or older            At the conclusion of the encounter I discussed the results of all of the tests and the disposition. The questions were answered. The patient or family acknowledged understanding and was agreeable with the care plan.         MEDICATIONS GIVEN IN THE EMERGENCY:  Medications   iopamidol (ISOVUE-370) solution 90 mL (75 mLs Intravenous $Given 10/27/24 2102)   ondansetron (ZOFRAN) injection 4 mg (4 mg Intravenous $Given 10/27/24 2339)   meclizine (ANTIVERT) tablet 25 mg (25 mg Oral $Given 10/27/24 2339)       NEW PRESCRIPTIONS STARTED AT TODAY'S ER VISIT  New Prescriptions    MECLIZINE (ANTIVERT) 25 MG TABLET    Take 1 tablet (25 mg) by mouth 3 times daily as needed for dizziness.    ONDANSETRON (ZOFRAN ODT) 4 MG ODT TAB    Take 1 tablet (4 mg) by mouth every 8 hours as needed.          =================================================================    TRIAGE ASSESSMENT:        HPI    Patient information was obtained from: the patient, family member    Use of : N/A         Diann Low is a 84 year old female with a pertinent history of rheumatoid arthritis, osteoporosis, hyponatremia, who presents to this ED  for evaluation of weakness, nausea.    Patient reports nausea, weakness, balance issues that started last night. She endorses chills and abdominal pain earlier today but none now. She's had similar symptoms in the mornings all month recently but nothing that had lasted this long before. She endorses urinary and bowel frequency for the past few months.  She hasn't eaten today and noticed shaking.    To note, a few weeks ago she was told her kidney was high functioning, had negative GI tests and a negative colonoscopy due to diarrhea.      REVIEW OF SYSTEMS   Review of Systems   No fever    PAST MEDICAL HISTORY:  Past Medical History:   Diagnosis Date    PONV (postoperative nausea and vomiting)     Skin cancer     Spider veins         PAST SURGICAL HISTORY:  Past Surgical History:   Procedure Laterality Date    COLONOSCOPY N/A 10/1/2024    Procedure: COLONOSCOPY with biopsies;  Surgeon: Manjit Sandhu MD;  Location: [facility] Main OR           CURRENT MEDICATIONS:    meclizine (ANTIVERT) 25 MG tablet  ondansetron (ZOFRAN ODT) 4 MG ODT tab  alendronate (FOSAMAX) 70 MG tablet  calcium carbonate-vitamin D (OSCAL W/D) 500-200 MG-UNIT tablet  carboxymethylcellulose PF (REFRESH PLUS) 0.5 % ophthalmic solution  clindamycin (CLEOCIN) 300 MG capsule  ferrous fumarate 65 mg, [place]. FE,-Vitamin C 125 mg (VITRON C)  MG TABS tablet  folic acid (FOLVITE) 1 MG tablet  levothyroxine (SYNTHROID/LEVOTHROID) 50 MCG tablet  loperamide (IMODIUM) 2 MG capsule  methotrexate 2.5 MG tablet  Multiple Vitamin (MULTIVITAMIN ADULT PO)        ALLERGIES:  Allergies   Allergen Reactions    Infliximab Rash     Throat began to close/tighten    Sulfa Antibiotics Rash    Hydrocodone-Acetaminophen Nausea    Oxycodone-Acetaminophen Nausea    Penicillins Rash    Erythromycin Nausea       FAMILY HISTORY:  No family history on file.    SOCIAL HISTORY:   Social History     Socioeconomic History    Marital status: Single   Tobacco Use    Smoking status: Never     Passive exposure: Never    Smokeless tobacco: Never   Vaping Use    Vaping status: Never Used     Social Drivers of Health     Financial Resource Strain: Low Risk  (1/8/2024)    Financial Resource Strain     Within the past 12 months, have you or your family members you live with been unable to get utilities (heat, electricity) when it was really needed?: No   Food Insecurity: Low Risk  (1/8/2024)    Food Insecurity     Within the past 12 months, did you worry that your food would run out before you got money to buy more?: No     Within the past 12 months, did the food you bought just not last and you didn t have money to get more?: No   Transportation Needs: Low Risk  (1/8/2024)    Transportation Needs     Within  the past 12 months, has lack of transportation kept you from medical appointments, getting your medicines, non-medical meetings or appointments, work, or from getting things that you need?: No   Physical Activity: Sufficiently Active (8/31/2023)    Received from [facility]    Exercise Vital Sign     Days of Exercise per Week: 5 days     Minutes of Exercise per Session: 40 min   Stress: Stress Concern Present (4/29/2021)    Received from [facility]    [place] [employer] - Occupational Stress Questionnaire     Feeling of Stress : To some extent   Social Connections: Unknown (4/29/2021)    Received from [facility]    Social Connection and Isolation Panel [NHANES]     Frequency of Communication with Friends and Family: Three times a week     Frequency of Social Gatherings with Friends and Family: Twice a week     Attends Yazidism Services: Patient declined     Active Member of Clubs or Organizations: Patient declined     Attends Club or Organization Meetings: Patient declined     Marital Status:    Interpersonal Safety: Unknown (10/1/2024)    Interpersonal Safety     Do you feel physically and emotionally safe where you currently live?: Patient unable to answer     Within the past 12 months, have you been hit, slapped, kicked or otherwise physically hurt by someone?: Patient unable to answer     Within the past 12 months, have you been humiliated or emotionally abused in other ways by your partner or ex-partner?: Patient unable to answer   Housing Stability: Low Risk  (1/8/2024)    Housing Stability     Do you have housing? : Yes     Are you worried about losing your housing?: No       VITALS:  /57   Pulse 74   Temp 99.4  F (37.4  C) (Temporal)   Resp 23   Wt 43.1 kg (95 lb)   LMP  (LMP Unknown)   SpO2 96%   BMI 17.66 kg/m      PHYSICAL EXAM      Vitals: /57   Pulse 74   Temp 99.4  F (37.4  C) (Temporal)   Resp 23   Wt 43.1  kg (95 lb)   LMP  (LMP Unknown)   SpO2 96%   BMI 17.66 kg/m    General: Appears in no acute distress, awake, alert, interactive. Looks ten years younger than recorded age. Normal radial pulses.  Eyes: Conjunctivae non-injected. Sclera anicteric.  No nystagmus  HENT: Atraumatic.  Neck: Supple.  Respiratory/Chest: Respiration unlabored. No wheezing. No rhonchi.   Heart: RRR  Abdomen: non distended. Non tender  Musculoskeletal: Normal extremities. No edema or erythema. 5/5 strength upper extremities. No weakness to lower extremities.  Skin: Normal color. No rash or diaphoresis.  Neurologic: Face symmetric, moves all extremities spontaneously. Speech clear. CN 2-12 intact.   Psychiatric: Oriented to person, place, and time. Affect appropriate.    LAB:  All pertinent labs reviewed and interpreted.  Results for orders placed or performed during the hospital encounter of 10/27/24   Chest XR,  PA & LAT    Impression    IMPRESSION: Chronic fibrotic changes in the lower lungs but appreciated on CT abdomen. No significant pleural effusion. Mid thoracic and lower thoracic compression deformities. No vascular congestion. Cardiomediastinal silhouette within normal limits.   CT Abdomen Pelvis w Contrast    Impression    IMPRESSION:   1.  No inflammatory change, bowel obstruction or abscess.  2.  Sigmoid colon is underdistended reducing evaluation for wall thickening/colitis.  3.  Small amount of pelvic free fluid.  4.  Chronic fibrotic change lung bases with peripheral mucous plugging.   Head CT w/o contrast    Impression    IMPRESSION:  1.  No acute intracranial process.   Lactic acid whole blood   Result Value Ref Range    Lactic Acid 1.7 0.7 - 2.0 mmol/L   Comprehensive metabolic panel   Result Value Ref Range    Sodium 137 135 - 145 mmol/L    Potassium 4.3 3.4 - 5.3 mmol/L    Carbon Dioxide (CO2) 23 22 - 29 mmol/L    Anion Gap 13 7 - 15 mmol/L    Urea Nitrogen 18.8 8.0 - 23.0 mg/dL    Creatinine 0.69 0.51 - 0.95 mg/dL     GFR Estimate 84 >60 mL/min/1.73m2    Calcium 9.6 8.8 - 10.4 mg/dL    Chloride 101 98 - 107 mmol/L    Glucose 164 (H) 70 - 99 mg/dL    Alkaline Phosphatase 67 40 - 150 U/L    AST 35 0 - 45 U/L    ALT 11 0 - 50 U/L    Protein Total 6.5 6.4 - 8.3 g/dL    Albumin 4.1 3.5 - 5.2 g/dL    Bilirubin Total 0.6 <=1.2 mg/dL   Nt probnp inpatient   Result Value Ref Range    N terminal Pro BNP Inpatient 637 0 - 1,800 pg/mL   Result Value Ref Range    Troponin T, High Sensitivity 8 <=14 ng/L   UA with Microscopic reflex to Culture    Specimen: Urine, NOS   Result Value Ref Range    Color Urine Colorless Colorless, Straw, Light Yellow, Yellow    Appearance Urine Clear Clear    Glucose Urine Negative Negative mg/dL    Bilirubin Urine Negative Negative    Ketones Urine Negative Negative mg/dL    Specific Gravity Urine 1.006 1.001 - 1.030    Blood Urine Negative Negative    pH Urine 6.0 5.0 - 7.0    Protein Albumin Urine Negative Negative mg/dL    Urobilinogen Urine <2.0 <2.0 mg/dL    Nitrite Urine Negative Negative    Leukocyte Esterase Urine Negative Negative    RBC Urine <1 <=2 /HPF    WBC Urine <1 <=5 /HPF    Squamous Epithelials Urine <1 <=1 /HPF   Symptomatic Influenza A/B, RSV, & SARS-CoV2 PCR (COVID-19) Nasopharyngeal    Specimen: Nasopharyngeal; Swab   Result Value Ref Range    Influenza A PCR Negative Negative    Influenza B PCR Negative Negative    RSV PCR Negative Negative    SARS CoV2 PCR Negative Negative   CBC with platelets and differential   Result Value Ref Range    WBC Count 6.4 4.0 - 11.0 10e3/uL    RBC Count 3.77 (L) 3.80 - 5.20 10e6/uL    Hemoglobin 12.4 11.7 - 15.7 g/dL    Hematocrit 36.4 35.0 - 47.0 %    MCV 97 78 - 100 fL    MCH 32.9 26.5 - 33.0 pg    MCHC 34.1 31.5 - 36.5 g/dL    RDW 14.1 10.0 - 15.0 %    Platelet Count 192 150 - 450 10e3/uL    % Neutrophils 70 %    % Lymphocytes 21 %    % Monocytes 7 %    % Eosinophils 0 %    % Basophils 1 %    % Immature Granulocytes 0 %    NRBCs per 100 WBC 0 <1 /100     Absolute Neutrophils 4.5 1.6 - 8.3 10e3/uL    Absolute Lymphocytes 1.4 0.8 - 5.3 10e3/uL    Absolute Monocytes 0.5 0.0 - 1.3 10e3/uL    Absolute Eosinophils 0.0 0.0 - 0.7 10e3/uL    Absolute Basophils 0.1 0.0 - 0.2 10e3/uL    Absolute Immature Granulocytes 0.0 <=0.4 10e3/uL    Absolute NRBCs 0.0 10e3/uL   Result Value Ref Range    Troponin T, High Sensitivity 9 <=14 ng/L       RADIOLOGY:  Reviewed all pertinent imaging. Please see official radiology report.  Head CT w/o contrast   Final Result   IMPRESSION:   1.  No acute intracranial process.      Chest XR,  PA & LAT   Final Result   IMPRESSION: Chronic fibrotic changes in the lower lungs but appreciated on CT abdomen. No significant pleural effusion. Mid thoracic and lower thoracic compression deformities. No vascular congestion. Cardiomediastinal silhouette within normal limits.      CT Abdomen Pelvis w Contrast   Final Result   IMPRESSION:    1.  No inflammatory change, bowel obstruction or abscess.   2.  Sigmoid colon is underdistended reducing evaluation for wall thickening/colitis.   3.  Small amount of pelvic free fluid.   4.  Chronic fibrotic change lung bases with peripheral mucous plugging.          EKG:    Performed at: 19:02    Impression: Abnormal ECG    Rate: 73  Rhythm: Sinus rhythm  Axis: 16 31 89  OR Interval: 140  QRS Interval: 102  QTc Interval: 493  ST Changes: Septal infarct, age undetermined; ST & T wave abnormality, consider lateral ischemia  Comparison: When compared with ECG of 23-Sep-2024, no significant changes found.    I have independently reviewed and interpreted the EKG(s) documented above.          I, Geremias Han, am serving as a scribe to document services personally performed by Candido Veras MD based on my observation and the provider's statements to me. I, Candido Veras MD, attest that Geremias Han is acting in a scribe capacity, has observed my performance of the services and has documented them in accordance with my  direction.    Candido Veras MD  [facility] EMERGENCY ROOM  [address] [id number] [phone]      Candido Veras MD  10/27/24 3341

## 2024-10-27 NOTE — ED TRIAGE NOTES
Pt arrives via EMS from [facility]. Pt c/o generalized weakness x1 month. Denies falls or hitting head. Does endorse some worsening urinary frequency. Also endorses an intermittent infrequent cough over the past month.     Triage Assessment (Adult)       Row Name 10/27/24 5187          Triage Assessment    Airway WDL WDL        Respiratory WDL    Respiratory WDL X;cough        Skin Circulation/Temperature WDL    Skin Circulation/Temperature WDL WDL        Cardiac WDL    Cardiac WDL WDL        Peripheral/Neurovascular WDL    Peripheral Neurovascular WDL X;neurovascular assessment upper        Cognitive/Neuro/Behavioral WDL    Cognitive/Neuro/Behavioral WDL WDL        RUE Neurovascular Assessment    Sensation RUE tingling present

## 2024-10-28 LAB
ATRIAL RATE - MUSE: 73 BPM
DIASTOLIC BLOOD PRESSURE - MUSE: 60 MMHG
INTERPRETATION ECG - MUSE: NORMAL
P AXIS - MUSE: 16 DEGREES
PR INTERVAL - MUSE: 140 MS
QRS DURATION - MUSE: 102 MS
QT - MUSE: 448 MS
QTC - MUSE: 493 MS
R AXIS - MUSE: 31 DEGREES
SYSTOLIC BLOOD PRESSURE - MUSE: 128 MMHG
T AXIS - MUSE: 89 DEGREES
VENTRICULAR RATE- MUSE: 73 BPM

## 2024-10-28 NOTE — DISCHARGE INSTRUCTIONS
Recheck primary care doctor.  Return to the ER for any worsening symptoms.  Recommend Zofran every 8 hours needed for nausea.  Recommend meclizine 3 times daily as needed for any dizziness

## 2024-10-28 NOTE — ED NOTES
AIDET performed, white board updated for rounding. Patient updated on plan of care. Patient's pain assessed. Call light within reach, bed in low position, side rails up. Visitor at bedside: son.

## 2024-10-29 ENCOUNTER — PATIENT OUTREACH (OUTPATIENT)
Dept: FAMILY MEDICINE | Facility: CLINIC | Age: 84
End: 2024-10-29
Payer: MEDICARE

## 2024-10-29 NOTE — TELEPHONE ENCOUNTER
Transitions of Care Outreach  Chief Complaint   Patient presents with    Hospital F/U     TCM        Most Recent Admission Date: 10/27/2024   Most Recent Admission Diagnosis:  10/28/24    Most Recent Discharge Date: 10/28/2024   Most Recent Discharge Diagnosis: Urinary frequency - R35.0  Positional lightheadedness - R42  Nausea - R11.0  Decreased appetite - R63.0     Transitions of Care Assessment    Discharge Assessment  How are you doing now that you are home?: Still not feeling very well.  How are your symptoms? (Red Flag symptoms escalate to triage hotline per guidelines): Improved  Does the patient have their discharge instructions? : No - Review discharge instructions  Does the patient have questions regarding their discharge instructions? : No  Were you started on any new medications or were there changes to any of your previous medications? : Yes  Does the patient have all of their medications?: Yes  Do you have questions regarding any of your medications? : Yes (see comment)  Do you have all of your needed medical supplies or equipment (DME)?  (i.e. oxygen tank, CPAP, cane, etc.): Yes    Clinic number provided to patient. Answer all her questions.     Report having diarrhea this morning, but it had stopped. Encourage to keep monitor and to call us back with any issues or concerns.     New medications of Meclizine and ondansetron, educate pt on side effects and answer any questions.     No further questions at this time.     Follow up Plan     Discharge Follow-Up  Discharge follow up appointment scheduled in alignment with recommended follow up timeframe or Transitions of Risk Category? (Low = within 30 days; Moderate= within 14 days; High= within 7 days): Yes  Discharge Follow Up Appointment Date: 11/05/24  Discharge Follow Up Appointment Scheduled with?: Primary Care Provider    Future Appointments   Date Time Provider Department Center   11/1/2024  1:30 PM [facility] LAB JANNY [facility]   11/5/2024  1:00 PM  Abdifatah Alvarado MD [facility]   11/6/2024  9:00 AM Nano Olivier MBBS [facility]   11/11/2024 12:30 PM [facility]   12/3/2024 11:00 AM [facility] INFUSION NURSE [facility]   12/17/2024 11:00 AM Abdifatah Alvarado MD [facility]   7/16/2025 10:00 AM [facility] LAB [facility]   7/23/2025  8:30 AM [facility]       Outpatient Plan as outlined on AVS reviewed with patient.    For any urgent concerns, please contact our 24 hour nurse triage line: [phone] ([phone])       Stefan Barnett RN

## 2024-10-30 ENCOUNTER — TELEPHONE (OUTPATIENT)
Dept: FAMILY MEDICINE | Facility: CLINIC | Age: 84
End: 2024-10-30

## 2024-10-30 NOTE — TELEPHONE ENCOUNTER
Yes I do recommend Imodium for this issue as long as there is no blood in the stool.  He should come and see me if this is persistent for more than 1 week.

## 2024-10-30 NOTE — TELEPHONE ENCOUNTER
S-(situation):   Patient calling with questions about medication.    B-(background):   10/27/24 Seen in ED  10/29/24 Hospital F/U call    A-(assessment):   Symptoms Started Yesterday:  Abdominal Cramping and Mucous in stool. )frequent stools during the night).    No Blood in stool  Nausea since Sunday- using RX and relieves some.  Drinking water and sprite.    Patient took 1 imodium at 9:00 am no stool since.    R-(recommendations):   Patient would like to know if provider recommends Imodium.    Patient instructed to call back if symptoms change or if new symptoms present. Patient verbalizes agreement with plan.    Faviola, RN, BSN  [place], [place]

## 2024-10-31 NOTE — TELEPHONE ENCOUNTER
Called from patient, relayed provider's message in detail.     No further questions at this time as pt has an appointment with PCP on 11/5/24.     Stefan CHEATHAM RN

## 2024-11-01 LAB — BACTERIA BLD CULT: NO GROWTH

## 2024-11-05 ENCOUNTER — OFFICE VISIT (OUTPATIENT)
Dept: FAMILY MEDICINE | Facility: CLINIC | Age: 84
End: 2024-11-05
Payer: MEDICARE

## 2024-11-05 VITALS
HEART RATE: 76 BPM | BODY MASS INDEX: 17.74 KG/M2 | HEIGHT: 62 IN | WEIGHT: 96.4 LBS | SYSTOLIC BLOOD PRESSURE: 138 MMHG | DIASTOLIC BLOOD PRESSURE: 60 MMHG | RESPIRATION RATE: 12 BRPM | OXYGEN SATURATION: 97 % | TEMPERATURE: 97.1 F

## 2024-11-05 DIAGNOSIS — R73.09 ELEVATED GLUCOSE: ICD-10-CM

## 2024-11-05 DIAGNOSIS — M05.79 RHEUMATOID ARTHRITIS, SEROPOSITIVE, MULTIPLE SITES (H): ICD-10-CM

## 2024-11-05 DIAGNOSIS — R55 PRE-SYNCOPE: Primary | ICD-10-CM

## 2024-11-05 DIAGNOSIS — R42 DIZZINESS: ICD-10-CM

## 2024-11-05 DIAGNOSIS — R94.31 ABNORMAL ELECTROCARDIOGRAM (ECG) (EKG): ICD-10-CM

## 2024-11-05 PROBLEM — S12.001A: Status: RESOLVED | Noted: 2023-08-19 | Resolved: 2024-11-05

## 2024-11-05 LAB
ALBUMIN SERPL BCG-MCNC: 4.2 G/DL (ref 3.5–5.2)
ALT SERPL W P-5'-P-CCNC: 10 U/L (ref 0–50)
CREAT SERPL-MCNC: 0.85 MG/DL (ref 0.51–0.95)
EGFRCR SERPLBLD CKD-EPI 2021: 66 ML/MIN/1.73M2
ERYTHROCYTE [DISTWIDTH] IN BLOOD BY AUTOMATED COUNT: 14.1 % (ref 10–15)
EST. AVERAGE GLUCOSE BLD GHB EST-MCNC: 105 MG/DL
HBA1C MFR BLD: 5.3 % (ref 0–5.6)
HCT VFR BLD AUTO: 35.7 % (ref 35–47)
HGB BLD-MCNC: 11.9 G/DL (ref 11.7–15.7)
MCH RBC QN AUTO: 33.1 PG (ref 26.5–33)
MCHC RBC AUTO-ENTMCNC: 33.3 G/DL (ref 31.5–36.5)
MCV RBC AUTO: 99 FL (ref 78–100)
PLATELET # BLD AUTO: 177 10E3/UL (ref 150–450)
RBC # BLD AUTO: 3.59 10E6/UL (ref 3.8–5.2)
WBC # BLD AUTO: 7 10E3/UL (ref 4–11)

## 2024-11-05 PROCEDURE — 36415 COLL VENOUS BLD VENIPUNCTURE: CPT | Performed by: STUDENT IN AN ORGANIZED HEALTH CARE EDUCATION/TRAINING PROGRAM

## 2024-11-05 PROCEDURE — 82040 ASSAY OF SERUM ALBUMIN: CPT | Performed by: STUDENT IN AN ORGANIZED HEALTH CARE EDUCATION/TRAINING PROGRAM

## 2024-11-05 PROCEDURE — G2211 COMPLEX E/M VISIT ADD ON: HCPCS | Performed by: STUDENT IN AN ORGANIZED HEALTH CARE EDUCATION/TRAINING PROGRAM

## 2024-11-05 PROCEDURE — 85027 COMPLETE CBC AUTOMATED: CPT | Performed by: STUDENT IN AN ORGANIZED HEALTH CARE EDUCATION/TRAINING PROGRAM

## 2024-11-05 PROCEDURE — 84460 ALANINE AMINO (ALT) (SGPT): CPT | Performed by: STUDENT IN AN ORGANIZED HEALTH CARE EDUCATION/TRAINING PROGRAM

## 2024-11-05 PROCEDURE — 82565 ASSAY OF CREATININE: CPT | Performed by: STUDENT IN AN ORGANIZED HEALTH CARE EDUCATION/TRAINING PROGRAM

## 2024-11-05 PROCEDURE — 99214 OFFICE O/P EST MOD 30 MIN: CPT | Performed by: STUDENT IN AN ORGANIZED HEALTH CARE EDUCATION/TRAINING PROGRAM

## 2024-11-05 PROCEDURE — 83036 HEMOGLOBIN GLYCOSYLATED A1C: CPT | Performed by: STUDENT IN AN ORGANIZED HEALTH CARE EDUCATION/TRAINING PROGRAM

## 2024-11-05 NOTE — PROGRESS NOTES
Assessment & Plan     Pre-syncope  Dizziness  Abnormal electrocardiogram (ECG) (EKG)    Diann is an 84-year-old female with history of rheumatoid arthritis on methotrexate and Orencia, hypothyroidism, history of odontoid fracture in August 2023 on alendronate, and chronic fatigue who presents for follow-up from her emergency room visit for dizziness and feelings of presyncope which started on October 26th and persisted through October 27 but have since resolved.    The symptoms occurred while sitting or laying down.  Dizziness did seem to worsen not with the active standing but with the act of moving such as walking to the bathroom, or walking down the mart[place]nez.  Symptoms have since resolved, with some improvement on meclizine although she has not used meclizine now for more than a day with no return of symptoms.    This is not a new symptom for the patient, I last saw her for this in May 2024, where she was having lightheadedness especially in the morning without position change.  Her dizziness also at that time would worsen with activity such as doing household chores.  She also would have presyncopal episodes but no syncope.  These episodes were happening 3-4 times a week, this most recent episode that brought her into the emergency room was the most long-lived and also seemed the most severe.    We have previously evaluated with echocardiogram and Holter monitor which were normal, she did report having the symptoms during the Holter monitor, where she was in sinus rhythm.  EKG has shown presence of possible septal infarct, first noted in May 2024 and demonstrated again on EKG October 27.    She is on Synthroid, TSH has been normal, last checked October 22.  Troponin was 8 and 9 on recheck on October 27.  BMP testing done normal.  Blood culture done no growth.  CBC showing normal H&H.  CT head was done, showing no acute intracranial process.  At emergency room orthostatic vital signs were normal.    Diann is not a  great historian, it is unclear exactly if she is experiencing lightheadedness or vertigo.  Presyncope tends to lend self more to lightheadedness however the patient did experience improvement with meclizine, and was having nausea at the time of her symptoms, possible that vertigo was present.    Because her symptoms were worse with exertion both previously and at this event, would recommend for cardiac stress test, they were agreeable and this was ordered.  I recommend for brain MRI with and without contrast due to possibility of vertigo being the cause, to look for brain lesion/mass that may be contributing to recurrences.      If the patient does not have any abnormal findings, would consider neurology referral as well as ENT referral.  These for further evaluation for both neurologic causes of lightheadedness and presyncope as well as ENT referral for workup of vertigo potentially.  They were agreeable.    Again recommended for physical therapy for patient, which to prevent falls especially given her prior history of odontoid fracture, she declines physical therapy at this time.    Should not take meclizine unless very dizzy, should be very careful when taking this as it may make her more susceptible to falling.    - MR Brain w/o & w Contrast  - NM Lexiscan stress test    Elevated glucose    Had elevated glucose reading in the emergency room, repeated A1c today which was in normal range, this is in line with her prior glucoses that have generally been in normal range.  No further evaluation required    - Hemoglobin A1c  - Hemoglobin A1c    Rheumatoid arthritis, seropositive, multiple sites (H)  Currently on methotrexate and Orencia, lab work has been ordered by rheumatology, this can be released today and labs can be done while here in clinic.  She will follow-up with rheumatology.  Last orencia given October 22.  - Albumin level  - ALT  - CBC with platelets  - Creatinine          MED REC REQUIRED  Post  Medication Reconciliation Status:           Nicholas Tidwell is a 84 year old, presenting for the following health issues:  Hospital F/U (10/27/2024, Urinary frequency, Positional lightheadedness, Nausea, Decreased appetite. Reports feeling much better. Advised to have blood drawn for Hemoglobin 1.)        11/5/2024    12:48 PM   Additional Questions   Roomed by SHARRON WELCH     HPI     Reports that on Saturday night she came home from a surprise birthday party and was watching television and dizziness came on. It never did go away.  It was still there when she woke up on Sunday,  She could not get out of bed due to the dizziness and she needed assistance to walk, changing positions did not make it worse, activity did not make it worse.     She did feel like she was going to pass out when she was getting up to use the bathroom.  She would not get this sensation when actually standing up but she will get that sensation when walking.  Also will get that sensation when walking up and down the mart[place].      Reports the dizziness felt like lightheadedness, but doesn't remember that the world was spinning, not what she believed she experienced.    At the time she was also feeling nausea.     Would get shortness of breath with walking but not on Sunday.     Was given meclizine for dizziness at the ED, and only taking it as needed.  Last took it yesterday morning.   Dizziness has entirely resolved.         ED Follow-up Visit:    Hospital/Nursing Home/IP Rehab Facility: [facility]  Date of Admission: 10/27/2024 5 PM  Date of Discharge: 10/28/2024 1 AM  Reason(s) for Admission: Urinary frequency, Positional lightheadedness, Nausea, Decreased appetite  Was the patient in the ICU or did the patient experience delirium during hospitalization?  No  Do you have any other stressors you would like to discuss with your provider? No    Problems taking medications regularly:  None  Medication changes since discharge:  "Meclizine and Ondansetron  Problems adhering to non-medication therapy:  None    Diagnostic Tests/Treatments reviewed.  Follow up needed: Repeat blood sugar testing  Other Healthcare Providers Involved in Patient s Care:         None  Update since discharge: improved.         Plan of care communicated with patient and family                 Review of Systems  Constitutional, neuro, ENT, endocrine, pulmonary, cardiac, gastrointestinal, genitourinary, musculoskeletal, integument and psychiatric systems are negative, except as otherwise noted.      Objective    /60 (BP Location: Right arm, Patient Position: Sitting, Cuff Size: Adult Small)   Pulse 76   Temp 97.1  F (36.2  C)   Resp 12   Ht 1.562 m (5' 1.5\")   Wt 43.7 kg (96 lb 6.4 oz)   LMP  (LMP Unknown)   SpO2 97%   BMI 17.92 kg/m    Body mass index is 17.92 kg/m .  Physical Exam   GENERAL: alert and no distress  NECK: no adenopathy, no asymmetry, masses, or scars  RESP: lungs clear to auscultation - no rales, rhonchi or wheezes  CV: regular rate and rhythm, normal S1 S2, no S3 or S4, no murmur, click or rub, no peripheral edema  MS: no gross musculoskeletal defects noted, no edema  NEURO: Normal strength and tone, mentation intact and speech normal, head impulse normal, test of skew normal, no nystagmus    Admission on 10/27/2024, Discharged on 10/28/2024   Component Date Value Ref Range Status    Systolic Blood Pressure 10/27/2024 128  mmHg Final    Diastolic Blood Pressure 10/27/2024 60  mmHg Final    Ventricular Rate 10/27/2024 73  BPM Final    Atrial Rate 10/27/2024 73  BPM Final    AL Interval 10/27/2024 140  ms Final    QRS Duration 10/27/2024 102  ms Final    QT 10/27/2024 448  ms Final    QTc 10/27/2024 493  ms Final    P Axis 10/27/2024 16  degrees Final    R AXIS 10/27/2024 31  degrees Final    T Axis 10/27/2024 89  degrees Final    Interpretation ECG 10/27/2024    Final                    Value:Sinus rhythm  Septal infarct , age " undetermined  ST & T wave abnormality, consider lateral ischemia  Abnormal ECG  When compared with ECG of 23-Sep-2024 08:52,  Septal infarct is now Present  Confirmed by SEE ED PROVIDER NOTE FOR, ECG INTERPRETATION ([id number]),  GENEVIEVE BHATTI ([id number]) on 10/28/2024 8:10:04 AM      Lactic Acid 10/27/2024 1.7  0.7 - 2.0 mmol/L Final    Sodium 10/27/2024 137  135 - 145 mmol/L Final    Potassium 10/27/2024 4.3  3.4 - 5.3 mmol/L Final    Carbon Dioxide (CO2) 10/27/2024 23  22 - 29 mmol/L Final    Anion Gap 10/27/2024 13  7 - 15 mmol/L Final    Urea Nitrogen 10/27/2024 18.8  8.0 - 23.0 mg/dL Final    Creatinine 10/27/2024 0.69  0.51 - 0.95 mg/dL Final    GFR Estimate 10/27/2024 84  >60 mL/min/1.73m2 Final    eGFR calculated using 2021 CKD-EPI equation.    Calcium 10/27/2024 9.6  8.8 - 10.4 mg/dL Final    Reference intervals for this test were updated on 7/16/2024 to reflect our healthy population more accurately. There may be differences in the flagging of prior results with similar values performed with this method. Those prior results can be interpreted in the context of the updated reference intervals.    Chloride 10/27/2024 101  98 - 107 mmol/L Final    Glucose 10/27/2024 164 (H)  70 - 99 mg/dL Final    Alkaline Phosphatase 10/27/2024 67  40 - 150 U/L Final    AST 10/27/2024 35  0 - 45 U/L Final    ALT 10/27/2024 11  0 - 50 U/L Final    Protein Total 10/27/2024 6.5  6.4 - 8.3 g/dL Final    Albumin 10/27/2024 4.1  3.5 - 5.2 g/dL Final    Bilirubin Total 10/27/2024 0.6  <=1.2 mg/dL Final    N terminal Pro BNP Inpatient 10/27/2024 637  0 - 1,800 pg/mL Final    Reference range shown and results flagged as abnormal are suggested inpatient cut points for confirming diagnosis if CHF in an acute setting. Establishing a baseline value for each individual patient is useful for follow-up. An inpatient or emergency department NT-proPBNP <300 pg/mL effectively rules out acute CHF, with 99% negative predictive value.    The  outpatient non-acute reference range for ruling out CHF is:  0-125 pg/mL (age 18 to less than 75)  0-450 pg/mL (age 75 yrs and older)     Troponin T, High Sensitivity 10/27/2024 8  <=14 ng/L Final    Either a High Sensitivity Troponin T baseline (0 hours) value = 100 ng/L, or an increase in High Sensitivity Troponin T = 7 ng/L at 2 hours compared to 0 hours (2-0 hours), suggests myocardial injury, and urgent clinical attention is required.    If the 2-0 hours increase is <7 ng/L, a High Sensitivity Troponin T result above gender-specific reference ranges warrants further evaluation.   Recommendations for further evaluation include correlation with clinical decision-making tool (e.g., HEART), a 3rd High Sensitivity Troponin T test 2 hours after the 2nd (a 20% change from baseline would represent concern), admission for observation, close PCC/cardiology follow-up, or urgent outpatient provocative testing.    Color Urine 10/27/2024 Colorless  Colorless, Straw, Light Yellow, Yellow Final    Appearance Urine 10/27/2024 Clear  Clear Final    Glucose Urine 10/27/2024 Negative  Negative mg/dL Final    Bilirubin Urine 10/27/2024 Negative  Negative Final    Ketones Urine 10/27/2024 Negative  Negative mg/dL Final    Specific Gravity Urine 10/27/2024 1.006  1.001 - 1.030 Final    Blood Urine 10/27/2024 Negative  Negative Final    pH Urine 10/27/2024 6.0  5.0 - 7.0 Final    Protein Albumin Urine 10/27/2024 Negative  Negative mg/dL Final    Urobilinogen Urine 10/27/2024 <2.0  <2.0 mg/dL Final    Nitrite Urine 10/27/2024 Negative  Negative Final    Leukocyte Esterase Urine 10/27/2024 Negative  Negative Final    RBC Urine 10/27/2024 <1  <=2 /HPF Final    WBC Urine 10/27/2024 <1  <=5 /HPF Final    Squamous Epithelials Urine 10/27/2024 <1  <=1 /HPF Final    Influenza A PCR 10/27/2024 Negative  Negative Final    Influenza B PCR 10/27/2024 Negative  Negative Final    RSV PCR 10/27/2024 Negative  Negative Final    SARS CoV2 PCR  10/27/2024 Negative  Negative Final    NEGATIVE: SARS-CoV-2 (COVID-19) RNA not detected, presumed negative.    Culture 10/27/2024 No Growth   Final    WBC Count 10/27/2024 6.4  4.0 - 11.0 10e3/uL Final    RBC Count 10/27/2024 3.77 (L)  3.80 - 5.20 10e6/uL Final    Hemoglobin 10/27/2024 12.4  11.7 - 15.7 g/dL Final    Hematocrit 10/27/2024 36.4  35.0 - 47.0 % Final    MCV 10/27/2024 97  78 - 100 fL Final    MCH 10/27/2024 32.9  26.5 - 33.0 pg Final    MCHC 10/27/2024 34.1  31.5 - 36.5 g/dL Final    RDW 10/27/2024 14.1  10.0 - 15.0 % Final    Platelet Count 10/27/2024 192  150 - 450 10e3/uL Final    % Neutrophils 10/27/2024 70  % Final    % Lymphocytes 10/27/2024 21  % Final    % Monocytes 10/27/2024 7  % Final    % Eosinophils 10/27/2024 0  % Final    % Basophils 10/27/2024 1  % Final    % Immature Granulocytes 10/27/2024 0  % Final    NRBCs per 100 WBC 10/27/2024 0  <1 /100 Final    Absolute Neutrophils 10/27/2024 4.5  1.6 - 8.3 10e3/uL Final    Absolute Lymphocytes 10/27/2024 1.4  0.8 - 5.3 10e3/uL Final    Absolute Monocytes 10/27/2024 0.5  0.0 - 1.3 10e3/uL Final    Absolute Eosinophils 10/27/2024 0.0  0.0 - 0.7 10e3/uL Final    Absolute Basophils 10/27/2024 0.1  0.0 - 0.2 10e3/uL Final    Absolute Immature Granulocytes 10/27/2024 0.0  <=0.4 10e3/uL Final    Absolute NRBCs 10/27/2024 0.0  10e3/uL Final    Troponin T, High Sensitivity 10/27/2024 9  <=14 ng/L Final    Either a High Sensitivity Troponin T baseline (0 hours) value = 100 ng/L, or an increase in High Sensitivity Troponin T = 7 ng/L at 2 hours compared to 0 hours (2-0 hours), suggests myocardial injury, and urgent clinical attention is required.    If the 2-0 hours increase is <7 ng/L, a High Sensitivity Troponin T result above gender-specific reference ranges warrants further evaluation.   Recommendations for further evaluation include correlation with clinical decision-making tool (e.g., HEART), a 3rd High Sensitivity Troponin T test 2 hours after the  2nd (a 20% change from baseline would represent concern), admission for observation, close PCC/cardiology follow-up, or urgent outpatient provocative testing.     No results found.       Show images for Head CT w/o contrast     Study Result    Narrative & Impression   EXAM: CT HEAD W/O CONTRAST  LOCATION: [facility]  DATE: 10/27/2024     INDICATION: dizzy  COMPARISON: CT head August 29, 2023   TECHNIQUE: Routine CT Head without IV contrast. Multiplanar reformats. Dose reduction techniques were used.     FINDINGS:  INTRACRANIAL CONTENTS: No intracranial hemorrhage, extraaxial collection, or mass effect.  No CT evidence of acute infarct. Normal parenchymal attenuation. Mild generalized volume loss. No hydrocephalus.      VISUALIZED ORBITS/SINUSES/MASTOIDS: Prior bilateral cataract surgery. Visualized portions of the orbits are otherwise unremarkable. No paranasal sinus mucosal disease. No middle ear or mastoid effusion.     BONES/SOFT TISSUES: No acute abnormality. Partially imaged chronic fractures of the upper cervical spine.                                                                      IMPRESSION:  1.  No acute intracranial process.       The longitudinal plan of care for the diagnosis(es)/condition(s) as documented were addressed during this visit. Due to the added complexity in care, I will continue to support Diann in the subsequent management and with ongoing continuity of care.      Signed Electronically by: Abdifatah Alvarado MD

## 2024-11-06 ENCOUNTER — OFFICE VISIT (OUTPATIENT)
Dept: RHEUMATOLOGY | Facility: CLINIC | Age: 84
End: 2024-11-06
Payer: MEDICARE

## 2024-11-06 VITALS
WEIGHT: 95.9 LBS | DIASTOLIC BLOOD PRESSURE: 56 MMHG | OXYGEN SATURATION: 98 % | BODY MASS INDEX: 17.83 KG/M2 | SYSTOLIC BLOOD PRESSURE: 126 MMHG | HEART RATE: 76 BPM

## 2024-11-06 DIAGNOSIS — Z79.899 HIGH RISK MEDICATION USE: ICD-10-CM

## 2024-11-06 DIAGNOSIS — M05.79 RHEUMATOID ARTHRITIS, SEROPOSITIVE, MULTIPLE SITES (H): Primary | ICD-10-CM

## 2024-11-06 DIAGNOSIS — R76.8 RHEUMATOID FACTOR POSITIVE: ICD-10-CM

## 2024-11-06 DIAGNOSIS — M15.0 PRIMARY OSTEOARTHRITIS INVOLVING MULTIPLE JOINTS: ICD-10-CM

## 2024-11-06 PROCEDURE — 99214 OFFICE O/P EST MOD 30 MIN: CPT | Performed by: INTERNAL MEDICINE

## 2024-11-06 PROCEDURE — G2211 COMPLEX E/M VISIT ADD ON: HCPCS | Performed by: INTERNAL MEDICINE

## 2024-11-06 NOTE — PROGRESS NOTES
Rheumatology follow-up visit note     Diann is a 84 year old female presents today for follow-up.    Diann was seen today for recheck.    Diagnoses and all orders for this visit:    Rheumatoid arthritis, seropositive, multiple sites (H)    High risk medication use    Primary osteoarthritis involving multiple joints    Rheumatoid factor positive        The longitudinal plan of care for the diagnosis(es)/condition(s) as documented were addressed during this visit. Due to the added complexity in care, I will continue to support Diann in the subsequent management and with ongoing continuity of care.      Follow up in 6 months.    HPI    Diann Low is a 84 year old female is here for follow-up of   seropositive rheumatoid arthritis, osteoarthritis.  Her current regimen includes methotrexate, Orencia infusion every 6 weeks at [facility].  No longer on prednisone.  Recent labs are within acceptable range.  While she noted 0 pain in all her joints.  She noted pain level at 0.  Apart from participating in recreational activities or walking 2 miles she is able to do all her day-to-day activities without difficulty.  She gets her folic acid over-the-counter.  She noted no morning stiffness.She has had surgery on her PIPs some of her PIPs.  She is not driving however restriction her cell to immediate neighborhood roads not on 5 days.  She is accompanied by her son.  She does have advanced deformities from rheumatoid arthritis but remains highly functional.           DETAILED EXAMINATION  11/06/24  :    Vitals:    11/06/24 0846   BP: 126/56   BP Location: Right arm   Patient Position: Sitting   Cuff Size: Adult Small   Pulse: 76   SpO2: 98%   Weight: 43.5 kg (95 lb 14.4 oz)     Alert oriented. Head including the face is examined for malar rash, heliotropes, scarring, lupus pernio. Eyes examined for redness such as in episcleritis/scleritis, periorbital lesions.   Neck/ Face examined for parotid gland swelling, range of  motion of neck.  Left upper and lower and right upper and lower extremities examined for tenderness, swelling, warmth of the appendicular joints, range of motion, edema, rash.  S 945 has not showed that your left o'clock got here now she realizes she is just fine.  She can unfortunately seem ome of the important findings included: she does not have evidence of synovitis in the palpable joints of the upper extremities.  OA related significant deformities of the digits.  + [place] nodes.  Range of motion of the shoulders  show full abduction.  No JLT effusion or warmth of the knees.  she does not have dactylitis of the digits.  She has some instability of the gait.  She does have a cane at home that she plans to use on a regular basis.     Patient Active Problem List    Diagnosis Date Noted    Hyponatremia 08/29/2023     Priority: Medium    Hypothyroidism 08/19/2023     Priority: Medium    Repeated falls 08/19/2023     Priority: Medium    Injury of head 08/17/2023     Priority: Medium    Osteoporosis, unspecified osteoporosis type, unspecified pathological fracture presence 08/31/2022     Priority: Medium    Rheumatoid arthritis, seropositive, multiple sites (H) 06/20/2022     Priority: Medium    Rheumatoid arthritis involving both hands, unspecified whether rheumatoid factor present (H) 01/14/2022     Priority: Medium    History of falling 10/15/2018     Priority: Medium    Presence of intraocular lens 09/17/2018     Priority: Medium    Arthritis, rheumatoid (H) 09/12/2007     Priority: Medium     Past Surgical History:   Procedure Laterality Date    COLONOSCOPY N/A 10/1/2024    Procedure: COLONOSCOPY with biopsies;  Surgeon: Manjit Sandhu MD;  Location: [facility] Main OR      Past Medical History:   Diagnosis Date    PONV (postoperative nausea and vomiting)     Skin cancer     Spider veins      Allergies   Allergen Reactions    Infliximab Rash     Throat began to close/tighten    Sulfa Antibiotics Rash     Hydrocodone-Acetaminophen Nausea    Oxycodone-Acetaminophen Nausea    Penicillins Rash    Erythromycin Nausea     Current Outpatient Medications   Medication Sig Dispense Refill    alendronate (FOSAMAX) 70 MG tablet Take 1 tablet (70 mg) by mouth every 7 days 12 tablet 3    calcium carbonate-vitamin D (OSCAL W/D) 500-200 MG-UNIT tablet Take 1 tablet by mouth 2 times daily      carboxymethylcellulose PF (REFRESH PLUS) 0.5 % ophthalmic solution 1 drop 3 times daily as needed for dry eyes      clindamycin (CLEOCIN) 300 MG capsule Take 600 mg by mouth. For dental procedures      ferrous fumarate 65 mg, [place]. FE,-Vitamin C 125 mg (VITRON C)  MG TABS tablet Take 1 tablet by mouth every other day      levothyroxine (SYNTHROID/LEVOTHROID) 50 MCG tablet Take 1 tablet (50 mcg) by mouth daily 90 tablet 3    loperamide (IMODIUM) 2 MG capsule Take 2 mg by mouth 4 times daily as needed for diarrhea      meclizine (ANTIVERT) 25 MG tablet Take 1 tablet (25 mg) by mouth 3 times daily as needed for dizziness. 20 tablet 0    methotrexate 2.5 MG tablet TAKE 4 TABLETS EVERY 7 DAYS 48 tablet 3    Multiple Vitamin (MULTIVITAMIN ADULT PO) Take 1 tablet by mouth daily      ondansetron (ZOFRAN ODT) 4 MG ODT tab Take 1 tablet (4 mg) by mouth every 8 hours as needed. 20 tablet 0     family history is not on file.  Social Connections: Unknown (4/29/2021)    [facility]    Social Connection and Isolation Panel [NHANES]     Frequency of Communication with Friends and Family: Three times a week     Frequency of Social Gatherings with Friends and Family: Twice a week     Attends Yarsani Services: Patient declined     Active Member of Clubs or Organizations: Patient declined     Attends Club or Organization Meetings: Patient declined     Marital Status:           WBC Count   Date Value Ref Range Status   11/05/2024 7.0 4.0 - 11.0 10e3/uL Final     RBC Count   Date Value Ref Range Status   11/05/2024 3.59  (L) 3.80 - 5.20 10e6/uL Final     Hemoglobin   Date Value Ref Range Status   11/05/2024 11.9 11.7 - 15.7 g/dL Final     Hematocrit   Date Value Ref Range Status   11/05/2024 35.7 35.0 - 47.0 % Final     MCV   Date Value Ref Range Status   11/05/2024 99 78 - 100 fL Final     MCH   Date Value Ref Range Status   11/05/2024 33.1 (H) 26.5 - 33.0 pg Final     Platelet Count   Date Value Ref Range Status   11/05/2024 177 150 - 450 10e3/uL Final     % Lymphocytes   Date Value Ref Range Status   10/27/2024 21 % Final     AST   Date Value Ref Range Status   10/27/2024 35 0 - 45 U/L Final     ALT   Date Value Ref Range Status   11/05/2024 10 0 - 50 U/L Final     Albumin   Date Value Ref Range Status   11/05/2024 4.2 3.5 - 5.2 g/dL Final   08/29/2023 3.3 (L) 3.5 - 5.0 g/dL Final     Alkaline Phosphatase   Date Value Ref Range Status   10/27/2024 67 40 - 150 U/L Final     Creatinine   Date Value Ref Range Status   11/05/2024 0.85 0.51 - 0.95 mg/dL Final     GFR Estimate   Date Value Ref Range Status   11/05/2024 66 >60 mL/min/1.73m2 Final     Comment:     eGFR calculated using 2021 CKD-EPI equation.     GFR, ESTIMATED POCT   Date Value Ref Range Status   07/07/2024 >60 >60 mL/min/1.73m2 Final     N terminal Pro BNP Inpatient   Date Value Ref Range Status   10/27/2024 637 0 - 1,800 pg/mL Final     Comment:     Reference range shown and results flagged as abnormal are suggested inpatient cut points for confirming diagnosis if CHF in an acute setting. Establishing a baseline value for each individual patient is useful for follow-up. An inpatient or emergency department NT-proPBNP <300 pg/mL effectively rules out acute CHF, with 99% negative predictive value.    The outpatient non-acute reference range for ruling out CHF is:  0-125 pg/mL (age 18 to less than 75)  0-450 pg/mL (age 75 yrs and older)

## 2024-11-11 ENCOUNTER — VIRTUAL VISIT (OUTPATIENT)
Dept: ENDOCRINOLOGY | Facility: CLINIC | Age: 84
End: 2024-11-11
Payer: MEDICARE

## 2024-11-11 VITALS — BODY MASS INDEX: 17.78 KG/M2 | HEIGHT: 62 IN | WEIGHT: 96.6 LBS

## 2024-11-11 DIAGNOSIS — E03.8 SUBCLINICAL HYPOTHYROIDISM: Primary | ICD-10-CM

## 2024-11-11 DIAGNOSIS — M81.0 OSTEOPOROSIS, UNSPECIFIED OSTEOPOROSIS TYPE, UNSPECIFIED PATHOLOGICAL FRACTURE PRESENCE: ICD-10-CM

## 2024-11-11 RX ORDER — EPINEPHRINE 1 MG/ML
0.3 INJECTION, SOLUTION, CONCENTRATE INTRAVENOUS EVERY 5 MIN PRN
OUTPATIENT
Start: 2024-11-11

## 2024-11-11 RX ORDER — ALBUTEROL SULFATE 90 UG/1
1-2 INHALANT RESPIRATORY (INHALATION)
Start: 2024-11-11

## 2024-11-11 RX ORDER — DIPHENHYDRAMINE HYDROCHLORIDE 50 MG/ML
50 INJECTION INTRAMUSCULAR; INTRAVENOUS
Start: 2024-11-11

## 2024-11-11 RX ORDER — DIPHENHYDRAMINE HYDROCHLORIDE 50 MG/ML
25 INJECTION INTRAMUSCULAR; INTRAVENOUS
Start: 2024-11-11

## 2024-11-11 RX ORDER — METHYLPREDNISOLONE SODIUM SUCCINATE 40 MG/ML
40 INJECTION INTRAMUSCULAR; INTRAVENOUS
Start: 2024-11-11

## 2024-11-11 RX ORDER — ZOLEDRONIC ACID 0.05 MG/ML
3 INJECTION, SOLUTION INTRAVENOUS ONCE
Start: 2024-11-11

## 2024-11-11 RX ORDER — HEPARIN SODIUM,PORCINE 10 UNIT/ML
5-20 VIAL (ML) INTRAVENOUS DAILY PRN
OUTPATIENT
Start: 2024-11-11

## 2024-11-11 RX ORDER — ALBUTEROL SULFATE 0.83 MG/ML
2.5 SOLUTION RESPIRATORY (INHALATION)
OUTPATIENT
Start: 2024-11-11

## 2024-11-11 RX ORDER — HEPARIN SODIUM (PORCINE) LOCK FLUSH IV SOLN 100 UNIT/ML 100 UNIT/ML
5 SOLUTION INTRAVENOUS
OUTPATIENT
Start: 2024-11-11

## 2024-11-11 ASSESSMENT — PAIN SCALES - GENERAL: PAINLEVEL_OUTOF10: NO PAIN (0)

## 2024-11-11 NOTE — NURSING NOTE
Current patient location: [address]   [facility] [address]    Is the patient currently in the state of [place]? YES    Visit mode:VIDEO    If the visit is dropped, the patient can be reconnected by:VIDEO VISIT: Text to cell phone:   Telephone Information:   Mobile [phone]       Will anyone else be joining the visit? NO  (If patient encounters technical issues they should call [phone])    Are changes needed to the allergy or medication list? No    Are refills needed on medications prescribed by this physician? NO    Rooming Documentation:  Questionnaire(s) completed    Reason for visit: RECHECK    Vangie GERMANF

## 2024-11-11 NOTE — PROGRESS NOTES
ENDOCRINOLOGY VIDEO FOLLOW-UP         HISTORY OF PRESENT ILLNESS    Diann Low is seen in follow-up via billable video visit. Patient is accompanied by her son Isael.    1.  Osteoporosis.  I am seeing patient earlier than planned after follow-up DXA scan showed significant decline in BMD.    The patient had DXA scan performed on 7/30/2024.  I personally reviewed images.  -L1-L4 T-score 0.4, 9.5% decline in BMD compared to prior study in 2022 (significant).  TBS adjusted T-score -2.2.  -Left femoral neck T-score -1.0, left total hip T-score -1.1.  -Right femoral neck T-score -0.3, right total hip T-score -0.9.  1.5% increase in BMD at the total mean hips compared to prior study in 2022 (not significant).    She has not had falls or fractures in the interim since last visit.    She has been on Fosamax since 8/31/2022: Continues to take the medication once weekly, first thing in the morning with water, and she waits for about 30 minutes before having other food or beverages.  Tolerating Fosamax.    Continues on Caltrate chewables, taking 1 chewable twice daily with food--each chewable contains 600 mg of calcium and 800 IU vitamin D3.    She does not take separate vitamin D3 supplement.  She does take daily multivitamin ([employer] brand).    Remains off prednisone: Has been off since the end of 2023.    2.  Hypothyroidism.     Continues on levothyroxine 50 mcg daily.    In the interim since last visit, she noticed increased weakness and fatigue and updated me via [employer]: Our testing revealed normal thyroid function and cortisol level.    She has been seen in primary care clinic and Dr. Alvarado has recommended additional evaluation: She is anticipating stress test tomorrow.    Pertinent endocrine and related history:  1.  Osteoporosis. Clinical diagnosis based on discovery of occult vertebral fractures on thoracic and lumbar spines XR's performed in 7/2022.  -Longstanding history, previously followed with serial DXA  scans in the [facility] system.  -Most recent DXA scan completed at [facility] on 6/14/2022 showed BMD in the range of osteopenia (although TBS adjusted T score was in the range of osteoporosis) and FRAX calculation indicating high risk of fracture.  Lumbar spine L1-L4 T score 1.5 (although discrepant BMD at vertebrae), TBS adjusted T score -3.2.  Left femoral neck T score -1.3, left total hip T score -1.2.  Right femoral neck T score -0.4, right total hip T score -1.1.  FRAX calculated 10 year probability of major osteoporotic fracture is 17.4%, hip fracture is 6.0%.  No prior studies were completed in our system for comparison.  -Review of care everywhere indicates that last DXA scan in the [facility] system was in 2014 (I see records of DXA since 2007 in the [facility] system).  DXA 6/18/2014 showed osteopenia.  -Patient does not recall ever being prescribed medications to reduce risk of fracture.  -Osteoporosis is likely related to postmenopausal status, history of RA and glucocorticoid therapy.  Our work-up for other secondary causes of bone loss has otherwise revealed normal PTH, CMP, no anemia, and normal 24-hour urine calcium excretion; serum protein electrophoresis was normal while urine protein electrophoresis showed trace proteins--normal urine immunofixation. Screening for celiac disease in primary care clinic in 4/2022 showed normal serology.  2.  Rheumatoid arthritis. Since age 45.  Presently on methotrexate, Orencia infusion and prednisone.  She recalls that she has been on much higher doses of prednisone in the past.  3.  Unintentional weight loss.   4.  Hypothyroidism.  Hypothyroidism.  Subclinical hypothyroidism noted on initial labs in 6/2022.  Follow-up thyroid function test shows an upward trend in TSH, overtly low free T4 level also.  Initiated levothyroxine in 8/2022.    Pertinent Social History: Presently living in independent living in [place]; previously lived in [place] and  "moved to the [facility] in 8/2022.  Has 3 children.    PAST MEDICAL HISTORY  Past Medical History:   Diagnosis Date    PONV (postoperative nausea and vomiting)     Skin cancer     Spider veins        MEDICATIONS  Current Outpatient Medications   Medication Sig Dispense Refill    calcium carbonate-vitamin D (OSCAL W/D) 500-200 MG-UNIT tablet Take 1 tablet by mouth 2 times daily      carboxymethylcellulose PF (REFRESH PLUS) 0.5 % ophthalmic solution 1 drop 3 times daily as needed for dry eyes      clindamycin (CLEOCIN) 300 MG capsule Take 600 mg by mouth. For dental procedures      ferrous fumarate 65 mg, [place]. FE,-Vitamin C 125 mg (VITRON C)  MG TABS tablet Take 1 tablet by mouth every other day      levothyroxine (SYNTHROID/LEVOTHROID) 50 MCG tablet Take 1 tablet (50 mcg) by mouth daily 90 tablet 3    loperamide (IMODIUM) 2 MG capsule Take 2 mg by mouth 4 times daily as needed for diarrhea      meclizine (ANTIVERT) 25 MG tablet Take 1 tablet (25 mg) by mouth 3 times daily as needed for dizziness. 20 tablet 0    Multiple Vitamin (MULTIVITAMIN ADULT PO) Take 1 tablet by mouth daily      ondansetron (ZOFRAN ODT) 4 MG ODT tab Take 1 tablet (4 mg) by mouth every 8 hours as needed. 20 tablet 0       Allergies, family, and social history were reviewed and documented as needed in EHR.     REVIEW OF SYSTEMS  A focused ROS was performed, with pertinent positives and negatives as noted in the HPI.    PHYSICAL EXAM  Ht 1.562 m (5' 1.5\")   Wt 43.8 kg (96 lb 9.6 oz)   LMP  (LMP Unknown)   BMI 17.96 kg/m    Body mass index is 17.96 kg/m .   Constitutional: Patient is alert, oriented and appears in no acute distress.  Eyes: Eyes grossly normal to inspection, EOMI, no stare, lid lag, or retraction; no conjunctival injection.  ENMT: Lips are without lesions.   Neck: No visible goiter or neck mass.  Respiratory: No audible wheeze or cough. No visible cyanosis. No visible increased work of breathing.  Neurological: Alert and " oriented times 3.  Cranial nerves grossly intact.        DATA REVIEW  Each of the following laboratory and/or imaging studies were reviewed.    DXA as in HPI.        ASSESSMENT    1.  Osteoporosis.  Clinical diagnosis of osteoporosis based on presence of vertebral compression fractures; DXA has shown osteopenia/low bone density and patient has been assessed to be at high risk of fracture given TBS adjusted T score and FRAX score.    ~On Fosamax since 8/2022 and tolerating without side effect.  However, has had a significant decline in BMD at the lumbar spine.  This may reflect the effects of glucocorticoids, which were discontinued in 2023.  This may also be related to malabsorption of Fosamax: We will check bone turnover markers now to assess for this.  Our prior testing for secondary causes of osteoporosis has been unremarkable (no indication of multiple myeloma, celiac disease, parathyroid gland disease, or vitamin D deficiency; normal 24-hour urine calcium excretion).  Additionally, testing in primary care in 5/2024 showed normal celiac serology.  ~Would recommend change to a more potent antiresorptive.  In the past, Ms. Low has had reservations about using more potent medications for osteoporosis but she is open to considering Reclast infusion.  We reviewed benefits and potential side effects, including infusion reaction and arthralgias and other effects that are similar to oral bisphosphonate including risk for AFF.  Given age and calculated creatinine clearance, we will dose Reclast at 3 mg.  ~Continue current calcium regimen without changes.  ~Vitamin D level checked prior to this visit is just outside normal range: Not in the range of toxicity (she has no hypercalcemia).  Does not take separate vitamin D supplement.  We will continue to follow.    2.  Vertebral compression fractures.  Discovered on spine x-rays.  Likely due to osteoporosis.  Our evaluation for other causes such as multiple myeloma has  not revealed suggestive findings.     3.  Hypothyroidism.  Normal TSH on previsit labs.  Continue levothyroxine without changes.      4.  Weakness/fatigue.  Normal early morning cortisol, thyroid hormone supplementation is optimized.  Agree with workup for nonendocrine causes as already being coordinated by PCP.    PLAN  -8 AM fasting labs in the coming few weeks  -Schedule Reclast infusion--stop Fosamax the week that Reclast infusion is to be given  -Continue calcium without changes, 1 chewable twice daily  -Continue levothyroxine without changes  -Return for a follow-up visit in November 2025, with fasting 8 AM labs before visit--contact me sooner if follow-up questions or new concerns  -We will communicate results via [employer], or if needed by phone      Orders Placed This Encounter   Procedures    C-Telopeptide, Beta-Cross-Linked       Video start time: 12:23 PM  Video end time: 12:47 PM    Physician location: Off-site    Video platform: [employer]    I spent a total of 49 minutes on the date of encounter reviewing medical records, evaluating the patient, coordinating care and documenting in the EHR, as detailed above.    Sukumar Jenkins MD   Division of Diabetes, Endocrinology and Metabolism  Department of Medicine

## 2024-11-11 NOTE — LETTER
11/11/2024       RE: Diann Low  [address]  [facility] [address]     Dear Colleague,    Thank you for referring your patient, Diann Low, to the [facility] [place] at [facility]. Please see a copy of my visit note below.      ENDOCRINOLOGY VIDEO FOLLOW-UP         HISTORY OF PRESENT ILLNESS    Diann Low is seen in follow-up via billable video visit. Patient is accompanied by her son Isael.    1.  Osteoporosis.  I am seeing patient earlier than planned after follow-up DXA scan showed significant decline in BMD.    The patient had DXA scan performed on 7/30/2024.  I personally reviewed images.  -L1-L4 T-score 0.4, 9.5% decline in BMD compared to prior study in 2022 (significant).  TBS adjusted T-score -2.2.  -Left femoral neck T-score -1.0, left total hip T-score -1.1.  -Right femoral neck T-score -0.3, right total hip T-score -0.9.  1.5% increase in BMD at the total mean hips compared to prior study in 2022 (not significant).    She has not had falls or fractures in the interim since last visit.    She has been on Fosamax since 8/31/2022: Continues to take the medication once weekly, first thing in the morning with water, and she waits for about 30 minutes before having other food or beverages.  Tolerating Fosamax.    Continues on Caltrate chewables, taking 1 chewable twice daily with food--each chewable contains 600 mg of calcium and 800 IU vitamin D3.    She does not take separate vitamin D3 supplement.  She does take daily multivitamin ([employer] brand).    Remains off prednisone: Has been off since the end of 2023.    2.  Hypothyroidism.     Continues on levothyroxine 50 mcg daily.    In the interim since last visit, she noticed increased weakness and fatigue and updated me via [employer]: Our testing revealed normal thyroid function and cortisol level.    She has been seen in primary care clinic and Dr. Alvarado has  recommended additional evaluation: She is anticipating stress test tomorrow.    Pertinent endocrine and related history:  1.  Osteoporosis. Clinical diagnosis based on discovery of occult vertebral fractures on thoracic and lumbar spines XR's performed in 7/2022.  -Longstanding history, previously followed with serial DXA scans in the [facility] system.  -Most recent DXA scan completed at [facility] on 6/14/2022 showed BMD in the range of osteopenia (although TBS adjusted T score was in the range of osteoporosis) and FRAX calculation indicating high risk of fracture.  Lumbar spine L1-L4 T score 1.5 (although discrepant BMD at vertebrae), TBS adjusted T score -3.2.  Left femoral neck T score -1.3, left total hip T score -1.2.  Right femoral neck T score -0.4, right total hip T score -1.1.  FRAX calculated 10 year probability of major osteoporotic fracture is 17.4%, hip fracture is 6.0%.  No prior studies were completed in our system for comparison.  -Review of care everywhere indicates that last DXA scan in the [facility] system was in 2014 (I see records of DXA since 2007 in the [facility] system).  DXA 6/18/2014 showed osteopenia.  -Patient does not recall ever being prescribed medications to reduce risk of fracture.  -Osteoporosis is likely related to postmenopausal status, history of RA and glucocorticoid therapy.  Our work-up for other secondary causes of bone loss has otherwise revealed normal PTH, CMP, no anemia, and normal 24-hour urine calcium excretion; serum protein electrophoresis was normal while urine protein electrophoresis showed trace proteins--normal urine immunofixation. Screening for celiac disease in primary care clinic in 4/2022 showed normal serology.  2.  Rheumatoid arthritis. Since age 45.  Presently on methotrexate, Orencia infusion and prednisone.  She recalls that she has been on much higher doses of prednisone in the past.  3.  Unintentional weight loss.   4.  Hypothyroidism.  " Hypothyroidism.  Subclinical hypothyroidism noted on initial labs in 6/2022.  Follow-up thyroid function test shows an upward trend in TSH, overtly low free T4 level also.  Initiated levothyroxine in 8/2022.    Pertinent Social History: Presently living in independent living in [place]; previously lived in [place] and moved to the [facility] in 8/2022.  Has 3 children.    PAST MEDICAL HISTORY  Past Medical History:   Diagnosis Date     PONV (postoperative nausea and vomiting)      Skin cancer      Spider veins        MEDICATIONS  Current Outpatient Medications   Medication Sig Dispense Refill     calcium carbonate-vitamin D (OSCAL W/D) 500-200 MG-UNIT tablet Take 1 tablet by mouth 2 times daily       carboxymethylcellulose PF (REFRESH PLUS) 0.5 % ophthalmic solution 1 drop 3 times daily as needed for dry eyes       clindamycin (CLEOCIN) 300 MG capsule Take 600 mg by mouth. For dental procedures       ferrous fumarate 65 mg, [place]. FE,-Vitamin C 125 mg (VITRON C)  MG TABS tablet Take 1 tablet by mouth every other day       levothyroxine (SYNTHROID/LEVOTHROID) 50 MCG tablet Take 1 tablet (50 mcg) by mouth daily 90 tablet 3     loperamide (IMODIUM) 2 MG capsule Take 2 mg by mouth 4 times daily as needed for diarrhea       meclizine (ANTIVERT) 25 MG tablet Take 1 tablet (25 mg) by mouth 3 times daily as needed for dizziness. 20 tablet 0     Multiple Vitamin (MULTIVITAMIN ADULT PO) Take 1 tablet by mouth daily       ondansetron (ZOFRAN ODT) 4 MG ODT tab Take 1 tablet (4 mg) by mouth every 8 hours as needed. 20 tablet 0       Allergies, family, and social history were reviewed and documented as needed in EHR.     REVIEW OF SYSTEMS  A focused ROS was performed, with pertinent positives and negatives as noted in the HPI.    PHYSICAL EXAM  Ht 1.562 m (5' 1.5\")   Wt 43.8 kg (96 lb 9.6 oz)   LMP  (LMP Unknown)   BMI 17.96 kg/m    Body mass index is 17.96 kg/m .   Constitutional: Patient is alert, oriented and " appears in no acute distress.  Eyes: Eyes grossly normal to inspection, EOMI, no stare, lid lag, or retraction; no conjunctival injection.  ENMT: Lips are without lesions.   Neck: No visible goiter or neck mass.  Respiratory: No audible wheeze or cough. No visible cyanosis. No visible increased work of breathing.  Neurological: Alert and oriented times 3.  Cranial nerves grossly intact.        DATA REVIEW  Each of the following laboratory and/or imaging studies were reviewed.    DXA as in HPI.        ASSESSMENT    1.  Osteoporosis.  Clinical diagnosis of osteoporosis based on presence of vertebral compression fractures; DXA has shown osteopenia/low bone density and patient has been assessed to be at high risk of fracture given TBS adjusted T score and FRAX score.    ~On Fosamax since 8/2022 and tolerating without side effect.  However, has had a significant decline in BMD at the lumbar spine.  This may reflect the effects of glucocorticoids, which were discontinued in 2023.  This may also be related to malabsorption of Fosamax: We will check bone turnover markers now to assess for this.  Our prior testing for secondary causes of osteoporosis has been unremarkable (no indication of multiple myeloma, celiac disease, parathyroid gland disease, or vitamin D deficiency; normal 24-hour urine calcium excretion).  Additionally, testing in primary care in 5/2024 showed normal celiac serology.  ~Would recommend change to a more potent antiresorptive.  In the past, Ms. Low has had reservations about using more potent medications for osteoporosis but she is open to considering Reclast infusion.  We reviewed benefits and potential side effects, including infusion reaction and arthralgias and other effects that are similar to oral bisphosphonate including risk for AFF.  Given age and calculated creatinine clearance, we will dose Reclast at 3 mg.  ~Continue current calcium regimen without changes.  ~Vitamin D level checked  prior to this visit is just outside normal range: Not in the range of toxicity (she has no hypercalcemia).  Does not take separate vitamin D supplement.  We will continue to follow.    2.  Vertebral compression fractures.  Discovered on spine x-rays.  Likely due to osteoporosis.  Our evaluation for other causes such as multiple myeloma has not revealed suggestive findings.     3.  Hypothyroidism.  Normal TSH on previsit labs.  Continue levothyroxine without changes.      4.  Weakness/fatigue.  Normal early morning cortisol, thyroid hormone supplementation is optimized.  Agree with workup for nonendocrine causes as already being coordinated by PCP.    PLAN  -8 AM fasting labs in the coming few weeks  -Schedule Reclast infusion--stop Fosamax the week that Reclast infusion is to be given  -Continue calcium without changes, 1 chewable twice daily  -Continue levothyroxine without changes  -Return for a follow-up visit in November 2025, with fasting 8 AM labs before visit--contact me sooner if follow-up questions or new concerns  -We will communicate results via [employer], or if needed by phone      Orders Placed This Encounter   Procedures     C-Telopeptide, Beta-Cross-Linked       Video start time: 12:23 PM  Video end time: 12:47 PM    Physician location: Off-site    Video platform: [employer]r    I spent a total of 49 minutes on the date of encounter reviewing medical records, evaluating the patient, coordinating care and documenting in the EHR, as detailed above.    Sukumar Jenkins MD   Division of Diabetes, Endocrinology and Metabolism  Department of Medicine            Again, thank you for allowing me to participate in the care of your patient.      Sincerely,    Sukumar Jenkins MD

## 2024-11-11 NOTE — PATIENT INSTRUCTIONS
-8 AM fasting labs in the coming few weeks  -Schedule Reclast infusion--stop Fosamax the week that Reclast infusion is to be given  -If you do not hear from the infusion center, you can contact the infusion center directly to follow-up on Reclast infusion--[facility] can be reached at [phone]   -Continue calcium without changes, 1 chewable twice daily  -Continue levothyroxine without changes  -Return for a follow-up visit in November 2025, with fasting 8 AM labs before visit--contact me sooner if follow-up questions or new concerns  -We will communicate results via [employer], or if needed by phone

## 2024-11-12 ENCOUNTER — HOSPITAL ENCOUNTER (OUTPATIENT)
Dept: NUCLEAR MEDICINE | Facility: CLINIC | Age: 84
Discharge: HOME | End: 2024-11-12
Attending: STUDENT IN AN ORGANIZED HEALTH CARE EDUCATION/TRAINING PROGRAM
Payer: MEDICARE

## 2024-11-12 ENCOUNTER — HOSPITAL ENCOUNTER (OUTPATIENT)
Dept: CARDIOLOGY | Facility: CLINIC | Age: 84
Discharge: HOME | End: 2024-11-12
Attending: STUDENT IN AN ORGANIZED HEALTH CARE EDUCATION/TRAINING PROGRAM
Payer: MEDICARE

## 2024-11-12 DIAGNOSIS — R55 PRE-SYNCOPE: ICD-10-CM

## 2024-11-12 DIAGNOSIS — R94.31 ABNORMAL ELECTROCARDIOGRAM (ECG) (EKG): ICD-10-CM

## 2024-11-12 LAB
CV STRESS CURRENT BP HE: NORMAL
CV STRESS CURRENT HR HE: 100
CV STRESS CURRENT HR HE: 101
CV STRESS CURRENT HR HE: 103
CV STRESS CURRENT HR HE: 103
CV STRESS CURRENT HR HE: 68
CV STRESS CURRENT HR HE: 69
CV STRESS CURRENT HR HE: 69
CV STRESS CURRENT HR HE: 80
CV STRESS CURRENT HR HE: 88
CV STRESS CURRENT HR HE: 95
CV STRESS CURRENT HR HE: 96
CV STRESS CURRENT HR HE: 97
CV STRESS DEVIATION TIME HE: NORMAL
CV STRESS ECHO PERCENT HR HE: NORMAL
CV STRESS EXERCISE STAGE HE: NORMAL
CV STRESS FINAL RESTING BP HE: NORMAL
CV STRESS FINAL RESTING HR HE: 80
CV STRESS MAX HR HE: 109
CV STRESS MAX TREADMILL GRADE HE: 0
CV STRESS MAX TREADMILL SPEED HE: 0
CV STRESS PEAK DIA BP HE: NORMAL
CV STRESS PEAK SYS BP HE: NORMAL
CV STRESS PHASE HE: NORMAL
CV STRESS PROTOCOL HE: NORMAL
CV STRESS RESTING PT POSITION HE: NORMAL
CV STRESS ST DEVIATION AMOUNT HE: NORMAL
CV STRESS ST DEVIATION ELEVATION HE: NORMAL
CV STRESS ST EVELATION AMOUNT HE: NORMAL
CV STRESS TEST TYPE HE: NORMAL
CV STRESS TOTAL STAGE TIME MIN 1 HE: NORMAL
NUC STRESS EJECTION FRACTION: 75 %
RATE PRESSURE PRODUCT: NORMAL
STRESS ECHO BASELINE DIASTOLIC HE: 57
STRESS ECHO BASELINE HR: 71
STRESS ECHO BASELINE SYSTOLIC BP: 137
STRESS ECHO CALCULATED PERCENT HR: 81 %
STRESS ECHO LAST STRESS DIASTOLIC BP: 52
STRESS ECHO LAST STRESS HR: 96
STRESS ECHO LAST STRESS SYSTOLIC BP: 115
STRESS ECHO TARGET HR: 134

## 2024-11-12 PROCEDURE — 343N000001 HC RX 343 MED OP 636: Performed by: STUDENT IN AN ORGANIZED HEALTH CARE EDUCATION/TRAINING PROGRAM

## 2024-11-12 PROCEDURE — 250N000011 HC RX IP 250 OP 636: Performed by: STUDENT IN AN ORGANIZED HEALTH CARE EDUCATION/TRAINING PROGRAM

## 2024-11-12 PROCEDURE — G1010 CDSM STANSON: HCPCS

## 2024-11-12 PROCEDURE — 78452 HT MUSCLE IMAGE SPECT MULT: CPT | Mod: 26 | Performed by: INTERNAL MEDICINE

## 2024-11-12 PROCEDURE — 93016 CV STRESS TEST SUPVJ ONLY: CPT | Performed by: INTERNAL MEDICINE

## 2024-11-12 PROCEDURE — 93017 CV STRESS TEST TRACING ONLY: CPT

## 2024-11-12 PROCEDURE — G1010 CDSM STANSON: HCPCS | Performed by: INTERNAL MEDICINE

## 2024-11-12 PROCEDURE — 93018 CV STRESS TEST I&R ONLY: CPT | Performed by: INTERNAL MEDICINE

## 2024-11-12 PROCEDURE — A9500 TC99M SESTAMIBI: HCPCS | Performed by: STUDENT IN AN ORGANIZED HEALTH CARE EDUCATION/TRAINING PROGRAM

## 2024-11-12 RX ORDER — AMINOPHYLLINE 25 MG/ML
50-100 INJECTION, SOLUTION INTRAVENOUS
Status: DISCONTINUED | OUTPATIENT
Start: 2024-11-12 | End: 2024-11-13 | Stop reason: HOSPADM

## 2024-11-12 RX ORDER — REGADENOSON 0.08 MG/ML
0.4 INJECTION, SOLUTION INTRAVENOUS ONCE
Status: COMPLETED | OUTPATIENT
Start: 2024-11-12 | End: 2024-11-12

## 2024-11-12 RX ORDER — CAFFEINE CITRATE 20 MG/ML
60 SOLUTION INTRAVENOUS
Status: DISCONTINUED | OUTPATIENT
Start: 2024-11-12 | End: 2024-11-12 | Stop reason: HOSPADM

## 2024-11-12 RX ORDER — CAFFEINE 200 MG
200 TABLET ORAL
Status: DISCONTINUED | OUTPATIENT
Start: 2024-11-12 | End: 2024-11-12 | Stop reason: HOSPADM

## 2024-11-12 RX ORDER — ALBUTEROL SULFATE 0.83 MG/ML
2.5 SOLUTION RESPIRATORY (INHALATION)
Status: DISCONTINUED | OUTPATIENT
Start: 2024-11-12 | End: 2024-11-12 | Stop reason: HOSPADM

## 2024-11-12 RX ADMIN — Medication 32.7 MILLICURIE: at 10:30

## 2024-11-12 RX ADMIN — Medication 8.2 MILLICURIE: at 09:54

## 2024-11-12 RX ADMIN — REGADENOSON 0.4 MG: 0.08 INJECTION, SOLUTION INTRAVENOUS at 10:29

## 2024-11-14 ENCOUNTER — MYC MEDICAL ADVICE (OUTPATIENT)
Dept: FAMILY MEDICINE | Facility: CLINIC | Age: 84
End: 2024-11-14
Payer: MEDICARE

## 2024-11-14 DIAGNOSIS — R42 DIZZINESS: Primary | ICD-10-CM

## 2024-11-15 NOTE — TELEPHONE ENCOUNTER
I will place a order for brain MRI without contrast.  However, if no contrast is used, it may be difficult to see the masses or lesions that could be causing vertigo.  It would be preferable to use contrast.  If she cannot tolerate contrast then we will go with this option however.

## 2024-11-19 RX ORDER — ALBUTEROL SULFATE 0.83 MG/ML
2.5 SOLUTION RESPIRATORY (INHALATION)
OUTPATIENT
Start: 2024-11-19

## 2024-11-19 RX ORDER — DIPHENHYDRAMINE HYDROCHLORIDE 50 MG/ML
50 INJECTION INTRAMUSCULAR; INTRAVENOUS
Start: 2024-11-19

## 2024-11-19 RX ORDER — MEPERIDINE HYDROCHLORIDE 25 MG/ML
25 INJECTION INTRAMUSCULAR; INTRAVENOUS; SUBCUTANEOUS
OUTPATIENT
Start: 2024-11-19

## 2024-11-19 RX ORDER — EPINEPHRINE 1 MG/ML
0.3 INJECTION, SOLUTION, CONCENTRATE INTRAVENOUS EVERY 5 MIN PRN
OUTPATIENT
Start: 2024-11-19

## 2024-11-19 RX ORDER — ALBUTEROL SULFATE 90 UG/1
1-2 INHALANT RESPIRATORY (INHALATION)
Start: 2024-11-19

## 2024-11-19 RX ORDER — DIPHENHYDRAMINE HYDROCHLORIDE 50 MG/ML
25 INJECTION INTRAMUSCULAR; INTRAVENOUS
Start: 2024-11-19

## 2024-11-19 RX ORDER — METHYLPREDNISOLONE SODIUM SUCCINATE 40 MG/ML
40 INJECTION INTRAMUSCULAR; INTRAVENOUS
Start: 2024-11-19

## 2024-11-22 ENCOUNTER — HOSPITAL ENCOUNTER (OUTPATIENT)
Dept: MRI IMAGING | Facility: CLINIC | Age: 84
Discharge: HOME | End: 2024-11-22
Attending: STUDENT IN AN ORGANIZED HEALTH CARE EDUCATION/TRAINING PROGRAM | Admitting: STUDENT IN AN ORGANIZED HEALTH CARE EDUCATION/TRAINING PROGRAM
Payer: MEDICARE

## 2024-11-22 DIAGNOSIS — R42 DIZZINESS: ICD-10-CM

## 2024-11-22 DIAGNOSIS — R55 PRE-SYNCOPE: ICD-10-CM

## 2024-11-22 LAB — RADIOLOGIST FLAGS: ABNORMAL

## 2024-11-22 PROCEDURE — 255N000002 HC RX 255 OP 636: Performed by: STUDENT IN AN ORGANIZED HEALTH CARE EDUCATION/TRAINING PROGRAM

## 2024-11-22 PROCEDURE — A9585 GADOBUTROL INJECTION: HCPCS | Performed by: STUDENT IN AN ORGANIZED HEALTH CARE EDUCATION/TRAINING PROGRAM

## 2024-11-22 PROCEDURE — 70553 MRI BRAIN STEM W/O & W/DYE: CPT | Mod: MG

## 2024-11-22 RX ORDER — GADOBUTROL 604.72 MG/ML
4.4 INJECTION INTRAVENOUS ONCE
Status: COMPLETED | OUTPATIENT
Start: 2024-11-22 | End: 2024-11-22

## 2024-11-22 RX ADMIN — GADOBUTROL 4.4 ML: 604.72 INJECTION INTRAVENOUS at 12:11

## 2024-11-25 ENCOUNTER — TELEPHONE (OUTPATIENT)
Dept: FAMILY MEDICINE | Facility: CLINIC | Age: 84
End: 2024-11-25
Payer: MEDICARE

## 2024-11-25 ENCOUNTER — APPOINTMENT (OUTPATIENT)
Dept: CT IMAGING | Facility: CLINIC | Age: 84
End: 2024-11-25
Attending: EMERGENCY MEDICINE
Payer: MEDICARE

## 2024-11-25 ENCOUNTER — HOSPITAL ENCOUNTER (EMERGENCY)
Facility: CLINIC | Age: 84
Discharge: HOME | End: 2024-11-25
Attending: EMERGENCY MEDICINE | Admitting: EMERGENCY MEDICINE
Payer: MEDICARE

## 2024-11-25 VITALS
DIASTOLIC BLOOD PRESSURE: 56 MMHG | TEMPERATURE: 97.8 F | RESPIRATION RATE: 18 BRPM | HEART RATE: 86 BPM | OXYGEN SATURATION: 96 % | SYSTOLIC BLOOD PRESSURE: 132 MMHG

## 2024-11-25 DIAGNOSIS — Z87.81 H/O CERVICAL FRACTURE: ICD-10-CM

## 2024-11-25 PROCEDURE — 99284 EMERGENCY DEPT VISIT MOD MDM: CPT | Mod: 25

## 2024-11-25 PROCEDURE — G1010 CDSM STANSON: HCPCS

## 2024-11-25 PROCEDURE — 72125 CT NECK SPINE W/O DYE: CPT | Mod: MF

## 2024-11-25 ASSESSMENT — ACTIVITIES OF DAILY LIVING (ADL)
ADLS_ACUITY_SCORE: 57

## 2024-11-25 ASSESSMENT — COLUMBIA-SUICIDE SEVERITY RATING SCALE - C-SSRS
2. HAVE YOU ACTUALLY HAD ANY THOUGHTS OF KILLING YOURSELF IN THE PAST MONTH?: NO
6. HAVE YOU EVER DONE ANYTHING, STARTED TO DO ANYTHING, OR PREPARED TO DO ANYTHING TO END YOUR LIFE?: NO
1. IN THE PAST MONTH, HAVE YOU WISHED YOU WERE DEAD OR WISHED YOU COULD GO TO SLEEP AND NOT WAKE UP?: NO

## 2024-11-25 NOTE — TELEPHONE ENCOUNTER
Patient returning call, relayed PCP message that was discussed with son. Patient verbalized an understanding and intends to go to ED. No further questions or concerns at this time.

## 2024-11-25 NOTE — TELEPHONE ENCOUNTER
I reviewed the imaging, attempted to call the patient directly, no answer, I called her son who she has given permission to discuss medical matters with me.  Discussed the findings on the MRI, specifically the concerning findings for instability or possible instability of the cervical spine from her prior dens fracture.  Rafa, her son, has not noted any weakness, or other new symptoms since her last visit with me earlier this month.      Encouraged them to urgently go to the emergency room, and to put the cervical collar back on.  He will put the collar back on and go to the emergency room.

## 2024-11-25 NOTE — TELEPHONE ENCOUNTER
General Call      Reason for Call:  MR Brain W/O and W Contrast    What are your questions or concerns:  Imaging calling regarding Urgent flag from radiologist on MR from 11/22/24.

## 2024-11-25 NOTE — ED TRIAGE NOTES
Pt sent in by primary for MRI changes. States a year ago in August she had a fall and fractured vertebrae 1 and 2. Was in an aspen collar. Was cleared in February and took collar off in march. Had repeat MRI on Friday and they noticed shifting in the vertebrae in her neck where the fracture was. She was placed back in aspen. She denies pain. denies numbness. Denies loss of bowel or bladder.      Triage Assessment (Adult)       Row Name 11/25/24 1538          Triage Assessment    Airway WDL WDL        Respiratory WDL    Respiratory WDL WDL        Skin Circulation/Temperature WDL    Skin Circulation/Temperature WDL WDL        Cardiac WDL    Cardiac WDL WDL        Peripheral/Neurovascular WDL    Peripheral Neurovascular WDL WDL        Cognitive/Neuro/Behavioral WDL    Cognitive/Neuro/Behavioral WDL WDL

## 2024-11-25 NOTE — ED PROVIDER NOTES
Emergency Department Note      History of Present Illness     Chief Complaint   Neck Injury      HPI   Diann Low is a 84 year old female with a history of rheumatoid arthritis and hypothyroidism who presents for an evaluation of a Neck Injury. The patient reports that approximately 15 months ago, she suffered a bad fall, resulting in fracture of vertebrae C1 and C2. Upon evaluation, neurosurgery physicians at [facility] determined that the patient was not a suitable candidate for surgery, and recommended that the patient wear an [place] [employer] Collar. The patient wore an Aspen Collar continuously for 8 months, and was slowly weaned off use of the collar starting February of this year. The patient states that she underwent a brain MRI at [facility] 3 days ago, and her primary care physician found non positive shifting of the aforementioned fractures when compared to an MRI of the patient conducted in February. The patients primary care physician called the patient and instructed her to wear her Aspen Collar again, and present for evaluation in the ED. The patient currently endorses experiencing chronic bilateral weakness in her arms, which she attributes to Rheumatoid Arthritis. Per the triage note, the patient denies experiencing any new pain, numbness, urinary difficulties, or constipation.     Independent Historian   None    Review of External Notes   The patient underwent an MRI of the brain on 11/22/204 at [facility]. The clinical impression of the results are as follows:     IMPRESSION:   1.  Negative for acute intracranial hemorrhage, infarct, hydrocephalus or mass. No pathologic postcontrast enhancement.  2.  Nonhealed chronic dens fracture which demonstrates more posterior angulation since comparison studies concerning for progressive instability.      The patient underwent a CT of her Cervical Spine on 12/19/2023 at [facility]. The clinical impression of the results are as  follows:    IMPRESSION:  1.  Interval healing changes about the anterior right C1 fracture. Fracture lines remain present in the anterior and posterior arches of C1.   2.  Increased displacement and posterior angulation of the dens fracture.     The patient had a CT of the cervical spine in February of this year at the [facility], the results are noted here    CT CERVICAL SPINE W/O CONTRAST  Order: [id number]  Impression    1.  Interval further slight healing about anterior right C1 and bilateral posterior ring fractures. Similar degree of left anterior C1 arch fracture distraction by 4 mm.  2.  Decreased posterior angulation of the dens fracture with improved fracture alignment, but slightly more distracted fracture plane.  Past Medical History     Medical History and Problem List   Hypothyroidism  Age-Related Osteoporosis  Rheumatoid Arthritis  Hyponatremia  Skin Cancer    Medications   Levothyroxine  Meclizine  Ondansetron  Clindamycin  Loperamide        Surgical History   Colonoscopy  Hardware Removal, right index finger  Phacoemulsification Cataract with Intraocular Lens Implantation  Tonsillectomy  Finger Arthrodesis, right index finger  Mohs Surgery  D&C  Tenosynovectomy, Upper Extremity  Excision of Prominent Ulnar Styloid  Hardware Removal, right thumb  Left Long Proximal Interphalangeal Joint Arthroplasty\  Hammertoe Correction, left 5th  Hand arthroplasty, left finger  Physical Exam     Patient Vitals for the past 24 hrs:   BP Temp Temp src Pulse Resp SpO2   11/25/24 1529 126/74 97.8  F (36.6  C) Temporal 75 16 98 %     Physical Exam  General: Resting comfortably on a chair in Intake.   Head:  The scalp, face, and head appear normal  Eyes:  The pupils are equal, round, and reactive to light    There is no nystagmus    Extraocular muscles are intact    Conjunctivae and sclerae are normal  ENT:    The nose is normal    Pinnae are normal    The oropharynx is normal  Neck:  Normal range of motion    There is  no rigidity noted    [place] [facility] Collar in place  CV:  Regular rate  Normal underlying rhythm     Normal S1/S2    No pathological murmur detected  Resp:  Lungs are clear    There is no tachypnea    Non-labored    No rales    No wheezing   GI:  Abdomen is soft, there is no rigidity    No distension/tympani    No rebound tenderness     Non-surgical without peritoneal features at this time  MS:  Normal muscular tone    Symmetric motor strength    No major joint effusions    No asymmetric leg swelling    No calf tenderness  Skin:  No rash or acute skin lesions noted  Neuro:  Speech is normal and fluent, there is no aphasia    No motor deficits    Cranial nerves are intact    No focal pain, weakness, paresthesia, or numbness in the arms or hands.  Psych: Awake. Alert.      Normal affect  Appropriate interactions           Diagnostics     Lab Results   Labs Ordered and Resulted from Time of ED Arrival to Time of ED Departure - No data to display    Imaging   CT Cervical Spine w/o Contrast   Final Result    IMPRESSION:   1.  Multiple chronic C1 fractures described above.      2.  C2 type II chronic dens fracture described above.      3.  No acute fracture.      4.  Multilevel spondylosis described above.          ED Course      Medications Administered   Medications - No data to display    Procedures   Procedures     Discussion of Management   I discussed the case with Judith Jacob from neurosurgery, she also discussed the case with Dr. Christiano Hurtado from neurosurgery    ED Course   ED Course as of 11/25/24 1950 Mon Nov 25, 2024 1615 I obtained the history and evaluated the patient as noted above.    1636 I rechecked and updated the patient.    1636 I spoke with Jose Mayer PA-C   1907 I spoke with    1908 I spoke with Patti Jacob from Neurosurgery   1942 I rechecked and updated the patient. The patient was asking about the delay in follow-up.       Additional Documentation  None    Medical Decision Making /  Diagnosis     CMS Diagnoses: None    MIPS       None    MDM   Diann Low is a 84 year old female presents with a history of a C1 and C2 fracture that occurred over a year ago.  She has been managed in an Goodman collar.  She had a recent brain MRI that showed some possible instability of the fracture fragments at C2 so she was directed to the emergency department.  We were able to get CT images pushed over the from the [facility] from her cervical spine CT in February of this year and compared it to new and images obtained today.  They were reviewed by the neurosurgery team.  They like the patient to remain in her collar and they like to see her in clinic in 2 weeks for flexion and extension films.  No life-threatening etiologies or acute interventions are needed at this time.  They will have to continue to assess for the stability of the fragmentation.    Disposition   The patient was discharged.     Diagnosis     ICD-10-CM    1. H/O cervical fracture  Z87.81              Scribe Disclosure:  Micha MERRILL, am serving as a scribe at 4:15 PM on 11/25/2024 to document services personally performed by Lucas Vazquez MD based on my observations and the provider's statements to me.       Lucas Vazquez MD  11/25/24 2037

## 2024-11-25 NOTE — PROGRESS NOTES
Neurosurgery was contacted regarding Ms. Low an 84-year-old female, with a history of C1 and odontoid fracture, had been managed at [place] over the past year, apparently was in a brace for about 8 months after that weaned out of it.    CT scan of the cervical spine in February 2024 demonstrated    1.  Interval further slight healing about anterior right C1 and bilateral posterior ring fractures. Similar degree of left anterior C1 arch fracture distraction by 4 mm.  2.  Decreased posterior angulation of the dens fracture with improved fracture alignment, but slightly more distracted fracture plane.    Was in her primary care office with complaints of fatigue and other symptoms, and was recommended a brain MRI, this was performed 11/22/2024,    Results demonstrated    IMPRESSION:   1.  Negative for acute intracranial hemorrhage, infarct, hydrocephalus or mass. No pathologic postcontrast enhancement.  2.  Nonhealed chronic dens fracture which demonstrates more posterior angulation since comparison studies concerning for progressive instability.      Patient was recommended by her primary care provider to present to the emergency department.    Going to the ER physician patient is neurologically intact with any without any acute neurologic or neck pain related symptoms at this time.    Plan    Recommended obtaining a cervical CT scan to better compared to the scan in February, please page neurosurgery when these results are available.

## 2024-11-26 ENCOUNTER — PATIENT OUTREACH (OUTPATIENT)
Dept: CARE COORDINATION | Facility: CLINIC | Age: 84
End: 2024-11-26
Payer: MEDICARE

## 2024-11-26 ENCOUNTER — TELEPHONE (OUTPATIENT)
Dept: NEUROSURGERY | Facility: CLINIC | Age: 84
End: 2024-11-26
Payer: MEDICARE

## 2024-11-26 DIAGNOSIS — Z87.81 H/O CERVICAL FRACTURE: Primary | ICD-10-CM

## 2024-11-26 NOTE — TELEPHONE ENCOUNTER
Left Voicemail (1st Attempt) for the patient to call back and schedule the following:    Location: ANY  Provider: ANY SANGEETA  Appointment type: Return hospital follow up   Appointment mode: In person  Return date: 2 weeks from 11/25/24    Specialty phone number: [phone]    Is Imaging Needed: yes     Additional Notes: Patient needs 2 wk. Follow up from ED for worsening cervical fx with flexion/extension x-ray prior.

## 2024-11-26 NOTE — DISCHARGE INSTRUCTIONS
Your cervical spine images have been reviewed.  Your collar should remain in place.  The neurosurgery clinic would like to see you in 2 weeks to perform some additional flexion and extension x-rays in the clinic.  They will call you tomorrow to reach out to schedule an appointment.

## 2024-11-26 NOTE — PROGRESS NOTES
Neurosurgery progress note:    Cervical CT reviewed:     IMPRESSION:  1.  Multiple chronic C1 fractures described above.     2.  C2 type II chronic dens fracture described above.     3.  No acute fracture.     4.  Multilevel spondylosis described above.    RECOMMENDATIONS:  Okay to discharge from ER.  Remain in cervical collar for now.  Follow up in NS clinic in 2 weeks with cervical flexion/extension x-ray's day of appt.    Discussed with Dr. Hurtado.    Gisselle Pepe MPAS  [facility]  [address] [address]    Tel [phone]  Pager [phone]

## 2024-11-26 NOTE — LETTER
Diann Low  [address]   [place] [address]    Dear Diann Low,      I am a team member within the Connected [facility] Resource Center with [facility]. I recently spoke to you to ensure you were doing well following a recent visit within our health system. I also wanted to take this chance to introduce Clinic Care Coordination.     Below is a description of Clinic Care Coordination and how this team can further assist you:       The Clinic Care Coordination team is made up of a Registered Nurse, , Financial Resource Worker, and a Community Health Worker who understand and can help navigate the health care system. The goal of clinic care coordination is to help you manage your health, improve access to care, and achieve optimal health outcomes. They work alongside your provider to assist you in determining your health and social needs, obtain health care and community resources, and provide you with necessary information and education. Clinic Care Coordination can work with you through any barriers and develop a care plan that helps coordinate and strengthen the relationship between you and your care team.    If you wish to connect with the Clinic Care Coordination Team, please let your [facility] Primary Care Provider or Clinic Care Team know and they can place a referral. The Clinic Care Coordination team will then reach out by phone to further support you.    We are focused on providing you with the highest-quality healthcare experience possible.    Sincerely,   Your care team with [facility]'s [address] [place] [address] ([phone]).       Kasey Rucker, [facility]  Care Coordination

## 2024-11-26 NOTE — PROGRESS NOTES
Clinic Care Coordination Contact  Community Health Worker Initial Outreach    CHW Initial Information Gathering:  Referral Source: ED Follow-Up  Current living arrangement:: Not Assessed  CHW Additional Questions  If ED/Hospital discharge, follow-up appointment scheduled as recommended?: Yes  Medication changes made following ED/Hospital discharge?: No  MyChart active?: Yes  Patient sent Social Drivers of Health questionnaire?: No    Spoke to patient. Diann shares she is doing  just fine . Reports no new or worsening symptoms. Reviewed discharge paperwork. Has follow-up visit scheduled for tomorrow. All questions were answered. No additional support is needed at this time.     Patient accepts CC: No, declined. Patient will be sent Care Coordination introduction letter for future reference.      Kasey Rucker [facility]  Care Coordination

## 2024-11-27 NOTE — TELEPHONE ENCOUNTER
[facility]    Phone Message    May a detailed message be left on voicemail: yes     Reason for Call: Other: Patient is needing an Xray order placed. Please call patient back once the order has been placed so she schedule.      Action Taken: Message routed to:  [facility] ([facility]    Travel Screening: Not Applicable

## 2024-12-03 ENCOUNTER — INFUSION THERAPY VISIT (OUTPATIENT)
Dept: INFUSION THERAPY | Facility: HOSPITAL | Age: 84
End: 2024-12-03
Attending: INTERNAL MEDICINE
Payer: MEDICARE

## 2024-12-03 VITALS
RESPIRATION RATE: 16 BRPM | BODY MASS INDEX: 17.48 KG/M2 | DIASTOLIC BLOOD PRESSURE: 53 MMHG | OXYGEN SATURATION: 96 % | HEART RATE: 68 BPM | TEMPERATURE: 98.5 F | SYSTOLIC BLOOD PRESSURE: 89 MMHG | WEIGHT: 95 LBS | HEIGHT: 62 IN

## 2024-12-03 DIAGNOSIS — M05.79 RHEUMATOID ARTHRITIS, SEROPOSITIVE, MULTIPLE SITES (H): ICD-10-CM

## 2024-12-03 DIAGNOSIS — M81.0 OSTEOPOROSIS, UNSPECIFIED OSTEOPOROSIS TYPE, UNSPECIFIED PATHOLOGICAL FRACTURE PRESENCE: Primary | ICD-10-CM

## 2024-12-03 PROCEDURE — 258N000003 HC RX IP 258 OP 636: Performed by: INTERNAL MEDICINE

## 2024-12-03 PROCEDURE — 96365 THER/PROPH/DIAG IV INF INIT: CPT

## 2024-12-03 PROCEDURE — 250N000028 HC RX JA MOD (INTRAVENOUS), IP 250 OP 636: Mod: JZ,JA | Performed by: INTERNAL MEDICINE

## 2024-12-03 PROCEDURE — 250N000011 HC RX IP 250 OP 636: Performed by: INTERNAL MEDICINE

## 2024-12-03 PROCEDURE — 96367 TX/PROPH/DG ADDL SEQ IV INF: CPT

## 2024-12-03 RX ORDER — HEPARIN SODIUM (PORCINE) LOCK FLUSH IV SOLN 100 UNIT/ML 100 UNIT/ML
5 SOLUTION INTRAVENOUS
OUTPATIENT
Start: 2025-01-14

## 2024-12-03 RX ORDER — EPINEPHRINE 1 MG/ML
0.3 INJECTION, SOLUTION INTRAMUSCULAR; SUBCUTANEOUS EVERY 5 MIN PRN
OUTPATIENT
Start: 2025-12-03

## 2024-12-03 RX ORDER — DIPHENHYDRAMINE HYDROCHLORIDE 50 MG/ML
25 INJECTION INTRAMUSCULAR; INTRAVENOUS
Status: DISCONTINUED | OUTPATIENT
Start: 2024-12-03 | End: 2024-12-03 | Stop reason: HOSPADM

## 2024-12-03 RX ORDER — ACETAMINOPHEN 325 MG/1
650 TABLET ORAL ONCE
Start: 2025-01-14 | End: 2025-01-14

## 2024-12-03 RX ORDER — ALBUTEROL SULFATE 0.83 MG/ML
2.5 SOLUTION RESPIRATORY (INHALATION)
Status: DISCONTINUED | OUTPATIENT
Start: 2024-12-03 | End: 2024-12-03 | Stop reason: HOSPADM

## 2024-12-03 RX ORDER — METHYLPREDNISOLONE SODIUM SUCCINATE 40 MG/ML
40 INJECTION INTRAMUSCULAR; INTRAVENOUS
Status: DISCONTINUED | OUTPATIENT
Start: 2024-12-03 | End: 2024-12-03 | Stop reason: HOSPADM

## 2024-12-03 RX ORDER — MEPERIDINE HYDROCHLORIDE 50 MG/ML
25 INJECTION INTRAMUSCULAR; INTRAVENOUS; SUBCUTANEOUS
Status: DISCONTINUED | OUTPATIENT
Start: 2024-12-03 | End: 2024-12-03 | Stop reason: HOSPADM

## 2024-12-03 RX ORDER — MEPERIDINE HYDROCHLORIDE 50 MG/ML
25 INJECTION INTRAMUSCULAR; INTRAVENOUS; SUBCUTANEOUS
OUTPATIENT
Start: 2025-01-14

## 2024-12-03 RX ORDER — EPINEPHRINE 1 MG/ML
0.3 INJECTION, SOLUTION INTRAMUSCULAR; SUBCUTANEOUS EVERY 5 MIN PRN
OUTPATIENT
Start: 2025-01-14

## 2024-12-03 RX ORDER — HEPARIN SODIUM,PORCINE 10 UNIT/ML
5-20 VIAL (ML) INTRAVENOUS DAILY PRN
OUTPATIENT
Start: 2025-01-14

## 2024-12-03 RX ORDER — DIPHENHYDRAMINE HYDROCHLORIDE 50 MG/ML
50 INJECTION INTRAMUSCULAR; INTRAVENOUS
Start: 2025-12-03

## 2024-12-03 RX ORDER — DIPHENHYDRAMINE HYDROCHLORIDE 50 MG/ML
25 INJECTION INTRAMUSCULAR; INTRAVENOUS
Start: 2025-01-14

## 2024-12-03 RX ORDER — EPINEPHRINE 1 MG/ML
0.3 INJECTION, SOLUTION INTRAMUSCULAR; SUBCUTANEOUS EVERY 5 MIN PRN
Status: DISCONTINUED | OUTPATIENT
Start: 2024-12-03 | End: 2024-12-03 | Stop reason: HOSPADM

## 2024-12-03 RX ORDER — ALBUTEROL SULFATE 90 UG/1
1-2 INHALANT RESPIRATORY (INHALATION)
Status: DISCONTINUED | OUTPATIENT
Start: 2024-12-03 | End: 2024-12-03 | Stop reason: HOSPADM

## 2024-12-03 RX ORDER — ALBUTEROL SULFATE 0.83 MG/ML
2.5 SOLUTION RESPIRATORY (INHALATION)
OUTPATIENT
Start: 2025-01-14

## 2024-12-03 RX ORDER — ALBUTEROL SULFATE 90 UG/1
1-2 INHALANT RESPIRATORY (INHALATION)
Start: 2025-12-03

## 2024-12-03 RX ORDER — ZOLEDRONIC ACID 0.05 MG/ML
3 INJECTION, SOLUTION INTRAVENOUS ONCE
Status: COMPLETED | OUTPATIENT
Start: 2024-12-03 | End: 2024-12-03

## 2024-12-03 RX ORDER — ALBUTEROL SULFATE 0.83 MG/ML
2.5 SOLUTION RESPIRATORY (INHALATION)
OUTPATIENT
Start: 2025-12-03

## 2024-12-03 RX ORDER — HEPARIN SODIUM,PORCINE 10 UNIT/ML
5-20 VIAL (ML) INTRAVENOUS DAILY PRN
OUTPATIENT
Start: 2025-12-03

## 2024-12-03 RX ORDER — DIPHENHYDRAMINE HYDROCHLORIDE 50 MG/ML
50 INJECTION INTRAMUSCULAR; INTRAVENOUS
Start: 2025-01-14

## 2024-12-03 RX ORDER — HEPARIN SODIUM (PORCINE) LOCK FLUSH IV SOLN 100 UNIT/ML 100 UNIT/ML
5 SOLUTION INTRAVENOUS
OUTPATIENT
Start: 2025-12-03

## 2024-12-03 RX ORDER — DIPHENHYDRAMINE HYDROCHLORIDE 50 MG/ML
50 INJECTION INTRAMUSCULAR; INTRAVENOUS
Status: DISCONTINUED | OUTPATIENT
Start: 2024-12-03 | End: 2024-12-03 | Stop reason: HOSPADM

## 2024-12-03 RX ORDER — ALBUTEROL SULFATE 90 UG/1
1-2 INHALANT RESPIRATORY (INHALATION)
Start: 2025-01-14

## 2024-12-03 RX ORDER — METHYLPREDNISOLONE SODIUM SUCCINATE 40 MG/ML
40 INJECTION INTRAMUSCULAR; INTRAVENOUS
Start: 2025-12-03

## 2024-12-03 RX ORDER — DIPHENHYDRAMINE HCL 25 MG
25 CAPSULE ORAL ONCE
Start: 2025-01-14 | End: 2025-01-14

## 2024-12-03 RX ORDER — METHYLPREDNISOLONE SODIUM SUCCINATE 40 MG/ML
40 INJECTION INTRAMUSCULAR; INTRAVENOUS
Start: 2025-01-14

## 2024-12-03 RX ORDER — DIPHENHYDRAMINE HYDROCHLORIDE 50 MG/ML
25 INJECTION INTRAMUSCULAR; INTRAVENOUS
Start: 2025-12-03

## 2024-12-03 RX ORDER — ZOLEDRONIC ACID 0.05 MG/ML
3 INJECTION, SOLUTION INTRAVENOUS ONCE
Start: 2025-12-03

## 2024-12-03 RX ORDER — METHYLPREDNISOLONE SODIUM SUCCINATE 125 MG/2ML
125 INJECTION INTRAMUSCULAR; INTRAVENOUS ONCE
Start: 2025-01-14 | End: 2025-01-14

## 2024-12-03 RX ADMIN — SODIUM CHLORIDE 100 ML: 900 INJECTION INTRAVENOUS at 11:38

## 2024-12-03 RX ADMIN — SODIUM CHLORIDE 500 MG: 9 INJECTION, SOLUTION INTRAVENOUS at 12:03

## 2024-12-03 RX ADMIN — ZOLEDRONIC ACID 3 MG: 5 INJECTION INTRAVENOUS at 11:40

## 2024-12-03 NOTE — PROGRESS NOTES
Infusion Nursing Note:  Diann Low presents today for Reclast and Orencia IV.    Patient seen by provider today: No   present during visit today: Not Applicable.    Note: Tolerated infusions well, offers no complaints.  Discharge to home with daughter.      Intravenous Access:  Peripheral IV placed.    Treatment Conditions:  Results reviewed, labs MET treatment parameters, ok to proceed with treatment.      Post Infusion Assessment:  Patient tolerated infusion without incident.  No evidence of extravasations.  Access discontinued per protocol.       Discharge Plan:   Patient and/or family verbalized understanding of discharge instructions and all questions answered.      Susan Moody RN

## 2024-12-03 NOTE — PROGRESS NOTES
~~~ NOTE: If the patient answers yes to any of the questions below, hold the infusion and contact ordering provider or on-call provider.    Do you currently have any signs of illness or infection or are you on any antibiotics? No  Have you recently had an elevated temperature, fever, chills, productive cough, coughing for 3 weeks or longer or hemoptysis, abnormal vital signs, night sweats, chest pain or have you noticed a decrease in your appetite, unexplained weight loss or fatigue? No  Have you had any new, sudden, or worsening abdominal pain? No  Do you have any open wounds or new incisions? (exclude for patients with hidradenitis suppurativa) No  Have you recently been diagnosed with any new nervous system diseases (ie. Multiple sclerosis, Guillain [place], seizures, neurological changes) or cancer diagnosis? Are you on any form of radiation or chemotherapy? No  Have there been any other new onset medical symptoms? No  Are you pregnant or breast feeding or do you have plans of pregnancy in the future? No; N/A  Do you have any upcoming hospitalizations or surgeries? Does not include esophagogastroduodenoscopy, colonoscopy, endoscopic retrograde cholangiopancreatography (ERCP), endoscopic ultrasound (EUS), dental procedures (including cleanings, fillings, implants, extractions)  or joint aspiration/steroid injections No  Have you or anyone in your household received a live vaccination in the past 4 weeks? Please note: No live vaccines while on biologic/chemotherapy until 6 months after the last treatment. Patient can receive the flu vaccine (shot only).  It is optimal for the patient to get it mid cycle, but it can be given at any time as long as it is not on the day of the infusion. No  If applicable to prescribed medication, confirm negative PPD or quantiferon gold MTB. If positive, verify has negative chest x-ray or the patient is at least 4 weeks post initiation of INH/B6 therapy and have clearance from provider  before infusion (Y/N:[id number])  If applicable to prescribed medication, confirm negative hepatitis B surface antigen or hepatitis C. If positive, clearance from provider before infusion. (Y/N: [id number])  Rheumatology patients receiving tocilizumab (Actemra): If labs were drawn within the past week, hold dosing until cleared to infuse If AST/ALT > 2 X upper limit normal; ANC < 1.0. NO; N/A  Patients receiving belimumab (Benlysta): Have you been having any signs of worsening depression or suicidal ideations? No; N/A

## 2024-12-09 ENCOUNTER — TRANSCRIBE ORDERS (OUTPATIENT)
Dept: NEUROSURGERY | Facility: CLINIC | Age: 84
End: 2024-12-09
Payer: MEDICARE

## 2024-12-09 DIAGNOSIS — Z87.81 H/O CERVICAL FRACTURE: Primary | ICD-10-CM

## 2024-12-11 ENCOUNTER — ANCILLARY PROCEDURE (OUTPATIENT)
Dept: GENERAL RADIOLOGY | Facility: CLINIC | Age: 84
End: 2024-12-11
Attending: PHYSICIAN ASSISTANT
Payer: MEDICARE

## 2024-12-11 DIAGNOSIS — Z87.81 H/O CERVICAL FRACTURE: ICD-10-CM

## 2024-12-11 PROCEDURE — 72040 X-RAY EXAM NECK SPINE 2-3 VW: CPT | Mod: TC | Performed by: RADIOLOGY

## 2024-12-12 ENCOUNTER — TELEPHONE (OUTPATIENT)
Dept: NEUROSURGERY | Facility: CLINIC | Age: 84
End: 2024-12-12

## 2024-12-12 NOTE — TELEPHONE ENCOUNTER
Records Requested     December 12, 2024 9:20 AM   [id number]   Facility  [facility]   Outcome 9:23 am Sent request for imaging to be pushed to PACS. -JA     SPINE PATIENTS - NEW PROTOCOL PREVISIT    RECORDS RECEIVED FROM: Care Everywhere   REASON FOR VISIT: 2 wk f/u from ED/ Worsening cervical fx with flexion/extension   PROVIDER: Meera Sheehan PA-C   DATE OF APPT: 12/17/24 @ 9:00 am    NOTES (FOR ALL VISITS) STATUS DETAILS   OFFICE NOTE from referring provider Internal ED Referral    OFFICE NOTE from other specialist Care Everywhere 11/5/24, 5/24/24, 1/15/24 Abdifatah Alvarado MD @[facility]    2/2/24, 12/19/23 [facility]    10/20/23, 9/6/23 Perla Velez APRN C.N.P.  [facility]    8/28/23, 8/24/23 Roberta Martinez CNP @[facility]     DISCHARGE SUMMARY from hospital Care Everywhere 8/17/23-8/22/23 Janes Self M.D.  @[facility]      DISCHARGE REPORT from ER Internal  11/25/24 Lucas Vazquez MD @[facility] ED    10/27/24-10/28/24 Candido Veras MD @[facility] ED     MEDICATION LIST Internal    IMAGING  (FOR ALL VISITS)     MRI (HEAD, NECK, SPINE) In process [facility]  11/22/24 MR Brain    [facility]  12/19/23 MR Cervical Spine     XRAY (SPINE) *NEUROSURGERY* Internal [facility]  12/11/24 XR Cervical Spine  7/13/22 XR [place] Spine  7/13/22 XR Lumbar Spine     CT (HEAD, NECK, SPINE) In process [facility]  11/25/24 CT Cervical Spine  10/27/24 CT Head  8/29/23 CT Head    [place]  2/2/24 CT Cervical Spine  12/19/23 CT Cervical Spine*  8/17/23 CT Cervical Spine*  8/17/23 CT Head*     DEXA SCAN In process [facility]  7/30/24 DX Hip/Pelvis/Spine  6/14/22 DX Hip/Pelvis/Spine    [facility]  12/19/23 DX Cervical Spine  10/20/23 DX Cervical Spine  9/6/23 DX Cervical Spine  8/18/23 DX Cervical Spine

## 2024-12-16 ENCOUNTER — TELEPHONE (OUTPATIENT)
Dept: NEUROSURGERY | Facility: CLINIC | Age: 84
End: 2024-12-16
Payer: MEDICARE

## 2024-12-16 NOTE — TELEPHONE ENCOUNTER
Called patient and scheduled appointment w/ Meera Sheehan per Kellee Marquez via patient call list. -KB

## 2024-12-17 ENCOUNTER — PRE VISIT (OUTPATIENT)
Dept: NEUROSURGERY | Facility: CLINIC | Age: 84
End: 2024-12-17

## 2024-12-17 ENCOUNTER — OFFICE VISIT (OUTPATIENT)
Dept: NEUROSURGERY | Facility: CLINIC | Age: 84
End: 2024-12-17
Payer: MEDICARE

## 2024-12-17 VITALS
SYSTOLIC BLOOD PRESSURE: 131 MMHG | HEART RATE: 73 BPM | WEIGHT: 96 LBS | OXYGEN SATURATION: 98 % | DIASTOLIC BLOOD PRESSURE: 69 MMHG | RESPIRATION RATE: 16 BRPM | HEIGHT: 62 IN | BODY MASS INDEX: 17.66 KG/M2

## 2024-12-17 DIAGNOSIS — Z87.81 H/O CERVICAL FRACTURE: Primary | ICD-10-CM

## 2024-12-17 PROCEDURE — 99215 OFFICE O/P EST HI 40 MIN: CPT | Performed by: PHYSICIAN ASSISTANT

## 2024-12-17 PROCEDURE — 99417 PROLNG OP E/M EACH 15 MIN: CPT | Performed by: PHYSICIAN ASSISTANT

## 2024-12-17 ASSESSMENT — PAIN SCALES - GENERAL: PAINLEVEL_OUTOF10: NO PAIN (0)

## 2024-12-17 NOTE — LETTER
12/17/2024       RE: Diann Low  [address]  [facility] [address]     Dear Colleague,    Thank you for referring your patient, Diann Low, to the [facility] [place] at [facility]. Please see a copy of my visit note below.      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  She had successfully weaned out of the collar with known nonhealing fractures and was doing well and has not had any changes in her functional level or pain.  A change in alignment of her odontoid process was noted on brain imaging in November 2024 leading to her being placed back in the collar.  Dynamic F/E cervical XR 12/11/24 is without any substantial movement with regard to the C1 and C2.      I discussed at length that these fractures will likely never heal, but scar tissue, soft tissue and ligaments help to hold things in place.  While there is no concerning movement in her neck on XR, she does have subtle movement.  She is at risk of paralysis and death should she have a fall and jerk/move her head.  She should avoid excessive head movements and a collar can help her to remember this and provide support.  She has the choice of using the collar at all times, during times when she might be at risk of neck injury, or not at all.  If she chooses not to wear the collar, she accepts the increased risk of further injury.  Patient has chosen to not wear the collar unless she has a reason to use it.      Recommend using hiking poles if she plans to ambulate longer distances to help provide stability and  support good posture.  She would be okay to engage in her exercise class, but is not to do any ROM activities involving her neck which includes bending to the ground or overhead lifting.  She can exercise her arms and legs and ambulate.    We also discussed that it would be appropriate for her to have a diving assessment done at [facility] prior to returning to driving.  She does have reduced ROM of her neck which may make checking for traffic more challenging.  She me be able to use adaptive mirrors or other equipment to make this easier.  Information provided to her and her son regarding this.       No scheduled neurosurgery visit are needed at this time.  I am happy to see her back at any time if she has concerns that arise.    I spent 56 minutes spent in patient care, independent review and interpretation of medical records/imaging, reviewing old records.    History of Present Illness:  Patient is following up after an ED visit with neurosurgery consult 11/25/24 staffed by Dr. Hurtado for chronic multiple C1 (bilat anterior and bilateral posterior ring fxs) and type II dens (posterior angulation) fractures per CT imaging.   PMH - Reclast infusions, osteoporosis, RA, frequent falls    Per chart, patient lives in senior living facility and underwent brain MRI showing the nonhealing fractures.  She had been managed by [place] with a collar for 8 months before weaning out of it.  She was found in ED to be neuro intact w/o neck pain.  She was recommended to wear a cervical collar and present in clinic for F/E XR.  Her initial fracture occurred 8/17/23 after falling down the stairs and was diagnosed with C1 Natalio fracture, type II odontoid fractures w/ minimal displacement at that time.      12/17/2024 Visit:  Patient presents with her son today.  She has been wearing the cervical collar again for the last 3 weeks. She denies gulshan neck pain, but does endorse some discomfort in the cervical muscles.  She occasionally  also feels some discomfort in her shoulders.  She denies problems with the use of her arms, legs, paresthesias in her extremities or issues with ambulation.  She likes to attend activities at her living facility, but has avoided the exercise activities that she attended prior to her cervical fracture.  She was driving prior to replacement of the cervical collar.            IMAGING   Imaging independently reviewed.    X-ray Cervical spine 2-3 vws  Result Date: 12/11/2024  EXAM: XR CERVICAL SPINE 2/3 VIEWS LOCATION: [facility] DATE: 12/11/2024 INDICATION: Cervical spine fracture. COMPARISON: CT cervical spine 11/25/2024.   IMPRESSION: Chronic type II dens fracture is again seen without significant change in alignment of the dens fracture fragment with flexion or extension. Grade 1 anterolisthesis of C3 on C4 and C7 on T1 with neutral positioning. Grade 1 retrolisthesis of C4 on C5 with neutral positioning. No dynamic instability with flexion or extension at these levels. Moderate to advanced cervical spondylosis. No prevertebral soft tissue swelling. The visualized portions of the lungs are clear.     CT Cervical Spine w/o Contrast  Result Date: 11/25/2024  EXAM: CT CERVICAL SPINE W/O CONTRAST LOCATION: [facility] DATE: 11/25/2024 INDICATION: Evaluate old C1 and C2 fractures.; Neck pain; Trauma; Mild moderate trauma COMPARISON: MRI brain 11/22/2024, CT head 8/29/2023 TECHNIQUE: Routine CT Cervical Spine without IV contrast. Multiplanar reformats. Dose reduction techniques were used. FINDINGS: VERTEBRA: C2 type II chronic dens fracture with mild distraction and apex posterior angulation similar compared to CT head 8/29/2023. There is mild posterior displacement measuring 2 mm with underlying prominent posterior osteophyte new compared to prior CT head 8/29/2023. Bilateral anterior C1 arch and bilateral posterior lateral C1 arch chronic fractures. Left anterior C1 arch  mild dehiscence measuring 2 mm similar compared to CT head 8/29/2023. No acute fracture.  No acute post traumatic subluxations.   Normal vertebral body heights. No prevertebral soft tissue swelling. CANAL/FORAMINA: Multilevel spondylosis. Various levels and degrees of central canal stenosis.  C5-6, C6-7 severe central canal stenosis. Various levels and degrees of neural foraminal stenosis.   Multiple levels demonstrate degenerative grade 1 subluxations. PARASPINAL: Right thyroid lobe nodule measuring 7 mm.    IMPRESSION: 1.  Multiple chronic C1 fractures described above. 2.  C2 type II chronic dens fracture described above. 3.  No acute fracture. 4.  Multilevel spondylosis described above.    MR Brain w/o & w Contrast  Result Date: 11/22/2024  EXAM: MR BRAIN W/O and W CONTRAST LOCATION: [facility] DATE: 11/22/2024 INDICATION: Patient with dizziness, presyncope that was transient on November 3rd in ED, normal CT head, please evaluate for lesion, mass, signs of TIA COMPARISON: 8/29/2023. CONTRAST: 4.4 mL Gadavist TECHNIQUE: Routine multiplanar multisequence head MRI without and with intravenous contrast. FINDINGS: INTRACRANIAL CONTENTS: No acute or subacute infarct. No mass, acute hemorrhage, or extra-axial fluid collections. Normal brain parenchymal signal. Mild generalized cerebral atrophy. No hydrocephalus. Normal position of the cerebellar tonsils. No pathologic contrast enhancement. SELLA: No abnormality accounting for technique. OSSEOUS STRUCTURES/SOFT TISSUES: Nonunited dens fracture with increased posterior angulation and indentation of the cervical medullary cord (sagittal series 5, image 12). Normal marrow signal. The major intracranial vascular flow voids are maintained. ORBITS: Prior bilateral cataract surgery. Visualized portions of the orbits are otherwise unremarkable. SINUSES/MASTOIDS: No paranasal sinus mucosal disease. No middle ear or mastoid effusion.   IMPRESSION: 1.   Negative for acute intracranial hemorrhage, infarct, hydrocephalus or mass. No pathologic postcontrast enhancement. 2.  Nonhealed chronic dens fracture which demonstrates more posterior angulation since comparison studies concerning for progressive instability.  [Access Center: Nonhealed dens fracture] This report will be copied to the [facility] to ensure a provider acknowledges the finding. [facility] is available Monday through Friday 8am-3:30 pm.    DX Hip/Pelvis/Spine  Result Date: 7/31/2024  EXAM: DX TBS AXIAL LOCATION: [facility] DATE: 7/30/2024 INDICATION: BMD screening, follow-up exam. Current use of antiresorptive therapy. DEMOGRAPHICS: Age- 84 years. Gender- Female. Menopausal status- Postmenopausal. COMPARISON: 06/14/2022. TECHNIQUE: Dual-energy x-ray absorptiometry (DXA) performed with routine technique. Trabecular bone score (TBS) analysis performed. FINDINGS: DXA RESULTS -Lumbar Spine: L1-L4: BMD: 1.234 g/cm2. T-score: 0.4. Z-score: 2.4. Degenerative change may artifactually increase BMD. -RIGHT Hip Total: BMD: 0.891 g/cm2. T-score: -0.9. Z-score: 1.4. -RIGHT Hip Femoral neck: BMD: 0.996 g/cm2. T-score: -0.3. Z-score: 2.1. -LEFT Hip Total: BMD: 0.872 g/cm2. T-score: -1.1. Z-score: 1.3. -LEFT Hip Femoral neck: BMD: 0.894 g/cm2. T-score: -1.0. Z-score: 1.4. WHO T-SCORE CRITERIA -Normal: T score at or above -1 SD -Osteopenia: T score between -1 and -2.5 SD -Osteoporosis: T score at or below -2.5 SD The World Health Organization (WHO) criteria is applicable to perimenopausal females, postmenopausal females, and men aged 50 years or older. TBS RESULTS -Lumbar Spine L1-L4: TBS: 1.269. TBS T-score: -2.2.TBS Z-score: ---. The TBS is a DXA derived measurement for fracture risk assessment, and reflects the structural condition of the bone microarchitecture. It can be used to adjust WHO Fracture Risk Assessment Tool (FRAX) probability of fracture in postmenopausal women and  older men. The calculated probabilities of fracture have been shown to be more accurate when computed with the TBS. INTERVAL CHANGE -There has been a 9.5% decrease in lumbar spine BMD. This indicates significant change based on 95% confidence interval. -There has been a 1.5% increase in bilateral hip BMD. FRACTURE RISK -The FRAX risk calculator is not applicable due to medication that is used for treatment of osteopenia/osteoporosis or can otherwise affect bone mineral density.   IMPRESSION: Low bone density (OSTEOPENIA). T score meets the WHO criteria for low bone density (osteopenia) at one or more measured sites. The risk of osteoporotic fracture increases approximately two-fold for each standard deviation decrease in T-score.    DX Cervical Spine 2-3 Views  Result Date: 2/2/2024  EXAM:  DX CERVICAL SPINE 2-3 VIEWS  Films obtained through cervical collar which obscures bone detail. As better demonstrated on comparison CT 2/02/2024, there is a moderately displaced subacute fracture at the base of the dens with some bone resorption at the fracture margins. Slight posterior displacement and angulation of the dens. The C1 fractures are poorly demonstrated. Osteopenia. Degenerative disk disease C3 through C6 interspaces with associated hypertrophic changes and facet arthropathy. Low grade retrolisthesis at the C4, C5 and C6 levels.     CT CERVICAL SPINE W/O CONTRAST  Result Date: 2/2/2024  EXAM: CT CERVICAL SPINE WITHOUT IV CONTRAST COMPARISON: CT cervical spine 12/19/23. FINDINGS: Redemonstrated fractures through the C1 ring. Slightly progressed further healing of the mildly displaced fracture through the right C1 anterior arch with osseous remodeling. Partial interval healing of the bilateral fractures through the posterior C1 arch with less prominent fracture planes. Stable displacement of the fracture through the left anterior C1 arch by approximately 4 mm when reformatted in similar planes. Increased displacement  between C2 fracture fragments on the left, with previous contact between the fracture fragments posteriorly (for example se7/im29 on the prior CT) vs se8/im25 on the current exam, which is probably due to slightly less prominent posterior angulation of the dens fracture fragment and anterior C1 arch compared to 12/19/2023. Sagittal alignment of the C2 fracture fragments as well as alignment of the anterior and posterior arches of C1 relative to C2 and skull base has improved. Otherwise, similar multilevel spondylosis with mild anterolisthesis C2-C3 and C3-C4 as well as C7-T1 and retrolisthesis C4-C5 and C5-C6. Advanced degenerative disc disease C3-C4 to C6-C7. Posterior disc osteophyte complexes with uncovertebral facet hypertrophy result in mild spinal canal narrowing C4-C5 and C5-C6. Advanced right neuroforaminal narrowing C5-C6 and moderate left C6-C7. Milder degree of neuroforaminal narrowing at the other levels. Heterogeneous thyroid gland with a small hypodense nodule in the right. Prominent biapical scarring.  1.  Interval further slight healing about anterior right C1 and bilateral posterior ring fractures. Similar degree of left anterior C1 arch fracture distraction by 4 mm. 2.  Decreased posterior angulation of the dens fracture with improved fracture alignment, but slightly more distracted fracture plane.    MR CERVICAL SPINE W/O & W CONTRAST  Result Date: 12/19/2023  EXAM: MR CERVICAL SPINE WITHOUT AND WITH IV CONTRAST COMPARISON: CT cervical spine 08/17/2023. FINDINGS: Known fracture through the base of the dens. Since the prior exam there is increased posterior displacement and angulation of the tip of the dens with respect to the body of C2. This results in narrowing of the foramen magnum. However, there is  no gulshan compression of the cervicomedullary junction or associated cervicomedullary edema. There is stable pannus along the posterior aspect of the dens. There is no gulshan evidence of ligamentous  disruption. Healing fractures of the C1 ring are less well visualized on this exam. Multilevel spondylotic change with loss of disc height and disc bulging C3-C4 through C7-T1. There is effacement of the ventral thecal sac at each level without gulshan spinal cord compression, edema or myelomalacia. Multilevel facet and uncinate hypertrophy results in severe narrowing of the right C5-C6 neural foramen with mild/moderate foraminal narrowing at the remaining cervical levels.  1. Known fracture through the base of the dens with increased posterior displacement and angulation. No compression of the cervicomedullary junction. 2. No gulshan evidence of ligamentous disruption. 3. Healing fractures of the C1 ring are less well visualized on this exam. 4. Multilevel spondylotic change results in mild multilevel central spinal stenosis. There is severe narrowing of the right C5-C6 neural foramen.    CT CERVICAL SPINE W/O CONTRAST  Result Date: 12/19/2023  EXAM: CT CERVICAL SPINE WITHOUT IV CONTRAST COMPARISON: MR cervical spine 12/19/2023, cervical spine radiograph 10/20/2023, CT cervical spine 08/17/2023 FINDINGS: Again demonstrated are multiple fractures through the C1 ring. Interval healing changes about the mildly displaced fracture through the right anterior arch. Slightly increased displacement of the fracture through the left anterior arch by approximately 2  mm. Stable fractures through the posterior arch. Dens fracture with increased displacement by approximately 5 mm with increased posterior angulation of the dens, which has progressed since 10/20/2023. No significant spinal canal narrowing. No acute fracture. Vertebral body heights are maintained. Multilevel spondylotic changes of the cervical spine, unchanged since 08/17/2023. Grade 1 anterolisthesis C2 on C3 and C3 on C4. Grade 1 retrolisthesis C4 on C5 and C5 on C6. Advanced disc space narrowing at C3-C4 to C6-C7, most marked at the C5 interspace. Posterior disc  "osteophyte complex results in mild spinal canal narrowing at C4-C5 and C5-C6. Uncovertebral and facet arthropathy results in advanced foraminal narrowing at C5-C6 on the right and moderate foraminal narrowing at C6-C7 on the left. Mild-moderate foraminal narrowing at the other levels.  1.  Interval healing changes about the anterior right C1 fracture. Fracture lines remain present in the anterior and posterior arches of C1. 2.  Increased displacement and posterior angulation of the dens fracture.    Physical Exam   /69 (BP Location: Right arm, Patient Position: Sitting)   Pulse 73   Resp 16   Ht 1.575 m (5' 2\")   Wt 43.5 kg (96 lb)   LMP  (LMP Unknown)   SpO2 98%   BMI 17.56 kg/m      Constitutional: Appears well-developed, thin, elderly. Cooperative. No acute distress.   HENT:   Head: Normocephalic and atraumatic.   Eyes: Conjunctivae are normal.  Neck: Neck supple. No tracheal deviation present.  Cardiovascular: Normal rate and regular rhythm.    Pulmonary/Chest: Effort normal and breath sounds normal.  Abdominal: Exhibits no obvious distension.   Musculoskeletal: Able to move all extremities.  No involuntary motor movements. No C/T/L spine tenderness to palpation.    Cervical flexion-extension range of motion: F/E good w/ mild muscle discomfort, lateral rotation is better left than right w/ similar muscle discomfort in cervical region.   Skin: Skin is warm, dry and intact.   Psychiatric: Normal mood and affect. Speech is normal and behavior is normal.    Neurological:  Alert, NAD, and oriented to person, place, and time.   No cranial nerve deficit   Gait: no assistive devices, steady, symmetrical     Strength (L/R)  5/5 Deltoid  5/5 Bicep  5/5 Tricep  5/5 Handgrip (deformed 2/2 RA)    5/5 Hip Flexion  5/5 Knee Extension  5/5 Ankle Dorsiflexion  5/5 Plantar Flexion    Reflexes (L/R)  2+/2+ Bicep  2+/2+ Brachioradialis  2+/2+ Patellar  2+/2+ Ankle    No Ciera's   No ankle clonus    Review of Systems "   See HPI     Past Medical History:   Diagnosis Date     PONV (postoperative nausea and vomiting)      Skin cancer      Spider veins        Past Surgical History:   Procedure Laterality Date     COLONOSCOPY N/A 10/1/2024    Procedure: COLONOSCOPY with biopsies;  Surgeon: Manjit Sandhu MD;  Location: [facility] Main OR       Social History     Socioeconomic History     Marital status: Single   Tobacco Use     Smoking status: Never     Passive exposure: Never     Smokeless tobacco: Never   Vaping Use     Vaping status: Never Used     Social Drivers of Health     Financial Resource Strain: Low Risk  (1/8/2024)    Financial Resource Strain      Within the past 12 months, have you or your family members you live with been unable to get utilities (heat, electricity) when it was really needed?: No   Food Insecurity: Low Risk  (1/8/2024)    Food Insecurity      Within the past 12 months, did you worry that your food would run out before you got money to buy more?: No      Within the past 12 months, did the food you bought just not last and you didn t have money to get more?: No   Transportation Needs: Low Risk  (1/8/2024)    Transportation Needs      Within the past 12 months, has lack of transportation kept you from medical appointments, getting your medicines, non-medical meetings or appointments, work, or from getting things that you need?: No   Physical Activity: Sufficiently Active (8/31/2023)    Received from [facility]    Exercise Vital Sign      Days of Exercise per Week: 5 days      Minutes of Exercise per Session: 40 min   Stress: Stress Concern Present (4/29/2021)    Received from [facility]    [employer] [place] [employer] - Occupational Stress Questionnaire      Feeling of Stress : To some extent   Social Connections: Unknown (4/29/2021)    [facility], [facility]    Social Connection and Isolation Panel [NHANES]      Frequency of Communication with  Friends and Family: Three times a week      Frequency of Social Gatherings with Friends and Family: Twice a week      Attends Alevism Services: Patient declined      Active Member of Clubs or Organizations: Patient declined      Attends Club or Organization Meetings: Patient declined      Marital Status:    Interpersonal Safety: Unknown (10/1/2024)    Interpersonal Safety      Do you feel physically and emotionally safe where you currently live?: Patient unable to answer      Within the past 12 months, have you been hit, slapped, kicked or otherwise physically hurt by someone?: Patient unable to answer      Within the past 12 months, have you been humiliated or emotionally abused in other ways by your partner or ex-partner?: Patient unable to answer   Housing Stability: Low Risk  (1/8/2024)    Housing Stability      Do you have housing? : Yes      Are you worried about losing your housing?: No       family history is not on file.       Meera Sheehan PA-C  Department of Neurosurgery  Office: [phone]        Again, thank you for allowing me to participate in the care of your patient.      Sincerely,    Meera Sheehan PA-C

## 2024-12-17 NOTE — PROGRESS NOTES
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  She had successfully weaned out of the collar with known nonhealing fractures and was doing well and has not had any changes in her functional level or pain.  A change in alignment of her odontoid process was noted on brain imaging in November 2024 leading to her being placed back in the collar.  Dynamic F/E cervical XR 12/11/24 is without any substantial movement with regard to the C1 and C2.      I discussed at length that these fractures will likely never heal, but scar tissue, soft tissue and ligaments help to hold things in place.  While there is no concerning movement in her neck on XR, she does have subtle movement.  She is at risk of paralysis and death should she have a fall and jerk/move her head.  She should avoid excessive head movements and a collar can help her to remember this and provide support.  She has the choice of using the collar at all times, during times when she might be at risk of neck injury, or not at all.  If she chooses not to wear the collar, she accepts the increased risk of further injury.  Patient has chosen to not wear the collar unless she has a reason to use it.      Recommend using hiking poles if she plans to ambulate longer distances to help provide stability and support good posture.  She would be okay to engage in her exercise class, but is not to do any ROM activities involving her neck which includes bending to the ground or overhead lifting.  She can exercise her arms and legs and ambulate.    We also discussed that it would be appropriate for her to have a diving assessment  done at [facility] prior to returning to driving.  She does have reduced ROM of her neck which may make checking for traffic more challenging.  She me be able to use adaptive mirrors or other equipment to make this easier.  Information provided to her and her son regarding this.       No scheduled neurosurgery visit are needed at this time.  I am happy to see her back at any time if she has concerns that arise.    I spent 56 minutes spent in patient care, independent review and interpretation of medical records/imaging, reviewing old records.    History of Present Illness:  Patient is following up after an ED visit with neurosurgery consult 11/25/24 staffed by Dr. Hurtado for chronic multiple C1 (bilat anterior and bilateral posterior ring fxs) and type II dens (posterior angulation) fractures per CT imaging.   PMH - Reclast infusions, osteoporosis, RA, frequent falls    Per chart, patient lives in senior living facility and underwent brain MRI showing the nonhealing fractures.  She had been managed by [facility] with a collar for 8 months before weaning out of it.  She was found in ED to be neuro intact w/o neck pain.  She was recommended to wear a cervical collar and present in clinic for F/E XR.  Her initial fracture occurred 8/17/23 after falling down the stairs and was diagnosed with C1 Natalio fracture, type II odontoid fractures w/ minimal displacement at that time.      12/17/2024 Visit:  Patient presents with her son today.  She has been wearing the cervical collar again for the last 3 weeks. She denies gulshan neck pain, but does endorse some discomfort in the cervical muscles.  She occasionally also feels some discomfort in her shoulders.  She denies problems with the use of her arms, legs, paresthesias in her extremities or issues with ambulation.  She likes to attend activities at her living facility, but has avoided the exercise activities that she attended prior to her cervical fracture.  She was driving  prior to replacement of the cervical collar.            IMAGING   Imaging independently reviewed.    X-ray Cervical spine 2-3 vws  Result Date: 12/11/2024  EXAM: XR CERVICAL SPINE 2/3 VIEWS LOCATION: [facility] DATE: 12/11/2024 INDICATION: Cervical spine fracture. COMPARISON: CT cervical spine 11/25/2024.   IMPRESSION: Chronic type II dens fracture is again seen without significant change in alignment of the dens fracture fragment with flexion or extension. Grade 1 anterolisthesis of C3 on C4 and C7 on T1 with neutral positioning. Grade 1 retrolisthesis of C4 on C5 with neutral positioning. No dynamic instability with flexion or extension at these levels. Moderate to advanced cervical spondylosis. No prevertebral soft tissue swelling. The visualized portions of the lungs are clear.     CT Cervical Spine w/o Contrast  Result Date: 11/25/2024  EXAM: CT CERVICAL SPINE W/O CONTRAST LOCATION: [facility] DATE: 11/25/2024 INDICATION: Evaluate old C1 and C2 fractures.; Neck pain; Trauma; Mild moderate trauma COMPARISON: MRI brain 11/22/2024, CT head 8/29/2023 TECHNIQUE: Routine CT Cervical Spine without IV contrast. Multiplanar reformats. Dose reduction techniques were used. FINDINGS: VERTEBRA: C2 type II chronic dens fracture with mild distraction and apex posterior angulation similar compared to CT head 8/29/2023. There is mild posterior displacement measuring 2 mm with underlying prominent posterior osteophyte new compared to prior CT head 8/29/2023. Bilateral anterior C1 arch and bilateral posterior lateral C1 arch chronic fractures. Left anterior C1 arch mild dehiscence measuring 2 mm similar compared to CT head 8/29/2023. No acute fracture.  No acute post traumatic subluxations.   Normal vertebral body heights. No prevertebral soft tissue swelling. CANAL/FORAMINA: Multilevel spondylosis. Various levels and degrees of central canal stenosis.  C5-6, C6-7 severe central  canal stenosis. Various levels and degrees of neural foraminal stenosis.   Multiple levels demonstrate degenerative grade 1 subluxations. PARASPINAL: Right thyroid lobe nodule measuring 7 mm.    IMPRESSION: 1.  Multiple chronic C1 fractures described above. 2.  C2 type II chronic dens fracture described above. 3.  No acute fracture. 4.  Multilevel spondylosis described above.    MR Brain w/o & w Contrast  Result Date: 11/22/2024  EXAM: MR BRAIN W/O and W CONTRAST LOCATION: [facility] DATE: 11/22/2024 INDICATION: Patient with dizziness, presyncope that was transient on November 3rd in ED, normal CT head, please evaluate for lesion, mass, signs of TIA COMPARISON: 8/29/2023. CONTRAST: 4.4 mL Gadavist TECHNIQUE: Routine multiplanar multisequence head MRI without and with intravenous contrast. FINDINGS: INTRACRANIAL CONTENTS: No acute or subacute infarct. No mass, acute hemorrhage, or extra-axial fluid collections. Normal brain parenchymal signal. Mild generalized cerebral atrophy. No hydrocephalus. Normal position of the cerebellar tonsils. No pathologic contrast enhancement. SELLA: No abnormality accounting for technique. OSSEOUS STRUCTURES/SOFT TISSUES: Nonunited dens fracture with increased posterior angulation and indentation of the cervical medullary cord (sagittal series 5, image 12). Normal marrow signal. The major intracranial vascular flow voids are maintained. ORBITS: Prior bilateral cataract surgery. Visualized portions of the orbits are otherwise unremarkable. SINUSES/MASTOIDS: No paranasal sinus mucosal disease. No middle ear or mastoid effusion.   IMPRESSION: 1.  Negative for acute intracranial hemorrhage, infarct, hydrocephalus or mass. No pathologic postcontrast enhancement. 2.  Nonhealed chronic dens fracture which demonstrates more posterior angulation since comparison studies concerning for progressive instability.  [[facility]: Nonhealed dens fracture] This report will be  copied to the [place] [facility] to ensure a provider acknowledges the finding. [facility] Center is available Monday through Friday 8am-3:30 pm.    DX Hip/Pelvis/Spine  Result Date: 7/31/2024  EXAM: DX TBS AXIAL LOCATION: [facility] DATE: 7/30/2024 INDICATION: BMD screening, follow-up exam. Current use of antiresorptive therapy. DEMOGRAPHICS: Age- 84 years. Gender- Female. Menopausal status- Postmenopausal. COMPARISON: 06/14/2022. TECHNIQUE: Dual-energy x-ray absorptiometry (DXA) performed with routine technique. Trabecular bone score (TBS) analysis performed. FINDINGS: DXA RESULTS -Lumbar Spine: L1-L4: BMD: 1.234 g/cm2. T-score: 0.4. Z-score: 2.4. Degenerative change may artifactually increase BMD. -RIGHT Hip Total: BMD: 0.891 g/cm2. T-score: -0.9. Z-score: 1.4. -RIGHT Hip Femoral neck: BMD: 0.996 g/cm2. T-score: -0.3. Z-score: 2.1. -LEFT Hip Total: BMD: 0.872 g/cm2. T-score: -1.1. Z-score: 1.3. -LEFT Hip Femoral neck: BMD: 0.894 g/cm2. T-score: -1.0. Z-score: 1.4. WHO T-SCORE CRITERIA -Normal: T score at or above -1 SD -Osteopenia: T score between -1 and -2.5 SD -Osteoporosis: T score at or below -2.5 SD The World Health Organization (WHO) criteria is applicable to perimenopausal females, postmenopausal females, and men aged 50 years or older. TBS RESULTS -Lumbar Spine L1-L4: TBS: 1.269. TBS T-score: -2.2.TBS Z-score: ---. The TBS is a DXA derived measurement for fracture risk assessment, and reflects the structural condition of the bone microarchitecture. It can be used to adjust WHO Fracture Risk Assessment Tool (FRAX) probability of fracture in postmenopausal women and older men. The calculated probabilities of fracture have been shown to be more accurate when computed with the TBS. INTERVAL CHANGE -There has been a 9.5% decrease in lumbar spine BMD. This indicates significant change based on 95% confidence interval. -There has been a 1.5% increase in bilateral hip BMD. FRACTURE RISK  -The FRAX risk calculator is not applicable due to medication that is used for treatment of osteopenia/osteoporosis or can otherwise affect bone mineral density.   IMPRESSION: Low bone density (OSTEOPENIA). T score meets the WHO criteria for low bone density (osteopenia) at one or more measured sites. The risk of osteoporotic fracture increases approximately two-fold for each standard deviation decrease in T-score.    DX Cervical Spine 2-3 Views  Result Date: 2/2/2024  EXAM:  DX CERVICAL SPINE 2-3 VIEWS  Films obtained through cervical collar which obscures bone detail. As better demonstrated on comparison CT 2/02/2024, there is a moderately displaced subacute fracture at the base of the dens with some bone resorption at the fracture margins. Slight posterior displacement and angulation of the dens. The C1 fractures are poorly demonstrated. Osteopenia. Degenerative disk disease C3 through C6 interspaces with associated hypertrophic changes and facet arthropathy. Low grade retrolisthesis at the C4, C5 and C6 levels.     CT CERVICAL SPINE W/O CONTRAST  Result Date: 2/2/2024  EXAM: CT CERVICAL SPINE WITHOUT IV CONTRAST COMPARISON: CT cervical spine 12/19/23. FINDINGS: Redemonstrated fractures through the C1 ring. Slightly progressed further healing of the mildly displaced fracture through the right C1 anterior arch with osseous remodeling. Partial interval healing of the bilateral fractures through the posterior C1 arch with less prominent fracture planes. Stable displacement of the fracture through the left anterior C1 arch by approximately 4 mm when reformatted in similar planes. Increased displacement between C2 fracture fragments on the left, with previous contact between the fracture fragments posteriorly (for example se7/im29 on the prior CT) vs se8/im25 on the current exam, which is probably due to slightly less prominent posterior angulation of the dens fracture fragment and anterior C1 arch compared to  12/19/2023. Sagittal alignment of the C2 fracture fragments as well as alignment of the anterior and posterior arches of C1 relative to C2 and skull base has improved. Otherwise, similar multilevel spondylosis with mild anterolisthesis C2-C3 and C3-C4 as well as C7-T1 and retrolisthesis C4-C5 and C5-C6. Advanced degenerative disc disease C3-C4 to C6-C7. Posterior disc osteophyte complexes with uncovertebral facet hypertrophy result in mild spinal canal narrowing C4-C5 and C5-C6. Advanced right neuroforaminal narrowing C5-C6 and moderate left C6-C7. Milder degree of neuroforaminal narrowing at the other levels. Heterogeneous thyroid gland with a small hypodense nodule in the right. Prominent biapical scarring.  1.  Interval further slight healing about anterior right C1 and bilateral posterior ring fractures. Similar degree of left anterior C1 arch fracture distraction by 4 mm. 2.  Decreased posterior angulation of the dens fracture with improved fracture alignment, but slightly more distracted fracture plane.    MR CERVICAL SPINE W/O & W CONTRAST  Result Date: 12/19/2023  EXAM: MR CERVICAL SPINE WITHOUT AND WITH IV CONTRAST COMPARISON: CT cervical spine 08/17/2023. FINDINGS: Known fracture through the base of the dens. Since the prior exam there is increased posterior displacement and angulation of the tip of the dens with respect to the body of C2. This results in narrowing of the foramen magnum. However, there is  no gulshan compression of the cervicomedullary junction or associated cervicomedullary edema. There is stable pannus along the posterior aspect of the dens. There is no gulhsan evidence of ligamentous disruption. Healing fractures of the C1 ring are less well visualized on this exam. Multilevel spondylotic change with loss of disc height and disc bulging C3-C4 through C7-T1. There is effacement of the ventral thecal sac at each level without gulshan spinal cord compression, edema or myelomalacia. Multilevel  facet and uncinate hypertrophy results in severe narrowing of the right C5-C6 neural foramen with mild/moderate foraminal narrowing at the remaining cervical levels.  1. Known fracture through the base of the dens with increased posterior displacement and angulation. No compression of the cervicomedullary junction. 2. No gulshan evidence of ligamentous disruption. 3. Healing fractures of the C1 ring are less well visualized on this exam. 4. Multilevel spondylotic change results in mild multilevel central spinal stenosis. There is severe narrowing of the right C5-C6 neural foramen.    CT CERVICAL SPINE W/O CONTRAST  Result Date: 12/19/2023  EXAM: CT CERVICAL SPINE WITHOUT IV CONTRAST COMPARISON: MR cervical spine 12/19/2023, cervical spine radiograph 10/20/2023, CT cervical spine 08/17/2023 FINDINGS: Again demonstrated are multiple fractures through the C1 ring. Interval healing changes about the mildly displaced fracture through the right anterior arch. Slightly increased displacement of the fracture through the left anterior arch by approximately 2  mm. Stable fractures through the posterior arch. Dens fracture with increased displacement by approximately 5 mm with increased posterior angulation of the dens, which has progressed since 10/20/2023. No significant spinal canal narrowing. No acute fracture. Vertebral body heights are maintained. Multilevel spondylotic changes of the cervical spine, unchanged since 08/17/2023. Grade 1 anterolisthesis C2 on C3 and C3 on C4. Grade 1 retrolisthesis C4 on C5 and C5 on C6. Advanced disc space narrowing at C3-C4 to C6-C7, most marked at the C5 interspace. Posterior disc osteophyte complex results in mild spinal canal narrowing at C4-C5 and C5-C6. Uncovertebral and facet arthropathy results in advanced foraminal narrowing at C5-C6 on the right and moderate foraminal narrowing at C6-C7 on the left. Mild-moderate foraminal narrowing at the other levels.  1.  Interval healing  "changes about the anterior right C1 fracture. Fracture lines remain present in the anterior and posterior arches of C1. 2.  Increased displacement and posterior angulation of the dens fracture.    Physical Exam   /69 (BP Location: Right arm, Patient Position: Sitting)   Pulse 73   Resp 16   Ht 1.575 m (5' 2\")   Wt 43.5 kg (96 lb)   LMP  (LMP Unknown)   SpO2 98%   BMI 17.56 kg/m      Constitutional: Appears well-developed, thin, elderly. Cooperative. No acute distress.   HENT:   Head: Normocephalic and atraumatic.   Eyes: Conjunctivae are normal.  Neck: Neck supple. No tracheal deviation present.  Cardiovascular: Normal rate and regular rhythm.    Pulmonary/Chest: Effort normal and breath sounds normal.  Abdominal: Exhibits no obvious distension.   Musculoskeletal: Able to move all extremities.  No involuntary motor movements. No C/T/L spine tenderness to palpation.    Cervical flexion-extension range of motion: F/E good w/ mild muscle discomfort, lateral rotation is better left than right w/ similar muscle discomfort in cervical region.   Skin: Skin is warm, dry and intact.   Psychiatric: Normal mood and affect. Speech is normal and behavior is normal.    Neurological:  Alert, NAD, and oriented to person, place, and time.   No cranial nerve deficit   Gait: no assistive devices, steady, symmetrical     Strength (L/R)  5/5 Deltoid  5/5 Bicep  5/5 Tricep  5/5 Handgrip (deformed 2/2 RA)    5/5 Hip Flexion  5/5 Knee Extension  5/5 Ankle Dorsiflexion  5/5 Plantar Flexion    Reflexes (L/R)  2+/2+ Bicep  2+/2+ Brachioradialis  2+/2+ Patellar  2+/2+ Ankle    No Ciera's   No ankle clonus    Review of Systems   See HPI     Past Medical History:   Diagnosis Date    PONV (postoperative nausea and vomiting)     Skin cancer     Spider veins        Past Surgical History:   Procedure Laterality Date    COLONOSCOPY N/A 10/1/2024    Procedure: COLONOSCOPY with biopsies;  Surgeon: Manjit Sandhu MD;  Location: " [facility]       Social History     Socioeconomic History    Marital status: Single   Tobacco Use    Smoking status: Never     Passive exposure: Never    Smokeless tobacco: Never   Vaping Use    Vaping status: Never Used     Social Drivers of Health     Financial Resource Strain: Low Risk  (1/8/2024)    Financial Resource Strain     Within the past 12 months, have you or your family members you live with been unable to get utilities (heat, electricity) when it was really needed?: No   Food Insecurity: Low Risk  (1/8/2024)    Food Insecurity     Within the past 12 months, did you worry that your food would run out before you got money to buy more?: No     Within the past 12 months, did the food you bought just not last and you didn t have money to get more?: No   Transportation Needs: Low Risk  (1/8/2024)    Transportation Needs     Within the past 12 months, has lack of transportation kept you from medical appointments, getting your medicines, non-medical meetings or appointments, work, or from getting things that you need?: No   Physical Activity: Sufficiently Active (8/31/2023)    Received from [facility]    Exercise Vital Sign     Days of Exercise per Week: 5 days     Minutes of Exercise per Session: 40 min   Stress: Stress Concern Present (4/29/2021)    Received from [facility]    [employer] [place] [employer] - Occupational Stress Questionnaire     Feeling of Stress : To some extent   Social Connections: Unknown (4/29/2021)    [facility]    Social Connection and Isolation Panel [NHANES]     Frequency of Communication with Friends and Family: Three times a week     Frequency of Social Gatherings with Friends and Family: Twice a week     Attends Cheondoism Services: Patient declined     Active Member of Clubs or Organizations: Patient declined     Attends Club or Organization Meetings: Patient declined     Marital Status:     Interpersonal Safety: Unknown (10/1/2024)    Interpersonal Safety     Do you feel physically and emotionally safe where you currently live?: Patient unable to answer     Within the past 12 months, have you been hit, slapped, kicked or otherwise physically hurt by someone?: Patient unable to answer     Within the past 12 months, have you been humiliated or emotionally abused in other ways by your partner or ex-partner?: Patient unable to answer   Housing Stability: Low Risk  (1/8/2024)    Housing Stability     Do you have housing? : Yes     Are you worried about losing your housing?: No       family history is not on file.       Meera Sheehan PA-C  Department of Neurosurgery  Office: [phone]

## 2024-12-17 NOTE — PATIENT INSTRUCTIONS
Recommend driving assessment at [facility] or through PT before returning to driving.      Follow up for concerning symptoms.

## 2025-01-10 SDOH — HEALTH STABILITY: PHYSICAL HEALTH: ON AVERAGE, HOW MANY DAYS PER WEEK DO YOU ENGAGE IN MODERATE TO STRENUOUS EXERCISE (LIKE A BRISK WALK)?: 5 DAYS

## 2025-01-10 SDOH — HEALTH STABILITY: PHYSICAL HEALTH: ON AVERAGE, HOW MANY MINUTES DO YOU ENGAGE IN EXERCISE AT THIS LEVEL?: 30 MIN

## 2025-01-10 ASSESSMENT — SOCIAL DETERMINANTS OF HEALTH (SDOH): HOW OFTEN DO YOU GET TOGETHER WITH FRIENDS OR RELATIVES?: MORE THAN THREE TIMES A WEEK

## 2025-01-15 ENCOUNTER — OFFICE VISIT (OUTPATIENT)
Dept: FAMILY MEDICINE | Facility: CLINIC | Age: 85
End: 2025-01-15
Payer: MEDICARE

## 2025-01-15 ENCOUNTER — TELEPHONE (OUTPATIENT)
Dept: RHEUMATOLOGY | Facility: CLINIC | Age: 85
End: 2025-01-15

## 2025-01-15 VITALS
HEART RATE: 77 BPM | RESPIRATION RATE: 14 BRPM | DIASTOLIC BLOOD PRESSURE: 64 MMHG | SYSTOLIC BLOOD PRESSURE: 118 MMHG | TEMPERATURE: 97.1 F | OXYGEN SATURATION: 98 % | HEIGHT: 62 IN | WEIGHT: 96.8 LBS | BODY MASS INDEX: 17.81 KG/M2

## 2025-01-15 DIAGNOSIS — S12.01XD: ICD-10-CM

## 2025-01-15 DIAGNOSIS — Z23 NEED FOR SHINGLES VACCINE: ICD-10-CM

## 2025-01-15 DIAGNOSIS — M81.0 AGE-RELATED OSTEOPOROSIS WITHOUT CURRENT PATHOLOGICAL FRACTURE: ICD-10-CM

## 2025-01-15 DIAGNOSIS — M05.79 RHEUMATOID ARTHRITIS, SEROPOSITIVE, MULTIPLE SITES (H): Primary | ICD-10-CM

## 2025-01-15 DIAGNOSIS — Z00.00 ENCOUNTER FOR MEDICARE ANNUAL WELLNESS EXAM: Primary | ICD-10-CM

## 2025-01-15 DIAGNOSIS — S12.100G CLOSED ODONTOID FRACTURE WITH DELAYED HEALING, SUBSEQUENT ENCOUNTER: ICD-10-CM

## 2025-01-15 DIAGNOSIS — R63.6 UNDERWEIGHT: ICD-10-CM

## 2025-01-15 DIAGNOSIS — Z97.4 HEARING AID WORN: ICD-10-CM

## 2025-01-15 DIAGNOSIS — E03.8 SUBCLINICAL HYPOTHYROIDISM: ICD-10-CM

## 2025-01-15 DIAGNOSIS — H04.123 DRY EYES: ICD-10-CM

## 2025-01-15 DIAGNOSIS — H90.3 BILATERAL SENSORINEURAL HEARING LOSS: ICD-10-CM

## 2025-01-15 DIAGNOSIS — M05.79 RHEUMATOID ARTHRITIS, SEROPOSITIVE, MULTIPLE SITES (H): ICD-10-CM

## 2025-01-15 PROBLEM — S93.402D SPRAIN OF UNSPECIFIED LIGAMENT OF LEFT ANKLE, SUBSEQUENT ENCOUNTER: Status: ACTIVE | Noted: 2023-08-21

## 2025-01-15 PROBLEM — M06.9 RHEUMATOID ARTHRITIS INVOLVING BOTH HANDS, UNSPECIFIED WHETHER RHEUMATOID FACTOR PRESENT (H): Status: RESOLVED | Noted: 2022-01-14 | Resolved: 2025-01-15

## 2025-01-15 RX ORDER — METHOTREXATE 2.5 MG/1
10 TABLET ORAL
Qty: 48 TABLET | Refills: 0 | Status: SHIPPED | OUTPATIENT
Start: 2025-01-15

## 2025-01-15 RX ORDER — FOLIC ACID 1 MG/1
1 TABLET ORAL 2 TIMES DAILY
COMMUNITY

## 2025-01-15 NOTE — PROGRESS NOTES
Preventive Care Visit  [facility]  Abdifatah Alvarado MD, Family Medicine  Keegan 15, 2025      Assessment & Plan     Encounter for Medicare annual wellness exam  Diann is a 84-year-old female with history of rheumatoid arthritis with deformity of the hands, stable burst fracture of her cervical vertebrae with cervical spine collar, subclinical hypothyroidism, osteoporosis, and dry eyes presenting for annual wellness exam.    Currently no acute symptoms, reports that she is doing well, no longer having any dizziness, not having any falls, no difficulty with balance.    Vaccinations: Recommend for repeat COVID vaccination which was done in October 2024, needs a repeat dose per CDC guidelines, she is agreeable and this was given today.  Shingrix vaccination also recommended per CDC guidelines, she was agreeable, this will be given in her pharmacy.    Age-related osteoporosis without current pathological fracture  Recently switched from Fosamax (which she was on since 2022) to Reclast (last given December 2024) by endocrinology due to loss of bone density, last DEXA scan was in July 2024, defer treatment and evaluation to endocrinology    Subclinical hypothyroidism  Last TSH within normal limits at 2.57 in September 2024, well-controlled on Synthroid 50 mcg    Closed odontoid fracture with delayed healing, subsequent encounter  Stable burst fracture of first cervical vertebra, subsequent encounter for fracture with routine healing  Has had chronic C1 and type II odontoid fractures from a fall in August 2023.  Was previously in a C-spine collar and although was noted to have nonhealing fractures, because of no pain she stopped use of C-spine collar.    An MRI was done in November 2024, at that time there was a change in alignment of her odontoid process and she was seen by neurosurgery.  They note that there is not any substantial movement with regard to the C1 and C2, but these fractures will likely  never heal.  Because there is subtle movement, is recommended that she does wear a seek spine collar.  She should avoid excessive head movements.  She is aware of the risk of falling that a sudden jerk or fall causing a jerk of the head could cause paralysis and/or death.  She is not willing to wear the collar all the time but is willing to wear the collar anytime that she does go out of the house or she is doing an activity where she may bump into someone or an object or fall.  She is wearing the C-spine collar today, reports that it is tolerable to wear.    Encouraged her to continue with this.  C-spine collar as often as is possible, she will continue to wear it at night as well during sleep.  Per neurosurgery she is okay to engage in her exercise class but not to do any range of motion activities involving her neck which include bending to the ground or overhead lifting, she is aware.    Rheumatoid arthritis, seropositive, multiple sites (H)  Managed by rheumatology, on methotrexate 10 mg weekly and Orencia.  Taking folate supplementation.  Has regular follow-up with lab work with rheumatology.  Last Orencia given December 3, 2024    Dry eyes  Controlled with over-the-counter eyedrops.    Has hearing loss, wears hearing aids    Underweight  Weight has been stable, at 96 pounds, does appear frail.  Will continue to monitor.  Encouraged regular meals    Need for shingles vaccine    - zoster vaccine recombinant adjuvanted (SHINGRIX) injection  Dispense: 0.5 mL; Refill: 0              Counseling  Appropriate preventive services were addressed with this patient via screening, questionnaire, or discussion as appropriate for fall prevention, nutrition, physical activity, Tobacco-use cessation, social engagement, weight loss and cognition.  Checklist reviewing preventive services available has been given to the patient.  Reviewed patient's diet, addressing concerns and/or questions.   She is at risk for psychosocial  distress and has been provided with information to reduce risk.   The patient was provided with written information regarding signs of hearing loss.   Information on urinary incontinence and treatment options given to patient.           Nicholas Tidwell is a 84 year old, presenting for the following:  Wellness Visit        1/15/2025     1:04 PM   Additional Questions   Roomed by SHARRON WELCH           HPI  Has stopped drinking coffee, feels like her stomach symptoms improve when it is stopped. Her stool is better, it is more formed.  The stool is more regular. Does still have some urgency with stooling. Dizziness has passed , not usint meclizine anymore.         Health Care Directive  Patient has a Health Care Directive on file  Advance care planning document is on file and is current.      1/10/2025   General Health   How would you rate your overall physical health? Good   Feel stress (tense, anxious, or unable to sleep) To some extent   (!) STRESS CONCERN      1/10/2025   Nutrition   Diet: Regular (no restrictions)         1/10/2025   Exercise   Days per week of moderate/strenous exercise 5 days   Average minutes spent exercising at this level 30 min         1/10/2025   Social Factors   Frequency of gathering with friends or relatives More than three times a week   Worry food won't last until get money to buy more No   Food not last or not have enough money for food? No   Do you have housing? (Housing is defined as stable permanent housing and does not include staying ouside in a car, in a tent, in an abandoned building, in an overnight shelter, or couch-surfing.) Yes   Are you worried about losing your housing? No   Lack of transportation? No   Unable to get utilities (heat,electricity)? No         1/10/2025   Fall Risk   Fallen 2 or more times in the past year? No   Trouble with walking or balance? No          1/10/2025   Activities of Daily Living- Home Safety   Needs help with the following daily activites None of  the above   Safety concerns in the home None of the above         1/10/2025   Dental   Dentist two times every year? Yes         1/10/2025   Hearing Screening   Hearing concerns? (!) IT'S HARD TO FOLLOW A CONVERSATION IN A NOISY RESTAURANT OR CROWDED ROOM.    (!) TROUBLE UNDERSTANDING SOFT OR WHISPERED SPEECH.       Multiple values from one day are sorted in reverse-chronological order         1/10/2025   Driving Risk Screening   Patient/family members have concerns about driving (!) DECLINE         1/10/2025   General Alertness/Fatigue Screening   Have you been more tired than usual lately? No         1/10/2025   Urinary Incontinence Screening   Bothered by leaking urine in past 6 months Yes            Today's PHQ-2 Score:       1/14/2025     2:26 PM   PHQ-2 ( 1999 [employer])   Q1: Little interest or pleasure in doing things 0   Q2: Feeling down, depressed or hopeless 0   PHQ-2 Score 0    Q1: Little interest or pleasure in doing things Not at all   Q2: Feeling down, depressed or hopeless Not at all   PHQ-2 Score 0       Patient-reported           1/10/2025   Substance Use   Alcohol more than 3/day or more than 7/wk No   Do you have a current opioid prescription? No   How severe/bad is pain from 1 to 10? 1/10   Do you use any other substances recreationally? No     Social History     Tobacco Use    Smoking status: Never     Passive exposure: Never    Smokeless tobacco: Never   Vaping Use    Vaping status: Never Used           3/3/2022   LAST FHS-7 RESULTS   1st degree relative breast or ovarian cancer No   Any relative bilateral breast cancer No   Any male have breast cancer No   Any ONE woman have BOTH breast AND ovarian cancer No   Any woman with breast cancer before 50yrs No   2 or more relatives with breast AND/OR ovarian cancer No   2 or more relatives with breast AND/OR bowel cancer No        Mammogram Screening - After age 74- determine frequency with patient based on health status, life expectancy and patient  goals          Reviewed and updated as needed this visit by Provider                    Past Medical History:   Diagnosis Date    PONV (postoperative nausea and vomiting)     Skin cancer     Spider veins      Past Surgical History:   Procedure Laterality Date    COLONOSCOPY N/A 10/1/2024    Procedure: COLONOSCOPY with biopsies;  Surgeon: Manjit Sandhu MD;  Location: [facility] Main OR     BP Readings from Last 3 Encounters:   01/15/25 118/64   12/17/24 131/69   12/03/24 (!) 89/53    Wt Readings from Last 3 Encounters:   01/15/25 43.9 kg (96 lb 12.8 oz)   12/17/24 43.5 kg (96 lb)   12/03/24 43.1 kg (95 lb)                  Patient Active Problem List   Diagnosis    History of falling    Presence of intraocular lens    Rheumatoid arthritis, seropositive, multiple sites (H)    Age-related osteoporosis without current pathological fracture    Hypothyroidism    Injury of head    Repeated falls    Hyponatremia    Stable burst fracture of first cervical vertebra, subsequent encounter for fracture with routine healing    Sprain of unspecified ligament of left ankle, subsequent encounter     Past Surgical History:   Procedure Laterality Date    COLONOSCOPY N/A 10/1/2024    Procedure: COLONOSCOPY with biopsies;  Surgeon: Manjit Sandhu MD;  Location: [facility] Main OR       Social History     Tobacco Use    Smoking status: Never     Passive exposure: Never    Smokeless tobacco: Never   Substance Use Topics    Alcohol use: Not on file     No family history on file.      Current Outpatient Medications   Medication Sig Dispense Refill    calcium carbonate-vitamin D (OSCAL W/D) 500-200 MG-UNIT tablet Take 1 tablet by mouth 2 times daily      carboxymethylcellulose PF (REFRESH PLUS) 0.5 % ophthalmic solution 1 drop 3 times daily as needed for dry eyes      clindamycin (CLEOCIN) 300 MG capsule Take 600 mg by mouth. For dental procedures      ferrous fumarate 65 mg, Tejon. FE,-Vitamin C 125 mg (VITRON C)  MG TABS tablet  Take 1 tablet by mouth every other day      folic acid (FOLVITE) 1 MG tablet Take 1 mg by mouth 2 times daily.      levothyroxine (SYNTHROID/LEVOTHROID) 50 MCG tablet Take 1 tablet (50 mcg) by mouth daily 90 tablet 3    methotrexate 2.5 MG tablet Take 4 tablets (10 mg) by mouth every 7 days. Labs every 8 - 12 weeks for refills. 48 tablet 0    Multiple Vitamin (MULTIVITAMIN ADULT PO) Take 1 tablet by mouth daily      zoster vaccine recombinant adjuvanted (SHINGRIX) injection Inject 0.5 mLs into the muscle once for 1 dose. Pharmacist administered 0.5 mL 0     Allergies   Allergen Reactions    Infliximab Rash     Throat began to close/tighten    Sulfa Antibiotics Rash    Hydrocodone-Acetaminophen Nausea    Oxycodone-Acetaminophen Nausea    Penicillins Rash    Erythromycin Nausea     Current providers sharing in care for this patient include:  Patient Care Team:  Abdifatah Alvarado MD as PCP - General (Family Medicine)  Sukumar Jenkins MD as MD (Endocrinology, Diabetes, and Metabolism)  Sukumar Jenkins MD as Assigned Endocrinology Provider  Nano Olivier MBBS as Assigned Rheumatology Provider  Abdifatah Alvarado MD as Assigned PCP  Meera Sheehan PA-C as Physician Assistant (Neurological Surgery)  Meera Sheehan PA-C as Assigned Neuroscience Provider    The following health maintenance items are reviewed in Epic and correct as of today:  Health Maintenance   Topic Date Due    ZOSTER IMMUNIZATION (1 of 2) Never done    MEDICARE ANNUAL WELLNESS VISIT  10/16/2024    COVID-19 Vaccine (11 - 2024-25 season) 12/03/2024    ANNUAL REVIEW OF HM ORDERS  09/09/2025    TSH W/FREE T4 REFLEX  10/22/2025    FALL RISK ASSESSMENT  01/15/2026    DTAP/TDAP/TD IMMUNIZATION (3 - Td or Tdap) 01/10/2028    ADVANCE CARE PLANNING  10/16/2028    DEXA  07/30/2039    PHQ-2 (once per calendar year)  Completed    INFLUENZA VACCINE  Completed    Pneumococcal Vaccine: 50+ Years  Completed    RSV VACCINE  Completed    HPV IMMUNIZATION  Aged Out     "MENINGITIS IMMUNIZATION  Aged Out    RSV MONOCLONAL ANTIBODY  Aged Out         Review of Systems  Constitutional, neuro, ENT, endocrine, pulmonary, cardiac, gastrointestinal, genitourinary, musculoskeletal, integument and psychiatric systems are negative, except as otherwise noted.     Objective    Exam  /64 (BP Location: Right arm, Patient Position: Sitting, Cuff Size: Adult Small)   Pulse 77   Temp 97.1  F (36.2  C)   Resp 14   Ht 1.562 m (5' 1.5\")   Wt 43.9 kg (96 lb 12.8 oz)   LMP  (LMP Unknown)   SpO2 98%   BMI 17.99 kg/m     Estimated body mass index is 17.99 kg/m  as calculated from the following:    Height as of this encounter: 1.562 m (5' 1.5\").    Weight as of this encounter: 43.9 kg (96 lb 12.8 oz).    Physical Exam  GENERAL: alert and no distress, appears frail  EYES: Eyes grossly normal to inspection, PERRL and conjunctivae and sclerae normal  HENT: ear canals and TM's normal, nose and mouth without ulcers or lesions  NECK: Wearing C-spine collar.  No adenopathy, no asymmetry, masses, or scars  RESP: lungs clear to auscultation - no rales, rhonchi or wheezes  CV: regular rate and rhythm, normal S1 S2, no S3 or S4, no murmur, click or rub, no peripheral edema  ABDOMEN: soft, nontender, no hepatosplenomegaly, no masses and bowel sounds normal  MS: Has deformity of bilateral hands with enlarged MCP joints bilaterally and ulnar deviation of the fingers of both hands.  SKIN: no suspicious lesions or rashes  NEURO: Normal strength and tone, mentation intact and speech normal  PSYCH: mentation appears normal, affect normal/bright         1/15/2025   Mini Cog   Clock Draw Score 2 Normal   3 Item Recall 3 objects recalled   Mini Cog Total Score 5              Signed Electronically by: Abdifatah Alvarado MD    "

## 2025-01-15 NOTE — PATIENT INSTRUCTIONS
Patient Education   Preventive Care Advice   This is general advice given by our system to help you stay healthy. However, your care team may have specific advice just for you. Please talk to your care team about your preventive care needs.  Nutrition  Eat 5 or more servings of fruits and vegetables each day.  Try wheat bread, brown rice and whole grain pasta (instead of white bread, rice, and pasta).  Get enough calcium and vitamin D. Check the label on foods and aim for 100% of the RDA (recommended daily allowance).  Lifestyle  Exercise at least 150 minutes each week  (30 minutes a day, 5 days a week).  Do muscle strengthening activities 2 days a week. These help control your weight and prevent disease.  No smoking.  Wear sunscreen to prevent skin cancer.  Have a dental exam and cleaning every 6 months.  Yearly exams  See your health care team every year to talk about:  Any changes in your health.  Any medicines your care team has prescribed.  Preventive care, family planning, and ways to prevent chronic diseases.  Shots (vaccines)   HPV shots (up to age 26), if you've never had them before.  Hepatitis B shots (up to age 59), if you've never had them before.  COVID-19 shot: Get this shot when it's due.  Flu shot: Get a flu shot every year.  Tetanus shot: Get a tetanus shot every 10 years.  Pneumococcal, hepatitis A, and RSV shots: Ask your care team if you need these based on your risk.  Shingles shot (for age 50 and up)  General health tests  Diabetes screening:  Starting at age 35, Get screened for diabetes at least every 3 years.  If you are younger than age 35, ask your care team if you should be screened for diabetes.  Cholesterol test: At age 39, start having a cholesterol test every 5 years, or more often if advised.  Bone density scan (DEXA): At age 50, ask your care team if you should have this scan for osteoporosis (brittle bones).  Hepatitis C: Get tested at least once in your life.  STIs (sexually  transmitted infections)  Before age 24: Ask your care team if you should be screened for STIs.  After age 24: Get screened for STIs if you're at risk. You are at risk for STIs (including HIV) if:  You are sexually active with more than one person.  You don't use condoms every time.  You or a partner was diagnosed with a sexually transmitted infection.  If you are at risk for HIV, ask about PrEP medicine to prevent HIV.  Get tested for HIV at least once in your life, whether you are at risk for HIV or not.  Cancer screening tests  Cervical cancer screening: If you have a cervix, begin getting regular cervical cancer screening tests starting at age 21.  Breast cancer scan (mammogram): If you've ever had breasts, begin having regular mammograms starting at age 40. This is a scan to check for breast cancer.  Colon cancer screening: It is important to start screening for colon cancer at age 45.  Have a colonoscopy test every 10 years (or more often if you're at risk) Or, ask your provider about stool tests like a FIT test every year or Cologuard test every 3 years.  To learn more about your testing options, visit:   .  For help making a decision, visit:   https://bit.ly/zy13236.  Prostate cancer screening test: If you have a prostate, ask your care team if a prostate cancer screening test (PSA) at age 55 is right for you.  Lung cancer screening: If you are a current or former smoker ages 50 to 80, ask your care team if ongoing lung cancer screenings are right for you.  For informational purposes only. Not to replace the advice of your health care provider. Copyright   2023 [facility] [employer]. All rights reserved. Clinically reviewed by the [facility]. Wavo.me 565112 - REV 01/24.  Hearing Loss: Care Instructions  Overview     Hearing loss is a sudden or slow decrease in how well you hear. It can range from slight to profound. Permanent hearing loss can occur with aging. It also can  happen when you are exposed long-term to loud noise. Examples include listening to loud music, riding motorcycles, or being around other loud machines.  Hearing loss can affect your work and home life. It can make you feel lonely or depressed. You may feel that you have lost your independence. But hearing aids and other devices can help you hear better and feel connected to others.  Follow-up care is a key part of your treatment and safety. Be sure to make and go to all appointments, and call your doctor if you are having problems. It's also a good idea to know your test results and keep a list of the medicines you take.  How can you care for yourself at home?  Avoid loud noises whenever possible. This helps keep your hearing from getting worse.  Always wear hearing protection around loud noises.  Wear a hearing aid as directed.  A professional can help you pick a hearing aid that will work best for you.  You can also get hearing aids over the counter for mild to moderate hearing loss.  Have hearing tests as your doctor suggests. They can show whether your hearing has changed. Your hearing aid may need to be adjusted.  Use other devices as needed. These may include:  Telephone amplifiers and hearing aids that can connect to a television, stereo, radio, or microphone.  Devices that use lights or vibrations. These alert you to the doorbell, a ringing telephone, or a baby monitor.  Television closed-captioning. This shows the words at the bottom of the screen. Most new TVs can do this.  TTY (text telephone). This lets you type messages back and forth on the telephone instead of talking or listening. These devices are also called TDD. When messages are typed on the keyboard, they are sent over the phone line to a receiving TTY. The message is shown on a monitor.  Use text messaging, social media, and email if it is hard for you to communicate by telephone.  Try to learn a listening technique called speechreading. It is  "not lipreading. You pay attention to people's gestures, expressions, posture, and tone of voice. These clues can help you understand what a person is saying. Face the person you are talking to, and have them face you. Make sure the lighting is good. You need to see the other person's face clearly.  Think about counseling if you need help to adjust to your hearing loss.  When should you call for help?  Watch closely for changes in your health, and be sure to contact your doctor if:    You think your hearing is getting worse.     You have new symptoms, such as dizziness or nausea.   Where can you learn more?  Go to https://www.[employer].net/patiented  Enter R798 in the search box to learn more about \"Hearing Loss: Care Instructions.\"  Current as of: September 27, 2023  Content Version: 14.3    2024 [employer].   Care instructions adapted under license by your healthcare professional. If you have questions about a medical condition or this instruction, always ask your healthcare professional. [employer] disclaims any warranty or liability for your use of this information.    Learning About Stress  What is stress?     Stress is your body's response to a hard situation. Your body can have a physical, emotional, or mental response. Stress is a fact of life for most people, and it affects everyone differently. What causes stress for you may not be stressful for someone else.  A lot of things can cause stress. You may feel stress when you go on a job interview, take a test, or run a race. This kind of short-term stress is normal and even useful. It can help you if you need to work hard or react quickly. For example, stress can help you finish an important job on time.  Long-term stress is caused by ongoing stressful situations or events. Examples of long-term stress include long-term health problems, ongoing problems at work, or conflicts in your family. Long-term stress can harm your health.  How " does stress affect your health?  When you are stressed, your body responds as though you are in danger. It makes hormones that speed up your heart, make you breathe faster, and give you a burst of energy. This is called the fight-or-flight stress response. If the stress is over quickly, your body goes back to normal and no harm is done.  But if stress happens too often or lasts too long, it can have bad effects. Long-term stress can make you more likely to get sick, and it can make symptoms of some diseases worse. If you tense up when you are stressed, you may develop neck, shoulder, or low back pain. Stress is linked to high blood pressure and heart disease.  Stress also harms your emotional health. It can make you ford, tense, or depressed. Your relationships may suffer, and you may not do well at work or school.  What can you do to manage stress?  You can try these things to help manage stress:   Do something active. Exercise or activity can help reduce stress. Walking is a great way to get started. Even everyday activities such as housecleaning or yard work can help.  Try yoga or jossue chi. These techniques combine exercise and meditation. You may need some training at first to learn them.  Do something you enjoy. For example, listen to music or go to a movie. Practice your hobby or do volunteer work.  Meditate. This can help you relax, because you are not worrying about what happened before or what may happen in the future.  Do guided imagery. Imagine yourself in any setting that helps you feel calm. You can use online videos, books, or a teacher to guide you.  Do breathing exercises. For example:  From a standing position, bend forward from the waist with your knees slightly bent. Let your arms dangle close to the floor.  Breathe in slowly and deeply as you return to a standing position. Roll up slowly and lift your head last.  Hold your breath for just a few seconds in the standing position.  Breathe out  "slowly and bend forward from the waist.  Let your feelings out. Talk, laugh, cry, and express anger when you need to. Talking with supportive friends or family, a counselor, or a vernon leader about your feelings is a healthy way to relieve stress. Avoid discussing your feelings with people who make you feel worse.  Write. It may help to write about things that are bothering you. This helps you find out how much stress you feel and what is causing it. When you know this, you can find better ways to cope.  What can you do to prevent stress?  You might try some of these things to help prevent stress:  Manage your time. This helps you find time to do the things you want and need to do.  Get enough sleep. Your body recovers from the stresses of the day while you are sleeping.  Get support. Your family, friends, and community can make a difference in how you experience stress.  Limit your news feed. Avoid or limit time on social media or news that may make you feel stressed.  Do something active. Exercise or activity can help reduce stress. Walking is a great way to get started.  Where can you learn more?  Go to https://www.[employer].net/patiented  Enter N032 in the search box to learn more about \"Learning About Stress.\"  Current as of: October 24, 2023  Content Version: 14.3    2024 [employer].   Care instructions adapted under license by your healthcare professional. If you have questions about a medical condition or this instruction, always ask your healthcare professional. [employer] disclaims any warranty or liability for your use of this information.    Bladder Training: Care Instructions  Your Care Instructions     Bladder training is used to treat urge incontinence and stress incontinence. Urge incontinence means that the need to urinate comes on so fast that you can't get to a toilet in time. Stress incontinence means that you leak urine because of pressure on your bladder. For example, " it may happen when you laugh, cough, or lift something heavy.  Bladder training can increase how long you can wait before you have to urinate. It can also help your bladder hold more urine. And it can give you better control over the urge to urinate.  It is important to remember that bladder training takes a few weeks to a few months to make a difference. You may not see results right away, but don't give up.  Follow-up care is a key part of your treatment and safety. Be sure to make and go to all appointments, and call your doctor if you are having problems. It's also a good idea to know your test results and keep a list of the medicines you take.  How can you care for yourself at home?  Work with your doctor to come up with a bladder training program that is right for you. You may use one or more of the following methods.  Delayed urination  In the beginning, try to keep from urinating for 5 minutes after you first feel the need to go.  While you wait, take deep, slow breaths to relax. Kegel exercises can also help you delay the need to go to the bathroom.  After some practice, when you can easily wait 5 minutes to urinate, try to wait 10 minutes before you urinate.  Slowly increase the waiting period until you are able to control when you have to urinate.  Scheduled urination  Empty your bladder when you first wake up in the morning.  Schedule times throughout the day when you will urinate.  Start by going to the bathroom every hour, even if you don't need to go.  Slowly increase the time between trips to the bathroom.  When you have found a schedule that works well for you, keep doing it.  If you wake up during the night and have to urinate, do it. Apply your schedule to waking hours only.  Kegel exercises  These tighten and strengthen pelvic muscles, which can help you control the flow of urine. (If doing these exercises causes pain, stop doing them and talk with your doctor.) To do Kegel exercises:  Squeeze  "your muscles as if you were trying not to pass gas. Or squeeze your muscles as if you were stopping the flow of urine. Your belly, legs, and buttocks shouldn't move.  Hold the squeeze for 3 seconds, then relax for 5 to 10 seconds.  Start with 3 seconds, then add 1 second each week until you are able to squeeze for 10 seconds.  Repeat the exercise 10 times a session. Do 3 to 8 sessions a day.  When should you call for help?  Watch closely for changes in your health, and be sure to contact your doctor if:    Your incontinence is getting worse.     You do not get better as expected.   Where can you learn more?  Go to https://www.[employer].net/patiented  Enter V684 in the search box to learn more about \"Bladder Training: Care Instructions.\"  Current as of: April 30, 2024  Content Version: 14.3    2024 [employer].   Care instructions adapted under license by your healthcare professional. If you have questions about a medical condition or this instruction, always ask your healthcare professional. [employer] disclaims any warranty or liability for your use of this information.       "

## 2025-01-15 NOTE — TELEPHONE ENCOUNTER
[facility] Call [facility]    Phone Message    May a detailed message be left on voicemail: yes     Reason for Call: Medication Refill Request    Has the patient contacted the pharmacy for the refill? Yes   Name of medication being requested: methotrexate 2.5 MG tablet   Provider who prescribed the medication: Dr. Olivier  Pharmacy: [facility] - [address]  Date medication is needed: Asap, per pt, she states the pharmacy is having trouble connecting with the clinic to get the rx.       Action Taken: Other: Rheum    Travel Screening: Not Applicable     Date of Service: 1/15/25

## 2025-01-29 ENCOUNTER — TELEPHONE (OUTPATIENT)
Dept: NEUROSURGERY | Facility: CLINIC | Age: 85
End: 2025-01-29
Payer: MEDICARE

## 2025-01-29 DIAGNOSIS — Z87.81 H/O CERVICAL FRACTURE: Primary | ICD-10-CM

## 2025-01-29 NOTE — TELEPHONE ENCOUNTER
Health Call Center    Phone Message    May a detailed message be left on voicemail: yes     Reason for Call: Other: Patient is calling stating that she has a cervical collar that she has had since August of 2023. She thinks she may need a new one due to hers becoming uncomfortable and is requesting a prescription for a new collar. Please call back to discuss further.      Action Taken: Message routed to:  [facility]    Travel Screening: Not Applicable

## 2025-02-05 ENCOUNTER — LAB (OUTPATIENT)
Dept: LAB | Facility: CLINIC | Age: 85
End: 2025-02-05
Payer: MEDICARE

## 2025-02-05 ENCOUNTER — TELEPHONE (OUTPATIENT)
Dept: RHEUMATOLOGY | Facility: CLINIC | Age: 85
End: 2025-02-05
Payer: MEDICARE

## 2025-02-05 DIAGNOSIS — M05.79 RHEUMATOID ARTHRITIS, SEROPOSITIVE, MULTIPLE SITES (H): ICD-10-CM

## 2025-02-05 LAB
ALBUMIN SERPL BCG-MCNC: 4.5 G/DL (ref 3.5–5.2)
ALT SERPL W P-5'-P-CCNC: 10 U/L (ref 0–50)
CREAT SERPL-MCNC: 1.1 MG/DL (ref 0.51–0.95)
EGFRCR SERPLBLD CKD-EPI 2021: 49 ML/MIN/1.73M2
ERYTHROCYTE [DISTWIDTH] IN BLOOD BY AUTOMATED COUNT: 14.5 % (ref 10–15)
HCT VFR BLD AUTO: 36.7 % (ref 35–47)
HGB BLD-MCNC: 12.4 G/DL (ref 11.7–15.7)
MCH RBC QN AUTO: 32.8 PG (ref 26.5–33)
MCHC RBC AUTO-ENTMCNC: 33.8 G/DL (ref 31.5–36.5)
MCV RBC AUTO: 97 FL (ref 78–100)
PLATELET # BLD AUTO: 174 10E3/UL (ref 150–450)
RBC # BLD AUTO: 3.78 10E6/UL (ref 3.8–5.2)
WBC # BLD AUTO: 6.9 10E3/UL (ref 4–11)

## 2025-02-05 PROCEDURE — 85041 AUTOMATED RBC COUNT: CPT

## 2025-02-05 PROCEDURE — 82565 ASSAY OF CREATININE: CPT

## 2025-02-05 PROCEDURE — 84460 ALANINE AMINO (ALT) (SGPT): CPT

## 2025-02-05 PROCEDURE — 85014 HEMATOCRIT: CPT

## 2025-02-05 PROCEDURE — 36415 COLL VENOUS BLD VENIPUNCTURE: CPT

## 2025-02-05 PROCEDURE — 82040 ASSAY OF SERUM ALBUMIN: CPT

## 2025-02-05 NOTE — TELEPHONE ENCOUNTER
[facility] Call [facility]    Phone Message    May a detailed message be left on voicemail: yes     Reason for Call: Other: Patient took Methotrexate around 8am today wants to know if she can still have lab draw done this afternoon or should reschedule?       Action Taken: Other: Rheum     Travel Screening: Not Applicable     Date of Service:

## 2025-02-11 ENCOUNTER — INFUSION THERAPY VISIT (OUTPATIENT)
Dept: INFUSION THERAPY | Facility: HOSPITAL | Age: 85
End: 2025-02-11
Attending: INTERNAL MEDICINE
Payer: MEDICARE

## 2025-02-11 VITALS
RESPIRATION RATE: 16 BRPM | DIASTOLIC BLOOD PRESSURE: 61 MMHG | OXYGEN SATURATION: 95 % | TEMPERATURE: 97.9 F | HEART RATE: 78 BPM | SYSTOLIC BLOOD PRESSURE: 121 MMHG

## 2025-02-11 DIAGNOSIS — M05.79 RHEUMATOID ARTHRITIS, SEROPOSITIVE, MULTIPLE SITES (H): Primary | ICD-10-CM

## 2025-02-11 PROCEDURE — 96365 THER/PROPH/DIAG IV INF INIT: CPT

## 2025-02-11 PROCEDURE — 258N000003 HC RX IP 258 OP 636: Performed by: INTERNAL MEDICINE

## 2025-02-11 PROCEDURE — 250N000028 HC RX JA MOD (INTRAVENOUS), IP 250 OP 636: Mod: JZ,JA | Performed by: INTERNAL MEDICINE

## 2025-02-11 RX ORDER — DIPHENHYDRAMINE HYDROCHLORIDE 50 MG/ML
25 INJECTION INTRAMUSCULAR; INTRAVENOUS
Start: 2025-02-25

## 2025-02-11 RX ORDER — ALBUTEROL SULFATE 0.83 MG/ML
2.5 SOLUTION RESPIRATORY (INHALATION)
OUTPATIENT
Start: 2025-02-25

## 2025-02-11 RX ORDER — METHYLPREDNISOLONE SODIUM SUCCINATE 40 MG/ML
40 INJECTION INTRAMUSCULAR; INTRAVENOUS
Start: 2025-02-25

## 2025-02-11 RX ORDER — MEPERIDINE HYDROCHLORIDE 25 MG/ML
25 INJECTION INTRAMUSCULAR; INTRAVENOUS; SUBCUTANEOUS
Status: DISCONTINUED | OUTPATIENT
Start: 2025-02-11 | End: 2025-02-11 | Stop reason: HOSPADM

## 2025-02-11 RX ORDER — HEPARIN SODIUM,PORCINE 10 UNIT/ML
5-20 VIAL (ML) INTRAVENOUS DAILY PRN
OUTPATIENT
Start: 2025-02-25

## 2025-02-11 RX ORDER — EPINEPHRINE 1 MG/ML
0.3 INJECTION, SOLUTION INTRAMUSCULAR; SUBCUTANEOUS EVERY 5 MIN PRN
OUTPATIENT
Start: 2025-02-25

## 2025-02-11 RX ORDER — DIPHENHYDRAMINE HYDROCHLORIDE 50 MG/ML
50 INJECTION INTRAMUSCULAR; INTRAVENOUS
Start: 2025-02-25

## 2025-02-11 RX ORDER — ALBUTEROL SULFATE 0.83 MG/ML
2.5 SOLUTION RESPIRATORY (INHALATION)
Status: DISCONTINUED | OUTPATIENT
Start: 2025-02-11 | End: 2025-02-11 | Stop reason: HOSPADM

## 2025-02-11 RX ORDER — METHYLPREDNISOLONE SODIUM SUCCINATE 40 MG/ML
40 INJECTION INTRAMUSCULAR; INTRAVENOUS
Status: DISCONTINUED | OUTPATIENT
Start: 2025-02-11 | End: 2025-02-11 | Stop reason: HOSPADM

## 2025-02-11 RX ORDER — METHYLPREDNISOLONE SODIUM SUCCINATE 125 MG/2ML
125 INJECTION INTRAMUSCULAR; INTRAVENOUS ONCE
Start: 2025-02-25 | End: 2025-02-25

## 2025-02-11 RX ORDER — DIPHENHYDRAMINE HYDROCHLORIDE 50 MG/ML
50 INJECTION INTRAMUSCULAR; INTRAVENOUS
Status: DISCONTINUED | OUTPATIENT
Start: 2025-02-11 | End: 2025-02-11 | Stop reason: HOSPADM

## 2025-02-11 RX ORDER — ALBUTEROL SULFATE 90 UG/1
1-2 INHALANT RESPIRATORY (INHALATION)
Status: DISCONTINUED | OUTPATIENT
Start: 2025-02-11 | End: 2025-02-11 | Stop reason: HOSPADM

## 2025-02-11 RX ORDER — MEPERIDINE HYDROCHLORIDE 25 MG/ML
25 INJECTION INTRAMUSCULAR; INTRAVENOUS; SUBCUTANEOUS
OUTPATIENT
Start: 2025-02-25

## 2025-02-11 RX ORDER — ACETAMINOPHEN 325 MG/1
650 TABLET ORAL ONCE
Start: 2025-02-25 | End: 2025-02-25

## 2025-02-11 RX ORDER — ALBUTEROL SULFATE 90 UG/1
1-2 INHALANT RESPIRATORY (INHALATION)
Start: 2025-02-25

## 2025-02-11 RX ORDER — DIPHENHYDRAMINE HYDROCHLORIDE 50 MG/ML
25 INJECTION INTRAMUSCULAR; INTRAVENOUS
Status: DISCONTINUED | OUTPATIENT
Start: 2025-02-11 | End: 2025-02-11 | Stop reason: HOSPADM

## 2025-02-11 RX ORDER — HEPARIN SODIUM (PORCINE) LOCK FLUSH IV SOLN 100 UNIT/ML 100 UNIT/ML
5 SOLUTION INTRAVENOUS
OUTPATIENT
Start: 2025-02-25

## 2025-02-11 RX ORDER — DIPHENHYDRAMINE HCL 25 MG
25 CAPSULE ORAL ONCE
Start: 2025-02-25 | End: 2025-02-25

## 2025-02-11 RX ORDER — EPINEPHRINE 1 MG/ML
0.3 INJECTION, SOLUTION INTRAMUSCULAR; SUBCUTANEOUS EVERY 5 MIN PRN
Status: DISCONTINUED | OUTPATIENT
Start: 2025-02-11 | End: 2025-02-11 | Stop reason: HOSPADM

## 2025-02-11 RX ADMIN — SODIUM CHLORIDE 500 MG: 9 INJECTION, SOLUTION INTRAVENOUS at 14:14

## 2025-02-11 RX ADMIN — SODIUM CHLORIDE 50 ML: 900 INJECTION INTRAVENOUS at 14:14

## 2025-03-18 ENCOUNTER — NURSE TRIAGE (OUTPATIENT)
Dept: FAMILY MEDICINE | Facility: CLINIC | Age: 85
End: 2025-03-18
Payer: MEDICARE

## 2025-03-18 NOTE — TELEPHONE ENCOUNTER
"Nurse Triage SBAR    Is this a 2nd Level Triage? NO    Situation: Patient reports having no energy in the mornings and having bowel issues again, similar to what she had towards the end of last year.    Background: Medical history includes subclinical hypothyroidism, RA, and was told by PCP that may have IBS.     Assessment: Patient reports that over the past few weeks she has had times of diarrhea and constipation. She even has this occurring during the night.  She has decreased energy especially in the mornings and feels weak at that time.  She does have cramping and rates it \"at the high end\" when having a BM and 5/10 when sitting doing nothing.     TSH   Date Value Ref Range Status   10/22/2024 2.42 0.30 - 4.20 uIU/mL Final   08/29/2023 6.47 (H) 0.30 - 5.00 uIU/mL Final        Protocol Recommended Disposition:   See in Office Within 3 Days    Recommendation: Scheduled with PCP tomorrow.  Advised if worsens to go to ER.  Patient verbalized understanding.    SHERINE Mosher RN             Does the patient meet one of the following criteria for ADS visit consideration? 16+ years old, with an [facility] PCP     TIP  Providers, please consider if this condition is appropriate for management at one of our Acute and Diagnostic Services sites.     If patient is a good candidate, please use dotphrase <dot>triageresponse and select Refer to ADS to document.    Reason for Disposition   Patient wants to be seen    Additional Information   Negative: Shock suspected (e.g., cold/pale/clammy skin, too weak to stand, low BP, rapid pulse)   Negative: Difficult to awaken or acting confused (e.g., disoriented, slurred speech)   Negative: Sounds like a life-threatening emergency to the triager   Negative: Vomiting also present and worse than the diarrhea   Negative: Blood in stool and without diarrhea   Negative: Diarrhea begins while taking an antibiotic by mouth (oral antibiotic)   Negative: SEVERE abdominal pain (e.g., excruciating) " and present > 1 hour   Negative: SEVERE abdominal pain and age > 60 years   Negative: Bloody, black, or tarry bowel movements  (Exception: Chronic-unchanged black-grey bowel movements and is taking iron pills or Pepto-Bismol.)   Negative: SEVERE diarrhea (e.g., 7 or more times / day more than normal) and age > 60 years   Negative: Constant abdominal pain lasting > 2 hours   Negative: Drinking very little and dehydration suspected (e.g., no urine > 12 hours, very dry mouth, very lightheaded)   Negative: Patient sounds very sick or weak to the triager   Negative: SEVERE diarrhea (e.g., 7 or more times / day more than normal) and present > 24 hours (1 day)   Negative: MODERATE diarrhea (e.g., 4-6 times / day more than normal) and present > 48 hours (2 days)   Negative: MODERATE diarrhea (e.g., 4-6 times / day more than normal) and age > 70 years   Negative: Abdominal pain  (Exceptions: Pain clears completely with each passage of diarrhea stool,  or symptoms similar to previously diagnosed irritable bowel syndrome.)   Negative: Fever > 101 F (38.3 C)   Negative: Blood in the stool  (Exception: Only on toilet paper. Reason: Diarrhea can cause rectal irritation with blood on wiping.)   Negative: Mucus or pus in stool has been present > 2 days and diarrhea is more than mild   Negative: Weak immune system (e.g., HIV positive, cancer chemo, splenectomy, organ transplant, chronic steroids)   Negative: Travel to a foreign country in past month   Negative: Recent antibiotic therapy (i.e., within last 2 months) and diarrhea present > 3 days since antibiotic was stopped   Negative: Recent hospitalization and diarrhea present > 3 days   Negative: Tube feedings (e.g., nasogastric, g-tube, j-tube)   Negative: MILD diarrhea (e.g., 1-3 or more stools than normal in past 24 hours) diarrhea and present > 7 days  (Exception: Chronic diarrhea that is not worse.)    Answer Assessment - Initial Assessment Questions  1. DIARRHEA SEVERITY:  "\"How bad is the diarrhea?\" \"How many more stools have you had in the past 24 hours than normal?\"       Occasional also has some constipation.  2. ONSET: \"When did the diarrhea begin?\"       3 weeks ago  3. STOOL DESCRIPTION:  \"How loose or watery is the diarrhea?\" \"What is the stool color?\" \"Is there any blood or mucous in the stool?\"      Constipation and diarrhea  4. VOMITING: \"Are you also vomiting?\" If Yes, ask: \"How many times in the past 24 hours?\"       no  5. ABDOMEN PAIN: \"Are you having any abdomen pain?\" If Yes, ask: \"What does it feel like?\" (e.g., crampy, dull, intermittent, constant)       Cramping, sometimes shortness of breath in the morning and is tired.  6. ABDOMEN PAIN SEVERITY: If present, ask: \"How bad is the pain?\"  (e.g., Scale 1-10; mild, moderate, or severe)      Upper end and less than 5 when just sitting.   7. ORAL INTAKE: If vomiting, \"Have you been able to drink liquids?\" \"How much liquids have you had in the past 24 hours?\"      yes  8. HYDRATION: \"Any signs of dehydration?\" (e.g., dry mouth [not just dry lips], too weak to stand, dizziness, new weight loss) \"When did you last urinate?\"      Drinking a lot  9. EXPOSURE: \"Have you traveled to a foreign country recently?\" \"Have you been exposed to anyone with diarrhea?\" \"Could you have eaten any food that was spoiled?\"      no  10. ANTIBIOTIC USE: \"Are you taking antibiotics now or have you taken antibiotics in the past 2 months?\"        no  11. OTHER SYMPTOMS: \"Do you have any other symptoms?\" (e.g., fever, blood in stool)        No -  12. PREGNANCY: \"Is there any chance you are pregnant?\" \"When was your last menstrual period?\"        no    Protocols used: Diarrhea-A-OH    "

## 2025-03-18 NOTE — PROGRESS NOTES
Hospitalist Infusion Nursing Note:  Diann Low presents today for Orencia.    Patient seen by provider today: No   present during visit today: Not Applicable.    Note: Diann arrived ambulatory by herself for Orencia. Her friend will return to provide a ride home. Plan of care reviewed and she has no questions. Diann tolerates Orencia well and does not take pre medications.    Intravenous Access:  Peripheral IV placed.    Treatment Conditions:  Biological Infusion Checklist:  ~~~ NOTE: If the patient answers yes to any of the questions below, hold the infusion and contact ordering provider or on-call provider.    Have you recently had an elevated temperature, fever, chills, productive cough, coughing for 3 weeks or longer or hemoptysis,  abnormal vital signs, night sweats,  chest pain or have you noticed a decrease in your appetite, unexplained weight loss or fatigue? No  Do you have any open wounds or new incisions? No  Do you have any upcoming hospitalizations or surgeries? Does not include esophagogastroduodenoscopy, colonoscopy, endoscopic retrograde cholangiopancreatography (ERCP), endoscopic ultrasound (EUS), dental procedures or joint aspiration/steroid injections No  Do you currently have any signs of illness or infection or are you on any antibiotics? No  Have you had any new, sudden or worsening abdominal pain? No  Have you or anyone in your household received a live vaccination in the past 4 weeks? Please note: No live vaccines while on biologic/chemotherapy until 6 months after the last treatment. Patient can receive the flu vaccine (shot only), pneumovax and the Covid vaccine. It is optimal for the patient to get these vaccines mid cycle, but they can be given at any time as long as it is not on the day of the infusion. No  Have you recently been diagnosed with any new nervous system diseases (ie. Multiple sclerosis, Guillain [place], seizures, neurological changes) or cancer diagnosis? Are you on any  form of radiation or chemotherapy? No  Are you pregnant or breast feeding or do you have plans of pregnancy in the future? NA  Have you been having any signs of worsening depression or suicidal ideations?  (benlysta only) NA  Have there been any other new onset medical symptoms? No  Have you had any new blood clots? (IVIG only) NA    Post Infusion Assessment:  Patient tolerated infusion without incident.  Blood return noted pre and post infusion.  No evidence of extravasations.  Access discontinued per protocol.     Discharge Plan:   Patient will return March 25th for next appointment.   Patient discharged in stable condition accompanied by: friend.  Departure Mode: Ambulatory.      Pao Morel RN    n/a

## 2025-03-19 ENCOUNTER — OFFICE VISIT (OUTPATIENT)
Dept: FAMILY MEDICINE | Facility: CLINIC | Age: 85
End: 2025-03-19
Payer: MEDICARE

## 2025-03-19 ENCOUNTER — MYC MEDICAL ADVICE (OUTPATIENT)
Dept: FAMILY MEDICINE | Facility: CLINIC | Age: 85
End: 2025-03-19

## 2025-03-19 ENCOUNTER — ANCILLARY PROCEDURE (OUTPATIENT)
Dept: GENERAL RADIOLOGY | Facility: CLINIC | Age: 85
End: 2025-03-19
Attending: STUDENT IN AN ORGANIZED HEALTH CARE EDUCATION/TRAINING PROGRAM
Payer: MEDICARE

## 2025-03-19 VITALS
OXYGEN SATURATION: 100 % | HEIGHT: 62 IN | SYSTOLIC BLOOD PRESSURE: 110 MMHG | WEIGHT: 95.6 LBS | HEART RATE: 93 BPM | BODY MASS INDEX: 17.59 KG/M2 | RESPIRATION RATE: 14 BRPM | DIASTOLIC BLOOD PRESSURE: 62 MMHG | TEMPERATURE: 97.2 F

## 2025-03-19 DIAGNOSIS — R14.2 FLATULENCE, ERUCTATION AND GAS PAIN: ICD-10-CM

## 2025-03-19 DIAGNOSIS — R14.3 FLATULENCE, ERUCTATION AND GAS PAIN: ICD-10-CM

## 2025-03-19 DIAGNOSIS — Z86.39 HISTORY OF IRON DEFICIENCY: ICD-10-CM

## 2025-03-19 DIAGNOSIS — E03.8 SUBCLINICAL HYPOTHYROIDISM: ICD-10-CM

## 2025-03-19 DIAGNOSIS — R05.2 SUBACUTE COUGH: ICD-10-CM

## 2025-03-19 DIAGNOSIS — R53.82 CHRONIC FATIGUE: Primary | ICD-10-CM

## 2025-03-19 DIAGNOSIS — R13.19 ESOPHAGEAL DYSPHAGIA: ICD-10-CM

## 2025-03-19 DIAGNOSIS — R63.6 UNDERWEIGHT: ICD-10-CM

## 2025-03-19 DIAGNOSIS — K59.00 CONSTIPATION, UNSPECIFIED CONSTIPATION TYPE: ICD-10-CM

## 2025-03-19 DIAGNOSIS — N18.31 CHRONIC KIDNEY DISEASE, STAGE 3A (H): ICD-10-CM

## 2025-03-19 DIAGNOSIS — R14.1 FLATULENCE, ERUCTATION AND GAS PAIN: ICD-10-CM

## 2025-03-19 DIAGNOSIS — R19.7 DIARRHEA, UNSPECIFIED TYPE: ICD-10-CM

## 2025-03-19 DIAGNOSIS — M62.81 GENERALIZED MUSCLE WEAKNESS: ICD-10-CM

## 2025-03-19 PROCEDURE — 3074F SYST BP LT 130 MM HG: CPT | Performed by: STUDENT IN AN ORGANIZED HEALTH CARE EDUCATION/TRAINING PROGRAM

## 2025-03-19 PROCEDURE — 3078F DIAST BP <80 MM HG: CPT | Performed by: STUDENT IN AN ORGANIZED HEALTH CARE EDUCATION/TRAINING PROGRAM

## 2025-03-19 PROCEDURE — G2211 COMPLEX E/M VISIT ADD ON: HCPCS | Performed by: STUDENT IN AN ORGANIZED HEALTH CARE EDUCATION/TRAINING PROGRAM

## 2025-03-19 PROCEDURE — 36415 COLL VENOUS BLD VENIPUNCTURE: CPT | Performed by: STUDENT IN AN ORGANIZED HEALTH CARE EDUCATION/TRAINING PROGRAM

## 2025-03-19 PROCEDURE — 71046 X-RAY EXAM CHEST 2 VIEWS: CPT | Mod: TC | Performed by: RADIOLOGY

## 2025-03-19 PROCEDURE — 99214 OFFICE O/P EST MOD 30 MIN: CPT | Performed by: STUDENT IN AN ORGANIZED HEALTH CARE EDUCATION/TRAINING PROGRAM

## 2025-03-19 RX ORDER — ALENDRONATE SODIUM 70 MG/1
TABLET ORAL
COMMUNITY
Start: 2025-01-11

## 2025-03-19 NOTE — PROGRESS NOTES
Assessment & Plan     Chronic fatigue  Generalized muscle weakness  Diann is a 85-year-old female with history of rheumatoid arthritis on methotrexate and Orencia, hypothyroidism, history of odontoid fracture in August 2023, presenting for follow-up of chronic fatigue and generalized weakness.  She reports worsening since our last visit, but still the symptoms are roughly the same, having fatigue worse in the morning that gets better throughout the day.  She feels like she cannot stand for more than 5 to 10 minutes, does not feel weak in the legs but does feel generally weak.    Patient is frail, although weight has been stable.  Has contractures of hands which is stable.  Otherwise normal exam, stable.    As previously had many lab studies to include TSH that is within normal limits, normal liver enzymes, B12 and folate normal.  Kidney function has generally been with GFR above 60, though her last GFR was just below that we will repeat that today.  Please does not likely to be the cause of her issue.  There is no anemia.  Cortisol testing normal.  CK done was normal previously.    Will repeat a CK and get aldolase today given the worsening although I believe that these will likely be normal.  Referral placed to neurology to consider evaluation, possible EMG.    Discussed with Diann that I do believe that the symptoms are most likely secondary to rheumatoid arthritis or iatrogenic from methotrexate use, as we have not been able to find any other cause.  If the above workup is negative, then we will need to talk to rheumatology if there is anything that they need to change about her regimen.    - CK total  - Aldolase  - Adult Neurology  Referral      Constipation, unspecified constipation type  Likely secondary to iron supplementation.  She should stop iron supplementation, we will monitor for improvement prior to any further workup of this.    Diarrhea, unspecified type  Has intermittent diarrhea, this  has been worked up, had negative celiac panel, negative stool panel except for a fecal calprotectin that was mildly elevated.  CT enterography was done and showed possible colon thickening so colonoscopy was done this was normal.  We will monitor for improvement in stooling off of iron, possible that diarrhea may worsen.  If it does we would consider starting loperamide as needed.  She will follow-up with me after having iron stopped for a couple of weeks to notify me this is going.    Subacute cough  Has had a subacute cough for the last couple of weeks, reports that it is mild, and she would not really necessarily call it a cough but hacking.  There is no fever, no significant shortness of breath, no chest pain.  Likely viral URI, but possibly also secondary to dysphagia as discussed below.  Will obtain chest x-ray.  - XR Chest 2 Views    Subclinical hypothyroidism  On Synthroid 50 mcg daily, given her increased fatigue recommend that we repeat a TSH although this was recently done in October and was normal.    TSH   Date Value Ref Range Status   10/22/2024 2.42 0.30 - 4.20 uIU/mL Final   08/29/2023 6.47 (H) 0.30 - 5.00 uIU/mL Final       - TSH with free T4 reflex    Chronic kidney disease, stage 3a (H)  Recently had renal panel that was slightly decreased in February, recommended we repeat renal panel today.  Further workup as needed.  - Renal panel (Alb, BUN, Ca, Cl, CO2, Creat, Gluc, Phos, K, Na)    Esophageal dysphagia  Previously has had esophageal dysphagi to solid foods, reporting that a it is still possible for her to swallow solid foods just difficult, it is not painful to do so.  No issues with liquids.  Also feels like her voice is different although not able to appreciate any difference in voice today.    Recommend that we have her do a barium swallow as well as see speech therapy for swallow challenges.  If there are abnormalities on the barium swallow would recommend for EGD    - XR Esophagram  -  Speech Therapy  Referral    Flatulence, eructation and gas pain  As above has had celiac panel normal, GI workup as above.  Also recommend for H. pylori testing as this is not done previously.  Likely secondary to iron supplementation we will monitor for improvement after stopping iron.  If is not improved we would consider starting PPI.  - Helicobacter pylori Antigen Stool  - Iron and iron binding capacity  - Ferritin    History of iron deficiency  Most recent CBC was normal, MCV was above 90 believe that iron deficiency is very unlikely but will obtain iron panel as we are stopping iron supplementation now to prove this  - Iron and iron binding capacity  - Ferritin    Underweight  Weight has been stable, at 95-96 pounds, does appear frail.  Will continue to monitor.  Encouraged regular meals              Subjective   Diann is a 85 year old, presenting for the following health issues:  Follow Up (Reports feeling exhausted for a few weeks. Seems to be worse in the morning. Bowel issues, has slowly improved but not getting better. Diarrhea, constipated, and soft stool, some mucus./Small cough.)        3/19/2025    11:17 AM   Additional Questions   Roomed by SHARRON WELCH     History of Present Illness       Diabetes:   She presents for follow up of diabetes.    She is not checking blood glucose.        She is concerned about low blood sugar, several less than 70 in the past few weeks.   She is having weight loss.            Reason for visit:  Determine why I feel so yucky.    She eats 2-3 servings of fruits and vegetables daily.She consumes 0 sweetened beverage(s) daily.She exercises with enough effort to increase her heart rate 20 to 29 minutes per day.  She exercises with enough effort to increase her heart rate 4 days per week.   She is taking medications regularly.          Diann reports that she is still very tired, but more tired than usual.  This has been worsening over the last few weeks.  Still it is much  "worse during the mornings, it seems to get better as the day goes on.  She still able to get up but just does not feel like she has the energy to do much, also has a difficult time standing for more than 5 to 10 minutes because she will feel tired.  Reports that is not any weakness in her legs or in her core and just feels like she runs out of energy.  As she does throughout the day the energy level seems to get better.    She notes that she has also been having bloating, and flatulence, this is not new, it is just stable from before.  Also having episodes of diarrhea and constipation that alternate.  These are not different than before.    She has been having a small cough, she is careful to call at a hack and not a cough, and it is only intermittently occurring, and not occurring during the visit today.  She has not had any fever, chest pain.  States she feels little more short of breath than usual.    States she has a hard time swallowing solids still, this is not significantly different than before.  She does feel like is more difficult is not painful to swallow solids.  There is no issue at all with swallowing etc. soft or liquid.  Feels like her voice has changed over the last few weeks, it is not hoarse just sounds different to her and seems like people are asking her what she is saying more frequently and asking her to repeat herself.              Review of Systems  Constitutional, neuro, ENT, endocrine, pulmonary, cardiac, gastrointestinal, genitourinary, musculoskeletal, integument and psychiatric systems are negative, except as otherwise noted.      Objective    /62 (BP Location: Right arm, Patient Position: Sitting, Cuff Size: Adult Small)   Pulse 93   Temp 97.2  F (36.2  C)   Resp 14   Ht 1.562 m (5' 1.5\")   Wt 43.4 kg (95 lb 9.6 oz)   LMP  (LMP Unknown)   SpO2 100%   BMI 17.77 kg/m    Body mass index is 17.77 kg/m .  Physical Exam   GENERAL: alert and no distress, frail  NECK: no " adenopathy, no asymmetry, masses, or scars  RESP: lungs clear to auscultation - no rales, rhonchi or wheezes  CV: regular rate and rhythm, normal S1 S2, no S3 or S4, no murmur, click or rub, no peripheral edema  ABDOMEN: soft, nontender, no hepatosplenomegaly, no masses and bowel sounds normal  MS: no gross musculoskeletal defects noted, no edema  NEURO: Normal strength and tone, mentation intact and speech normal  PSYCH: mentation appears normal, affect normal/bright    Lab on 02/05/2025   Component Date Value Ref Range Status    WBC Count 02/05/2025 6.9  4.0 - 11.0 10e3/uL Final    RBC Count 02/05/2025 3.78 (L)  3.80 - 5.20 10e6/uL Final    Hemoglobin 02/05/2025 12.4  11.7 - 15.7 g/dL Final    Hematocrit 02/05/2025 36.7  35.0 - 47.0 % Final    MCV 02/05/2025 97  78 - 100 fL Final    MCH 02/05/2025 32.8  26.5 - 33.0 pg Final    MCHC 02/05/2025 33.8  31.5 - 36.5 g/dL Final    RDW 02/05/2025 14.5  10.0 - 15.0 % Final    Platelet Count 02/05/2025 174  150 - 450 10e3/uL Final    Creatinine 02/05/2025 1.10 (H)  0.51 - 0.95 mg/dL Final    GFR Estimate 02/05/2025 49 (L)  >60 mL/min/1.73m2 Final    eGFR calculated using 2021 CKD-EPI equation.    ALT 02/05/2025 10  0 - 50 U/L Final    Albumin 02/05/2025 4.5  3.5 - 5.2 g/dL Final         The longitudinal plan of care for the diagnosis(es)/condition(s) as documented were addressed during this visit. Due to the added complexity in care, I will continue to support Diann in the subsequent management and with ongoing continuity of care.    Signed Electronically by: Abdifatah Alvarado MD

## 2025-03-20 DIAGNOSIS — R93.89 ABNORMAL CHEST X-RAY: Primary | ICD-10-CM

## 2025-03-20 LAB
ALBUMIN SERPL BCG-MCNC: 4.4 G/DL (ref 3.5–5.2)
ANION GAP SERPL CALCULATED.3IONS-SCNC: 14 MMOL/L (ref 7–15)
BUN SERPL-MCNC: 14.9 MG/DL (ref 8–23)
CALCIUM SERPL-MCNC: 9.6 MG/DL (ref 8.8–10.4)
CHLORIDE SERPL-SCNC: 99 MMOL/L (ref 98–107)
CK SERPL-CCNC: 90 U/L (ref 26–192)
CREAT SERPL-MCNC: 0.83 MG/DL (ref 0.51–0.95)
EGFRCR SERPLBLD CKD-EPI 2021: 68 ML/MIN/1.73M2
FERRITIN SERPL-MCNC: 137 NG/ML (ref 11–328)
GLUCOSE SERPL-MCNC: 88 MG/DL (ref 70–99)
HCO3 SERPL-SCNC: 24 MMOL/L (ref 22–29)
IRON BINDING CAPACITY (ROCHE): 234 UG/DL (ref 240–430)
IRON SATN MFR SERPL: 36 % (ref 15–46)
IRON SERPL-MCNC: 85 UG/DL (ref 37–145)
PHOSPHATE SERPL-MCNC: 3.6 MG/DL (ref 2.5–4.5)
POTASSIUM SERPL-SCNC: 4.3 MMOL/L (ref 3.4–5.3)
SODIUM SERPL-SCNC: 137 MMOL/L (ref 135–145)
TSH SERPL DL<=0.005 MIU/L-ACNC: 3.45 UIU/ML (ref 0.3–4.2)

## 2025-03-20 NOTE — TELEPHONE ENCOUNTER
Diann, thank you for your question.  I do not believe it would be redundant because Meera works with neurosurgery, neurology is a different specialty which would be focused more on evaluating your nerves.  The neurosurgery team is very focused on your neck fracture.    Best regards,    Abdifatah Alvarado MD

## 2025-03-25 ENCOUNTER — INFUSION THERAPY VISIT (OUTPATIENT)
Dept: INFUSION THERAPY | Facility: HOSPITAL | Age: 85
End: 2025-03-25
Attending: INTERNAL MEDICINE
Payer: MEDICARE

## 2025-03-25 VITALS
RESPIRATION RATE: 16 BRPM | SYSTOLIC BLOOD PRESSURE: 107 MMHG | DIASTOLIC BLOOD PRESSURE: 52 MMHG | TEMPERATURE: 98.3 F | OXYGEN SATURATION: 96 % | HEART RATE: 76 BPM

## 2025-03-25 DIAGNOSIS — M05.79 RHEUMATOID ARTHRITIS, SEROPOSITIVE, MULTIPLE SITES (H): Primary | ICD-10-CM

## 2025-03-25 PROCEDURE — 250N000028 HC RX JA MOD (INTRAVENOUS), IP 250 OP 636: Mod: JZ,JA | Performed by: INTERNAL MEDICINE

## 2025-03-25 PROCEDURE — 96365 THER/PROPH/DIAG IV INF INIT: CPT

## 2025-03-25 PROCEDURE — 258N000003 HC RX IP 258 OP 636: Performed by: INTERNAL MEDICINE

## 2025-03-25 RX ORDER — DIPHENHYDRAMINE HYDROCHLORIDE 50 MG/ML
25 INJECTION, SOLUTION INTRAMUSCULAR; INTRAVENOUS
Start: 2025-05-06

## 2025-03-25 RX ORDER — METHYLPREDNISOLONE SODIUM SUCCINATE 40 MG/ML
40 INJECTION INTRAMUSCULAR; INTRAVENOUS
Status: DISCONTINUED | OUTPATIENT
Start: 2025-03-25 | End: 2025-03-25 | Stop reason: HOSPADM

## 2025-03-25 RX ORDER — ALBUTEROL SULFATE 0.83 MG/ML
2.5 SOLUTION RESPIRATORY (INHALATION)
OUTPATIENT
Start: 2025-12-03

## 2025-03-25 RX ORDER — ALBUTEROL SULFATE 90 UG/1
1-2 INHALANT RESPIRATORY (INHALATION)
Status: DISCONTINUED | OUTPATIENT
Start: 2025-03-25 | End: 2025-03-25 | Stop reason: HOSPADM

## 2025-03-25 RX ORDER — HEPARIN SODIUM,PORCINE 10 UNIT/ML
5-20 VIAL (ML) INTRAVENOUS DAILY PRN
OUTPATIENT
Start: 2025-05-06

## 2025-03-25 RX ORDER — METHYLPREDNISOLONE SODIUM SUCCINATE 40 MG/ML
40 INJECTION INTRAMUSCULAR; INTRAVENOUS
Start: 2025-05-06

## 2025-03-25 RX ORDER — HEPARIN SODIUM (PORCINE) LOCK FLUSH IV SOLN 100 UNIT/ML 100 UNIT/ML
5 SOLUTION INTRAVENOUS
OUTPATIENT
Start: 2025-05-06

## 2025-03-25 RX ORDER — EPINEPHRINE 1 MG/ML
0.3 INJECTION, SOLUTION INTRAMUSCULAR; SUBCUTANEOUS EVERY 5 MIN PRN
OUTPATIENT
Start: 2025-05-06

## 2025-03-25 RX ORDER — DIPHENHYDRAMINE HYDROCHLORIDE 50 MG/ML
50 INJECTION, SOLUTION INTRAMUSCULAR; INTRAVENOUS
Start: 2025-05-06

## 2025-03-25 RX ORDER — ALBUTEROL SULFATE 90 UG/1
1-2 INHALANT RESPIRATORY (INHALATION)
Start: 2025-05-06

## 2025-03-25 RX ORDER — EPINEPHRINE 1 MG/ML
0.3 INJECTION, SOLUTION INTRAMUSCULAR; SUBCUTANEOUS EVERY 5 MIN PRN
Status: DISCONTINUED | OUTPATIENT
Start: 2025-03-25 | End: 2025-03-25 | Stop reason: HOSPADM

## 2025-03-25 RX ORDER — ALBUTEROL SULFATE 90 UG/1
1-2 INHALANT RESPIRATORY (INHALATION)
Start: 2025-12-03

## 2025-03-25 RX ORDER — ZOLEDRONIC ACID 0.05 MG/ML
3 INJECTION, SOLUTION INTRAVENOUS ONCE
Start: 2025-12-03

## 2025-03-25 RX ORDER — MEPERIDINE HYDROCHLORIDE 25 MG/ML
25 INJECTION INTRAMUSCULAR; INTRAVENOUS; SUBCUTANEOUS
Status: DISCONTINUED | OUTPATIENT
Start: 2025-03-25 | End: 2025-03-25 | Stop reason: HOSPADM

## 2025-03-25 RX ORDER — DIPHENHYDRAMINE HYDROCHLORIDE 50 MG/ML
50 INJECTION, SOLUTION INTRAMUSCULAR; INTRAVENOUS
Start: 2025-12-03

## 2025-03-25 RX ORDER — EPINEPHRINE 1 MG/ML
0.3 INJECTION, SOLUTION INTRAMUSCULAR; SUBCUTANEOUS EVERY 5 MIN PRN
OUTPATIENT
Start: 2025-12-03

## 2025-03-25 RX ORDER — ALBUTEROL SULFATE 0.83 MG/ML
2.5 SOLUTION RESPIRATORY (INHALATION)
Status: DISCONTINUED | OUTPATIENT
Start: 2025-03-25 | End: 2025-03-25 | Stop reason: HOSPADM

## 2025-03-25 RX ORDER — METHYLPREDNISOLONE SODIUM SUCCINATE 40 MG/ML
40 INJECTION INTRAMUSCULAR; INTRAVENOUS
Start: 2025-12-03

## 2025-03-25 RX ORDER — HEPARIN SODIUM (PORCINE) LOCK FLUSH IV SOLN 100 UNIT/ML 100 UNIT/ML
5 SOLUTION INTRAVENOUS
OUTPATIENT
Start: 2025-12-03

## 2025-03-25 RX ORDER — MEPERIDINE HYDROCHLORIDE 25 MG/ML
25 INJECTION INTRAMUSCULAR; INTRAVENOUS; SUBCUTANEOUS
OUTPATIENT
Start: 2025-05-06

## 2025-03-25 RX ORDER — DIPHENHYDRAMINE HYDROCHLORIDE 50 MG/ML
25 INJECTION, SOLUTION INTRAMUSCULAR; INTRAVENOUS
Status: DISCONTINUED | OUTPATIENT
Start: 2025-03-25 | End: 2025-03-25 | Stop reason: HOSPADM

## 2025-03-25 RX ORDER — DIPHENHYDRAMINE HYDROCHLORIDE 50 MG/ML
25 INJECTION, SOLUTION INTRAMUSCULAR; INTRAVENOUS
Start: 2025-12-03

## 2025-03-25 RX ORDER — DIPHENHYDRAMINE HCL 25 MG
25 CAPSULE ORAL ONCE
Start: 2025-05-06 | End: 2025-05-06

## 2025-03-25 RX ORDER — HEPARIN SODIUM,PORCINE 10 UNIT/ML
5-20 VIAL (ML) INTRAVENOUS DAILY PRN
OUTPATIENT
Start: 2025-12-03

## 2025-03-25 RX ORDER — ACETAMINOPHEN 325 MG/1
650 TABLET ORAL ONCE
Start: 2025-05-06 | End: 2025-05-06

## 2025-03-25 RX ORDER — METHYLPREDNISOLONE SODIUM SUCCINATE 125 MG/2ML
125 INJECTION INTRAMUSCULAR; INTRAVENOUS ONCE
Start: 2025-05-06 | End: 2025-05-06

## 2025-03-25 RX ORDER — ALBUTEROL SULFATE 0.83 MG/ML
2.5 SOLUTION RESPIRATORY (INHALATION)
OUTPATIENT
Start: 2025-05-06

## 2025-03-25 RX ORDER — DIPHENHYDRAMINE HYDROCHLORIDE 50 MG/ML
50 INJECTION, SOLUTION INTRAMUSCULAR; INTRAVENOUS
Status: DISCONTINUED | OUTPATIENT
Start: 2025-03-25 | End: 2025-03-25 | Stop reason: HOSPADM

## 2025-03-25 RX ADMIN — SODIUM CHLORIDE 250 ML: 0.9 INJECTION, SOLUTION INTRAVENOUS at 13:36

## 2025-03-25 RX ADMIN — SODIUM CHLORIDE 500 MG: 9 INJECTION, SOLUTION INTRAVENOUS at 13:48

## 2025-03-25 NOTE — PROGRESS NOTES
Infusion Nursing Note:  Diann Low presents today for orencia.    Patient seen by provider today: No   present during visit today: Not Applicable.    Note: Patient ambulated into Infusion. Very pleasant Denies any new changes or concerns.      Intravenous Access:  Peripheral IV placed.    Treatment Conditions:  Biological Infusion Checklist:  ~~~ NOTE: If the patient answers yes to any of the questions below, hold the infusion and contact ordering provider or on-call provider.    Have you recently had an elevated temperature, fever, chills, productive cough, coughing for 3 weeks or longer or hemoptysis,  abnormal vital signs, night sweats,  chest pain or have you noticed a decrease in your appetite, unexplained weight loss or fatigue? No  Do you have any open wounds or new incisions? No  Do you have any upcoming hospitalizations or surgeries? Does not include esophagogastroduodenoscopy, colonoscopy, endoscopic retrograde cholangiopancreatography (ERCP), endoscopic ultrasound (EUS), dental procedures or joint aspiration/steroid injections No  Do you currently have any signs of illness or infection or are you on any antibiotics? No  Have you had any new, sudden or worsening abdominal pain? No  Have you or anyone in your household received a live vaccination in the past 4 weeks? Please note: No live vaccines while on biologic/chemotherapy until 6 months after the last treatment. Patient can receive the flu vaccine (shot only), pneumovax and the Covid vaccine. It is optimal for the patient to get these vaccines mid cycle, but they can be given at any time as long as it is not on the day of the infusion. No  Have you recently been diagnosed with any new nervous system diseases (ie. Multiple sclerosis, Guillain [place], seizures, neurological changes) or cancer diagnosis? Are you on any form of radiation or chemotherapy? No  Are you pregnant or breast feeding or do you have plans of pregnancy in the future?  No  Have you been having any signs of worsening depression or suicidal ideations?  (benlysta only) No  Have there been any other new onset medical symptoms? No  Have you had any new blood clots? (IVIG only) No      Post Infusion Assessment:  Patient tolerated infusion without incident.  Site patent and intact, free from redness, edema or discomfort.  No evidence of extravasations.  Access discontinued per protocol.       Discharge Plan:   Patient and/or family verbalized understanding of discharge instructions and all questions answered.      Smita Campos RN

## 2025-03-28 ENCOUNTER — HOSPITAL ENCOUNTER (OUTPATIENT)
Dept: CT IMAGING | Facility: CLINIC | Age: 85
Discharge: HOME | End: 2025-03-28
Attending: STUDENT IN AN ORGANIZED HEALTH CARE EDUCATION/TRAINING PROGRAM | Admitting: STUDENT IN AN ORGANIZED HEALTH CARE EDUCATION/TRAINING PROGRAM
Payer: MEDICARE

## 2025-03-28 DIAGNOSIS — R93.89 ABNORMAL CHEST X-RAY: ICD-10-CM

## 2025-03-28 PROCEDURE — 71250 CT THORAX DX C-: CPT

## 2025-03-30 DIAGNOSIS — J47.9 BRONCHIECTASIS WITHOUT COMPLICATION (H): Primary | ICD-10-CM

## 2025-04-04 DIAGNOSIS — M05.79 RHEUMATOID ARTHRITIS, SEROPOSITIVE, MULTIPLE SITES (H): ICD-10-CM

## 2025-04-07 RX ORDER — METHOTREXATE 2.5 MG/1
TABLET ORAL
Qty: 48 TABLET | Refills: 0 | Status: SHIPPED | OUTPATIENT
Start: 2025-04-07

## 2025-04-09 RX ORDER — ALBUTEROL SULFATE 0.83 MG/ML
2.5 SOLUTION RESPIRATORY (INHALATION) ONCE
Status: COMPLETED | OUTPATIENT
Start: 2025-04-10 | End: 2025-04-10

## 2025-04-10 ENCOUNTER — HOSPITAL ENCOUNTER (OUTPATIENT)
Dept: RESPIRATORY THERAPY | Facility: CLINIC | Age: 85
Discharge: HOME | End: 2025-04-10
Attending: STUDENT IN AN ORGANIZED HEALTH CARE EDUCATION/TRAINING PROGRAM
Payer: MEDICARE

## 2025-04-10 DIAGNOSIS — J47.9 BRONCHIECTASIS WITHOUT COMPLICATION (H): Primary | ICD-10-CM

## 2025-04-10 DIAGNOSIS — J47.9 BRONCHIECTASIS WITHOUT COMPLICATION (H): ICD-10-CM

## 2025-04-10 LAB
DLCOCOR-%PRED-PRE: 55 %
DLCOCOR-PRE: 9.27 ML/MIN/MMHG
DLCOUNC-%PRED-PRE: 52 %
DLCOUNC-PRE: 8.78 ML/MIN/MMHG
DLCOUNC-PRED: 16.77 ML/MIN/MMHG
ERV-%PRED-PRE: 79 %
ERV-PRE: 0.52 L
ERV-PRED: 0.66 L
EXPTIME-PRE: 6.06 SEC
FEF2575-%PRED-POST: 101 %
FEF2575-%PRED-PRE: 76 %
FEF2575-POST: 1.25 L/SEC
FEF2575-PRE: 0.94 L/SEC
FEF2575-PRED: 1.23 L/SEC
FEFMAX-%PRED-PRE: 90 %
FEFMAX-PRE: 3.35 L/SEC
FEFMAX-PRED: 3.68 L/SEC
FEV1-%PRED-PRE: 87 %
FEV1-PRE: 1.32 L
FEV1FEV6-PRE: 73 %
FEV1FEV6-PRED: 76 %
FEV1FVC-PRE: 73 %
FEV1FVC-PRED: 77 %
FEV1SVC-PRE: 77 %
FEV1SVC-PRED: 58 %
FIFMAX-PRE: 2.64 L/SEC
FRCPLETH-%PRED-PRE: 117 %
FRCPLETH-PRE: 3.19 L
FRCPLETH-PRED: 2.71 L
FVC-%PRED-PRE: 91 %
FVC-PRE: 1.8 L
FVC-PRED: 1.98 L
HGB BLD-MCNC: 11.8 G/DL (ref 11.7–15.7)
IC-%PRED-PRE: 91 %
IC-PRE: 1.2 L
IC-PRED: 1.31 L
RVPLETH-%PRED-PRE: 120 %
RVPLETH-PRE: 2.67 L
RVPLETH-PRED: 2.22 L
TLCPLETH-%PRED-PRE: 97 %
TLCPLETH-PRE: 4.39 L
TLCPLETH-PRED: 4.52 L
VA-%PRED-PRE: 85 %
VA-PRE: 3.42 L
VC-%PRED-PRE: 66 %
VC-PRE: 1.72 L
VC-PRED: 2.6 L

## 2025-04-10 PROCEDURE — 999N000157 HC STATISTIC RCP TIME EA 10 MIN

## 2025-04-10 PROCEDURE — 36415 COLL VENOUS BLD VENIPUNCTURE: CPT | Performed by: STUDENT IN AN ORGANIZED HEALTH CARE EDUCATION/TRAINING PROGRAM

## 2025-04-10 PROCEDURE — 250N000009 HC RX 250: Performed by: STUDENT IN AN ORGANIZED HEALTH CARE EDUCATION/TRAINING PROGRAM

## 2025-04-10 PROCEDURE — 94726 PLETHYSMOGRAPHY LUNG VOLUMES: CPT

## 2025-04-10 PROCEDURE — 94729 DIFFUSING CAPACITY: CPT

## 2025-04-10 PROCEDURE — 85018 HEMOGLOBIN: CPT | Performed by: STUDENT IN AN ORGANIZED HEALTH CARE EDUCATION/TRAINING PROGRAM

## 2025-04-10 PROCEDURE — 94618 PULMONARY STRESS TESTING: CPT

## 2025-04-10 PROCEDURE — 94060 EVALUATION OF WHEEZING: CPT

## 2025-04-10 RX ADMIN — ALBUTEROL SULFATE 2.5 MG: 2.5 SOLUTION RESPIRATORY (INHALATION) at 09:38

## 2025-04-10 NOTE — PROGRESS NOTES
RESPIRATORY CARE NOTE        Complete PFTdone also. The results of testing meet the ATS standards for acceptability and repeatability, except DLCO maneuver.  Patient was unable to inhale >90% of vital capacity for DLCO testing. Good patient effort and cooperation. Patient was given 2.5 mg albuterol neb.  Patient left PFT lab in no     Arianne Biggs, RT  4/10/2025 distress.

## 2025-04-10 NOTE — PROGRESS NOTES
Six Minute Walk Test:   walk done on [employer]  Probe: (Finger probe.) Stops: (0)   Patient walked (1103.97 Ft /336.49 m) in 6 minutes. LLN = (1036.38Ft /315.89m)   O2 system: None  Walking aides: None  Pre-walk on RA: SP02 (96%) Heart Rate (74) Rut Dyspnea = (0) Fatigue = (3) BP (97/49)   Post-walk on RA: SP02 (90%) Heart Rate (100 ) Rut Dyspnea = (2) Fatigue = (5)   Recovery time to baseline 02 SAT (1:30) minutes and HR (1:30) minutes.                             Patient reports walk distance may have been affected by overall body fatigue  BP after 1 minute Rest:  124/60    Rut:1, Fatigue:4        BP after 5 minutes Rest:  120/56  Rut: 0, Fatigue: 3                        See  in epic for full report of 6-minute walk.   Arianne Biggs, RT

## 2025-04-12 LAB
DLCOCOR-%PRED-PRE: 55 %
DLCOCOR-PRE: 9.27 ML/MIN/MMHG
DLCOUNC-%PRED-PRE: 52 %
DLCOUNC-PRE: 8.78 ML/MIN/MMHG
DLCOUNC-PRED: 16.77 ML/MIN/MMHG
ERV-%PRED-PRE: 79 %
ERV-PRE: 0.52 L
ERV-PRED: 0.66 L
EXPTIME-PRE: 6.06 SEC
FEF2575-%PRED-POST: 101 %
FEF2575-%PRED-PRE: 76 %
FEF2575-POST: 1.25 L/SEC
FEF2575-PRE: 0.94 L/SEC
FEF2575-PRED: 1.23 L/SEC
FEFMAX-%PRED-PRE: 90 %
FEFMAX-PRE: 3.35 L/SEC
FEFMAX-PRED: 3.68 L/SEC
FEV1-%PRED-PRE: 87 %
FEV1-PRE: 1.32 L
FEV1FEV6-PRE: 73 %
FEV1FEV6-PRED: 76 %
FEV1FVC-PRE: 73 %
FEV1FVC-PRED: 77 %
FEV1SVC-PRE: 77 %
FEV1SVC-PRED: 58 %
FIFMAX-PRE: 2.64 L/SEC
FRCPLETH-%PRED-PRE: 117 %
FRCPLETH-PRE: 3.19 L
FRCPLETH-PRED: 2.71 L
FVC-%PRED-PRE: 91 %
FVC-PRE: 1.8 L
FVC-PRED: 1.98 L
IC-%PRED-PRE: 91 %
IC-PRE: 1.2 L
IC-PRED: 1.31 L
RVPLETH-%PRED-PRE: 120 %
RVPLETH-PRE: 2.67 L
RVPLETH-PRED: 2.22 L
TLCPLETH-%PRED-PRE: 97 %
TLCPLETH-PRE: 4.39 L
TLCPLETH-PRED: 4.52 L
VA-%PRED-PRE: 85 %
VA-PRE: 3.42 L
VC-%PRED-PRE: 66 %
VC-PRE: 1.72 L
VC-PRED: 2.6 L

## 2025-04-15 ENCOUNTER — OFFICE VISIT (OUTPATIENT)
Dept: PULMONOLOGY | Facility: CLINIC | Age: 85
End: 2025-04-15
Attending: STUDENT IN AN ORGANIZED HEALTH CARE EDUCATION/TRAINING PROGRAM
Payer: MEDICARE

## 2025-04-15 VITALS
BODY MASS INDEX: 17.48 KG/M2 | HEIGHT: 62 IN | OXYGEN SATURATION: 97 % | SYSTOLIC BLOOD PRESSURE: 124 MMHG | WEIGHT: 95 LBS | DIASTOLIC BLOOD PRESSURE: 64 MMHG | HEART RATE: 64 BPM

## 2025-04-15 DIAGNOSIS — J47.9 BRONCHIECTASIS WITHOUT COMPLICATION (H): ICD-10-CM

## 2025-04-15 DIAGNOSIS — J84.10 PULMONARY FIBROSIS (H): Primary | ICD-10-CM

## 2025-04-15 PROCEDURE — 99204 OFFICE O/P NEW MOD 45 MIN: CPT | Performed by: INTERNAL MEDICINE

## 2025-04-15 PROCEDURE — 3078F DIAST BP <80 MM HG: CPT | Performed by: INTERNAL MEDICINE

## 2025-04-15 PROCEDURE — 3074F SYST BP LT 130 MM HG: CPT | Performed by: INTERNAL MEDICINE

## 2025-04-15 PROCEDURE — G2211 COMPLEX E/M VISIT ADD ON: HCPCS | Performed by: INTERNAL MEDICINE

## 2025-04-15 RX ORDER — ALBUTEROL SULFATE 0.83 MG/ML
2.5 SOLUTION RESPIRATORY (INHALATION)
Qty: 180 ML | Refills: 12 | Status: SHIPPED | OUTPATIENT
Start: 2025-04-15 | End: 2025-04-29

## 2025-04-15 RX ORDER — SODIUM CHLORIDE FOR INHALATION 3 %
3 VIAL, NEBULIZER (ML) INHALATION 2 TIMES DAILY
Qty: 240 ML | Refills: 12 | Status: SHIPPED | OUTPATIENT
Start: 2025-04-15 | End: 2025-04-29

## 2025-04-15 NOTE — PROGRESS NOTES
PULMONARY OUTPATIENT CONSULT  NOTE        Assessment:     Bronchiectasis   Denies history of asthma or outdoor allergies. No tobacco use.  Hx rheumatoid arthritis.  CT scan showed bronchiectasis, retained secretions, and peripheral chronic parenchymal opacities compatible with fibrosis  PFTs showed a normal spirometry, lung volumes, there is moderate reduction of diffusion capacity.   Significant desaturation with activities , no need for O2 supplementation.   Recommend pulmonary toiletting Albuterol and saline nebs to facilitate mobilization of bronchial secretions.   Chin tuck maneuver with liquid intake was discussed. Eating only in upright position.   Follow up with speech therapy.   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   Rheumatoid arthritis     Plan:      Albuterol nebs start twice a day and if improvement of cough, chest tightness and productive cough, decrease to daily   Saline nebs twice a day and decrease to daily according to amount of bronchial secretions  Chin tuck posture with liquids , eat in upright position.   Follow up with speech therapy   Follow up in 6 months     Haresh Zhu  Pulmonary / Critical Care  April 15, 2025        CC:     Chief Complaint   Patient presents with    Consult     J47.9 (ICD-10-CM) - Bronchiectasis without complication (H)  6MWT 4/10/25        HPI:         Diann Low is a 85 year old female who presents for evaluation of bronchiectasis  Patient has history of rheumatoid arthritis on methotrexate , hypothyroidism, bronchiectasis.    Reports mild exertional dyspnea , able to walk < 1 block before stopping.   Reports mild productive cough, sometimes difficulty bringing up bronchial secretions.  Denies wheezes.   Denies hemoptysis.   No history of asthma or outdoor allergies. Denies tobacco use.  Denies fever, chills or night sweats.   Denies  postnasal drip, headaches, or sinus tenderness.   Denies use of inhalers.   Denies acid reflux  symptoms. Reports occasional cough with food intake.   Retired teacher.      Past Medical History :     Past Medical History:   Diagnosis Date    PONV (postoperative nausea and vomiting)     Skin cancer     Spider veins           Medications:     Current Outpatient Medications   Medication Sig Dispense Refill    albuterol (PROVENTIL) (2.5 MG/3ML) 0.083% neb solution Take 1 vial (2.5 mg) by nebulization two times daily. 180 mL 12    alendronate (FOSAMAX) 70 MG tablet       calcium carbonate-vitamin D (OSCAL W/D) 500-200 MG-UNIT tablet Take 1 tablet by mouth 2 times daily      carboxymethylcellulose PF (REFRESH PLUS) 0.5 % ophthalmic solution 1 drop 3 times daily as needed for dry eyes      clindamycin (CLEOCIN) 300 MG capsule Take 600 mg by mouth. For dental procedures      folic acid (FOLVITE) 1 MG tablet Take 1 mg by mouth 2 times daily.      levothyroxine (SYNTHROID/LEVOTHROID) 50 MCG tablet Take 1 tablet (50 mcg) by mouth daily 90 tablet 3    methotrexate 2.5 MG tablet TAKE 4 TABLETS (10 MG) EVERY 7 DAYS (LABS EVERY 8 TO 12 WEEKS FOR REFILLS) 48 tablet 0    Multiple Vitamin (MULTIVITAMIN ADULT PO) Take 1 tablet by mouth daily      sodium chloride (NEBUSAL) 3 % neb solution Take 3 mLs by nebulization 2 times daily. 240 mL 12     No current facility-administered medications for this visit.        Social History :     Social History     Socioeconomic History    Marital status: Single     Spouse name: Not on file    Number of children: Not on file    Years of education: Not on file    Highest education level: Not on file   Occupational History    Not on file   Tobacco Use    Smoking status: Never     Passive exposure: Never    Smokeless tobacco: Never   Vaping Use    Vaping status: Never Used   Substance and Sexual Activity    Alcohol use: Not on file    Drug use: Not on file    Sexual activity: Not on file   Other Topics Concern    Not on file   Social History Narrative    Not on file     Social Drivers of Health      Financial Resource Strain: Low Risk  (1/10/2025)    Financial Resource Strain     Within the past 12 months, have you or your family members you live with been unable to get utilities (heat, electricity) when it was really needed?: No   Food Insecurity: Low Risk  (1/10/2025)    Food Insecurity     Within the past 12 months, did you worry that your food would run out before you got money to buy more?: No     Within the past 12 months, did the food you bought just not last and you didn t have money to get more?: No   Transportation Needs: Low Risk  (1/10/2025)    Transportation Needs     Within the past 12 months, has lack of transportation kept you from medical appointments, getting your medicines, non-medical meetings or appointments, work, or from getting things that you need?: No   Physical Activity: Sufficiently Active (1/10/2025)    Exercise Vital Sign     Days of Exercise per Week: 5 days     Minutes of Exercise per Session: 30 min   Stress: Stress Concern Present (1/10/2025)    [employer] - Occupational Stress Questionnaire     Feeling of Stress : To some extent   Social Connections: Unknown (1/10/2025)    Social Connection and Isolation Panel [NHANES]     Frequency of Communication with Friends and Family: Not on file     Frequency of Social Gatherings with Friends and Family: More than three times a week     Attends Moravian Services: Not on file     Active Member of Clubs or Organizations: Not on file     Attends Club or Organization Meetings: Not on file     Marital Status: Not on file   Interpersonal Safety: Low Risk  (1/15/2025)    Interpersonal Safety     Do you feel physically and emotionally safe where you currently live?: Yes     Within the past 12 months, have you been hit, slapped, kicked or otherwise physically hurt by someone?: No     Within the past 12 months, have you been humiliated or emotionally abused in other ways by your partner or ex-partner?: No  "  Housing Stability: Low Risk  (1/10/2025)    Housing Stability     Do you have housing? : Yes     Are you worried about losing your housing?: No          Family History :     No family history on file.    Review of Systems  A 12 point comprehensive review of systems was negative except as noted.        Objective:     /64 (BP Location: Left arm, Patient Position: Sitting, Cuff Size: Adult Small)   Pulse 64   Ht 1.562 m (5' 1.5\")   Wt 43.1 kg (95 lb)   LMP  (LMP Unknown)   SpO2 97%   BMI 17.66 kg/m        Gen: awake, alert, no distress  HEENT: pink conjunctiva, moist mucosa, Mallampati II/IV  Neck: no thyromegaly, masses or JVD  Lungs: clear  CV: regular, no murmurs or gallops appreciated  Abdomen: soft, NT, BS wnl  Ext: no edema  Neuro: CN II-XII intact, non focal      Diagnostic tests:         6MWT: (4/10/2025)      SpO2 at rest on RA 96%  SpO2 after walking 6 min on RA 90%  Distance covered 1103 Ft / 336 m    PFTs:  (4/10/2025)     FVC  1.80 L (91%)  FEV1  1.32 L (87%)  FEV1/FVC 73  No significant post bronchodilator response  TLC  4.39 L (97%)  RV  2.67 L (120%)  DLCO  55%     IMAGES:     CT CHEST W/O CONTRAST  LOCATION: [facility]  DATE: 3/28/2025     INDICATION:  Abnormal chest x-ray; subacute cough   COMPARISON: 3/19/2025 chest x-ray abdominal CT dated 10/27/2024, cervical spine CT dated 2-24.  TECHNIQUE: CT chest without IV contrast. Multiplanar reformats were obtained. Dose reduction techniques were used.  CONTRAST: None.     FINDINGS:   LUNGS AND PLEURA: Moderately advanced changes of bronchiectasis, with moderately extensive retained secretions noted. Where comparison images are available, findings are similar to both prior exams. There is asymmetric biapical pleural-parenchymal   scarring/parenchymal opacity. Multifocal heterogeneous peripheral areas of irregular parenchymal opacity as well as reticular densities noted. There is some superimposed groundglass opacity " in the right middle lobe (comparison none available in this   area). No pneumothorax or pleural effusion.     MEDIASTINUM/AXILLAE: Precarinal adenopathy reaching 13 mm, image 49 series 3. Subcarinal adenopathy reaching 14 mm, image 55. Additional smaller nodes also present. Normal heart size.     CORONARY ARTERY CALCIFICATION: Mild.     UPPER ABDOMEN: Unremarkable     MUSCULOSKELETAL: 50% T12, less than 25% T10, greater than 50% T9, and less than 25% T4 compression fractures are favored chronic. Multilevel spondylosis.                                                                      IMPRESSION:   1.   Relatively extensive changes of bronchiectasis, retained secretions, and peripheral chronic parenchymal opacities compatible with fibrosis and/or chronic infection. Pulmonology consultation recommended with consideration to high resolution CT.  2.  Mediastinal adenopathy may be reactive in the setting of chronic infection/inflammation.  3.  Likely chronic multilevel compression deformities

## 2025-04-15 NOTE — PATIENT INSTRUCTIONS
"Albuterol nebs start twice a day and if improvement of cough, chest tightness and productive cough, decrease to daily   Saline nebs twice a day and down to daily according to clinical progress  Chin tuck posture with liquids   YOUTUBE VIDEO \"How to carry out the chin tuck posture\"  Follow up with speech therapy   Follow up in 6 months  "

## 2025-04-15 NOTE — PROGRESS NOTES
Patient instructed in use of nebulizer machine from [facility].  Patient states good understanding of how to use the nebulizer machine.  Nebulizer machine given to patient from [facility].  All paperwork signed and copy scanned to chart. Phone numbers given to patient to call if any questions.

## 2025-04-22 ENCOUNTER — TELEPHONE (OUTPATIENT)
Dept: RHEUMATOLOGY | Facility: CLINIC | Age: 85
End: 2025-04-22
Payer: MEDICARE

## 2025-04-22 DIAGNOSIS — M05.79 RHEUMATOID ARTHRITIS, SEROPOSITIVE, MULTIPLE SITES (H): Primary | ICD-10-CM

## 2025-04-22 DIAGNOSIS — Z79.899 HIGH RISK MEDICATION USE: ICD-10-CM

## 2025-04-22 NOTE — TELEPHONE ENCOUNTER
Health Call Center    Phone Message    May a detailed message be left on voicemail: yes     Reason for Call: Other: Per patient, she has had a lot of labs drawn recently and is wondering if she still would need the lab scheduled on 5/1? If she does still need more labs, she's also wondering if they can be drawn during infusion on 5/6. Please advise patient at [phone].     Action Taken: Message routed to:  Other: RHEUM    Travel Screening: Not Applicable     Date of Service: 4/22/25

## 2025-04-24 NOTE — TELEPHONE ENCOUNTER
Patient returned call. Tried the back line a few times but was busy. Relayed Sophie's message from 4/22 to pt and she didn't have any additional questions at this time but will call back if she does. Thanks.

## 2025-04-28 ENCOUNTER — TELEPHONE (OUTPATIENT)
Dept: PULMONOLOGY | Facility: CLINIC | Age: 85
End: 2025-04-28
Payer: MEDICARE

## 2025-04-28 ENCOUNTER — MYC MEDICAL ADVICE (OUTPATIENT)
Dept: PULMONOLOGY | Facility: CLINIC | Age: 85
End: 2025-04-28
Payer: MEDICARE

## 2025-04-28 DIAGNOSIS — J47.9 BRONCHIECTASIS WITHOUT COMPLICATION (H): ICD-10-CM

## 2025-04-28 NOTE — TELEPHONE ENCOUNTER
[facility] Call Center    Phone Message    May a detailed message be left on voicemail: yes     Reason for Call: Medication Question or concern regarding medication   Prescription Clarification  Name of Medication: albuterol (PROVENTIL) (2.5 MG/3ML) 0.083% neb solution   Prescribing Provider: Dr. Mela Zhu    Pharmacy:   [facility] - [facility], [address]      What on the order needs clarification?     Per pt Prior Auth is needed for medication.       Action Taken: Other: pulm      Travel Screening: Not Applicable     Date of Service:

## 2025-04-28 NOTE — TELEPHONE ENCOUNTER
Patient returning call, please follow-up. Thank you.     Patient requesting a more detailed voicemail to be left please, if you aren't able to connect.

## 2025-04-28 NOTE — TELEPHONE ENCOUNTER
Left message to call back for: Diann  Information to relay to patient: [facility], what pharmacy does she want us to send them to?    Nina Katz LPN

## 2025-04-29 RX ORDER — ALBUTEROL SULFATE 0.83 MG/ML
2.5 SOLUTION RESPIRATORY (INHALATION)
Qty: 180 ML | Refills: 11 | Status: SHIPPED | OUTPATIENT
Start: 2025-04-29

## 2025-04-29 RX ORDER — SODIUM CHLORIDE FOR INHALATION 3 %
4 VIAL, NEBULIZER (ML) INHALATION 2 TIMES DAILY
Qty: 240 ML | Refills: 11 | Status: SHIPPED | OUTPATIENT
Start: 2025-04-29

## 2025-04-30 NOTE — TELEPHONE ENCOUNTER
Left message for Diann, we received a message from the pharmacy that her medication is not covered at this location, what other pharmacy would you like to use? I will also send a my chart message.    Nina Katz LPN

## 2025-05-06 ENCOUNTER — OFFICE VISIT (OUTPATIENT)
Dept: RHEUMATOLOGY | Facility: CLINIC | Age: 85
End: 2025-05-06
Payer: MEDICARE

## 2025-05-06 VITALS
HEART RATE: 70 BPM | WEIGHT: 98 LBS | SYSTOLIC BLOOD PRESSURE: 138 MMHG | DIASTOLIC BLOOD PRESSURE: 60 MMHG | BODY MASS INDEX: 18.22 KG/M2 | OXYGEN SATURATION: 98 %

## 2025-05-06 DIAGNOSIS — M05.79 RHEUMATOID ARTHRITIS, SEROPOSITIVE, MULTIPLE SITES (H): Primary | ICD-10-CM

## 2025-05-06 DIAGNOSIS — Z79.899 HIGH RISK MEDICATION USE: ICD-10-CM

## 2025-05-06 DIAGNOSIS — M15.0 PRIMARY OSTEOARTHRITIS INVOLVING MULTIPLE JOINTS: ICD-10-CM

## 2025-05-06 DIAGNOSIS — R76.8 RHEUMATOID FACTOR POSITIVE: ICD-10-CM

## 2025-05-06 PROCEDURE — 99214 OFFICE O/P EST MOD 30 MIN: CPT | Performed by: INTERNAL MEDICINE

## 2025-05-06 PROCEDURE — 3075F SYST BP GE 130 - 139MM HG: CPT | Performed by: INTERNAL MEDICINE

## 2025-05-06 PROCEDURE — 3078F DIAST BP <80 MM HG: CPT | Performed by: INTERNAL MEDICINE

## 2025-05-06 PROCEDURE — G2211 COMPLEX E/M VISIT ADD ON: HCPCS | Performed by: INTERNAL MEDICINE

## 2025-05-06 RX ORDER — DIPHENHYDRAMINE HCL 25 MG
25 CAPSULE ORAL ONCE
Status: CANCELLED
Start: 2025-05-06 | End: 2025-05-06

## 2025-05-06 RX ORDER — HEPARIN SODIUM,PORCINE 10 UNIT/ML
5-20 VIAL (ML) INTRAVENOUS DAILY PRN
Status: CANCELLED | OUTPATIENT
Start: 2025-05-06

## 2025-05-06 RX ORDER — METHYLPREDNISOLONE SODIUM SUCCINATE 125 MG/2ML
125 INJECTION INTRAMUSCULAR; INTRAVENOUS ONCE
Status: CANCELLED
Start: 2025-05-06 | End: 2025-05-06

## 2025-05-06 RX ORDER — METHOTREXATE 2.5 MG/1
10 TABLET ORAL
Status: SHIPPED
Start: 2025-05-06

## 2025-05-06 RX ORDER — ACETAMINOPHEN 325 MG/1
650 TABLET ORAL ONCE
Status: CANCELLED
Start: 2025-05-06 | End: 2025-05-06

## 2025-05-06 RX ORDER — HEPARIN SODIUM (PORCINE) LOCK FLUSH IV SOLN 100 UNIT/ML 100 UNIT/ML
5 SOLUTION INTRAVENOUS
Status: CANCELLED | OUTPATIENT
Start: 2025-05-06

## 2025-05-06 NOTE — PROGRESS NOTES
Rheumatology follow-up visit note     Diann is a 85 year old female presents today for follow-up.    Diann was seen today for recheck.    Diagnoses and all orders for this visit:    Rheumatoid arthritis, seropositive, multiple sites (H)  -     methotrexate 2.5 MG tablet; Take 4 tablets (10 mg) by mouth every 7 days.    High risk medication use    Primary osteoarthritis involving multiple joints    Rheumatoid factor positive    Other orders  -     sodium chloride 0.9% BOLUS 250 mL  -     sodium chloride (PF) 0.9% PF flush 3-20 mL  -     heparin lock flush 10 unit/mL injection 5-20 mL  -     heparin lock flush 100 unit/mL injection 5 mL  -     alteplase (CATHFLO ACTIVASE) injection 2 mg  -     acetaminophen (TYLENOL) tablet 650 mg  -     diphenhydrAMINE (BENADRYL) capsule 25 mg  -     diphenhydrAMINE (BENADRYL) 25 mg in sodium chloride 0.9 % 50.5 mL intermittent infusion  -     methylPREDNISolone Na Suc (solu-MEDROL) injection 125 mg  -     abatacept (ORENCIA) 500 mg in sodium chloride 0.9 % 120 mL infusion        The longitudinal plan of care for the diagnosis(es)/condition(s) as documented were addressed during this visit. Due to the added complexity in care, I will continue to support Diann in the subsequent management and with ongoing continuity of care.      Follow up in 6 months.    HPI    Diann Low is a 85 year old female is here for follow-up of seropositive rheumatoid arthritis, osteoarthritis.  Her current regimen includes methotrexate, Orencia infusion every 6 weeks at [facility].  No longer on prednisone.  Recent labs are within acceptable range, these were done in February she is going to have them repeated on her upcoming Orencia infusion on Friday.  While she noted 0 pain in all her joints.  She noted pain level at 1.0.  Apart from participating in recreational activities or walking 2 miles she is able to do all her day-to-day activities without difficulty.  She gets her folic acid  over-the-counter.  She noted no morning stiffness.She has had surgery on her PIPs some of her PIPs.  She is not driving however restriction her cell to immediate neighborhood roads not on 5 days.  She is accompanied by her son.  She does have advanced deformities from rheumatoid arthritis but remains highly functional.     DETAILED EXAMINATION  05/06/25  :    Vitals:    05/06/25 0810   BP: 138/60   BP Location: Right arm   Patient Position: Sitting   Cuff Size: Adult Small   Pulse: 70   SpO2: 98%   Weight: 44.5 kg (98 lb)     Alert oriented. Head including the face is examined for malar rash, heliotropes, scarring, lupus pernio. Eyes examined for redness such as in episcleritis/scleritis, periorbital lesions.   Neck/ Face examined for parotid gland swelling, range of motion of neck.  Left upper and lower and right upper and lower extremities examined for tenderness, swelling, warmth of the appendicular joints, range of motion, edema, rash.  Some of the important findings included: she does not have evidence of synovitis in the palpable joints of the upper extremities.  She has significant deformities at her digits combination of OA and RA.  Of the digits.  + [place] nodes.  Range of motion of the shoulders  show full abduction.  No JLT effusion or warmth of the knees.  she does not have dactylitis of the digits.  Her gait is remarkably intact for her background core morbidities we did talk about carrying a cane.     Patient Active Problem List    Diagnosis Date Noted    Chronic kidney disease, stage 3a (H) 03/19/2025     Priority: Medium    Dry eyes 01/15/2025     Priority: Medium    Closed odontoid fracture with delayed healing, subsequent encounter 01/15/2025     Priority: Medium    Bilateral sensorineural hearing loss 01/15/2025     Priority: Medium    Hearing aid worn 01/15/2025     Priority: Medium    Hyponatremia 08/29/2023     Priority: Medium    Stable burst fracture of first cervical vertebra, subsequent  encounter for fracture with routine healing 08/21/2023     Priority: Medium    Sprain of unspecified ligament of left ankle, subsequent encounter 08/21/2023     Priority: Medium    Subclinical hypothyroidism 08/19/2023     Priority: Medium    Repeated falls 08/19/2023     Priority: Medium    Injury of head 08/17/2023     Priority: Medium    Age-related osteoporosis without current pathological fracture 08/31/2022     Priority: Medium    Rheumatoid arthritis, seropositive, multiple sites (H) 06/20/2022     Priority: Medium    History of falling 10/15/2018     Priority: Medium    Presence of intraocular lens 09/17/2018     Priority: Medium     Past Surgical History:   Procedure Laterality Date    COLONOSCOPY N/A 10/1/2024    Procedure: COLONOSCOPY with biopsies;  Surgeon: Manjit Sandhu MD;  Location: [facility] Main OR      Past Medical History:   Diagnosis Date    PONV (postoperative nausea and vomiting)     Skin cancer     Spider veins      Allergies   Allergen Reactions    Infliximab Rash     Throat began to close/tighten    Sulfa Antibiotics Rash    Hydrocodone-Acetaminophen Nausea    Oxycodone-Acetaminophen Nausea    Penicillins Rash    Erythromycin Nausea     Current Outpatient Medications   Medication Sig Dispense Refill    albuterol (PROVENTIL) (2.5 MG/3ML) 0.083% neb solution Take 1 vial (2.5 mg) by nebulization two times daily. 180 mL 11    alendronate (FOSAMAX) 70 MG tablet       calcium carbonate-vitamin D (OSCAL W/D) 500-200 MG-UNIT tablet Take 1 tablet by mouth 2 times daily      carboxymethylcellulose PF (REFRESH PLUS) 0.5 % ophthalmic solution 1 drop 3 times daily as needed for dry eyes      clindamycin (CLEOCIN) 300 MG capsule Take 600 mg by mouth. For dental procedures      folic acid (FOLVITE) 1 MG tablet Take 1 mg by mouth 2 times daily.      levothyroxine (SYNTHROID/LEVOTHROID) 50 MCG tablet Take 1 tablet (50 mcg) by mouth daily 90 tablet 3    methotrexate 2.5 MG tablet TAKE 4 TABLETS (10 MG)  EVERY 7 DAYS (LABS EVERY 8 TO 12 WEEKS FOR REFILLS) 48 tablet 0    Multiple Vitamin (MULTIVITAMIN ADULT PO) Take 1 tablet by mouth daily      sodium chloride (NEBUSAL) 3 % neb solution Take 4 mLs by nebulization 2 times daily. 240 mL 11     family history is not on file.  Social Connections: Unknown (1/10/2025)    Social Connection and Isolation Panel [NHANES]     Frequency of Communication with Friends and Family: Not on file     Frequency of Social Gatherings with Friends and Family: More than three times a week     Attends Buddhism Services: Not on file     Active Member of Clubs or Organizations: Not on file     Attends Club or Organization Meetings: Not on file     Marital Status: Not on file          WBC Count   Date Value Ref Range Status   02/05/2025 6.9 4.0 - 11.0 10e3/uL Final     RBC Count   Date Value Ref Range Status   02/05/2025 3.78 (L) 3.80 - 5.20 10e6/uL Final     Hemoglobin   Date Value Ref Range Status   04/10/2025 11.8 11.7 - 15.7 g/dL Final     Hematocrit   Date Value Ref Range Status   02/05/2025 36.7 35.0 - 47.0 % Final     MCV   Date Value Ref Range Status   02/05/2025 97 78 - 100 fL Final     MCH   Date Value Ref Range Status   02/05/2025 32.8 26.5 - 33.0 pg Final     Platelet Count   Date Value Ref Range Status   02/05/2025 174 150 - 450 10e3/uL Final     % Lymphocytes   Date Value Ref Range Status   10/27/2024 21 % Final     AST   Date Value Ref Range Status   10/27/2024 35 0 - 45 U/L Final     ALT   Date Value Ref Range Status   02/05/2025 10 0 - 50 U/L Final     Albumin   Date Value Ref Range Status   03/19/2025 4.4 3.5 - 5.2 g/dL Final   08/29/2023 3.3 (L) 3.5 - 5.0 g/dL Final     Alkaline Phosphatase   Date Value Ref Range Status   10/27/2024 67 40 - 150 U/L Final     Creatinine   Date Value Ref Range Status   03/19/2025 0.83 0.51 - 0.95 mg/dL Final     GFR Estimate   Date Value Ref Range Status   03/19/2025 68 >60 mL/min/1.73m2 Final     Comment:     eGFR calculated using 2021  CKD-EPI equation.     GFR, ESTIMATED POCT   Date Value Ref Range Status   07/07/2024 >60 >60 mL/min/1.73m2 Final     N terminal Pro BNP Inpatient   Date Value Ref Range Status   10/27/2024 637 0 - 1,800 pg/mL Final     Comment:     Reference range shown and results flagged as abnormal are suggested inpatient cut points for confirming diagnosis if CHF in an acute setting. Establishing a baseline value for each individual patient is useful for follow-up. An inpatient or emergency department NT-proPBNP <300 pg/mL effectively rules out acute CHF, with 99% negative predictive value.    The outpatient non-acute reference range for ruling out CHF is:  0-125 pg/mL (age 18 to less than 75)  0-450 pg/mL (age 75 yrs and older)

## 2025-05-07 DIAGNOSIS — M05.79 RHEUMATOID ARTHRITIS, SEROPOSITIVE, MULTIPLE SITES (H): Primary | ICD-10-CM

## 2025-05-08 ENCOUNTER — LAB (OUTPATIENT)
Dept: LAB | Facility: CLINIC | Age: 85
End: 2025-05-08
Payer: MEDICARE

## 2025-05-08 ENCOUNTER — RESULTS FOLLOW-UP (OUTPATIENT)
Dept: RHEUMATOLOGY | Facility: CLINIC | Age: 85
End: 2025-05-08

## 2025-05-08 ENCOUNTER — INFUSION THERAPY VISIT (OUTPATIENT)
Dept: INFUSION THERAPY | Facility: HOSPITAL | Age: 85
End: 2025-05-08
Attending: INTERNAL MEDICINE
Payer: MEDICARE

## 2025-05-08 VITALS
SYSTOLIC BLOOD PRESSURE: 130 MMHG | TEMPERATURE: 98.2 F | DIASTOLIC BLOOD PRESSURE: 71 MMHG | HEART RATE: 77 BPM | OXYGEN SATURATION: 95 % | RESPIRATION RATE: 16 BRPM

## 2025-05-08 DIAGNOSIS — Z79.899 HIGH RISK MEDICATION USE: ICD-10-CM

## 2025-05-08 DIAGNOSIS — M05.79 RHEUMATOID ARTHRITIS, SEROPOSITIVE, MULTIPLE SITES (H): Primary | ICD-10-CM

## 2025-05-08 DIAGNOSIS — M05.79 RHEUMATOID ARTHRITIS, SEROPOSITIVE, MULTIPLE SITES (H): ICD-10-CM

## 2025-05-08 LAB
ALBUMIN SERPL BCG-MCNC: 4.4 G/DL (ref 3.5–5.2)
ALT SERPL W P-5'-P-CCNC: 11 U/L (ref 0–50)
CREAT SERPL-MCNC: 0.83 MG/DL (ref 0.51–0.95)
EGFRCR SERPLBLD CKD-EPI 2021: 68 ML/MIN/1.73M2
ERYTHROCYTE [DISTWIDTH] IN BLOOD BY AUTOMATED COUNT: 14.9 % (ref 10–15)
HCT VFR BLD AUTO: 35.9 % (ref 35–47)
HGB BLD-MCNC: 12.1 G/DL (ref 11.7–15.7)
MCH RBC QN AUTO: 32.5 PG (ref 26.5–33)
MCHC RBC AUTO-ENTMCNC: 33.7 G/DL (ref 31.5–36.5)
MCV RBC AUTO: 97 FL (ref 78–100)
PLATELET # BLD AUTO: 169 10E3/UL (ref 150–450)
RBC # BLD AUTO: 3.72 10E6/UL (ref 3.8–5.2)
WBC # BLD AUTO: 6.5 10E3/UL (ref 4–11)

## 2025-05-08 PROCEDURE — 82040 ASSAY OF SERUM ALBUMIN: CPT

## 2025-05-08 PROCEDURE — 36415 COLL VENOUS BLD VENIPUNCTURE: CPT

## 2025-05-08 PROCEDURE — 250N000028 HC RX JA MOD (INTRAVENOUS), IP 250 OP 636: Mod: JZ,JA | Performed by: INTERNAL MEDICINE

## 2025-05-08 PROCEDURE — 84460 ALANINE AMINO (ALT) (SGPT): CPT

## 2025-05-08 PROCEDURE — 82565 ASSAY OF CREATININE: CPT

## 2025-05-08 PROCEDURE — 258N000003 HC RX IP 258 OP 636: Performed by: INTERNAL MEDICINE

## 2025-05-08 PROCEDURE — 85041 AUTOMATED RBC COUNT: CPT

## 2025-05-08 RX ORDER — DIPHENHYDRAMINE HYDROCHLORIDE 50 MG/ML
25 INJECTION, SOLUTION INTRAMUSCULAR; INTRAVENOUS
Start: 2025-06-17

## 2025-05-08 RX ORDER — EPINEPHRINE 1 MG/ML
0.3 INJECTION, SOLUTION INTRAMUSCULAR; SUBCUTANEOUS EVERY 5 MIN PRN
OUTPATIENT
Start: 2025-06-17

## 2025-05-08 RX ORDER — ALBUTEROL SULFATE 90 UG/1
1-2 INHALANT RESPIRATORY (INHALATION)
Status: DISCONTINUED | OUTPATIENT
Start: 2025-05-08 | End: 2025-05-08 | Stop reason: HOSPADM

## 2025-05-08 RX ORDER — DIPHENHYDRAMINE HYDROCHLORIDE 50 MG/ML
25 INJECTION, SOLUTION INTRAMUSCULAR; INTRAVENOUS
Status: DISCONTINUED | OUTPATIENT
Start: 2025-05-08 | End: 2025-05-08 | Stop reason: HOSPADM

## 2025-05-08 RX ORDER — DIPHENHYDRAMINE HYDROCHLORIDE 50 MG/ML
50 INJECTION, SOLUTION INTRAMUSCULAR; INTRAVENOUS
Start: 2025-06-17

## 2025-05-08 RX ORDER — ALBUTEROL SULFATE 90 UG/1
1-2 INHALANT RESPIRATORY (INHALATION)
Start: 2025-06-17

## 2025-05-08 RX ORDER — ALBUTEROL SULFATE 0.83 MG/ML
2.5 SOLUTION RESPIRATORY (INHALATION)
OUTPATIENT
Start: 2025-06-17

## 2025-05-08 RX ORDER — MEPERIDINE HYDROCHLORIDE 25 MG/ML
25 INJECTION INTRAMUSCULAR; INTRAVENOUS; SUBCUTANEOUS
OUTPATIENT
Start: 2025-06-17

## 2025-05-08 RX ORDER — METHYLPREDNISOLONE SODIUM SUCCINATE 125 MG/2ML
125 INJECTION INTRAMUSCULAR; INTRAVENOUS ONCE
Start: 2025-06-17 | End: 2025-06-17

## 2025-05-08 RX ORDER — ALBUTEROL SULFATE 0.83 MG/ML
2.5 SOLUTION RESPIRATORY (INHALATION)
Status: DISCONTINUED | OUTPATIENT
Start: 2025-05-08 | End: 2025-05-08 | Stop reason: HOSPADM

## 2025-05-08 RX ORDER — HEPARIN SODIUM,PORCINE 10 UNIT/ML
5-20 VIAL (ML) INTRAVENOUS DAILY PRN
OUTPATIENT
Start: 2025-06-17

## 2025-05-08 RX ORDER — DIPHENHYDRAMINE HCL 25 MG
25 CAPSULE ORAL ONCE
Start: 2025-06-17 | End: 2025-06-17

## 2025-05-08 RX ORDER — MEPERIDINE HYDROCHLORIDE 25 MG/ML
25 INJECTION INTRAMUSCULAR; INTRAVENOUS; SUBCUTANEOUS
Status: DISCONTINUED | OUTPATIENT
Start: 2025-05-08 | End: 2025-05-08 | Stop reason: HOSPADM

## 2025-05-08 RX ORDER — METHYLPREDNISOLONE SODIUM SUCCINATE 40 MG/ML
40 INJECTION INTRAMUSCULAR; INTRAVENOUS
Start: 2025-06-17

## 2025-05-08 RX ORDER — ACETAMINOPHEN 325 MG/1
650 TABLET ORAL ONCE
Start: 2025-06-17 | End: 2025-06-17

## 2025-05-08 RX ORDER — HEPARIN SODIUM (PORCINE) LOCK FLUSH IV SOLN 100 UNIT/ML 100 UNIT/ML
5 SOLUTION INTRAVENOUS
OUTPATIENT
Start: 2025-06-17

## 2025-05-08 RX ORDER — EPINEPHRINE 1 MG/ML
0.3 INJECTION, SOLUTION INTRAMUSCULAR; SUBCUTANEOUS EVERY 5 MIN PRN
Status: DISCONTINUED | OUTPATIENT
Start: 2025-05-08 | End: 2025-05-08 | Stop reason: HOSPADM

## 2025-05-08 RX ORDER — METHYLPREDNISOLONE SODIUM SUCCINATE 40 MG/ML
40 INJECTION INTRAMUSCULAR; INTRAVENOUS
Status: DISCONTINUED | OUTPATIENT
Start: 2025-05-08 | End: 2025-05-08 | Stop reason: HOSPADM

## 2025-05-08 RX ORDER — DIPHENHYDRAMINE HYDROCHLORIDE 50 MG/ML
50 INJECTION, SOLUTION INTRAMUSCULAR; INTRAVENOUS
Status: DISCONTINUED | OUTPATIENT
Start: 2025-05-08 | End: 2025-05-08 | Stop reason: HOSPADM

## 2025-05-08 RX ADMIN — SODIUM CHLORIDE 250 ML: 0.9 INJECTION, SOLUTION INTRAVENOUS at 10:26

## 2025-05-08 RX ADMIN — SODIUM CHLORIDE 500 MG: 9 INJECTION, SOLUTION INTRAVENOUS at 10:26

## 2025-05-08 NOTE — PROGRESS NOTES
Infusion Nursing Note:  Diann DRAGAN Maranda presents today for Orencia (every 6 week dosing).    Patient seen by provider today: No   present during visit today: Not Applicable.    Note: /71 (Patient Position: Sitting)   Pulse 77   Temp 98.2  F (36.8  C)   Resp 16   LMP  (LMP Unknown)   SpO2 95%     Patient states that Dr. Hunter does not want her to do a UA prior to each Orencia infusion per patient report. She had a visit with him last week and the orders are intentionally not signed in the plan. Patient denies any urinary sx today.     Premeds Given: none - no premeds per patient request    Intravenous Access:  Peripheral IV placed.    Treatment Conditions:  Biological checklist (see responses below)      Post Infusion Assessment:  Patient tolerated infusion without incident.  Site patent and intact, free from redness, edema or discomfort.  No evidence of extravasations.  Access discontinued per protocol.       Discharge Plan:   Discharge instructions reviewed with: Patient.  Patient and/or family verbalized understanding of discharge instructions and all questions answered.  Patient discharged in stable condition accompanied by: self.  Departure Mode: Ambulatory.      Jennifer Marquez RN    ~~~ NOTE: If the patient answers yes to any of the questions below, hold the infusion and contact ordering provider or on-call provider.    Do you currently have any signs of illness or infection or are you on any antibiotics? No  Have you recently had an elevated temperature, fever, chills, productive cough, coughing for 3 weeks or longer or hemoptysis, abnormal vital signs, night sweats, chest pain or have you noticed a decrease in your appetite, unexplained weight loss or fatigue? No  Have you had any new, sudden, or worsening abdominal pain? No  Do you have any open wounds or new incisions? (exclude for patients with hidradenitis suppurativa) No  Have you recently been diagnosed with any new nervous  "system diseases (ie. Multiple sclerosis, Guillain [place], seizures, neurological changes) or cancer diagnosis? Are you on any form of radiation or chemotherapy? No  Have there been any other new onset medical symptoms? Patient reports she was recently diagnosed with \"generalized muscle weakness\" and has PT ordered for her. Discussed with Rheumatologist last week and OK to proceed with Orencia today.  Are you pregnant or breast feeding or do you have plans of pregnancy in the future? No; N/A  Do you have any upcoming hospitalizations or surgeries? Does not include esophagogastroduodenoscopy, colonoscopy, endoscopic retrograde cholangiopancreatography (ERCP), endoscopic ultrasound (EUS), dental procedures (including cleanings, fillings, implants, extractions)  or joint aspiration/steroid injections No  Have you or anyone in your household received a live vaccination in the past 4 weeks? Please note: No live vaccines while on biologic/chemotherapy until 6 months after the last treatment. Patient can receive the flu vaccine (shot only).  It is optimal for the patient to get it mid cycle, but it can be given at any time as long as it is not on the day of the infusion. No  If applicable to prescribed medication, confirm negative PPD or quantiferon gold MTB. If positive, verify has negative chest x-ray or the patient is at least 4 weeks post initiation of INH/B6 therapy and have clearance from provider before infusion - NA  If applicable to prescribed medication, confirm negative hepatitis B surface antigen or hepatitis C. If positive, clearance from provider before infusion. NA  Rheumatology patients receiving tocilizumab (Actemra): If labs were drawn within the past week, hold dosing until cleared to infuse If AST/ALT > 2 X upper limit normal; ANC < 1.0. N/A  Patients receiving belimumab (Benlysta): Have you been having any signs of worsening depression or suicidal ideations? NA  "

## 2025-05-14 ENCOUNTER — TELEPHONE (OUTPATIENT)
Dept: ENDOCRINOLOGY | Facility: CLINIC | Age: 85
End: 2025-05-14
Payer: MEDICARE

## 2025-05-14 DIAGNOSIS — E03.8 SUBCLINICAL HYPOTHYROIDISM: ICD-10-CM

## 2025-05-14 RX ORDER — LEVOTHYROXINE SODIUM 50 UG/1
50 TABLET ORAL DAILY
Qty: 10 TABLET | Refills: 0 | Status: SHIPPED | OUTPATIENT
Start: 2025-05-14

## 2025-05-14 NOTE — TELEPHONE ENCOUNTER
[facility] Call Center    Phone Message    May a detailed message be left on voicemail: yes     Reason for Call: Medication Question or concern regarding medication   Prescription Clarification  Name of Medication: levothyroxine (SYNTHROID/LEVOTHROID) 50 MCG tablet   Prescribing Provider: Sukumar Jenkins   Pharmacy: [facility] - [address] [place] [address] , [place] [address]   What on the order needs clarification? Pt is requesting to get a 1 week supply sent over to the pharmacy above as she will be going out town on Monday for a week and her prescription from [facility] will not arrive before then. Please advise.       Action Taken: Other: Endo    Travel Screening: Not Applicable     Date of Service: 5/14/25

## 2025-05-31 ENCOUNTER — OFFICE VISIT (OUTPATIENT)
Dept: URGENT CARE | Facility: URGENT CARE | Age: 85
End: 2025-05-31
Payer: MEDICARE

## 2025-05-31 VITALS
HEART RATE: 70 BPM | TEMPERATURE: 98.3 F | RESPIRATION RATE: 18 BRPM | WEIGHT: 99.2 LBS | HEIGHT: 62 IN | DIASTOLIC BLOOD PRESSURE: 58 MMHG | OXYGEN SATURATION: 96 % | BODY MASS INDEX: 18.26 KG/M2 | SYSTOLIC BLOOD PRESSURE: 114 MMHG

## 2025-05-31 DIAGNOSIS — J02.9 SORE THROAT: Primary | ICD-10-CM

## 2025-05-31 LAB
DEPRECATED S PYO AG THROAT QL EIA: NEGATIVE
S PYO DNA THROAT QL NAA+PROBE: NOT DETECTED

## 2025-05-31 PROCEDURE — 3078F DIAST BP <80 MM HG: CPT | Performed by: PHYSICIAN ASSISTANT

## 2025-05-31 PROCEDURE — 3074F SYST BP LT 130 MM HG: CPT | Performed by: PHYSICIAN ASSISTANT

## 2025-05-31 PROCEDURE — 87651 STREP A DNA AMP PROBE: CPT | Performed by: PHYSICIAN ASSISTANT

## 2025-05-31 PROCEDURE — 99213 OFFICE O/P EST LOW 20 MIN: CPT | Performed by: PHYSICIAN ASSISTANT

## 2025-05-31 ASSESSMENT — ENCOUNTER SYMPTOMS
SORE THROAT: 1
FEVER: 0
COUGH: 1

## 2025-05-31 NOTE — PROGRESS NOTES
Urgent Care Clinic Visit    Chief Complaint   Patient presents with    Pharyngitis     Symptoms started earlier this week with just a tickle in throat and yesterday started to become painful. Pt is in a neck brace but due to spinal neck injury.                5/31/2025     9:22 AM   Additional Questions   Roomed by Evangelina ORTA   Accompanied by Son     No  Does the patient have a sore throat and either history of fever >100.4 in the previous 24 hours without a cough or recent exposure to a known case of strep throat? Yes

## 2025-05-31 NOTE — PATIENT INSTRUCTIONS
You can try Claritin for possible allergies.     Also Flonase daily 1 spray each nostril, takes time to work.     Throat drops, try tea.

## 2025-05-31 NOTE — PROGRESS NOTES
Assessment & Plan:        ICD-10-CM    1. Sore throat  J02.9 Streptococcus A Rapid Screen w/Reflex to PCR - Clinic Collect     Group A Streptococcus PCR Throat Swab            Plan/Clinical Decision Making:    Patient presents with acute ST, mild PND, intermittent congestion.   Negative strep, PCR pending.   Tylenol if needed.     Patient Instructions   You can try Claritin for possible allergies.     Also Flonase daily 1 spray each nostril, takes time to work.     Throat drops, try tea.       Return if symptoms worsen or fail to improve, for in 5-7 days.     At the end of the encounter, I discussed results, diagnosis, medications. Discussed red flags for immediate return to clinic/ER, as well as indications for follow up if no improvement. Patient understood and agreed to plan. Patient was stable for discharge.        Debi Killian PA-C on 5/31/2025 at 9:53 AM          Subjective:     HPI:    Diann is a 85 year old female who presents to clinic today for the following health issues:  Chief Complaint   Patient presents with    Pharyngitis     Symptoms started earlier this week with just a tickle in throat and yesterday started to become painful. Pt is in a neck brace but due to spinal neck injury.      HPI    Patient complains of ST.   Has some PND.   No known hx of seasonal allergies.   No recent illness.   No exposure to kids or strep.   No GERD.     Review of Systems   Constitutional:  Negative for fever.   HENT:  Positive for congestion (off and on), postnasal drip and sore throat.    Respiratory:  Positive for cough (nothing deep, but occasional hacking cough).          Patient Active Problem List   Diagnosis    History of falling    Presence of intraocular lens    Rheumatoid arthritis, seropositive, multiple sites (H)    Age-related osteoporosis without current pathological fracture    Subclinical hypothyroidism    Injury of head    Repeated falls    Hyponatremia    Stable burst fracture of first cervical  "vertebra, subsequent encounter for fracture with routine healing    Sprain of unspecified ligament of left ankle, subsequent encounter    Dry eyes    Closed odontoid fracture with delayed healing, subsequent encounter    Bilateral sensorineural hearing loss    Hearing aid worn    Chronic kidney disease, stage 3a (H)        Past Medical History:   Diagnosis Date    PONV (postoperative nausea and vomiting)     Skin cancer     Spider veins        Social History     Tobacco Use    Smoking status: Never     Passive exposure: Never    Smokeless tobacco: Never   Substance Use Topics    Alcohol use: Not on file             Objective:     Vitals:    05/31/25 0923   BP: 114/58   Pulse: 70   Resp: 18   Temp: 98.3  F (36.8  C)   TempSrc: Oral   SpO2: 96%   Weight: 45 kg (99 lb 3.2 oz)   Height: 1.562 m (5' 1.5\")         Physical Exam   EXAM:   Pleasant, alert, appropriate appearance. NAD.  Head Exam: Normocephalic, atraumatic.  Eye Exam:   non icteric/injection.    Nose Exam: Normal external nose.    OroPharynx Exam:  Moist mucous membranes. No erythema, pharynx without exudate or hypertrophy.  Neck/Thyroid Exam:  Neck brace on  Chest/Respiratory Exam: CTAB.  Cardiovascular Exam: RRR.       Results:  Results for orders placed or performed in visit on 05/31/25   Streptococcus A Rapid Screen w/Reflex to PCR - Clinic Collect     Status: Normal    Specimen: Throat; Swab   Result Value Ref Range    Group A Strep antigen Negative Negative         "

## 2025-06-17 ENCOUNTER — INFUSION THERAPY VISIT (OUTPATIENT)
Dept: INFUSION THERAPY | Facility: HOSPITAL | Age: 85
End: 2025-06-17
Attending: INTERNAL MEDICINE
Payer: MEDICARE

## 2025-06-17 VITALS
OXYGEN SATURATION: 97 % | TEMPERATURE: 97.6 F | RESPIRATION RATE: 16 BRPM | HEART RATE: 69 BPM | DIASTOLIC BLOOD PRESSURE: 60 MMHG | SYSTOLIC BLOOD PRESSURE: 104 MMHG

## 2025-06-17 DIAGNOSIS — M05.79 RHEUMATOID ARTHRITIS, SEROPOSITIVE, MULTIPLE SITES (H): Primary | ICD-10-CM

## 2025-06-17 PROCEDURE — 250N000028 HC RX JA MOD (INTRAVENOUS), IP 250 OP 636: Mod: JZ,JA | Performed by: INTERNAL MEDICINE

## 2025-06-17 PROCEDURE — 258N000003 HC RX IP 258 OP 636: Performed by: INTERNAL MEDICINE

## 2025-06-17 PROCEDURE — 96365 THER/PROPH/DIAG IV INF INIT: CPT

## 2025-06-17 RX ORDER — DIPHENHYDRAMINE HYDROCHLORIDE 50 MG/ML
50 INJECTION, SOLUTION INTRAMUSCULAR; INTRAVENOUS
Start: 2025-06-19

## 2025-06-17 RX ORDER — ALBUTEROL SULFATE 0.83 MG/ML
2.5 SOLUTION RESPIRATORY (INHALATION)
OUTPATIENT
Start: 2025-06-19

## 2025-06-17 RX ORDER — DIPHENHYDRAMINE HCL 25 MG
25 CAPSULE ORAL ONCE
Start: 2025-06-19 | End: 2025-06-19

## 2025-06-17 RX ORDER — DIPHENHYDRAMINE HYDROCHLORIDE 50 MG/ML
25 INJECTION, SOLUTION INTRAMUSCULAR; INTRAVENOUS
Start: 2025-06-19

## 2025-06-17 RX ORDER — METHYLPREDNISOLONE SODIUM SUCCINATE 125 MG/2ML
125 INJECTION INTRAMUSCULAR; INTRAVENOUS ONCE
Start: 2025-06-19 | End: 2025-06-19

## 2025-06-17 RX ORDER — HEPARIN SODIUM (PORCINE) LOCK FLUSH IV SOLN 100 UNIT/ML 100 UNIT/ML
5 SOLUTION INTRAVENOUS
OUTPATIENT
Start: 2025-06-19

## 2025-06-17 RX ORDER — EPINEPHRINE 1 MG/ML
0.3 INJECTION, SOLUTION INTRAMUSCULAR; SUBCUTANEOUS EVERY 5 MIN PRN
OUTPATIENT
Start: 2025-06-19

## 2025-06-17 RX ORDER — ACETAMINOPHEN 325 MG/1
650 TABLET ORAL ONCE
Start: 2025-06-19 | End: 2025-06-19

## 2025-06-17 RX ORDER — METHYLPREDNISOLONE SODIUM SUCCINATE 40 MG/ML
40 INJECTION INTRAMUSCULAR; INTRAVENOUS
Start: 2025-06-19

## 2025-06-17 RX ORDER — HEPARIN SODIUM,PORCINE 10 UNIT/ML
5-20 VIAL (ML) INTRAVENOUS DAILY PRN
OUTPATIENT
Start: 2025-06-19

## 2025-06-17 RX ORDER — ALBUTEROL SULFATE 90 UG/1
1-2 INHALANT RESPIRATORY (INHALATION)
Start: 2025-06-19

## 2025-06-17 RX ORDER — MEPERIDINE HYDROCHLORIDE 50 MG/ML
25 INJECTION INTRAMUSCULAR; INTRAVENOUS; SUBCUTANEOUS
OUTPATIENT
Start: 2025-06-19

## 2025-06-17 RX ADMIN — SODIUM CHLORIDE 500 MG: 9 INJECTION, SOLUTION INTRAVENOUS at 14:37

## 2025-06-17 NOTE — PROGRESS NOTES
"Infusion Nursing Note:  Diann Low presents today for Orencia.     Note: Pt states that the medication keeps her arthritis \"stable\"     Intravenous Access:  Peripheral IV placed.    Post Infusion Assessment:  Patient tolerated infusion without incident.     Discharge Plan:   Patient discharged in stable condition accompanied by: asif.    Ashely DE LEON RN      "

## 2025-07-02 DIAGNOSIS — M05.79 RHEUMATOID ARTHRITIS, SEROPOSITIVE, MULTIPLE SITES (H): Primary | ICD-10-CM

## 2025-07-23 ENCOUNTER — TELEPHONE (OUTPATIENT)
Dept: RHEUMATOLOGY | Facility: CLINIC | Age: 85
End: 2025-07-23

## 2025-07-23 DIAGNOSIS — M05.79 RHEUMATOID ARTHRITIS, SEROPOSITIVE, MULTIPLE SITES (H): ICD-10-CM

## 2025-07-23 NOTE — TELEPHONE ENCOUNTER
[facility] Call Center    Phone Message    May a detailed message be left on voicemail: yes     Reason for Call: Medication Refill Request    Has the patient contacted the pharmacy for the refill? Yes   Name of medication being requested: methotrexate 2.5 MG tablet   Provider who prescribed the medication: Dr. Olivier    Pharmacy: [facility]    Date medication is needed: asap      Action Taken: Other: rheum     Travel Screening: Not Applicable     Date of Service:

## 2025-07-28 ENCOUNTER — LAB (OUTPATIENT)
Dept: LAB | Facility: CLINIC | Age: 85
End: 2025-07-28
Payer: MEDICARE

## 2025-07-28 DIAGNOSIS — N18.31 CHRONIC KIDNEY DISEASE, STAGE 3A (H): ICD-10-CM

## 2025-07-28 DIAGNOSIS — E03.8 SUBCLINICAL HYPOTHYROIDISM: ICD-10-CM

## 2025-07-28 DIAGNOSIS — Z13.6 SCREENING FOR CARDIOVASCULAR CONDITION: Primary | ICD-10-CM

## 2025-07-28 LAB
CHOLEST SERPL-MCNC: 189 MG/DL
FASTING STATUS PATIENT QL REPORTED: YES
HDLC SERPL-MCNC: 72 MG/DL
LDLC SERPL CALC-MCNC: 105 MG/DL
NONHDLC SERPL-MCNC: 117 MG/DL
TRIGL SERPL-MCNC: 59 MG/DL
TSH SERPL DL<=0.005 MIU/L-ACNC: 3.04 UIU/ML (ref 0.3–4.2)

## 2025-07-28 PROCEDURE — 80061 LIPID PANEL: CPT

## 2025-07-28 PROCEDURE — 36415 COLL VENOUS BLD VENIPUNCTURE: CPT

## 2025-07-28 PROCEDURE — 84443 ASSAY THYROID STIM HORMONE: CPT

## 2025-07-29 ENCOUNTER — INFUSION THERAPY VISIT (OUTPATIENT)
Dept: INFUSION THERAPY | Facility: HOSPITAL | Age: 85
End: 2025-07-29
Attending: INTERNAL MEDICINE
Payer: MEDICARE

## 2025-07-29 ENCOUNTER — RESULTS FOLLOW-UP (OUTPATIENT)
Dept: ENDOCRINOLOGY | Facility: CLINIC | Age: 85
End: 2025-07-29

## 2025-07-29 VITALS
SYSTOLIC BLOOD PRESSURE: 114 MMHG | OXYGEN SATURATION: 98 % | TEMPERATURE: 98.2 F | DIASTOLIC BLOOD PRESSURE: 63 MMHG | RESPIRATION RATE: 16 BRPM | HEART RATE: 64 BPM

## 2025-07-29 DIAGNOSIS — M05.79 RHEUMATOID ARTHRITIS, SEROPOSITIVE, MULTIPLE SITES (H): Primary | ICD-10-CM

## 2025-07-29 DIAGNOSIS — Z79.899 HIGH RISK MEDICATION USE: ICD-10-CM

## 2025-07-29 LAB
ALBUMIN SERPL BCG-MCNC: 4.1 G/DL (ref 3.5–5.2)
ALT SERPL W P-5'-P-CCNC: 10 U/L (ref 0–50)
CREAT SERPL-MCNC: 0.89 MG/DL (ref 0.51–0.95)
EGFRCR SERPLBLD CKD-EPI 2021: 62 ML/MIN/1.73M2
ERYTHROCYTE [DISTWIDTH] IN BLOOD BY AUTOMATED COUNT: 14.9 % (ref 10–15)
HCT VFR BLD AUTO: 34.3 % (ref 35–47)
HGB BLD-MCNC: 11.4 G/DL (ref 11.7–15.7)
MCH RBC QN AUTO: 32.1 PG (ref 26.5–33)
MCHC RBC AUTO-ENTMCNC: 33.2 G/DL (ref 31.5–36.5)
MCV RBC AUTO: 97 FL (ref 78–100)
PLATELET # BLD AUTO: 162 10E3/UL (ref 150–450)
RBC # BLD AUTO: 3.55 10E6/UL (ref 3.8–5.2)
WBC # BLD AUTO: 5.3 10E3/UL (ref 4–11)

## 2025-07-29 PROCEDURE — 250N000028 HC RX JA MOD (INTRAVENOUS), IP 250 OP 636: Mod: JZ,JA | Performed by: INTERNAL MEDICINE

## 2025-07-29 PROCEDURE — 96365 THER/PROPH/DIAG IV INF INIT: CPT

## 2025-07-29 PROCEDURE — 36415 COLL VENOUS BLD VENIPUNCTURE: CPT

## 2025-07-29 PROCEDURE — 84460 ALANINE AMINO (ALT) (SGPT): CPT

## 2025-07-29 PROCEDURE — 82565 ASSAY OF CREATININE: CPT

## 2025-07-29 PROCEDURE — 258N000003 HC RX IP 258 OP 636: Performed by: INTERNAL MEDICINE

## 2025-07-29 PROCEDURE — 85041 AUTOMATED RBC COUNT: CPT

## 2025-07-29 PROCEDURE — 82040 ASSAY OF SERUM ALBUMIN: CPT

## 2025-07-29 RX ORDER — EPINEPHRINE 1 MG/ML
0.3 INJECTION, SOLUTION INTRAMUSCULAR; SUBCUTANEOUS EVERY 5 MIN PRN
OUTPATIENT
Start: 2025-07-31

## 2025-07-29 RX ORDER — DIPHENHYDRAMINE HYDROCHLORIDE 50 MG/ML
50 INJECTION, SOLUTION INTRAMUSCULAR; INTRAVENOUS
Status: DISCONTINUED | OUTPATIENT
Start: 2025-07-29 | End: 2025-07-29 | Stop reason: HOSPADM

## 2025-07-29 RX ORDER — MEPERIDINE HYDROCHLORIDE 50 MG/ML
25 INJECTION INTRAMUSCULAR; INTRAVENOUS; SUBCUTANEOUS
Status: DISCONTINUED | OUTPATIENT
Start: 2025-07-29 | End: 2025-07-29 | Stop reason: HOSPADM

## 2025-07-29 RX ORDER — HEPARIN SODIUM (PORCINE) LOCK FLUSH IV SOLN 100 UNIT/ML 100 UNIT/ML
5 SOLUTION INTRAVENOUS
OUTPATIENT
Start: 2025-07-31

## 2025-07-29 RX ORDER — METHYLPREDNISOLONE SODIUM SUCCINATE 125 MG/2ML
125 INJECTION INTRAMUSCULAR; INTRAVENOUS ONCE
Start: 2025-07-31 | End: 2025-07-31

## 2025-07-29 RX ORDER — DIPHENHYDRAMINE HCL 25 MG
25 CAPSULE ORAL ONCE
Start: 2025-07-31 | End: 2025-07-31

## 2025-07-29 RX ORDER — MEPERIDINE HYDROCHLORIDE 50 MG/ML
25 INJECTION INTRAMUSCULAR; INTRAVENOUS; SUBCUTANEOUS
OUTPATIENT
Start: 2025-07-31

## 2025-07-29 RX ORDER — DIPHENHYDRAMINE HYDROCHLORIDE 50 MG/ML
50 INJECTION, SOLUTION INTRAMUSCULAR; INTRAVENOUS
Start: 2025-07-31

## 2025-07-29 RX ORDER — ALBUTEROL SULFATE 90 UG/1
1-2 INHALANT RESPIRATORY (INHALATION)
Start: 2025-07-31

## 2025-07-29 RX ORDER — HEPARIN SODIUM,PORCINE 10 UNIT/ML
5-20 VIAL (ML) INTRAVENOUS DAILY PRN
OUTPATIENT
Start: 2025-07-31

## 2025-07-29 RX ORDER — METHYLPREDNISOLONE SODIUM SUCCINATE 40 MG/ML
40 INJECTION INTRAMUSCULAR; INTRAVENOUS
Start: 2025-07-31

## 2025-07-29 RX ORDER — ALBUTEROL SULFATE 0.83 MG/ML
2.5 SOLUTION RESPIRATORY (INHALATION)
Status: DISCONTINUED | OUTPATIENT
Start: 2025-07-29 | End: 2025-07-29 | Stop reason: HOSPADM

## 2025-07-29 RX ORDER — DIPHENHYDRAMINE HYDROCHLORIDE 50 MG/ML
25 INJECTION, SOLUTION INTRAMUSCULAR; INTRAVENOUS
Status: DISCONTINUED | OUTPATIENT
Start: 2025-07-29 | End: 2025-07-29 | Stop reason: HOSPADM

## 2025-07-29 RX ORDER — ALBUTEROL SULFATE 90 UG/1
1-2 INHALANT RESPIRATORY (INHALATION)
Status: DISCONTINUED | OUTPATIENT
Start: 2025-07-29 | End: 2025-07-29 | Stop reason: HOSPADM

## 2025-07-29 RX ORDER — DIPHENHYDRAMINE HYDROCHLORIDE 50 MG/ML
25 INJECTION, SOLUTION INTRAMUSCULAR; INTRAVENOUS
Start: 2025-07-31

## 2025-07-29 RX ORDER — ACETAMINOPHEN 325 MG/1
650 TABLET ORAL ONCE
Start: 2025-07-31 | End: 2025-07-31

## 2025-07-29 RX ORDER — METHYLPREDNISOLONE SODIUM SUCCINATE 40 MG/ML
40 INJECTION INTRAMUSCULAR; INTRAVENOUS
Status: DISCONTINUED | OUTPATIENT
Start: 2025-07-29 | End: 2025-07-29 | Stop reason: HOSPADM

## 2025-07-29 RX ORDER — ALBUTEROL SULFATE 0.83 MG/ML
2.5 SOLUTION RESPIRATORY (INHALATION)
OUTPATIENT
Start: 2025-07-31

## 2025-07-29 RX ORDER — EPINEPHRINE 1 MG/ML
0.3 INJECTION, SOLUTION INTRAMUSCULAR; SUBCUTANEOUS EVERY 5 MIN PRN
Status: DISCONTINUED | OUTPATIENT
Start: 2025-07-29 | End: 2025-07-29 | Stop reason: HOSPADM

## 2025-07-29 RX ADMIN — SODIUM CHLORIDE 250 ML: 0.9 INJECTION, SOLUTION INTRAVENOUS at 14:06

## 2025-07-29 RX ADMIN — SODIUM CHLORIDE 500 MG: 9 INJECTION, SOLUTION INTRAVENOUS at 14:06

## 2025-07-29 NOTE — TELEPHONE ENCOUNTER
Endocrine team, would you please update patient that her thyroid hormone level is in ideal range: No change to thyroid hormone regimen.  If fatigue persists, would suggest checking it with rheumatology and primary care also.

## 2025-07-29 NOTE — PROGRESS NOTES
"Infusion Nursing Note:  Diann Low presents today for Orencia infusion.      Note: Pt states no pain and that medication helps her keep \"stable\".    Premeds Given: No premeds per pt request    Intravenous Access:  Peripheral IV placed.    Treatment Conditions:  Biological Infusion Checklist:  ~~~ NOTE: If the patient answers yes to any of the questions below, hold the infusion and contact ordering provider or on-call provider.    Have you recently had an elevated temperature, fever, chills, productive cough, coughing for 3 weeks or longer or hemoptysis,  abnormal vital signs, night sweats,  chest pain or have you noticed a decrease in your appetite, unexplained weight loss or fatigue? No  Do you have any open wounds or new incisions? No  Do you have any upcoming hospitalizations or surgeries? Does not include esophagogastroduodenoscopy, colonoscopy, endoscopic retrograde cholangiopancreatography (ERCP), endoscopic ultrasound (EUS), dental procedures or joint aspiration/steroid injections No  Do you currently have any signs of illness or infection or are you on any antibiotics? No  Have you had any new, sudden or worsening abdominal pain? No  Have you or anyone in your household received a live vaccination in the past 4 weeks? Please note: No live vaccines while on biologic/chemotherapy until 6 months after the last treatment. Patient can receive the flu vaccine (shot only), pneumovax and the Covid vaccine. It is optimal for the patient to get these vaccines mid cycle, but they can be given at any time as long as it is not on the day of the infusion. No  Have you recently been diagnosed with any new nervous system diseases (ie. Multiple sclerosis, Guillain [place], seizures, neurological changes) or cancer diagnosis? Are you on any form of radiation or chemotherapy? No  Are you pregnant or breast feeding or do you have plans of pregnancy in the future? No  Have you been having any signs of worsening depression or " suicidal ideations?  (benlysta only) No  Have there been any other new onset medical symptoms? No  Have you had any new blood clots? (IVIG only) No    Post Infusion Assessment:  Patient tolerated infusion without incident.     Discharge Plan:   Patient discharged in stable condition accompanied by: delores DE LEON RN

## 2025-07-30 ENCOUNTER — TELEPHONE (OUTPATIENT)
Dept: RHEUMATOLOGY | Facility: CLINIC | Age: 85
End: 2025-07-30
Payer: MEDICARE

## 2025-07-30 RX ORDER — FOLIC ACID 1 MG/1
1 TABLET ORAL 2 TIMES DAILY
Qty: 180 TABLET | Refills: 0 | Status: SHIPPED | OUTPATIENT
Start: 2025-07-30

## 2025-07-30 RX ORDER — METHOTREXATE 2.5 MG/1
10 TABLET ORAL
Qty: 48 TABLET | Refills: 0 | Status: SHIPPED | OUTPATIENT
Start: 2025-07-30

## 2025-07-30 NOTE — TELEPHONE ENCOUNTER
Pt returning call, RN unavailable at the moment.   Pt reports the main reason she was wondering what Dr. Olivier thought about her lab results is because she has been noticing increased fatigue throughout the day. She saw on MyChart that some of her lab tests were abnormal and thought they might explain why she has been feeling so tired?   Pt was also wondering if her results would mean that any of her medications should be changed? Pt will wait for call back from care team.

## 2025-07-31 ENCOUNTER — TELEPHONE (OUTPATIENT)
Dept: RHEUMATOLOGY | Facility: CLINIC | Age: 85
End: 2025-07-31
Payer: MEDICARE

## 2025-07-31 DIAGNOSIS — M05.79 RHEUMATOID ARTHRITIS, SEROPOSITIVE, MULTIPLE SITES (H): Primary | ICD-10-CM

## 2025-07-31 RX ORDER — METHOTREXATE 2.5 MG/1
10 TABLET ORAL
Qty: 6 TABLET | Refills: 0 | Status: SHIPPED | OUTPATIENT
Start: 2025-07-31

## 2025-07-31 NOTE — TELEPHONE ENCOUNTER
Lm for pt to c/b to discuss below.     Upon review pt's primary addressed labs already with pt as well.

## 2025-07-31 NOTE — TELEPHONE ENCOUNTER
[facility]    Phone Message [phone]    May a detailed message be left on voicemail: yes     Reason for Call: Medication Question or concern regarding medication   Prescription Clarification  Name of Medication: Methotrexate 2.5 MG tablet  Prescribing Provider: Charline   Pharmacy: [facility] in [place]   What on the order needs clarification? Rx for Methotrexate sent to [facility] will not be shipped to patient until August 4th and takes 7-10 days to be received. Patient is requesting a Rx to be sent to pharmacy above to last her until her shipment arrives. Patient only takes the medication on Wednesday, and has already taken her dose for this week, but only has 2 tablets left for next week.    Patient also returning call about results from previous encounter. Writer tried backline multiple times with no answer.     Patient will be unavailable today from 1-3:30pm - so please return call outside of those hours.      Action Taken: Message routed to:  Other: RHEUMATOLOGY SUPPORT POOL    Travel Screening: Not Applicable     Date of Service: N/A

## 2025-08-19 ENCOUNTER — TELEPHONE (OUTPATIENT)
Dept: ENDOCRINOLOGY | Facility: CLINIC | Age: 85
End: 2025-08-19
Payer: MEDICARE

## 2025-08-19 DIAGNOSIS — E03.8 SUBCLINICAL HYPOTHYROIDISM: ICD-10-CM

## 2025-08-19 RX ORDER — LEVOTHYROXINE SODIUM 50 UG/1
50 TABLET ORAL DAILY
Qty: 90 TABLET | Refills: 0 | Status: SHIPPED | OUTPATIENT
Start: 2025-08-19

## (undated) DEVICE — FORCEP BIOPSY 2.3MM DISP COATED 000388

## (undated) DEVICE — TUBING SUCTION MEDI-VAC 1/4"X20' N620A

## (undated) DEVICE — SUCTION MANIFOLD NEPTUNE 2 SYS 1 PORT 702-025-000

## (undated) DEVICE — SOL WATER IRRIG 1000ML BOTTLE 2F7114